# Patient Record
Sex: MALE | Race: BLACK OR AFRICAN AMERICAN | NOT HISPANIC OR LATINO | Employment: FULL TIME | URBAN - METROPOLITAN AREA
[De-identification: names, ages, dates, MRNs, and addresses within clinical notes are randomized per-mention and may not be internally consistent; named-entity substitution may affect disease eponyms.]

---

## 2018-03-29 ENCOUNTER — HOSPITAL ENCOUNTER (EMERGENCY)
Facility: HOSPITAL | Age: 38
Discharge: HOME | End: 2018-03-29
Attending: EMERGENCY MEDICINE
Payer: COMMERCIAL

## 2018-03-29 VITALS
WEIGHT: 203 LBS | HEART RATE: 100 BPM | SYSTOLIC BLOOD PRESSURE: 140 MMHG | TEMPERATURE: 100 F | RESPIRATION RATE: 20 BRPM | OXYGEN SATURATION: 97 % | DIASTOLIC BLOOD PRESSURE: 94 MMHG | BODY MASS INDEX: 34.84 KG/M2

## 2018-03-29 DIAGNOSIS — R03.0 ELEVATED BLOOD PRESSURE READING: ICD-10-CM

## 2018-03-29 DIAGNOSIS — R05.9 COUGH: ICD-10-CM

## 2018-03-29 DIAGNOSIS — J02.9 ACUTE PHARYNGITIS, UNSPECIFIED ETIOLOGY: Primary | ICD-10-CM

## 2018-03-29 LAB
FLUAV AG SPEC QL IA: NEGATIVE
FLUBV AG SPEC QL IA: NEGATIVE
SPECIMEN SOURCE: NORMAL

## 2018-03-29 PROCEDURE — 99284 EMERGENCY DEPT VISIT MOD MDM: CPT | Mod: 25

## 2018-03-29 PROCEDURE — 96372 THER/PROPH/DIAG INJ SC/IM: CPT

## 2018-03-29 PROCEDURE — 87400 INFLUENZA A/B EACH AG IA: CPT | Mod: 59

## 2018-03-29 PROCEDURE — 25000003 PHARM REV CODE 250: Performed by: EMERGENCY MEDICINE

## 2018-03-29 PROCEDURE — 63600175 PHARM REV CODE 636 W HCPCS: Performed by: EMERGENCY MEDICINE

## 2018-03-29 RX ORDER — BETAMETHASONE SODIUM PHOSPHATE AND BETAMETHASONE ACETATE 3; 3 MG/ML; MG/ML
12 INJECTION, SUSPENSION INTRA-ARTICULAR; INTRALESIONAL; INTRAMUSCULAR; SOFT TISSUE
Status: COMPLETED | OUTPATIENT
Start: 2018-03-29 | End: 2018-03-29

## 2018-03-29 RX ORDER — ACETAMINOPHEN 500 MG
1000 TABLET ORAL
Status: COMPLETED | OUTPATIENT
Start: 2018-03-29 | End: 2018-03-29

## 2018-03-29 RX ORDER — IBUPROFEN 200 MG
800 TABLET ORAL
Status: COMPLETED | OUTPATIENT
Start: 2018-03-29 | End: 2018-03-29

## 2018-03-29 RX ORDER — NAPROXEN 500 MG/1
500 TABLET ORAL 2 TIMES DAILY WITH MEALS
Qty: 20 TABLET | Refills: 0 | Status: SHIPPED | OUTPATIENT
Start: 2018-03-29 | End: 2019-08-25

## 2018-03-29 RX ORDER — TRAMADOL HYDROCHLORIDE 50 MG/1
50 TABLET ORAL EVERY 6 HOURS PRN
Qty: 12 TABLET | Refills: 0 | Status: SHIPPED | OUTPATIENT
Start: 2018-03-29 | End: 2019-08-25

## 2018-03-29 RX ORDER — CETIRIZINE HYDROCHLORIDE 10 MG/1
10 TABLET ORAL DAILY
Qty: 30 TABLET | Refills: 0 | Status: SHIPPED | OUTPATIENT
Start: 2018-03-29 | End: 2019-08-25

## 2018-03-29 RX ORDER — CLONIDINE HYDROCHLORIDE 0.1 MG/1
0.1 TABLET ORAL
Status: DISCONTINUED | OUTPATIENT
Start: 2018-03-29 | End: 2018-03-29

## 2018-03-29 RX ADMIN — BETAMETHASONE SODIUM PHOSPHATE AND BETAMETHASONE ACETATE 12 MG: 3; 3 INJECTION, SUSPENSION INTRA-ARTICULAR; INTRALESIONAL; INTRAMUSCULAR at 06:03

## 2018-03-29 RX ADMIN — PENICILLIN G BENZATHINE 1.2 MILLION UNITS: 1200000 INJECTION, SUSPENSION INTRAMUSCULAR at 06:03

## 2018-03-29 RX ADMIN — ACETAMINOPHEN 1000 MG: 500 TABLET, FILM COATED ORAL at 06:03

## 2018-03-29 RX ADMIN — IBUPROFEN 800 MG: 200 TABLET, FILM COATED ORAL at 06:03
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
## 2019-04-26 ENCOUNTER — HOSPITAL ENCOUNTER (EMERGENCY)
Facility: HOSPITAL | Age: 39
Discharge: HOME | End: 2019-04-26
Attending: EMERGENCY MEDICINE
Payer: MEDICAID

## 2019-04-26 VITALS
TEMPERATURE: 98 F | SYSTOLIC BLOOD PRESSURE: 169 MMHG | OXYGEN SATURATION: 99 % | HEART RATE: 95 BPM | DIASTOLIC BLOOD PRESSURE: 93 MMHG | BODY MASS INDEX: 33.74 KG/M2 | HEIGHT: 64 IN | WEIGHT: 197.63 LBS | RESPIRATION RATE: 18 BRPM

## 2019-04-26 DIAGNOSIS — S62.355A CLOSED NONDISPLACED FRACTURE OF SHAFT OF FOURTH METACARPAL BONE OF LEFT HAND, INITIAL ENCOUNTER: Primary | ICD-10-CM

## 2019-04-26 PROCEDURE — 99283 EMERGENCY DEPT VISIT LOW MDM: CPT | Mod: 25,ER

## 2019-04-26 PROCEDURE — 29125 APPL SHORT ARM SPLINT STATIC: CPT | Mod: LT,ER

## 2019-04-26 RX ORDER — HYDROCODONE BITARTRATE AND ACETAMINOPHEN 5; 325 MG/1; MG/1
1 TABLET ORAL EVERY 6 HOURS PRN
Qty: 15 TABLET | Refills: 0 | Status: SHIPPED | OUTPATIENT
Start: 2019-04-26 | End: 2019-08-25

## 2019-04-26 NOTE — ED NOTES
Pt states [employer] insurance and will f/u with [facility] orth[facility]. Pt stable, in NAD, and states no further needs at this time.MD aware of vitals.  Pt to be d/c'd home.

## 2019-04-26 NOTE — ED PROVIDER NOTES
Encounter Date: 4/26/2019       History     Chief Complaint   Patient presents with    Hand Injury     left hand, trip and fall 2 days ago      Patient is a 38-year-old male with no significant past medical history, who presents today for her emergency department today with complaints of left hand pain.  Patient states that he fell onto that hand 2 days ago and has had pain since.  He has had progressive swelling of the hand as well.  He has had no prior evaluation in no prior treatment.  Pain is worse with palpation and movement.  Pain is severe in severity and constant in duration        Review of patient's allergies indicates:  No Known Allergies  History reviewed. No pertinent past medical history.  History reviewed. No pertinent surgical history.  Family History   Problem Relation Age of Onset    Hypertension Mother     Diabetes Mother     Hypertension Father      Social History     Tobacco Use    Smoking status: Current Every Day Smoker     Packs/day: 1.00     Types: Cigarettes    Smokeless tobacco: Never Used   Substance Use Topics    Alcohol use: No    Drug use: No     Review of Systems     Constitutional: No fevers, no fatigue  HENT: No headache, no facial pain, no hearing loss  Eyes: No vision changes, no eye pain  Neck/Back: No neck pain, no back pain  Cardiovascular: No chest pain, no palpitations, no syncope  Respiratory: no SOB, no cough  Abdominal: no abdominal pain, no N/V  Genitourinary: no pelvic pain, no genital pain  Musculoskeletal:  Left hand pain/swelling   Neurological: No numbness, no paresthesias, no weakness, no LOC      Physical Exam     Initial Vitals [04/26/19 1050]   BP Pulse Resp Temp SpO2   (!) 169/93 95 18 97.5 °F (36.4 °C) 99 %      MAP       --         Physical Exam     Constitutional: Awake, alert, NAD  HENT: normocephalic, no facial bone tenderness, no evidence of basilar skull fx  Eyes: PERRL, EOM, normal conjunctiva  Neck: Trachea midline, nontender, full  ROM  Cardiovascular: RRR, 2+ palpable pulses in all 4 extremities  Pulmonary: Non-labored respirations, equal bilateral breath sounds, LCTAB  Chest Wall: No tenderness, no deformity  Abdominal: Soft, nontender, nondistended  Back: Nontender, no step-offs  Musculoskeletal:  Swelling to the entire left hand with tenderness most severe at the ring finger metacarpal neurovascularly intact; extension of left finger intact; patient reluctant to fully flex finger due to pain   Neurological: AAO x4, GCS 15, maintaining airway and answering questions appropriately, no focal deficits  Skin:  Skin intact with no lacerations      ED Course   Orthopedic Injury  Date/Time: 4/26/2019 5:00 PM  Performed by: Derrell Padilla MD  Authorized by: Derrell Padilla MD     Injury:     Injury location:  Hand    Location details:  Left hand    Injury type:  Fracture      Pre-procedure assessment:     Neurovascular status: Neurovascularly intact      Distal perfusion: normal      Neurological function: normal      Range of motion: reduced      Patient sedated?: No        Selections made in this section will also lock the Injury type section above.:     Manipulation performed?: No      Immobilization:  Splint    Splint type:  Ulnar gutter    Supplies used:  Ortho-Glass    Complications: No    Post-procedure assessment:     Neurovascular status: Neurovascularly intact      Distal perfusion: normal      Neurological function: normal      Range of motion: splinted      Patient tolerance:  Patient tolerated the procedure well with no immediate complications      Labs Reviewed - No data to display       Imaging Results          X-Ray Hand 3 View Left (Final result)  Result time 04/26/19 11:59:59    Final result by KARINE Barrientos Sr., MD (04/26/19 11:59:59)                 Impression:      1. There is an acute appearing oblique fracture in the diaphyseal portion of the 4th metacarpal.  2. There is mild prominence of the soft tissue thickness in the  dorsum of the left hand.      Electronically signed by: Praveen Barrientos MD  Date:    04/26/2019  Time:    11:59             Narrative:    EXAMINATION:  XR HAND COMPLETE 3 VIEW LEFT    CLINICAL HISTORY:  left hand pain;    COMPARISON:  None    FINDINGS:  There is an acute appearing oblique fracture in the diaphyseal portion of the 4th metacarpal.  There is no dislocation.  There is mild prominence of the soft tissue thickness in the dorsum of the left hand.                              Patient was offered pain medication here in the emergency department such as Tylenol or ibuprofen, but patient declined.  Patient was not given any narcotics because he is driving home.  Patient was given a prescription for hydrocodone and advised to follow up with Orthopedics.  Orthopedic information given to patient discharge                          Clinical Impression:   Final diagnoses:  [S62.355A] Closed nondisplaced fracture of shaft of fourth metacarpal bone of left hand, initial encounter (Primary)    Patient discharged home in stable condition in an ulnar gutter splint with instructions to follow up with Orthopedics and a prescription for hydrocodone; ER return precautions provided                             Derrell Padilla MD  04/26/19 [id number]

## 2019-06-27 ENCOUNTER — HOSPITAL ENCOUNTER (EMERGENCY)
Facility: HOSPITAL | Age: 39
Discharge: HOME | End: 2019-06-27
Attending: EMERGENCY MEDICINE
Payer: MEDICAID

## 2019-06-27 VITALS
OXYGEN SATURATION: 99 % | DIASTOLIC BLOOD PRESSURE: 99 MMHG | HEART RATE: 100 BPM | SYSTOLIC BLOOD PRESSURE: 169 MMHG | RESPIRATION RATE: 18 BRPM | BODY MASS INDEX: 33.76 KG/M2 | TEMPERATURE: 97 F | WEIGHT: 196.63 LBS

## 2019-06-27 DIAGNOSIS — L29.9 GENERALIZED PRURITUS: Primary | ICD-10-CM

## 2019-06-27 PROCEDURE — 25000003 PHARM REV CODE 250: Mod: ER | Performed by: EMERGENCY MEDICINE

## 2019-06-27 PROCEDURE — 99283 EMERGENCY DEPT VISIT LOW MDM: CPT | Mod: ER

## 2019-06-27 RX ORDER — HYDROXYZINE PAMOATE 25 MG/1
25 CAPSULE ORAL EVERY 4 HOURS PRN
Qty: 30 CAPSULE | Refills: 0 | Status: SHIPPED | OUTPATIENT
Start: 2019-06-27 | End: 2019-08-25

## 2019-06-27 RX ORDER — HYDROXYZINE PAMOATE 25 MG/1
50 CAPSULE ORAL
Status: COMPLETED | OUTPATIENT
Start: 2019-06-27 | End: 2019-06-27

## 2019-06-27 RX ADMIN — HYDROXYZINE PAMOATE 50 MG: 25 CAPSULE ORAL at 06:06

## 2019-06-27 NOTE — ED PROVIDER NOTES
Encounter Date: 6/27/2019       History     Chief Complaint   Patient presents with    Allergic Reaction     c/o possible allergic reaction. states swelling to left arm w/ bruises and bumps to neck and bilateral arms. unknown of what possibly allergic to.      Filemon La is a 38 y.o. male w/ no significant PMH p/w two days of constant, generalized itching that onset gradually after awakening yesterday AM at home. It originally started just on his left arm.  He has associated hives.  No prior episodes. He is not aware of any new exposures but he is exposed frequently to dust at work.         Review of patient's allergies indicates:  No Known Allergies  History reviewed. No pertinent past medical history.  History reviewed. No pertinent surgical history.  Family History   Problem Relation Age of Onset    Hypertension Mother     Diabetes Mother     Hypertension Father      Social History     Tobacco Use    Smoking status: Current Every Day Smoker     Packs/day: 1.00     Types: Cigarettes    Smokeless tobacco: Never Used   Substance Use Topics    Alcohol use: No    Drug use: No     Review of Systems   Constitutional: Negative.  Negative for fever.   HENT: Negative.    Eyes: Negative.    Respiratory: Negative.  Negative for cough and shortness of breath.    Cardiovascular: Negative.    Gastrointestinal: Negative.    Endocrine: Negative.    Genitourinary: Negative.    Musculoskeletal: Negative.    Skin: Negative.    Allergic/Immunologic: Negative.    Neurological: Negative.    Hematological: Negative.    Psychiatric/Behavioral: Negative.    All other systems reviewed and are negative.      Physical Exam     Initial Vitals [06/27/19 0555]   BP Pulse Resp Temp SpO2   (!) 191/91 99 20 97.4 °F (36.3 °C) 99 %      MAP       --         Physical Exam    Nursing note and vitals reviewed.  Constitutional: He appears well-developed and well-nourished. No distress.   HENT:   Head: Normocephalic and atraumatic.   Eyes:  Conjunctivae and EOM are normal. Pupils are equal, round, and reactive to light.   Neck: Neck supple.   Cardiovascular: Normal rate, regular rhythm, normal heart sounds and intact distal pulses.   Pulmonary/Chest: Breath sounds normal. No respiratory distress. He has no wheezes. He has no rhonchi. He has no rales.   Abdominal: Soft. He exhibits no distension. There is no tenderness.   Musculoskeletal: Normal range of motion. He exhibits no edema.   Neurological: He is alert and oriented to person, place, and time. He has normal strength.   Skin: Skin is warm and dry. Capillary refill takes less than 2 seconds. Rash (scattered erythematous rash over right anterolateral neck, both arms medially, and patches over the torso, with urticaria from 3 to 40 mm) noted.   No burrow marks or contaminants noted   Psychiatric: He has a normal mood and affect. His behavior is normal. Judgment and thought content normal.         ED Course   Procedures  Labs Reviewed - No data to display       Imaging Results    None        Presentation is most consistent with contact dermatitis to exposure at home or work, versus allergen exposure with reaction without anaphylaxis.  Rash is not classic for infestation but stated that if no improvement with supportive treatment and hygiene, will need to consider treatment such as permethrin.  Recommended Benadryl at night, looking for source at home, nonallergenic chemicals when possible, removing dust/chemicals from work as soon as feasible after exposure.                        Clinical Impression:       ICD-10-CM ICD-9-CM   1. Generalized pruritus L29.9 698.9         Disposition:   Disposition: Discharged  Condition: Stable                        Wesley Carcamo MD  06/27/19 [id number]

## 2019-08-25 ENCOUNTER — HOSPITAL ENCOUNTER (EMERGENCY)
Facility: HOSPITAL | Age: 39
Discharge: HOME | End: 2019-08-25
Attending: EMERGENCY MEDICINE
Payer: MEDICAID

## 2019-08-25 VITALS
HEART RATE: 107 BPM | BODY MASS INDEX: 34.16 KG/M2 | WEIGHT: 199 LBS | SYSTOLIC BLOOD PRESSURE: 128 MMHG | DIASTOLIC BLOOD PRESSURE: 87 MMHG | OXYGEN SATURATION: 97 % | RESPIRATION RATE: 20 BRPM | TEMPERATURE: 100 F

## 2019-08-25 DIAGNOSIS — J06.9 UPPER RESPIRATORY TRACT INFECTION, UNSPECIFIED TYPE: ICD-10-CM

## 2019-08-25 DIAGNOSIS — Z72.0 TOBACCO ABUSE: Primary | ICD-10-CM

## 2019-08-25 DIAGNOSIS — R05.9 COUGH: ICD-10-CM

## 2019-08-25 DIAGNOSIS — R19.7 DIARRHEA, UNSPECIFIED TYPE: ICD-10-CM

## 2019-08-25 LAB
DEPRECATED S PYO AG THROAT QL EIA: NEGATIVE
INFLUENZA A, MOLECULAR: NEGATIVE
INFLUENZA B, MOLECULAR: NEGATIVE
SPECIMEN SOURCE: NORMAL

## 2019-08-25 PROCEDURE — 99284 EMERGENCY DEPT VISIT MOD MDM: CPT | Mod: 25,ER

## 2019-08-25 PROCEDURE — 87081 CULTURE SCREEN ONLY: CPT

## 2019-08-25 PROCEDURE — 87880 STREP A ASSAY W/OPTIC: CPT | Mod: ER,59

## 2019-08-25 PROCEDURE — 25000003 PHARM REV CODE 250: Mod: ER | Performed by: EMERGENCY MEDICINE

## 2019-08-25 PROCEDURE — 87147 CULTURE TYPE IMMUNOLOGIC: CPT

## 2019-08-25 PROCEDURE — 87502 INFLUENZA DNA AMP PROBE: CPT | Mod: ER

## 2019-08-25 RX ORDER — IBUPROFEN 200 MG
600 TABLET ORAL
Status: COMPLETED | OUTPATIENT
Start: 2019-08-25 | End: 2019-08-25

## 2019-08-25 RX ORDER — ACETAMINOPHEN 500 MG
500 TABLET ORAL
Status: COMPLETED | OUTPATIENT
Start: 2019-08-25 | End: 2019-08-25

## 2019-08-25 RX ADMIN — IBUPROFEN 600 MG: 200 TABLET, FILM COATED ORAL at 09:08

## 2019-08-25 RX ADMIN — ACETAMINOPHEN 500 MG: 500 TABLET ORAL at 09:08

## 2019-08-25 NOTE — ED PROVIDER NOTES
History     Chief Complaint   Patient presents with    Generalized Body Aches     with chills       Review of patient's allergies indicates:  No Known Allergies    History of Present Illness   HPI    8/25/2019, 9:26 AM  The history is provided by the patient    Filemon La is a 38 y.o. male presenting to the ED for chills, body aches, cough, diarrhea and sore throat.  Patient's 3 year old daughter had similar episodes.    Prior treatment includes:  Motrin 400 mg last night.  Patient started with chills last night.  He had generalized body aches and pains.  He notes that his throat is sore.  It is painful for him to swallow.  Patient has a mild frontal headache not associated with neck pain or photophobia.  It is throbbing in nature.  It is not the worse headache of his life.  Patient is a smoker.  He does have a productive cough with green sputum.  Patient denies any rhinorrhea, nausea, vomiting, abdominal pain, numbness or weakness to 1 side.      Arrival mode:  Personal Vehicle    PCP: Primary Doctor No     Allergies:  Review of patient's allergies indicates:  No Known Allergies    Past Medical History:  History reviewed. No pertinent past medical history.    Past Surgical History:  History reviewed. No pertinent surgical history.      Family History:  Family History   Problem Relation Age of Onset    Hypertension Mother     Diabetes Mother     Hypertension Father        Social History:  Social History     Tobacco Use    Smoking status: Current Every Day Smoker     Packs/day: 1.00     Types: Cigarettes    Smokeless tobacco: Never Used   Substance and Sexual Activity    Alcohol use: No    Drug use: No    Sexual activity: Not on file        Review of Systems   Review of Systems   Constitutional: Positive for chills and fatigue. Negative for fever.   HENT: Positive for sinus pain and sore throat. Negative for congestion, sinus pressure, trouble swallowing and voice change.    Eyes: Negative for  photophobia.   Respiratory: Negative for shortness of breath.    Cardiovascular: Negative for chest pain.   Gastrointestinal: Positive for diarrhea (Times once). Negative for abdominal pain, nausea and vomiting.   Genitourinary: Negative for decreased urine volume, dysuria, frequency and urgency.   Musculoskeletal: Negative for back pain, neck pain and neck stiffness.   Skin: Negative for rash.   Neurological: Positive for headaches (Frontal, mild,  Not the most severe). Negative for weakness.   Hematological: Does not bruise/bleed easily.   Psychiatric/Behavioral: The patient is not nervous/anxious.           Physical Exam     Initial Vitals [08/25/19 0924]   BP Pulse Resp Temp SpO2   (!) 142/91 (!) 118 20 99.6 °F (37.6 °C) 96 %      MAP       --          Physical Exam    Nursing Notes and Vital Signs Reviewed.  Constitutional: Patient is in no acute distress.  Smells of smoke. Well-developed and well-nourished.  Head: Atraumatic. Normocephalic.  Eyes: PERRL. EOM intact. Conjunctivae are not pale. No scleral icterus.  ENT: Mucous membranes are moist. Oropharynx is clear and symmetric.  tympanic membranes pearly gray bilaterally. Oropharynx does have some erythema.  No exudate. Tonsils are symmetric. Slight nasal congestion noted.  Neck: Supple. Full ROM. No lymphadenopathy.  Cardiovascular: Tachycardic. Regular rate. Regular rhythm. No murmurs, rubs, or gallops. Distal pulses are 2+ and symmetric.  Pulmonary/Chest: No respiratory distress. Clear to auscultation bilaterally. No wheezing or rales.  Abdominal: Soft and non-distended.  There is no tenderness.  No rebound, guarding, or rigidity. Good bowel sounds.  Genitourinary: No CVA tenderness  Musculoskeletal: Moves all extremities. No obvious deformities. No edema. No calf tenderness.  Skin: Warm and dry.  Neurological:  Alert, awake, and appropriate.  Normal speech.  No acute focal neurological deficits are appreciated. Cranial nerves 2-12 intact.  Psychiatric:  Normal affect. Good eye contact. Appropriate in content.     ED Course     Procedures    ED Vital Signs:  Vitals:    08/25/19 0924 08/25/19 1027 08/25/19 1044   BP: (!) 142/91  128/87   Pulse: (!) 118 (!) 117 107   Resp: 20  20   Temp: 99.6 °F (37.6 °C)     TempSrc: Oral     SpO2: 96%  97%   Weight: 90.3 kg (198 lb 15.8 oz)         Abnormal Lab Results:  Labs Reviewed   THROAT SCREEN, RAPID   INFLUENZA A & B BY MOLECULAR   CULTURE, STREP A,  THROAT        All Lab Results:  Results for orders placed or performed during the hospital encounter of 08/25/19   Rapid strep screen   Result Value Ref Range    Rapid Strep A Screen Negative Negative   Influenza A & B by Molecular   Result Value Ref Range    Influenza A, Molecular Negative Negative    Influenza B, Molecular Negative Negative    Flu A & B Source NP              Imaging Results:  Imaging Results          X-Ray Chest PA And Lateral (Final result)  Result time 08/25/19 10:15:12    Final result by Johnnie Arias MD (08/25/19 10:15:12)                 Impression:      1.  Negative for acute process involving the chest.    2.  Stable findings as noted above.      Electronically signed by: Johnnie Arias MD  Date:    08/25/2019  Time:    10:15             Narrative:    EXAMINATION:  XR CHEST PA AND LATERAL    CLINICAL HISTORY:  Cough    COMPARISON:  March 28, 2018    FINDINGS:  The lungs are clear. The cardiac silhouette size is normal. The trachea is midline and the mediastinal width is normal. Negative for focal infiltrate, effusion or pneumothorax. Pulmonary vasculature is normal. Negative for osseous abnormalities. Tortuous aorta.  Marginal spondylosis.  Eventration of the hemidiaphragms.  Degenerative changes of both acromioclavicular joints.                                 The Emergency Provider reviewed the vital signs and test results, which are outlined above.     ED Discussion     11:06 AM  Reassessment: Dr. Chery reassessed the pt.  Patient states that  he feels much better.  No SIRS criteria.  Patient reports that he feels much better.  I discussed with patient the importance of oral rehydration.  Discussed fever control, indications to return to the emergency department.  Patient verbalized understanding.  The pt is resting comfortably and is NAD.  Pt states their sx have improved. Discussed test results, shared treatment plan, specific conditions for return, and the need for f/u.  Answered their questions at this time.  Pt understands and agrees to the plan.  The pt has remained hemodynamically stable through ED course and is stable for discharge.      I discussed with patient and/or family/caretaker that evaluation in the ED does not suggest any emergent or life threatening medical conditions requiring immediate intervention beyond what was provided in the ED, and I believe patient is safe for discharge.  Regardless, an unremarkable evaluation in the ED does not preclude the development or presence of a serious of life threatening condition. As such, patient was instructed to return immediately for any worsening or change in current symptoms.    Patient presents with upper respiratory and flulike symptoms. Based on my assessment in the ED, I do not suspect any respiratory, airway, pulmonary, cardiovascular (including myocarditis), metabolic, CNS, medical, or surgical emergency medical condition. I have discussed with the patient and/or caregiver signs and symptoms for secondary bacterial infections, such as pneumonia. I believe that the patient's symptoms are most consistent with a viral illness, possibly influenza. Patient is safe for discharge home with conservative therapy.        ED Medication(s):  Medications   ibuprofen tablet 600 mg (600 mg Oral Given 8/25/19 0946)   acetaminophen tablet 500 mg (500 mg Oral Given 8/25/19 0946)          Medication List      You have not been prescribed any medications.         Follow-up Information     [facility]  "Shilo In 2 days.    Why:  Return to emergency department for:  Stiff neck, severe headache, difficulty breathing, weakness, chest pain or other concerns.  Contact information:  [id number]  [place] [id number] [phone]                        MIPS Measures     Smoker? Yes     Hypertension: Pre-hypertension/Hypertension: The pt has been informed that they may have pre-hypertension or hypertension based on a blood pressure reading in the ED. I recommend that the pt call the PCP listed on their discharge instructions or a physician of their choice this week to arrange f/u for further evaluation of possible pre-hypertension or hypertension.        Medical Decision Making     Medical Decision Making:   Clinical Tests:   Lab Tests: Ordered and Reviewed  Radiological Study: Ordered and Reviewed       Additional MDM:   Smoking Cessation: The patient is a smoker. The patient was counseled on smoking cessation for: 3 minutes. The patient was counseled on tobacco related  health complications. Appropriate patient literature was given to the patient concerning tobacco cessation.        MDM  Reviewed: nursing note and vitals          Portions of this note may have been created with voice recognition software. Occasional "wrong-word" or "sound-a-like" substitutions may have occurred due to the inherent limitations of voice recognition software. Please, read the note carefully and recognize, using context, where substitutions have occurred.        Clinical Impression       ICD-10-CM ICD-9-CM   1. Tobacco abuse Z72.0 305.1   2. Cough R05 786.2   3. Upper respiratory tract infection, unspecified type J06.9 465.9   4. Diarrhea, unspecified type R19.7 787.91            Disposition: Discharge to home  Patient condition: Stable           Luci Chery, DO  08/25/19 1412    "

## 2019-08-26 LAB — BACTERIA THROAT CULT: ABNORMAL

## 2020-11-11 ENCOUNTER — HOSPITAL ENCOUNTER (EMERGENCY)
Facility: HOSPITAL | Age: 40
Discharge: HOME | End: 2020-11-11
Attending: EMERGENCY MEDICINE
Payer: MEDICAID

## 2020-11-11 VITALS
TEMPERATURE: 98 F | HEART RATE: 77 BPM | WEIGHT: 182.56 LBS | HEIGHT: 64 IN | RESPIRATION RATE: 20 BRPM | BODY MASS INDEX: 31.17 KG/M2 | DIASTOLIC BLOOD PRESSURE: 77 MMHG | SYSTOLIC BLOOD PRESSURE: 138 MMHG | OXYGEN SATURATION: 97 %

## 2020-11-11 DIAGNOSIS — Z20.822 EXPOSURE TO COVID-19 VIRUS: ICD-10-CM

## 2020-11-11 DIAGNOSIS — J06.9 VIRAL URI WITH COUGH: Primary | ICD-10-CM

## 2020-11-11 DIAGNOSIS — B34.9 VIRAL SYNDROME: ICD-10-CM

## 2020-11-11 LAB — SARS-COV-2 RDRP RESP QL NAA+PROBE: NEGATIVE

## 2020-11-11 PROCEDURE — 99283 EMERGENCY DEPT VISIT LOW MDM: CPT | Mod: ER

## 2020-11-11 PROCEDURE — U0002 COVID-19 LAB TEST NON-CDC: HCPCS | Mod: ER

## 2020-11-11 RX ORDER — PROMETHAZINE HYDROCHLORIDE AND DEXTROMETHORPHAN HYDROBROMIDE 6.25; 15 MG/5ML; MG/5ML
5 SYRUP ORAL EVERY 6 HOURS PRN
Qty: 120 ML | Refills: 0 | Status: SHIPPED | OUTPATIENT
Start: 2020-11-11 | End: 2020-11-21

## 2020-11-11 NOTE — Clinical Note
"Filemon Nicholson (Brian)ey was seen and treated in our emergency department on 11/11/2020.  He may return to work on 11/12/2020.       If you have any questions or concerns, please don't hesitate to call.      luis miguel brandt rn RN    "

## 2020-11-11 NOTE — ED PROVIDER NOTES
Encounter Date: 11/11/2020       History     Chief Complaint   Patient presents with    Nasal Congestion       URI  The primary symptoms include fever, sore throat, cough and myalgias. Primary symptoms do not include nausea or rash. The current episode started yesterday. The fever began yesterday.   The sore throat began yesterday.   The cough began yesterday.   The myalgias are not associated with weakness.   The onset of the illness is associated with exposure to sick contacts.     Review of patient's allergies indicates:  No Known Allergies  History reviewed. No pertinent past medical history.  History reviewed. No pertinent surgical history.  Family History   Problem Relation Age of Onset    Hypertension Mother     Diabetes Mother     Hypertension Father      Social History     Tobacco Use    Smoking status: Current Every Day Smoker     Packs/day: 1.00     Types: Cigarettes    Smokeless tobacco: Never Used   Substance Use Topics    Alcohol use: No    Drug use: No     Review of Systems   Constitutional: Positive for fever.   HENT: Positive for sore throat.    Respiratory: Positive for cough. Negative for shortness of breath.    Cardiovascular: Negative for chest pain.   Gastrointestinal: Negative for nausea.   Genitourinary: Negative for dysuria.   Musculoskeletal: Positive for myalgias. Negative for back pain.   Skin: Negative for rash.   Neurological: Negative for weakness.   Hematological: Does not bruise/bleed easily.       Physical Exam     Initial Vitals [11/11/20 1006]   BP Pulse Resp Temp SpO2   138/77 77 20 98.4 °F (36.9 °C) 97 %      MAP       --         Physical Exam    Nursing note and vitals reviewed.  Constitutional: He appears well-developed and well-nourished. No distress.   HENT:   Head: Normocephalic and atraumatic.   Mouth/Throat: Oropharynx is clear and moist.   Eyes: Conjunctivae and EOM are normal. Pupils are equal, round, and reactive to light.   Neck: Normal range of motion. Neck  supple.   Cardiovascular: Normal rate, regular rhythm and normal heart sounds. Exam reveals no gallop and no friction rub.    No murmur heard.  Pulmonary/Chest: Breath sounds normal. No respiratory distress. He has no wheezes. He has no rhonchi. He has no rales.   Abdominal: Soft. Bowel sounds are normal. He exhibits no distension and no mass. There is no abdominal tenderness. There is no rebound and no guarding.   Musculoskeletal: Normal range of motion. No edema.   Neurological: He is alert and oriented to person, place, and time. He has normal strength.   Skin: Skin is warm and dry. No rash noted.   Psychiatric: He has a normal mood and affect. Thought content normal.         ED Course   Procedures  Labs Reviewed   SARS-COV-2 RNA AMPLIFICATION, QUAL          Imaging Results    None                                      Clinical Impression:       ICD-10-CM ICD-9-CM   1. Viral URI with cough  J06.9 465.9   2. Viral syndrome  B34.9 079.99   3. Exposure to COVID-19 virus  Z20.828 V01.79                      Disposition:   Disposition: Discharged  Condition: Stable     ED Disposition Condition    Discharge Stable        ED Prescriptions     Medication Sig Dispense Start Date End Date Auth. Provider    promethazine-dextromethorphan (PROMETHAZINE-DM) 6.25-15 mg/5 mL Syrp Take 5 mLs by mouth every 6 (six) hours as needed. 120 mL 11/11/2020 11/21/2020 Pardeep Bright MD        Follow-up Information    None                                      Pardeep Bright MD  11/11/20 1111

## 2021-03-29 NOTE — DISCHARGE INSTRUCTIONS
Rapid Strep:  Negative    Influenza:  Negative    Chest Xray:  clear, no pneumonia    Use over the counter:  Afrin nasal spray OTC as directed.    Do not use more than 3 days in a row.    Use over the counter:   Nasal saline.  Use this as needed for dryness or congestion.     Get plenty of rest, keep well hydrated.    For throat pain, suck on lozenges or popsicles.    Return to the ED for:   Severe headache, confusion, difficulty breathing, rash, shortness of breath, fever or worsening condition.     Take frequent sips of Pedialyte, Powerade, Gatorade.  Popsicles.  Gaffney diet, bananas, breads, rice.  Avoid red sauces, fruit juices, and dairy products as can irritate your stomach.  If drinking water, be sure to have something salty such as crackers to help restore electrolytes.  Return to emergency department for: Weakness, passing out, blood in stool, blood in vomit, fever, worsening abdominal pain, or other concerns.       no edema, no murmurs, regular rate and rhythm

## 2021-10-26 ENCOUNTER — HOSPITAL ENCOUNTER (EMERGENCY)
Facility: HOSPITAL | Age: 41
Discharge: HOME | End: 2021-10-26
Attending: EMERGENCY MEDICINE
Payer: MEDICAID

## 2021-10-26 VITALS
OXYGEN SATURATION: 99 % | RESPIRATION RATE: 18 BRPM | SYSTOLIC BLOOD PRESSURE: 148 MMHG | BODY MASS INDEX: 31.37 KG/M2 | DIASTOLIC BLOOD PRESSURE: 93 MMHG | TEMPERATURE: 98 F | WEIGHT: 182.75 LBS | HEART RATE: 103 BPM

## 2021-10-26 DIAGNOSIS — N39.0 URINARY TRACT INFECTION WITHOUT HEMATURIA, SITE UNSPECIFIED: Primary | ICD-10-CM

## 2021-10-26 LAB
BACTERIA #/AREA URNS AUTO: ABNORMAL /HPF
BILIRUB UR QL STRIP: NEGATIVE
CLARITY UR REFRACT.AUTO: ABNORMAL
COLOR UR AUTO: YELLOW
GLUCOSE UR QL STRIP: NEGATIVE
HGB UR QL STRIP: ABNORMAL
KETONES UR QL STRIP: NEGATIVE
LEUKOCYTE ESTERASE UR QL STRIP: ABNORMAL
MICROSCOPIC COMMENT: ABNORMAL
NITRITE UR QL STRIP: NEGATIVE
PH UR STRIP: 7 [PH] (ref 5–8)
PROT UR QL STRIP: NEGATIVE
RBC #/AREA URNS AUTO: 4 /HPF (ref 0–4)
SP GR UR STRIP: 1.01 (ref 1–1.03)
URN SPEC COLLECT METH UR: ABNORMAL
UROBILINOGEN UR STRIP-ACNC: >=8 EU/DL
WBC #/AREA URNS AUTO: 20 /HPF (ref 0–5)
WBC CLUMPS UR QL AUTO: ABNORMAL

## 2021-10-26 PROCEDURE — 87086 URINE CULTURE/COLONY COUNT: CPT | Performed by: EMERGENCY MEDICINE

## 2021-10-26 PROCEDURE — 99283 EMERGENCY DEPT VISIT LOW MDM: CPT | Mod: ER

## 2021-10-26 PROCEDURE — 81000 URINALYSIS NONAUTO W/SCOPE: CPT | Mod: ER | Performed by: EMERGENCY MEDICINE

## 2021-10-26 RX ORDER — LEVOFLOXACIN 500 MG/1
500 TABLET, FILM COATED ORAL DAILY
Qty: 10 TABLET | Refills: 0 | Status: SHIPPED | OUTPATIENT
Start: 2021-10-26 | End: 2021-11-05

## 2021-10-28 LAB — BACTERIA UR CULT: NORMAL

## 2021-12-29 ENCOUNTER — HOSPITAL ENCOUNTER (EMERGENCY)
Facility: HOSPITAL | Age: 41
Discharge: HOME | End: 2021-12-30
Attending: EMERGENCY MEDICINE
Payer: MEDICAID

## 2021-12-29 VITALS
WEIGHT: 180.75 LBS | SYSTOLIC BLOOD PRESSURE: 120 MMHG | TEMPERATURE: 101 F | RESPIRATION RATE: 24 BRPM | DIASTOLIC BLOOD PRESSURE: 68 MMHG | OXYGEN SATURATION: 96 % | BODY MASS INDEX: 30.86 KG/M2 | HEART RATE: 129 BPM | HEIGHT: 64 IN

## 2021-12-29 DIAGNOSIS — U07.1 ACUTE COVID-19: Primary | ICD-10-CM

## 2021-12-29 LAB
CTP QC/QA: YES
SARS-COV-2 RDRP RESP QL NAA+PROBE: POSITIVE

## 2021-12-29 PROCEDURE — U0002 COVID-19 LAB TEST NON-CDC: HCPCS | Mod: ER | Performed by: EMERGENCY MEDICINE

## 2021-12-29 PROCEDURE — 99283 EMERGENCY DEPT VISIT LOW MDM: CPT | Mod: 25,ER

## 2021-12-29 RX ORDER — IBUPROFEN 200 MG
600 TABLET ORAL
Status: COMPLETED | OUTPATIENT
Start: 2021-12-30 | End: 2021-12-29

## 2021-12-29 RX ORDER — ACETAMINOPHEN 500 MG
1000 TABLET ORAL
Status: COMPLETED | OUTPATIENT
Start: 2021-12-30 | End: 2021-12-29

## 2021-12-29 RX ADMIN — IBUPROFEN 600 MG: 200 TABLET, FILM COATED ORAL at 11:12

## 2021-12-29 RX ADMIN — ACETAMINOPHEN 1000 MG: 500 TABLET ORAL at 11:12

## 2021-12-29 NOTE — Clinical Note
"Filemon Beaver" Abhinav was seen and treated in our emergency department on 12/29/2021.  He may return to work on 01/03/2022.       If you have any questions or concerns, please don't hesitate to call.      Jose Marks, RN RN    "

## 2021-12-29 NOTE — Clinical Note
"Filemon"Edouard La was seen and treated in our emergency department on 12/29/2021.     COVID-19 is present in our communities across the state. There is limited testing for COVID at this time, so not all patients can be tested. In this situation, your employee meets the following criteria:    Filemon La has met the criteria for COVID-19 testing and has a POSITIVE result. He can return to work once they are asymptomatic for 72 hours without the use of fever reducing medications AND at least ten days from the first positive result.     If you have any questions or concerns, or if I can be of further assistance, please do not hesitate to contact me.    Sincerely,             Santy Pop MD"

## 2021-12-30 PROCEDURE — 25000003 PHARM REV CODE 250: Mod: ER | Performed by: EMERGENCY MEDICINE

## 2021-12-30 NOTE — DISCHARGE INSTRUCTIONS
Quit smoking.  Follow routine isolation and self-care and monitoring instructions as printed including home pulse ox monitoring.  Return if less than 94%.  Once fully recovered, please get vaccinated.

## 2021-12-30 NOTE — ED PROVIDER NOTES
Encounter Date: 12/29/2021       History     Chief Complaint   Patient presents with    Fever     Relatively mild symptoms onset yesterday, cough, congestion, fever, aches.  Smoker, counseled to quit smoking.  Testing positive for coronavirus on arrival, never tested positive for, has received no doses of the vaccine.  Counseled in detail regarding all relevant concerns related to COVID, home care, home monitoring, home pulse oximetry, etc..  Stable for outpatient monitoring.  Strongly encouraged vaccination after recovered.    The history is provided by the patient. No  was used.     Review of patient's allergies indicates:  No Known Allergies  History reviewed. No pertinent past medical history.  History reviewed. No pertinent surgical history.  Family History   Problem Relation Age of Onset    Hypertension Mother     Diabetes Mother     Hypertension Father      Social History     Tobacco Use    Smoking status: Current Every Day Smoker     Packs/day: 1.00     Types: Cigarettes    Smokeless tobacco: Never Used   Substance Use Topics    Alcohol use: No    Drug use: No     Review of Systems   Constitutional: Positive for chills and fever.   HENT: Negative for congestion, facial swelling, nosebleeds and sinus pressure.    Eyes: Negative for pain and redness.   Respiratory: Positive for cough. Negative for chest tightness, shortness of breath and wheezing.    Cardiovascular: Negative for chest pain, palpitations and leg swelling.   Gastrointestinal: Negative for abdominal distention, abdominal pain, diarrhea, nausea and vomiting.   Endocrine: Negative for cold intolerance, polydipsia and polyphagia.   Genitourinary: Negative for difficulty urinating, dysuria, frequency and hematuria.   Musculoskeletal: Negative for arthralgias, back pain, myalgias and neck pain.   Skin: Negative for color change and rash.   Neurological: Positive for headaches. Negative for dizziness, weakness and numbness.    Hematological: Negative for adenopathy. Does not bruise/bleed easily.   Psychiatric/Behavioral: Negative for agitation and behavioral problems.   All other systems reviewed and are negative.      Physical Exam     Initial Vitals [12/29/21 2326]   BP Pulse Resp Temp SpO2   120/68 (!) 129 (!) 24 (!) 100.6 °F (38.1 °C) 96 %      MAP       --         Physical Exam    Nursing note and vitals reviewed.  Constitutional: He appears well-developed and well-nourished. He is not diaphoretic. No distress.   Mildly febrile   HENT:   Head: Normocephalic and atraumatic.   Mouth/Throat: Oropharynx is clear and moist. No oropharyngeal exudate.   Eyes: Conjunctivae and EOM are normal. Pupils are equal, round, and reactive to light. Right eye exhibits no discharge. Left eye exhibits no discharge. No scleral icterus.   Neck: Neck supple. No thyromegaly present. No tracheal deviation present. No JVD present.   Normal range of motion.  Cardiovascular: Regular rhythm and normal heart sounds. Exam reveals no gallop and no friction rub.    No murmur heard.  Mild tachycardia   Pulmonary/Chest: Breath sounds normal. No respiratory distress. He has no wheezes. He has no rhonchi. He has no rales. He exhibits no tenderness.   Abdominal: Abdomen is soft. Bowel sounds are normal. He exhibits no distension and no mass. There is no abdominal tenderness. There is no rebound and no guarding.   Musculoskeletal:         General: No tenderness or edema. Normal range of motion.      Cervical back: Normal range of motion and neck supple.     Lymphadenopathy:     He has no cervical adenopathy.   Neurological: He is alert and oriented to person, place, and time. He has normal strength. No cranial nerve deficit.   Skin: Skin is warm and dry. No rash noted. No erythema.   Psychiatric: He has a normal mood and affect. His behavior is normal. Judgment and thought content normal.         ED Course   Procedures  Labs Reviewed   SARS-COV-2 RDRP GENE - Abnormal;  Notable for the following components:       Result Value    POC Rapid COVID Positive (*)     All other components within normal limits          Imaging Results    None          Medications   acetaminophen tablet 1,000 mg (has no administration in time range)   ibuprofen tablet 600 mg (600 mg Oral Given 12/29/21 4518)                          Clinical Impression:   Final diagnoses:  [U07.1] Acute COVID-19 (Primary)          ED Disposition Condition    Discharge Stable        ED Prescriptions     None        Follow-up Information     Follow up With Specialties Details Why Contact Info    [facility] - Emergency Dept Emergency Medicine  As needed [id number] [address] [address] [phone]           Santy Pop MD  12/29/21 4051

## 2022-01-03 DIAGNOSIS — U07.1 COVID-19 VIRUS DETECTED: ICD-10-CM

## 2022-04-16 ENCOUNTER — HOSPITAL ENCOUNTER (EMERGENCY)
Facility: HOSPITAL | Age: 42
Discharge: HOME | End: 2022-04-16
Attending: EMERGENCY MEDICINE
Payer: MEDICAID

## 2022-04-16 VITALS
WEIGHT: 215.63 LBS | HEART RATE: 111 BPM | DIASTOLIC BLOOD PRESSURE: 81 MMHG | TEMPERATURE: 98 F | HEIGHT: 64 IN | BODY MASS INDEX: 36.81 KG/M2 | OXYGEN SATURATION: 97 % | RESPIRATION RATE: 17 BRPM | SYSTOLIC BLOOD PRESSURE: 139 MMHG

## 2022-04-16 DIAGNOSIS — R10.9 FLANK PAIN: Primary | ICD-10-CM

## 2022-04-16 DIAGNOSIS — K76.0 FATTY LIVER: ICD-10-CM

## 2022-04-16 DIAGNOSIS — R73.9 ELEVATED BLOOD SUGAR: ICD-10-CM

## 2022-04-16 LAB
ALBUMIN SERPL BCP-MCNC: 3.7 G/DL (ref 3.5–5.2)
ALP SERPL-CCNC: 70 U/L (ref 55–135)
ALT SERPL W/O P-5'-P-CCNC: 58 U/L (ref 10–44)
ANION GAP SERPL CALC-SCNC: 13 MMOL/L (ref 8–16)
AST SERPL-CCNC: 43 U/L (ref 10–40)
BASOPHILS # BLD AUTO: 0.06 K/UL (ref 0–0.2)
BASOPHILS NFR BLD: 0.8 % (ref 0–1.9)
BILIRUB SERPL-MCNC: 1.6 MG/DL (ref 0.1–1)
BILIRUB UR QL STRIP: NEGATIVE
BUN SERPL-MCNC: 9 MG/DL (ref 6–20)
CALCIUM SERPL-MCNC: 8.9 MG/DL (ref 8.7–10.5)
CHLORIDE SERPL-SCNC: 110 MMOL/L (ref 95–110)
CLARITY UR REFRACT.AUTO: CLEAR
CO2 SERPL-SCNC: 20 MMOL/L (ref 23–29)
COLOR UR AUTO: YELLOW
CREAT SERPL-MCNC: 1 MG/DL (ref 0.5–1.4)
DIFFERENTIAL METHOD: ABNORMAL
EOSINOPHIL # BLD AUTO: 0 K/UL (ref 0–0.5)
EOSINOPHIL NFR BLD: 0.6 % (ref 0–8)
ERYTHROCYTE [DISTWIDTH] IN BLOOD BY AUTOMATED COUNT: 12.4 % (ref 11.5–14.5)
EST. GFR  (AFRICAN AMERICAN): >60 ML/MIN/1.73 M^2
EST. GFR  (NON AFRICAN AMERICAN): >60 ML/MIN/1.73 M^2
GLUCOSE SERPL-MCNC: 164 MG/DL (ref 70–110)
GLUCOSE UR QL STRIP: NEGATIVE
HCT VFR BLD AUTO: 44 % (ref 40–54)
HGB BLD-MCNC: 15.4 G/DL (ref 14–18)
HGB UR QL STRIP: ABNORMAL
IMM GRANULOCYTES # BLD AUTO: 0.03 K/UL (ref 0–0.04)
IMM GRANULOCYTES NFR BLD AUTO: 0.4 % (ref 0–0.5)
KETONES UR QL STRIP: ABNORMAL
LEUKOCYTE ESTERASE UR QL STRIP: NEGATIVE
LYMPHOCYTES # BLD AUTO: 2.8 K/UL (ref 1–4.8)
LYMPHOCYTES NFR BLD: 38.9 % (ref 18–48)
MCH RBC QN AUTO: 35.7 PG (ref 27–31)
MCHC RBC AUTO-ENTMCNC: 35 G/DL (ref 32–36)
MCV RBC AUTO: 102 FL (ref 82–98)
MONOCYTES # BLD AUTO: 0.5 K/UL (ref 0.3–1)
MONOCYTES NFR BLD: 6.5 % (ref 4–15)
NEUTROPHILS # BLD AUTO: 3.8 K/UL (ref 1.8–7.7)
NEUTROPHILS NFR BLD: 52.8 % (ref 38–73)
NITRITE UR QL STRIP: NEGATIVE
NRBC BLD-RTO: 0 /100 WBC
PH UR STRIP: 7 [PH] (ref 5–8)
PLATELET # BLD AUTO: 172 K/UL (ref 150–450)
PMV BLD AUTO: 12.2 FL (ref 9.2–12.9)
POTASSIUM SERPL-SCNC: 3.9 MMOL/L (ref 3.5–5.1)
PROT SERPL-MCNC: 6.7 G/DL (ref 6–8.4)
PROT UR QL STRIP: ABNORMAL
RBC # BLD AUTO: 4.31 M/UL (ref 4.6–6.2)
SODIUM SERPL-SCNC: 143 MMOL/L (ref 136–145)
SP GR UR STRIP: 1.01 (ref 1–1.03)
URN SPEC COLLECT METH UR: ABNORMAL
UROBILINOGEN UR STRIP-ACNC: ABNORMAL EU/DL
WBC # BLD AUTO: 7.12 K/UL (ref 3.9–12.7)

## 2022-04-16 PROCEDURE — 99284 EMERGENCY DEPT VISIT MOD MDM: CPT | Mod: 25,ER

## 2022-04-16 PROCEDURE — 85025 COMPLETE CBC W/AUTO DIFF WBC: CPT | Mod: ER | Performed by: EMERGENCY MEDICINE

## 2022-04-16 PROCEDURE — 80053 COMPREHEN METABOLIC PANEL: CPT | Mod: ER | Performed by: EMERGENCY MEDICINE

## 2022-04-16 PROCEDURE — 81003 URINALYSIS AUTO W/O SCOPE: CPT | Mod: ER | Performed by: EMERGENCY MEDICINE

## 2022-04-16 NOTE — ED PROVIDER NOTES
Encounter Date: 4/16/2022       History     Chief Complaint   Patient presents with    Flank Pain     Bilateral X2 weeks     The history is provided by the patient.   Flank Pain  This is a new problem. The current episode started more than 1 week ago. The problem occurs daily. The problem has not changed since onset.Pertinent negatives include no chest pain, no abdominal pain, no headaches and no shortness of breath. Nothing aggravates the symptoms. Nothing relieves the symptoms.     Review of patient's allergies indicates:  No Known Allergies  History reviewed. No pertinent past medical history.  History reviewed. No pertinent surgical history.  Family History   Problem Relation Age of Onset    Hypertension Mother     Diabetes Mother     Hypertension Father      Social History     Tobacco Use    Smoking status: Current Every Day Smoker     Packs/day: 1.00     Types: Cigarettes    Smokeless tobacco: Never Used   Substance Use Topics    Alcohol use: No    Drug use: No     Review of Systems   Constitutional: Negative for fever.   HENT: Negative for sore throat.    Respiratory: Negative for shortness of breath.    Cardiovascular: Negative for chest pain.   Gastrointestinal: Negative for abdominal pain and nausea.   Genitourinary: Positive for flank pain. Negative for dysuria.   Musculoskeletal: Negative for back pain.   Skin: Negative for rash.   Neurological: Negative for weakness and headaches.   Hematological: Does not bruise/bleed easily.       Physical Exam     Initial Vitals [04/16/22 1038]   BP Pulse Resp Temp SpO2   127/86 (!) 115 18 97.8 °F (36.6 °C) 97 %      MAP       --         Physical Exam    Nursing note and vitals reviewed.  Constitutional: He appears well-developed and well-nourished. No distress.   HENT:   Head: Normocephalic and atraumatic.   Mouth/Throat: Oropharynx is clear and moist.   Eyes: Conjunctivae and EOM are normal. Pupils are equal, round, and reactive to light.   Neck: Neck  supple.   Normal range of motion.  Cardiovascular: Normal rate, regular rhythm and normal heart sounds. Exam reveals no gallop and no friction rub.    No murmur heard.  Pulmonary/Chest: Breath sounds normal. No respiratory distress. He has no wheezes. He has no rhonchi. He has no rales.   Abdominal: Abdomen is soft. Bowel sounds are normal. He exhibits no distension and no mass. There is no abdominal tenderness. There is no rebound and no guarding.   Musculoskeletal:         General: No edema. Normal range of motion.      Cervical back: Normal range of motion and neck supple.     Neurological: He is alert and oriented to person, place, and time. He has normal strength.   Skin: Skin is warm and dry. No rash noted.   Psychiatric: He has a normal mood and affect. Thought content normal.         ED Course   Procedures  Labs Reviewed   CBC W/ AUTO DIFFERENTIAL - Abnormal; Notable for the following components:       Result Value    RBC 4.31 (*)      (*)     MCH 35.7 (*)     All other components within normal limits   COMPREHENSIVE METABOLIC PANEL - Abnormal; Notable for the following components:    CO2 20 (*)     Glucose 164 (*)     Total Bilirubin 1.6 (*)     AST 43 (*)     ALT 58 (*)     All other components within normal limits   URINALYSIS, REFLEX TO URINE CULTURE - Abnormal; Notable for the following components:    Protein, UA Trace (*)     Ketones, UA Trace (*)     Occult Blood UA Trace (*)     Urobilinogen, UA 2.0-3.0 (*)     All other components within normal limits    Narrative:     Specimen Source->Urine          Imaging Results          CT Renal Stone Study ABD Pelvis WO (Final result)  Result time 04/16/22 11:18:06    Final result by Praveen Mejia MD (04/16/22 11:18:06)                 Impression:      1. Negative for acute inflammatory process of the abdomen or pelvis.  2. Severe fatty infiltration of the liver.  3. Negative for renal/ureteral calculus or obstruction.  All CT scans at this  "facility are performed  using dose modulation techniques as appropriate to performed exam including the following:  automated exposure control; adjustment of mA and/or kV according to the patients size (this includes techniques or standardized protocols for targeted exams where dose is matched to indication/reason for exam: i.e. extremities or head);  iterative reconstruction technique.      Electronically signed by: Praveen Mejia  Date:    04/16/2022  Time:    11:18             Narrative:    EXAMINATION:  CT RENAL STONE STUDY ABD PELVIS WO    CLINICAL HISTORY:  Flank pain, kidney stone suspected;.    TECHNIQUE:  Low dose axial images, sagittal and coronal reformations were obtained from the lung bases to the pubic symphysis.    COMPARISON:  None    FINDINGS:  Lung bases are clear.    Diffuse fatty infiltration of the liver.  Normal gallbladder.    Normal spleen.  Normal pancreas.  Normal adrenal glands.  The aorta and IVC are normal.  No retroperitoneal adenopathy.    The left kidney is normal.  Left ureter is normal.  Right kidney is normal.  Right ureter is normal.    Stomach is normal.  The small intestine is normal.  The appendix is normal.  The colon is normal.    The pelvis demonstrates no abnormalities.    Regional bones demonstrate no suspicious bony abnormality.  Minimal anterior wedging L1 with small Schmorl's node.  Finding likely represents normal variant rather than compression fracture.  Abdominal and pelvic wall soft tissues are normal.                              ED Vital Signs:  Vitals:    04/16/22 1038 04/16/22 1134   BP: 127/86 139/81   Pulse: (!) 115 (!) 113   Resp: 18 17   Temp: 97.8 °F (36.6 °C)    TempSrc: Oral    SpO2: 97% 96%   Weight: 97.8 kg (215 lb 9.8 oz)    Height: 5' 4" (1.626 m)          Abnormal Lab Results:  Labs Reviewed   CBC W/ AUTO DIFFERENTIAL - Abnormal; Notable for the following components:       Result Value    RBC 4.31 (*)      (*)     MCH 35.7 (*)     All " other components within normal limits   COMPREHENSIVE METABOLIC PANEL - Abnormal; Notable for the following components:    CO2 20 (*)     Glucose 164 (*)     Total Bilirubin 1.6 (*)     AST 43 (*)     ALT 58 (*)     All other components within normal limits   URINALYSIS, REFLEX TO URINE CULTURE - Abnormal; Notable for the following components:    Protein, UA Trace (*)     Ketones, UA Trace (*)     Occult Blood UA Trace (*)     Urobilinogen, UA 2.0-3.0 (*)     All other components within normal limits    Narrative:     Specimen Source->Urine          All Lab Results:  Results for orders placed or performed during the hospital encounter of 04/16/22   CBC Auto Differential   Result Value Ref Range    WBC 7.12 3.90 - 12.70 K/uL    RBC 4.31 (L) 4.60 - 6.20 M/uL    Hemoglobin 15.4 14.0 - 18.0 g/dL    Hematocrit 44.0 40.0 - 54.0 %     (H) 82 - 98 fL    MCH 35.7 (H) 27.0 - 31.0 pg    MCHC 35.0 32.0 - 36.0 g/dL    RDW 12.4 11.5 - 14.5 %    Platelets 172 150 - 450 K/uL    MPV 12.2 9.2 - 12.9 fL    Immature Granulocytes 0.4 0.0 - 0.5 %    Gran # (ANC) 3.8 1.8 - 7.7 K/uL    Immature Grans (Abs) 0.03 0.00 - 0.04 K/uL    Lymph # 2.8 1.0 - 4.8 K/uL    Mono # 0.5 0.3 - 1.0 K/uL    Eos # 0.0 0.0 - 0.5 K/uL    Baso # 0.06 0.00 - 0.20 K/uL    nRBC 0 0 /100 WBC    Gran % 52.8 38.0 - 73.0 %    Lymph % 38.9 18.0 - 48.0 %    Mono % 6.5 4.0 - 15.0 %    Eosinophil % 0.6 0.0 - 8.0 %    Basophil % 0.8 0.0 - 1.9 %    Differential Method Automated    Comprehensive Metabolic Panel   Result Value Ref Range    Sodium 143 136 - 145 mmol/L    Potassium 3.9 3.5 - 5.1 mmol/L    Chloride 110 95 - 110 mmol/L    CO2 20 (L) 23 - 29 mmol/L    Glucose 164 (H) 70 - 110 mg/dL    BUN 9 6 - 20 mg/dL    Creatinine 1.0 0.5 - 1.4 mg/dL    Calcium 8.9 8.7 - 10.5 mg/dL    Total Protein 6.7 6.0 - 8.4 g/dL    Albumin 3.7 3.5 - 5.2 g/dL    Total Bilirubin 1.6 (H) 0.1 - 1.0 mg/dL    Alkaline Phosphatase 70 55 - 135 U/L    AST 43 (H) 10 - 40 U/L    ALT 58 (H) 10 -  44 U/L    Anion Gap 13 8 - 16 mmol/L    eGFR if African American >60.0 >60 mL/min/1.73 m^2    eGFR if non African American >60.0 >60 mL/min/1.73 m^2   Urinalysis, Reflex to Urine Culture Urine, Clean Catch    Specimen: Urine   Result Value Ref Range    Specimen UA Urine, Clean Catch     Color, UA Yellow Yellow, Straw, Jessy    Appearance, UA Clear Clear    pH, UA 7.0 5.0 - 8.0    Specific Gravity, UA 1.015 1.005 - 1.030    Protein, UA Trace (A) Negative    Glucose, UA Negative Negative    Ketones, UA Trace (A) Negative    Bilirubin (UA) Negative Negative    Occult Blood UA Trace (A) Negative    Nitrite, UA Negative Negative    Urobilinogen, UA 2.0-3.0 (A) <2.0 EU/dL    Leukocytes, UA Negative Negative           Imaging Results:  Imaging Results          CT Renal Stone Study ABD Pelvis WO (Final result)  Result time 04/16/22 11:18:06    Final result by Praveen Mejia MD (04/16/22 11:18:06)                 Impression:      1. Negative for acute inflammatory process of the abdomen or pelvis.  2. Severe fatty infiltration of the liver.  3. Negative for renal/ureteral calculus or obstruction.  All CT scans at this facility are performed  using dose modulation techniques as appropriate to performed exam including the following:  automated exposure control; adjustment of mA and/or kV according to the patients size (this includes techniques or standardized protocols for targeted exams where dose is matched to indication/reason for exam: i.e. extremities or head);  iterative reconstruction technique.      Electronically signed by: Praveen Mejia  Date:    04/16/2022  Time:    11:18             Narrative:    EXAMINATION:  CT RENAL STONE STUDY ABD PELVIS WO    CLINICAL HISTORY:  Flank pain, kidney stone suspected;.    TECHNIQUE:  Low dose axial images, sagittal and coronal reformations were obtained from the lung bases to the pubic symphysis.    COMPARISON:  None    FINDINGS:  Lung bases are clear.    Diffuse fatty  infiltration of the liver.  Normal gallbladder.    Normal spleen.  Normal pancreas.  Normal adrenal glands.  The aorta and IVC are normal.  No retroperitoneal adenopathy.    The left kidney is normal.  Left ureter is normal.  Right kidney is normal.  Right ureter is normal.    Stomach is normal.  The small intestine is normal.  The appendix is normal.  The colon is normal.    The pelvis demonstrates no abnormalities.    Regional bones demonstrate no suspicious bony abnormality.  Minimal anterior wedging L1 with small Schmorl's node.  Finding likely represents normal variant rather than compression fracture.  Abdominal and pelvic wall soft tissues are normal.                                   The Emergency Provider reviewed the vital signs and test results, which are outlined above.    ED Discussions:  11:40 AM: Reassessed pt at this time.  Pt states his condition has improved at this time. Discussed with pt all pertinent ED information and results. Discussed pt dx of flank pain, fatty liver and plan of tx. Gave pt all f/u and return to the ED instructions. All questions and concerns were addressed at this time. Pt expresses understanding of information and instructions, and is comfortable with plan to discharge. Pt is stable for discharge.               Medications - No data to display                       Clinical Impression:   Final diagnoses:  [R10.9] Flank pain (Primary)  [K76.0] Fatty liver  [R73.9] Elevated blood sugar          ED Disposition Condition    Discharge Stable        ED Prescriptions     None        Follow-up Information     Follow up With Specialties Details Why Contact [facility]  Call in 1 day  [id number]  [place] [address]  [phone]             Pardeep Bright MD  04/16/22 0442

## 2022-05-21 ENCOUNTER — HOSPITAL ENCOUNTER (OUTPATIENT)
Facility: HOSPITAL | Age: 42
Discharge: HOME | End: 2022-05-22
Attending: EMERGENCY MEDICINE | Admitting: EMERGENCY MEDICINE
Payer: MEDICAID

## 2022-05-21 DIAGNOSIS — R79.89 ELEVATED TROPONIN: ICD-10-CM

## 2022-05-21 DIAGNOSIS — M25.569 KNEE PAIN: ICD-10-CM

## 2022-05-21 DIAGNOSIS — J18.9 PNEUMONIA OF RIGHT MIDDLE LOBE DUE TO INFECTIOUS ORGANISM: Primary | ICD-10-CM

## 2022-05-21 DIAGNOSIS — R00.0 TACHYCARDIA: ICD-10-CM

## 2022-05-21 DIAGNOSIS — R07.9 CHEST PAIN: ICD-10-CM

## 2022-05-21 DIAGNOSIS — M79.606 LEG PAIN: ICD-10-CM
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
## 2022-05-21 LAB
ALBUMIN SERPL BCP-MCNC: 3.9 G/DL (ref 3.5–5.2)
ALP SERPL-CCNC: 84 U/L (ref 55–135)
ALT SERPL W/O P-5'-P-CCNC: 30 U/L (ref 10–44)
ANION GAP SERPL CALC-SCNC: 13 MMOL/L (ref 8–16)
AST SERPL-CCNC: 34 U/L (ref 10–40)
BASOPHILS # BLD AUTO: 0.06 K/UL (ref 0–0.2)
BASOPHILS NFR BLD: 0.9 % (ref 0–1.9)
BILIRUB SERPL-MCNC: 1.5 MG/DL (ref 0.1–1)
BILIRUB UR QL STRIP: NEGATIVE
BNP SERPL-MCNC: 281 PG/ML (ref 0–99)
BUN SERPL-MCNC: 6 MG/DL (ref 6–20)
CALCIUM SERPL-MCNC: 8.8 MG/DL (ref 8.7–10.5)
CHLORIDE SERPL-SCNC: 112 MMOL/L (ref 95–110)
CK SERPL-CCNC: 359 U/L (ref 20–200)
CLARITY UR REFRACT.AUTO: ABNORMAL
CO2 SERPL-SCNC: 19 MMOL/L (ref 23–29)
COLOR UR AUTO: ABNORMAL
CREAT SERPL-MCNC: 0.9 MG/DL (ref 0.5–1.4)
CTP QC/QA: YES
DIFFERENTIAL METHOD: ABNORMAL
EOSINOPHIL # BLD AUTO: 0.1 K/UL (ref 0–0.5)
EOSINOPHIL NFR BLD: 0.7 % (ref 0–8)
ERYTHROCYTE [DISTWIDTH] IN BLOOD BY AUTOMATED COUNT: 13 % (ref 11.5–14.5)
EST. GFR  (AFRICAN AMERICAN): >60 ML/MIN/1.73 M^2
EST. GFR  (NON AFRICAN AMERICAN): >60 ML/MIN/1.73 M^2
GLUCOSE SERPL-MCNC: 92 MG/DL (ref 70–110)
GLUCOSE UR QL STRIP: NEGATIVE
HCT VFR BLD AUTO: 44.4 % (ref 40–54)
HGB BLD-MCNC: 15.8 G/DL (ref 14–18)
HGB UR QL STRIP: ABNORMAL
IMM GRANULOCYTES # BLD AUTO: 0.03 K/UL (ref 0–0.04)
IMM GRANULOCYTES NFR BLD AUTO: 0.4 % (ref 0–0.5)
KETONES UR QL STRIP: ABNORMAL
LEUKOCYTE ESTERASE UR QL STRIP: NEGATIVE
LYMPHOCYTES # BLD AUTO: 3 K/UL (ref 1–4.8)
LYMPHOCYTES NFR BLD: 43.1 % (ref 18–48)
MCH RBC QN AUTO: 36 PG (ref 27–31)
MCHC RBC AUTO-ENTMCNC: 35.6 G/DL (ref 32–36)
MCV RBC AUTO: 101 FL (ref 82–98)
MONOCYTES # BLD AUTO: 0.5 K/UL (ref 0.3–1)
MONOCYTES NFR BLD: 7.5 % (ref 4–15)
NEUTROPHILS # BLD AUTO: 3.3 K/UL (ref 1.8–7.7)
NEUTROPHILS NFR BLD: 47.4 % (ref 38–73)
NITRITE UR QL STRIP: NEGATIVE
NRBC BLD-RTO: 0 /100 WBC
PH UR STRIP: 6 [PH] (ref 5–8)
PLATELET # BLD AUTO: 207 K/UL (ref 150–450)
PMV BLD AUTO: 11.7 FL (ref 9.2–12.9)
POTASSIUM SERPL-SCNC: 3.9 MMOL/L (ref 3.5–5.1)
PROT SERPL-MCNC: 7.3 G/DL (ref 6–8.4)
PROT UR QL STRIP: ABNORMAL
RBC # BLD AUTO: 4.39 M/UL (ref 4.6–6.2)
SARS-COV-2 RDRP RESP QL NAA+PROBE: NEGATIVE
SODIUM SERPL-SCNC: 144 MMOL/L (ref 136–145)
SP GR UR STRIP: 1.02 (ref 1–1.03)
TROPONIN I SERPL DL<=0.01 NG/ML-MCNC: 0.04 NG/ML (ref 0–0.03)
URN SPEC COLLECT METH UR: ABNORMAL
UROBILINOGEN UR STRIP-ACNC: ABNORMAL EU/DL
WBC # BLD AUTO: 6.93 K/UL (ref 3.9–12.7)

## 2022-05-21 PROCEDURE — 96365 THER/PROPH/DIAG IV INF INIT: CPT | Mod: 59,ER

## 2022-05-21 PROCEDURE — 96367 TX/PROPH/DG ADDL SEQ IV INF: CPT

## 2022-05-21 PROCEDURE — 99285 EMERGENCY DEPT VISIT HI MDM: CPT | Mod: 25,ER

## 2022-05-21 PROCEDURE — 93005 ELECTROCARDIOGRAM TRACING: CPT | Mod: ER

## 2022-05-21 PROCEDURE — 84484 ASSAY OF TROPONIN QUANT: CPT | Mod: ER | Performed by: EMERGENCY MEDICINE

## 2022-05-21 PROCEDURE — 83880 ASSAY OF NATRIURETIC PEPTIDE: CPT | Mod: ER | Performed by: EMERGENCY MEDICINE

## 2022-05-21 PROCEDURE — 63600175 PHARM REV CODE 636 W HCPCS: Performed by: EMERGENCY MEDICINE

## 2022-05-21 PROCEDURE — 85025 COMPLETE CBC W/AUTO DIFF WBC: CPT | Mod: ER | Performed by: EMERGENCY MEDICINE

## 2022-05-21 PROCEDURE — 81003 URINALYSIS AUTO W/O SCOPE: CPT | Mod: ER | Performed by: EMERGENCY MEDICINE

## 2022-05-21 PROCEDURE — 87040 BLOOD CULTURE FOR BACTERIA: CPT | Mod: 59 | Performed by: EMERGENCY MEDICINE

## 2022-05-21 PROCEDURE — 82550 ASSAY OF CK (CPK): CPT | Mod: ER | Performed by: EMERGENCY MEDICINE

## 2022-05-21 PROCEDURE — G0378 HOSPITAL OBSERVATION PER HR: HCPCS

## 2022-05-21 PROCEDURE — 93010 EKG 12-LEAD: ICD-10-PCS | Mod: ,,, | Performed by: STUDENT IN AN ORGANIZED HEALTH CARE EDUCATION/TRAINING PROGRAM

## 2022-05-21 PROCEDURE — 93010 ELECTROCARDIOGRAM REPORT: CPT | Mod: ,,, | Performed by: STUDENT IN AN ORGANIZED HEALTH CARE EDUCATION/TRAINING PROGRAM

## 2022-05-21 PROCEDURE — 25000003 PHARM REV CODE 250: Performed by: EMERGENCY MEDICINE

## 2022-05-21 PROCEDURE — 25500020 PHARM REV CODE 255: Mod: ER | Performed by: EMERGENCY MEDICINE

## 2022-05-21 PROCEDURE — 80053 COMPREHEN METABOLIC PANEL: CPT | Mod: ER | Performed by: EMERGENCY MEDICINE

## 2022-05-21 PROCEDURE — A4216 STERILE WATER/SALINE, 10 ML: HCPCS | Performed by: EMERGENCY MEDICINE

## 2022-05-21 PROCEDURE — U0002 COVID-19 LAB TEST NON-CDC: HCPCS | Mod: ER | Performed by: EMERGENCY MEDICINE

## 2022-05-21 PROCEDURE — 63600175 PHARM REV CODE 636 W HCPCS: Mod: ER | Performed by: EMERGENCY MEDICINE

## 2022-05-21 PROCEDURE — S4991 NICOTINE PATCH NONLEGEND: HCPCS | Performed by: EMERGENCY MEDICINE

## 2022-05-21 RX ORDER — ONDANSETRON 8 MG/1
8 TABLET, ORALLY DISINTEGRATING ORAL EVERY 8 HOURS PRN
Status: DISCONTINUED | OUTPATIENT
Start: 2022-05-21 | End: 2022-05-22 | Stop reason: HOSPADM

## 2022-05-21 RX ORDER — SODIUM CHLORIDE 0.9 % (FLUSH) 0.9 %
10 SYRINGE (ML) INJECTION EVERY 8 HOURS
Status: DISCONTINUED | OUTPATIENT
Start: 2022-05-21 | End: 2022-05-22 | Stop reason: HOSPADM

## 2022-05-21 RX ORDER — LANOLIN ALCOHOL/MO/W.PET/CERES
800 CREAM (GRAM) TOPICAL
Status: DISCONTINUED | OUTPATIENT
Start: 2022-05-21 | End: 2022-05-22 | Stop reason: HOSPADM

## 2022-05-21 RX ORDER — ACETAMINOPHEN 325 MG/1
650 TABLET ORAL EVERY 4 HOURS PRN
Status: DISCONTINUED | OUTPATIENT
Start: 2022-05-21 | End: 2022-05-22 | Stop reason: HOSPADM

## 2022-05-21 RX ORDER — SODIUM,POTASSIUM PHOSPHATES 280-250MG
2 POWDER IN PACKET (EA) ORAL
Status: DISCONTINUED | OUTPATIENT
Start: 2022-05-21 | End: 2022-05-22 | Stop reason: HOSPADM

## 2022-05-21 RX ORDER — LEVOFLOXACIN 750 MG/1
750 TABLET ORAL DAILY
Status: DISCONTINUED | OUTPATIENT
Start: 2022-05-22 | End: 2022-05-22 | Stop reason: HOSPADM

## 2022-05-21 RX ORDER — NALOXONE HCL 0.4 MG/ML
0.02 VIAL (ML) INJECTION
Status: DISCONTINUED | OUTPATIENT
Start: 2022-05-21 | End: 2022-05-22 | Stop reason: HOSPADM

## 2022-05-21 RX ORDER — HYDROCODONE BITARTRATE AND ACETAMINOPHEN 5; 325 MG/1; MG/1
1 TABLET ORAL EVERY 6 HOURS PRN
Status: DISCONTINUED | OUTPATIENT
Start: 2022-05-21 | End: 2022-05-22 | Stop reason: HOSPADM

## 2022-05-21 RX ORDER — AMOXICILLIN 250 MG
1 CAPSULE ORAL 2 TIMES DAILY PRN
Status: DISCONTINUED | OUTPATIENT
Start: 2022-05-21 | End: 2022-05-22 | Stop reason: HOSPADM

## 2022-05-21 RX ORDER — TALC
6 POWDER (GRAM) TOPICAL NIGHTLY PRN
Status: DISCONTINUED | OUTPATIENT
Start: 2022-05-21 | End: 2022-05-22 | Stop reason: HOSPADM

## 2022-05-21 RX ORDER — POLYETHYLENE GLYCOL 3350 17 G/17G
17 POWDER, FOR SOLUTION ORAL DAILY PRN
Status: DISCONTINUED | OUTPATIENT
Start: 2022-05-21 | End: 2022-05-22 | Stop reason: HOSPADM

## 2022-05-21 RX ORDER — IBUPROFEN 200 MG
1 TABLET ORAL DAILY
Status: DISCONTINUED | OUTPATIENT
Start: 2022-05-21 | End: 2022-05-22 | Stop reason: HOSPADM

## 2022-05-21 RX ORDER — LEVOFLOXACIN 5 MG/ML
750 INJECTION, SOLUTION INTRAVENOUS
Status: COMPLETED | OUTPATIENT
Start: 2022-05-21 | End: 2022-05-21

## 2022-05-21 RX ADMIN — NICOTINE 1 PATCH: 14 PATCH, EXTENDED RELEASE TRANSDERMAL at 08:05

## 2022-05-21 RX ADMIN — IOHEXOL 100 ML: 350 INJECTION, SOLUTION INTRAVENOUS at 10:05

## 2022-05-21 RX ADMIN — Medication 10 ML: at 09:05

## 2022-05-21 RX ADMIN — FOLIC ACID: 5 INJECTION, SOLUTION INTRAMUSCULAR; INTRAVENOUS; SUBCUTANEOUS at 09:05

## 2022-05-21 RX ADMIN — LEVOFLOXACIN 750 MG: 750 INJECTION, SOLUTION INTRAVENOUS at 12:05

## 2022-05-21 NOTE — PLAN OF CARE
Pt arrived from [facility] at 1645, remains free from injury/falls this shift. Safety precautions maintained. VSS. No signs and symptoms of acute distress noted at this time. Chart reviewed, will continue to monitor.

## 2022-05-21 NOTE — ED PROVIDER NOTES
Encounter Date: 5/21/2022       History     Chief Complaint   Patient presents with    Leg Injury     Right leg pain from fall 7 days ago.     Patient states he is having worsening right leg pain after a fall a week ago.  He was carrying a crawfish pot, when he slipped on some wet steps.  Denies any chest pain or sob.     The history is provided by the patient.     Review of patient's allergies indicates:  No Known Allergies  History reviewed. No pertinent past medical history.  History reviewed. No pertinent surgical history.  Family History   Problem Relation Age of Onset    Hypertension Mother     Diabetes Mother     Hypertension Father      Social History     Tobacco Use    Smoking status: Current Every Day Smoker     Packs/day: 1.00     Types: Cigarettes    Smokeless tobacco: Never Used   Substance Use Topics    Alcohol use: No    Drug use: No     Review of Systems   Constitutional: Negative for fever.   HENT: Negative for sore throat.    Respiratory: Negative for shortness of breath.    Cardiovascular: Positive for leg swelling. Negative for chest pain.   Gastrointestinal: Negative for nausea.   Genitourinary: Negative for dysuria.   Musculoskeletal: Positive for myalgias. Negative for back pain.   Skin: Negative for rash.   Neurological: Negative for weakness.   Hematological: Does not bruise/bleed easily.       Physical Exam     Initial Vitals [05/21/22 0851]   BP Pulse Resp Temp SpO2   120/80 (!) 121 (!) 22 98.1 °F (36.7 °C) 98 %      MAP       --         Physical Exam    Nursing note and vitals reviewed.  Constitutional: He appears well-developed and well-nourished. No distress.   HENT:   Head: Normocephalic and atraumatic.   Mouth/Throat: Oropharynx is clear and moist.   Eyes: Conjunctivae and EOM are normal. Pupils are equal, round, and reactive to light.   Neck: Neck supple.   Normal range of motion.  Cardiovascular: Regular rhythm and normal heart sounds. Tachycardia present.  Exam reveals no  gallop and no friction rub.    No murmur heard.  Pulmonary/Chest: Breath sounds normal. Tachypnea noted. No respiratory distress. He has no wheezes. He has no rhonchi. He has no rales.   Abdominal: Abdomen is soft. Bowel sounds are normal. He exhibits no distension and no mass. There is no abdominal tenderness. There is no rebound and no guarding.   Musculoskeletal:         General: No edema. Normal range of motion.      Cervical back: Normal range of motion and neck supple.      Right lower leg: Swelling present.     Neurological: He is alert and oriented to person, place, and time. He has normal strength.   Skin: Skin is warm and dry. No rash noted.   Psychiatric: He has a normal mood and affect. Thought content normal.         ED Course   Procedures  Labs Reviewed   CBC W/ AUTO DIFFERENTIAL - Abnormal; Notable for the following components:       Result Value    RBC 4.39 (*)      (*)     MCH 36.0 (*)     All other components within normal limits   COMPREHENSIVE METABOLIC PANEL - Abnormal; Notable for the following components:    Chloride 112 (*)     CO2 19 (*)     Total Bilirubin 1.5 (*)     All other components within normal limits   URINALYSIS, REFLEX TO URINE CULTURE - Abnormal; Notable for the following components:    Appearance, UA Hazy (*)     Protein, UA Trace (*)     Ketones, UA Trace (*)     Occult Blood UA Trace (*)     Urobilinogen, UA 2.0-3.0 (*)     All other components within normal limits    Narrative:     Specimen Source->Urine   B-TYPE NATRIURETIC PEPTIDE - Abnormal; Notable for the following components:     (*)     All other components within normal limits   CK - Abnormal; Notable for the following components:     (*)     All other components within normal limits   TROPONIN I - Abnormal; Notable for the following components:    Troponin I 0.041 (*)     All other components within normal limits   CULTURE, BLOOD   CULTURE, BLOOD     EKG Readings: (Independently Interpreted)    Rhythm: Sinus Tachycardia. Heart Rate: 120. Ectopy: No Ectopy. Conduction: Normal. ST Segments: Normal ST Segments. T Waves: Normal. Clinical Impression: Normal Sinus Rhythm       Imaging Results          CTA Chest Non-Coronary(PE Studies) (Final result)  Result time 05/21/22 11:13:01    Final result by Leobardo Colin Jr., MD (05/21/22 11:13:01)                 Impression:      1. No evidence of pulmonary embolism.  2. Peripheral tree-in-bud opacities lower lobes right middle lobe with bronchial wall thickening within the lower lobes suggests endobronchial spread of infection and bronchitis.  3. Prominent lymphoid tissue within the hilar regions is likely reactive.      Electronically signed by: Leobardo Colin Jr., MD  Date:    05/21/2022  Time:    11:13             Narrative:    EXAMINATION:  CTA CHEST NON CORONARY    CLINICAL HISTORY:  PE suspected, intermediate prob, neg D-dimer;    TECHNIQUE:  CT angiogram of the chest protocol performed after the IV administration of 100 mL Omnipaque 350. 3D reconstructions/reformats/MIPs obtained. All CT scans at this facility use dose modulation, iterative reconstruction, and/or weight based dosing when appropriate to reduce radiation dose to as low as reasonably achievable.    COMPARISON:  None    FINDINGS:  There is no evidence of pulmonary embolism. There are tree-in-bud opacities within the peripheries of the right lower lobe and within the periphery of the left lower lobe.  There are similar findings within the right middle lobe.  Bronchial wall thickening within the lower lobes bilaterally.  No pleural fluid, pleural thickening, or pneumothorax. Prominent perihilar lymphoid tissue that is likely reactive in nature.  Station 10 R nolvia tissue measures up to 12 mm.  Normal caliber of the aorta.  Normal size of the heart. No acute osseous abnormalities.  Limited images through the upper abdomen demonstrate no acute findings.                               X-Ray Knee  Complete 4 Or More Views Right (Final result)  Result time 05/21/22 10:07:42    Final result by Santy Shultz III, MD (05/21/22 10:07:42)                 Impression:      No acute findings.      Electronically signed by: Santy Shultz MD  Date:    05/21/2022  Time:    10:07             Narrative:    EXAMINATION:  XR KNEE COMP 4 OR MORE VIEWS RIGHT    CLINICAL HISTORY:  Pain in unspecified knee    FINDINGS:  Bone alignment is satisfactory.  Chronic ossicle of the proximal MCL consistent with a Monik Stieda lesion related to remote sprain.  No fracture or dislocation.  No significant arthritic change.  No significant joint effusion.                               X-Ray Femur Ap/Lat Right (Final result)  Result time 05/21/22 10:06:15    Final result by Santy Shultz III, MD (05/21/22 10:06:15)                 Impression:      No acute findings.      Electronically signed by: Santy Shultz MD  Date:    05/21/2022  Time:    10:06             Narrative:    EXAMINATION:  XR FEMUR 2 VIEW RIGHT    CLINICAL HISTORY:  Pain in leg, unspecified    FINDINGS:  Bone alignment is satisfactory.  Small chronic ossicle along the proximal MCL of the knee consistent with a Monik Stieda lesion related to remote sprain.  No fracture or dislocation.  No advanced arthritic change.  No significant soft tissue findings.                               X-Ray Chest AP Portable (Final result)  Result time 05/21/22 09:46:45    Final result by Leobardo Colin Jr., MD (05/21/22 09:46:45)                 Impression:      Mild cardiomegaly.      Electronically signed by: Leobardo Colin Jr., MD  Date:    05/21/2022  Time:    09:46             Narrative:    EXAMINATION:  XR CHEST AP PORTABLE    CLINICAL HISTORY:  tachycardia;    COMPARISON:  Prior radiograph from 08/25/2019.    FINDINGS:  Lungs are clear.  No pleural fluid or pneumothorax.  The heart appears larger than on previous studies.  No significant bony findings.                        "       ED Vital Signs:  Vitals:    05/21/22 0851   BP: 120/80   Pulse: (!) 121   Resp: (!) 22   Temp: 98.1 °F (36.7 °C)   TempSrc: Oral   SpO2: 98%   Weight: 79 kg (174 lb 2.6 oz)   Height: 5' 4" (1.626 m)         Abnormal Lab Results:  Labs Reviewed   CBC W/ AUTO DIFFERENTIAL - Abnormal; Notable for the following components:       Result Value    RBC 4.39 (*)      (*)     MCH 36.0 (*)     All other components within normal limits   COMPREHENSIVE METABOLIC PANEL - Abnormal; Notable for the following components:    Chloride 112 (*)     CO2 19 (*)     Total Bilirubin 1.5 (*)     All other components within normal limits   URINALYSIS, REFLEX TO URINE CULTURE - Abnormal; Notable for the following components:    Appearance, UA Hazy (*)     Protein, UA Trace (*)     Ketones, UA Trace (*)     Occult Blood UA Trace (*)     Urobilinogen, UA 2.0-3.0 (*)     All other components within normal limits    Narrative:     Specimen Source->Urine   B-TYPE NATRIURETIC PEPTIDE - Abnormal; Notable for the following components:     (*)     All other components within normal limits   CK - Abnormal; Notable for the following components:     (*)     All other components within normal limits   TROPONIN I - Abnormal; Notable for the following components:    Troponin I 0.041 (*)     All other components within normal limits   CULTURE, BLOOD   CULTURE, BLOOD          All Lab Results:  Results for orders placed or performed during the hospital encounter of 05/21/22   CBC Auto Differential   Result Value Ref Range    WBC 6.93 3.90 - 12.70 K/uL    RBC 4.39 (L) 4.60 - 6.20 M/uL    Hemoglobin 15.8 14.0 - 18.0 g/dL    Hematocrit 44.4 40.0 - 54.0 %     (H) 82 - 98 fL    MCH 36.0 (H) 27.0 - 31.0 pg    MCHC 35.6 32.0 - 36.0 g/dL    RDW 13.0 11.5 - 14.5 %    Platelets 207 150 - 450 K/uL    MPV 11.7 9.2 - 12.9 fL    Immature Granulocytes 0.4 0.0 - 0.5 %    Gran # (ANC) 3.3 1.8 - 7.7 K/uL    Immature Grans (Abs) 0.03 0.00 - 0.04 " K/uL    Lymph # 3.0 1.0 - 4.8 K/uL    Mono # 0.5 0.3 - 1.0 K/uL    Eos # 0.1 0.0 - 0.5 K/uL    Baso # 0.06 0.00 - 0.20 K/uL    nRBC 0 0 /100 WBC    Gran % 47.4 38.0 - 73.0 %    Lymph % 43.1 18.0 - 48.0 %    Mono % 7.5 4.0 - 15.0 %    Eosinophil % 0.7 0.0 - 8.0 %    Basophil % 0.9 0.0 - 1.9 %    Differential Method Automated    Comprehensive Metabolic Panel   Result Value Ref Range    Sodium 144 136 - 145 mmol/L    Potassium 3.9 3.5 - 5.1 mmol/L    Chloride 112 (H) 95 - 110 mmol/L    CO2 19 (L) 23 - 29 mmol/L    Glucose 92 70 - 110 mg/dL    BUN 6 6 - 20 mg/dL    Creatinine 0.9 0.5 - 1.4 mg/dL    Calcium 8.8 8.7 - 10.5 mg/dL    Total Protein 7.3 6.0 - 8.4 g/dL    Albumin 3.9 3.5 - 5.2 g/dL    Total Bilirubin 1.5 (H) 0.1 - 1.0 mg/dL    Alkaline Phosphatase 84 55 - 135 U/L    AST 34 10 - 40 U/L    ALT 30 10 - 44 U/L    Anion Gap 13 8 - 16 mmol/L    eGFR if African American >60.0 >60 mL/min/1.73 m^2    eGFR if non African American >60.0 >60 mL/min/1.73 m^2   Urinalysis, Reflex to Urine Culture Urine, Clean Catch    Specimen: Urine   Result Value Ref Range    Specimen UA Urine, Clean Catch     Color, UA Jessy Yellow, Straw, Jessy    Appearance, UA Hazy (A) Clear    pH, UA 6.0 5.0 - 8.0    Specific Gravity, UA 1.020 1.005 - 1.030    Protein, UA Trace (A) Negative    Glucose, UA Negative Negative    Ketones, UA Trace (A) Negative    Bilirubin (UA) Negative Negative    Occult Blood UA Trace (A) Negative    Nitrite, UA Negative Negative    Urobilinogen, UA 2.0-3.0 (A) <2.0 EU/dL    Leukocytes, UA Negative Negative   BNP   Result Value Ref Range     (H) 0 - 99 pg/mL   CK   Result Value Ref Range     (H) 20 - 200 U/L   Troponin I   Result Value Ref Range    Troponin I 0.041 (H) 0.000 - 0.026 ng/mL           Imaging Results:  Imaging Results          CTA Chest Non-Coronary(PE Studies) (Final result)  Result time 05/21/22 11:13:01    Final result by Leobardo Colin Jr., MD (05/21/22 11:13:01)                  Impression:      1. No evidence of pulmonary embolism.  2. Peripheral tree-in-bud opacities lower lobes right middle lobe with bronchial wall thickening within the lower lobes suggests endobronchial spread of infection and bronchitis.  3. Prominent lymphoid tissue within the hilar regions is likely reactive.      Electronically signed by: Leobardo Colin Jr., MD  Date:    05/21/2022  Time:    11:13             Narrative:    EXAMINATION:  CTA CHEST NON CORONARY    CLINICAL HISTORY:  PE suspected, intermediate prob, neg D-dimer;    TECHNIQUE:  CT angiogram of the chest protocol performed after the IV administration of 100 mL Omnipaque 350. 3D reconstructions/reformats/MIPs obtained. All CT scans at this facility use dose modulation, iterative reconstruction, and/or weight based dosing when appropriate to reduce radiation dose to as low as reasonably achievable.    COMPARISON:  None    FINDINGS:  There is no evidence of pulmonary embolism. There are tree-in-bud opacities within the peripheries of the right lower lobe and within the periphery of the left lower lobe.  There are similar findings within the right middle lobe.  Bronchial wall thickening within the lower lobes bilaterally.  No pleural fluid, pleural thickening, or pneumothorax. Prominent perihilar lymphoid tissue that is likely reactive in nature.  Station 10 R nolvia tissue measures up to 12 mm.  Normal caliber of the aorta.  Normal size of the heart. No acute osseous abnormalities.  Limited images through the upper abdomen demonstrate no acute findings.                               X-Ray Knee Complete 4 Or More Views Right (Final result)  Result time 05/21/22 10:07:42    Final result by Santy Shultz III, MD (05/21/22 10:07:42)                 Impression:      No acute findings.      Electronically signed by: Santy Shultz MD  Date:    05/21/2022  Time:    10:07             Narrative:    EXAMINATION:  XR KNEE COMP 4 OR MORE VIEWS RIGHT    CLINICAL  HISTORY:  Pain in unspecified knee    FINDINGS:  Bone alignment is satisfactory.  Chronic ossicle of the proximal MCL consistent with a Monik Stieda lesion related to remote sprain.  No fracture or dislocation.  No significant arthritic change.  No significant joint effusion.                               X-Ray Femur Ap/Lat Right (Final result)  Result time 05/21/22 10:06:15    Final result by Santy Shultz III, MD (05/21/22 10:06:15)                 Impression:      No acute findings.      Electronically signed by: Santy Shultz MD  Date:    05/21/2022  Time:    10:06             Narrative:    EXAMINATION:  XR FEMUR 2 VIEW RIGHT    CLINICAL HISTORY:  Pain in leg, unspecified    FINDINGS:  Bone alignment is satisfactory.  Small chronic ossicle along the proximal MCL of the knee consistent with a Monik Stieda lesion related to remote sprain.  No fracture or dislocation.  No advanced arthritic change.  No significant soft tissue findings.                               X-Ray Chest AP Portable (Final result)  Result time 05/21/22 09:46:45    Final result by Leobardo Colin Jr., MD (05/21/22 09:46:45)                 Impression:      Mild cardiomegaly.      Electronically signed by: Leobardo Colin Jr., MD  Date:    05/21/2022  Time:    09:46             Narrative:    EXAMINATION:  XR CHEST AP PORTABLE    CLINICAL HISTORY:  tachycardia;    COMPARISON:  Prior radiograph from 08/25/2019.    FINDINGS:  Lungs are clear.  No pleural fluid or pneumothorax.  The heart appears larger than on previous studies.  No significant bony findings.                                   The Emergency Provider reviewed the vital signs and test results, which are outlined above.    ED Discussions:  11:23 AM: Re-evaluated pt. I have discussed test results, shared treatment plan, and the need for admission with patient and family at bedside. Pt and family express understanding at this time and agree with all information. All questions  answered. Pt and family have no further questions or concerns at this time. Pt is ready for admit.      All historical, clinical, radiographic, and laboratory findings were reviewed with the patient/family in detail along with the indications for transport to the facility in [place] in order to receive admission, IV abx.  All remaining questions and concerns were addressed at this time and the patient/family communicates understanding and agrees to proceed accordingly.  Similarly, all pertinent details of the encounter were discussed with Dr. Paiz via [facility] Sarmiento who agrees to receive the patient at [facility] for further care as outlined above.  The patient will be transferred by [facility] ambulance services secondary to a need for ongoing cardiac monitoring en route.  Pardeep Bright MD  11:47 AM           Medications   levoFLOXacin 750 mg/150 mL IVPB 750 mg (has no administration in time range)   iohexoL (OMNIPAQUE 350) injection 100 mL (100 mLs Intravenous Given 5/21/22 1039)                          Clinical Impression:   Final diagnoses:  [R00.0] Tachycardia  [M25.569] Knee pain  [M79.606] Leg pain  [J18.9] Pneumonia of right middle lobe due to infectious organism (Primary)  [R77.8] Elevated troponin          ED Disposition Condition    Observation               Pardeep Bright MD  05/21/22 6932

## 2022-05-22 VITALS
HEIGHT: 64 IN | BODY MASS INDEX: 29.74 KG/M2 | TEMPERATURE: 98 F | WEIGHT: 174.19 LBS | OXYGEN SATURATION: 96 % | SYSTOLIC BLOOD PRESSURE: 139 MMHG | DIASTOLIC BLOOD PRESSURE: 78 MMHG | HEART RATE: 51 BPM | RESPIRATION RATE: 17 BRPM

## 2022-05-22 LAB
ANION GAP SERPL CALC-SCNC: 15 MMOL/L (ref 8–16)
BUN SERPL-MCNC: 8 MG/DL (ref 6–20)
CALCIUM SERPL-MCNC: 8.5 MG/DL (ref 8.7–10.5)
CHLORIDE SERPL-SCNC: 105 MMOL/L (ref 95–110)
CK SERPL-CCNC: 126 U/L (ref 20–200)
CO2 SERPL-SCNC: 20 MMOL/L (ref 23–29)
CREAT SERPL-MCNC: 0.9 MG/DL (ref 0.5–1.4)
EST. GFR  (AFRICAN AMERICAN): >60 ML/MIN/1.73 M^2
EST. GFR  (NON AFRICAN AMERICAN): >60 ML/MIN/1.73 M^2
GLUCOSE SERPL-MCNC: 70 MG/DL (ref 70–110)
POTASSIUM SERPL-SCNC: 4 MMOL/L (ref 3.5–5.1)
SODIUM SERPL-SCNC: 140 MMOL/L (ref 136–145)
TROPONIN I SERPL DL<=0.01 NG/ML-MCNC: 0.04 NG/ML (ref 0–0.03)

## 2022-05-22 PROCEDURE — 82550 ASSAY OF CK (CPK): CPT | Performed by: EMERGENCY MEDICINE

## 2022-05-22 PROCEDURE — S4991 NICOTINE PATCH NONLEGEND: HCPCS | Performed by: EMERGENCY MEDICINE

## 2022-05-22 PROCEDURE — 80048 BASIC METABOLIC PNL TOTAL CA: CPT | Performed by: EMERGENCY MEDICINE

## 2022-05-22 PROCEDURE — 84484 ASSAY OF TROPONIN QUANT: CPT | Performed by: EMERGENCY MEDICINE

## 2022-05-22 PROCEDURE — 25000003 PHARM REV CODE 250: Performed by: EMERGENCY MEDICINE

## 2022-05-22 PROCEDURE — G0378 HOSPITAL OBSERVATION PER HR: HCPCS

## 2022-05-22 PROCEDURE — 36415 COLL VENOUS BLD VENIPUNCTURE: CPT | Performed by: EMERGENCY MEDICINE

## 2022-05-22 RX ORDER — LEVOFLOXACIN 750 MG/1
750 TABLET ORAL DAILY
Qty: 7 TABLET | Refills: 0 | Status: SHIPPED | OUTPATIENT
Start: 2022-05-22 | End: 2022-09-17

## 2022-05-22 RX ADMIN — LEVOFLOXACIN 750 MG: 750 TABLET, FILM COATED ORAL at 09:05

## 2022-05-22 RX ADMIN — NICOTINE 1 PATCH: 14 PATCH, EXTENDED RELEASE TRANSDERMAL at 09:05

## 2022-05-22 NOTE — DISCHARGE SUMMARY
[facility] Medicine  Discharge Summary      Patient Name: Filemon La  MRN: [id number]  Patient Class: OP- Observation  Admission Date: 5/21/2022  Hospital Length of Stay: 0 days  Discharge Date and Time: 5/22/2022 12:20 PM  Attending Physician: Paola Paiz MD   Discharging Provider: Anthony Michel NP  Primary Care Provider: Primary Doctor No      HPI:   41 year old AAM, ch smoker, no PMH who presented to [facility] ER with c/o worsening right leg pain after a fall a week ago. States that he was carrying a crawfish pot, when he slipped on some wet steps, and hurt his R thigh. There was some bruising but no obvious swelling. Since then pain has not improved significantly so he came to ER today. Denies any chest pain or sob but does have some cough, no sputum, no hemoptysis.    In the ER, VSS stable, afebrile, sats 98%. CBC normal, CMP normal except HCO3 19, , , Trop 0.031. Xray R thigh no fracture, CXR clear. CTA chest no PE but showed Peripheral tree-in-bud opacities lower lobes right middle lobe with bronchial wall thickening within the lower lobes suggests endobronchial spread of infection and bronchitis. Pt treated with IV Levaquin and placed in Observation at [facility] under Hospital Medicine.         * No surgery found *      Hospital Course:   Mr La is a 41 year old male who presented to [facility] ED for evaluation of right leg pain after a fall one week ago. CTA of chest showed no PE but did show peripheral tree-in-bud opacities to the lower lobes, right middle lobe with bronchial wall thickening. Clinically did not have any s/s of Pneumonia. Today, patient feeling well, denies chest pain or shortness of breath. O 2 sats 98 % on room air. Troponin mildly elevated and flat. Will continue Augmentin as outpatient for another week. He was seen, examined and deemed suitable for discharge.        Goals of Care Treatment Preferences:  Code Status: Full Code      Consults:      No new Assessment & Plan notes have been filed under this hospital service since the last note was generated.  Service: Hospital Medicine    Final Active Diagnoses:    Diagnosis Date Noted POA    PRINCIPAL PROBLEM:  Community acquired pneumonia of right lower lobe of lung [J18.9] 05/21/2022 Yes    Fall (on) (from) other stairs and steps, initial encounter [W10.8XXA] 05/21/2022 Yes      Problems Resolved During this Admission:       Discharged Condition: stable    Disposition: Home or Self Care    Follow Up:   Follow-up Information       [facility] Follow up in 2 day(s).    Why: Hospital Follow up for pneumonia  Contact information:  [address] [place] [address]  [phone]                           Patient Instructions:      Diet Cardiac     Notify your health care provider if you experience any of the following:  difficulty breathing or increased cough     Notify your health care provider if you experience any of the following:  severe uncontrolled pain     Activity as tolerated       Significant Diagnostic Studies: Labs:   CMP   Recent Labs   Lab 05/21/22  0915 05/22/22  0645    140   K 3.9 4.0   * 105   CO2 19* 20*   GLU 92 70   BUN 6 8   CREATININE 0.9 0.9   CALCIUM 8.8 8.5*   PROT 7.3  --    ALBUMIN 3.9  --    BILITOT 1.5*  --    ALKPHOS 84  --    AST 34  --    ALT 30  --    ANIONGAP 13 15   ESTGFRAFRICA >60.0 >60   EGFRNONAA >60.0 >60    and CBC   Recent Labs   Lab 05/21/22  0915   WBC 6.93   HGB 15.8   HCT 44.4          Pending Diagnostic Studies:       None           Medications:  Reconciled Home Medications:      Medication List        START taking these medications      levoFLOXacin 750 MG tablet  Commonly known as: LEVAQUIN  Take 1 tablet (750 mg total) by mouth once daily.              Indwelling Lines/Drains at time of discharge:   Lines/Drains/Airways       None                   Time spent on the discharge of patient: 40 minutes         Anthony Hawkins  JACINTO Michel  Department of Hospital Medicine  '[facility] Surg

## 2022-05-22 NOTE — PLAN OF CARE
[facility] - Med Surg  Discharge Final Note    Primary Care Provider: Primary Doctor No    Expected Discharge Date: 5/22/2022    Final Discharge Note (most recent)       Final Note - 05/22/22 1546          Final Note    Assessment Type Final Discharge Note (P)      Anticipated Discharge Disposition Home or Self Care (P)         Post-Acute Status    Discharge Delays None known at this time (P)                      Important Message from Medicare             Contact Info       [facility] [place] [address]   Phone: [phone]       Next Steps: Follow up in 2 day(s)    Instructions: Hospital Follow up for pneumonia          Chinyere Lennon LMSW 5/22/2022 3:46 PM

## 2022-05-22 NOTE — ASSESSMENT & PLAN NOTE
Seen on CT Chest- etiology unclear  Pt asymptomatic  Start IV levaquin  May need Pulmonary input to r/o Bronchiectasis

## 2022-05-22 NOTE — HPI
41 year old AAM, ch smoker, no PMH who presented to [facility] ER with c/o worsening right leg pain after a fall a week ago. States that he was carrying a crawfish pot, when he slipped on some wet steps, and hurt his R thigh. There was some bruising but no obvious swelling. Since then pain has not improved significantly so he came to ER today. Denies any chest pain or sob but does have some cough, no sputum, no hemoptysis.    In the ER, VSS stable, afebrile, sats 98%. CBC normal, CMP normal except HCO3 19, , , Trop 0.031. Xray R thigh no fracture, CXR clear. CTA chest no PE but showed Peripheral tree-in-bud opacities lower lobes right middle lobe with bronchial wall thickening within the lower lobes suggests endobronchial spread of infection and bronchitis. Pt treated with IV Levaquin and placed in Observation at [facility] under Hospital Medicine.

## 2022-05-22 NOTE — H&P
[facility] Medicine  History & Physical    Patient Name: Filemon La  MRN: [id number]  Patient Class: OP- Observation  Admission Date: 5/21/2022  Attending Physician: Paola Paiz MD   Primary Care Provider: Primary Doctor No         Patient information was obtained from patient and ER records.     Subjective:     Principal Problem:<principal problem not specified>    Chief Complaint:   Chief Complaint   Patient presents with    Leg Injury     Right leg pain from fall 7 days ago.        HPI: 41 year old AAM, ch smoker, no PMH who presented to [facility] ER with c/o worsening right leg pain after a fall a week ago. States that he was carrying a crawfish pot, when he slipped on some wet steps, and hurt his R thigh. There was some bruising but no obvious swelling. Since then pain has not improved significantly so he came to ER today. Denies any chest pain or sob but does have some cough, no sputum, no hemoptysis.    In the ER, VSS stable, afebrile, sats 98%. CBC normal, CMP normal except HCO3 19, , , Trop 0.031. Xray R thigh no fracture, CXR clear. CTA chest no PE but showed Peripheral tree-in-bud opacities lower lobes right middle lobe with bronchial wall thickening within the lower lobes suggests endobronchial spread of infection and bronchitis. Pt treated with IV Levaquin and placed in Observation at [facility] under Hospital Medicine.         History reviewed. No pertinent past medical history.    History reviewed. No pertinent surgical history.    Review of patient's allergies indicates:  No Known Allergies    No current facility-administered medications on file prior to encounter.     No current outpatient medications on file prior to encounter.     Family History       Problem Relation (Age of Onset)    Diabetes Mother    Hypertension Mother, Father          Tobacco Use    Smoking status: Current Every Day Smoker     Packs/day: 1.00     Types: Cigarettes    Smokeless tobacco: Never Used    Substance and Sexual Activity    Alcohol use: No    Drug use: No    Sexual activity: Not on file     Review of Systems   Constitutional:  Positive for activity change.   HENT: Negative.     Eyes: Negative.    Respiratory:  Positive for cough. Negative for shortness of breath and stridor.    Cardiovascular:  Negative for chest pain, palpitations and leg swelling.   Gastrointestinal: Negative.    Endocrine: Negative.    Genitourinary: Negative.    Musculoskeletal:  Positive for myalgias.   Skin: Negative.    Allergic/Immunologic: Negative.    Neurological: Negative.    Hematological: Negative.    Psychiatric/Behavioral: Negative.     Objective:     Vital Signs (Most Recent):  Temp: 97.6 °F (36.4 °C) (05/21/22 1948)  Pulse: (!) 53 (05/21/22 1948)  Resp: 18 (05/21/22 1948)  BP: (!) 149/92 (05/21/22 1948)  SpO2: 97 % (05/21/22 1948)   Vital Signs (24h Range):  Temp:  [97.6 °F (36.4 °C)-98.1 °F (36.7 °C)] 97.6 °F (36.4 °C)  Pulse:  [] 53  Resp:  [16-22] 18  SpO2:  [96 %-98 %] 97 %  BP: (120-149)/(80-94) 149/92     Weight: 79 kg (174 lb 2.6 oz)  Body mass index is 29.9 kg/m².    Physical Exam  Vitals and nursing note reviewed.   Constitutional:       General: He is not in acute distress.     Appearance: Normal appearance. He is not ill-appearing, toxic-appearing or diaphoretic.   HENT:      Head: Normocephalic and atraumatic.      Right Ear: External ear normal.      Left Ear: External ear normal.      Nose: Nose normal.      Mouth/Throat:      Mouth: Mucous membranes are moist.      Pharynx: Oropharynx is clear. No oropharyngeal exudate or posterior oropharyngeal erythema.   Eyes:      General: No scleral icterus.     Extraocular Movements: Extraocular movements intact.      Conjunctiva/sclera: Conjunctivae normal.      Pupils: Pupils are equal, round, and reactive to light.   Cardiovascular:      Rate and Rhythm: Regular rhythm. Tachycardia present.      Pulses: Normal pulses.      Heart sounds: Normal heart  sounds. No murmur heard.    No friction rub. No gallop.   Pulmonary:      Effort: Pulmonary effort is normal. No respiratory distress.      Breath sounds: Normal breath sounds. No wheezing or rales.   Abdominal:      General: Abdomen is flat. Bowel sounds are normal. There is no distension.      Palpations: Abdomen is soft.      Tenderness: There is no abdominal tenderness.   Genitourinary:     Comments: deferred  Musculoskeletal:         General: Tenderness present. No swelling or deformity.      Cervical back: Normal range of motion and neck supple. No rigidity or tenderness.   Skin:     General: Skin is dry.      Findings: Bruising present.      Comments: R Thigh Bruise   Neurological:      General: No focal deficit present.      Mental Status: He is alert and oriented to person, place, and time.   Psychiatric:         Mood and Affect: Mood normal.         Behavior: Behavior normal.         Thought Content: Thought content normal.         Judgment: Judgment normal.       CRANIAL NERVES     CN III, IV, VI   Pupils are equal, round, and reactive to light.  Results for orders placed or performed during the hospital encounter of 05/21/22   CBC Auto Differential   Result Value Ref Range    WBC 6.93 3.90 - 12.70 K/uL    RBC 4.39 (L) 4.60 - 6.20 M/uL    Hemoglobin 15.8 14.0 - 18.0 g/dL    Hematocrit 44.4 40.0 - 54.0 %     (H) 82 - 98 fL    MCH 36.0 (H) 27.0 - 31.0 pg    MCHC 35.6 32.0 - 36.0 g/dL    RDW 13.0 11.5 - 14.5 %    Platelets 207 150 - 450 K/uL    MPV 11.7 9.2 - 12.9 fL    Immature Granulocytes 0.4 0.0 - 0.5 %    Gran # (ANC) 3.3 1.8 - 7.7 K/uL    Immature Grans (Abs) 0.03 0.00 - 0.04 K/uL    Lymph # 3.0 1.0 - 4.8 K/uL    Mono # 0.5 0.3 - 1.0 K/uL    Eos # 0.1 0.0 - 0.5 K/uL    Baso # 0.06 0.00 - 0.20 K/uL    nRBC 0 0 /100 WBC    Gran % 47.4 38.0 - 73.0 %    Lymph % 43.1 18.0 - 48.0 %    Mono % 7.5 4.0 - 15.0 %    Eosinophil % 0.7 0.0 - 8.0 %    Basophil % 0.9 0.0 - 1.9 %    Differential Method Automated     Comprehensive Metabolic Panel   Result Value Ref Range    Sodium 144 136 - 145 mmol/L    Potassium 3.9 3.5 - 5.1 mmol/L    Chloride 112 (H) 95 - 110 mmol/L    CO2 19 (L) 23 - 29 mmol/L    Glucose 92 70 - 110 mg/dL    BUN 6 6 - 20 mg/dL    Creatinine 0.9 0.5 - 1.4 mg/dL    Calcium 8.8 8.7 - 10.5 mg/dL    Total Protein 7.3 6.0 - 8.4 g/dL    Albumin 3.9 3.5 - 5.2 g/dL    Total Bilirubin 1.5 (H) 0.1 - 1.0 mg/dL    Alkaline Phosphatase 84 55 - 135 U/L    AST 34 10 - 40 U/L    ALT 30 10 - 44 U/L    Anion Gap 13 8 - 16 mmol/L    eGFR if African American >60.0 >60 mL/min/1.73 m^2    eGFR if non African American >60.0 >60 mL/min/1.73 m^2   Urinalysis, Reflex to Urine Culture Urine, Clean Catch    Specimen: Urine   Result Value Ref Range    Specimen UA Urine, Clean Catch     Color, UA Jessy Yellow, Straw, Jessy    Appearance, UA Hazy (A) Clear    pH, UA 6.0 5.0 - 8.0    Specific Gravity, UA 1.020 1.005 - 1.030    Protein, UA Trace (A) Negative    Glucose, UA Negative Negative    Ketones, UA Trace (A) Negative    Bilirubin (UA) Negative Negative    Occult Blood UA Trace (A) Negative    Nitrite, UA Negative Negative    Urobilinogen, UA 2.0-3.0 (A) <2.0 EU/dL    Leukocytes, UA Negative Negative   BNP   Result Value Ref Range     (H) 0 - 99 pg/mL   CK   Result Value Ref Range     (H) 20 - 200 U/L   Troponin I   Result Value Ref Range    Troponin I 0.041 (H) 0.000 - 0.026 ng/mL   POCT COVID-19 Rapid Screening   Result Value Ref Range    POC Rapid COVID Negative Negative     Acceptable Yes       Significant Labs: All pertinent labs within the past 24 hours have been reviewed.  Imaging Results              CTA Chest Non-Coronary(PE Studies) (Final result)  Result time 05/21/22 11:13:01      Final result by Leobardo Colin Jr., MD (05/21/22 11:13:01)                   Impression:      1. No evidence of pulmonary embolism.  2. Peripheral tree-in-bud opacities lower lobes right middle lobe with bronchial  wall thickening within the lower lobes suggests endobronchial spread of infection and bronchitis.  3. Prominent lymphoid tissue within the hilar regions is likely reactive.      Electronically signed by: Leobardo Colin Jr., MD  Date:    05/21/2022  Time:    11:13               Narrative:    EXAMINATION:  CTA CHEST NON CORONARY    CLINICAL HISTORY:  PE suspected, intermediate prob, neg D-dimer;    TECHNIQUE:  CT angiogram of the chest protocol performed after the IV administration of 100 mL Omnipaque 350. 3D reconstructions/reformats/MIPs obtained. All CT scans at this facility use dose modulation, iterative reconstruction, and/or weight based dosing when appropriate to reduce radiation dose to as low as reasonably achievable.    COMPARISON:  None    FINDINGS:  There is no evidence of pulmonary embolism. There are tree-in-bud opacities within the peripheries of the right lower lobe and within the periphery of the left lower lobe.  There are similar findings within the right middle lobe.  Bronchial wall thickening within the lower lobes bilaterally.  No pleural fluid, pleural thickening, or pneumothorax. Prominent perihilar lymphoid tissue that is likely reactive in nature.  Station 10 R nolvia tissue measures up to 12 mm.  Normal caliber of the aorta.  Normal size of the heart. No acute osseous abnormalities.  Limited images through the upper abdomen demonstrate no acute findings.                                       X-Ray Knee Complete 4 Or More Views Right (Final result)  Result time 05/21/22 10:07:42      Final result by Santy Shultz III, MD (05/21/22 10:07:42)                   Impression:      No acute findings.      Electronically signed by: Santy Shultz MD  Date:    05/21/2022  Time:    10:07               Narrative:    EXAMINATION:  XR KNEE COMP 4 OR MORE VIEWS RIGHT    CLINICAL HISTORY:  Pain in unspecified knee    FINDINGS:  Bone alignment is satisfactory.  Chronic ossicle of the proximal MCL consistent  with a Monik Stieda lesion related to remote sprain.  No fracture or dislocation.  No significant arthritic change.  No significant joint effusion.                                       X-Ray Femur Ap/Lat Right (Final result)  Result time 05/21/22 10:06:15      Final result by Santy Shultz III, MD (05/21/22 10:06:15)                   Impression:      No acute findings.      Electronically signed by: Santy Shultz MD  Date:    05/21/2022  Time:    10:06               Narrative:    EXAMINATION:  XR FEMUR 2 VIEW RIGHT    CLINICAL HISTORY:  Pain in leg, unspecified    FINDINGS:  Bone alignment is satisfactory.  Small chronic ossicle along the proximal MCL of the knee consistent with a Monik Stieda lesion related to remote sprain.  No fracture or dislocation.  No advanced arthritic change.  No significant soft tissue findings.                                       X-Ray Chest AP Portable (Final result)  Result time 05/21/22 09:46:45      Final result by Leobardo Colin Jr., MD (05/21/22 09:46:45)                   Impression:      Mild cardiomegaly.      Electronically signed by: Leobardo Colin Jr., MD  Date:    05/21/2022  Time:    09:46               Narrative:    EXAMINATION:  XR CHEST AP PORTABLE    CLINICAL HISTORY:  tachycardia;    COMPARISON:  Prior radiograph from 08/25/2019.    FINDINGS:  Lungs are clear.  No pleural fluid or pneumothorax.  The heart appears larger than on previous studies.  No significant bony findings.                                     Significant Imaging: I have reviewed all pertinent imaging results/findings within the past 24 hours.    Assessment/Plan:     Fall (on) (from) other stairs and steps, initial encounter  Mild bruise R Thigh but no fracture, hematoma      Community acquired pneumonia of right lower lobe of lung  Seen on CT Chest- etiology unclear  Pt asymptomatic  Start IV levaquin  May need Pulmonary input to r/o Bronchiectasis        VTE Risk Mitigation (From  admission, onward)         Ordered     IP VTE LOW RISK PATIENT  Once         05/21/22 1848     Place sequential compression device  Until discontinued         05/21/22 1848                   Paola Paiz MD  Department of Hospital Medicine   [facility]

## 2022-05-22 NOTE — SUBJECTIVE & OBJECTIVE
History reviewed. No pertinent past medical history.    History reviewed. No pertinent surgical history.    Review of patient's allergies indicates:  No Known Allergies    No current facility-administered medications on file prior to encounter.     No current outpatient medications on file prior to encounter.     Family History       Problem Relation (Age of Onset)    Diabetes Mother    Hypertension Mother, Father          Tobacco Use    Smoking status: Current Every Day Smoker     Packs/day: 1.00     Types: Cigarettes    Smokeless tobacco: Never Used   Substance and Sexual Activity    Alcohol use: No    Drug use: No    Sexual activity: Not on file     Review of Systems   Constitutional:  Positive for activity change.   HENT: Negative.     Eyes: Negative.    Respiratory:  Positive for cough. Negative for shortness of breath and stridor.    Cardiovascular:  Negative for chest pain, palpitations and leg swelling.   Gastrointestinal: Negative.    Endocrine: Negative.    Genitourinary: Negative.    Musculoskeletal:  Positive for myalgias.   Skin: Negative.    Allergic/Immunologic: Negative.    Neurological: Negative.    Hematological: Negative.    Psychiatric/Behavioral: Negative.     Objective:     Vital Signs (Most Recent):  Temp: 97.6 °F (36.4 °C) (05/21/22 1948)  Pulse: (!) 53 (05/21/22 1948)  Resp: 18 (05/21/22 1948)  BP: (!) 149/92 (05/21/22 1948)  SpO2: 97 % (05/21/22 1948)   Vital Signs (24h Range):  Temp:  [97.6 °F (36.4 °C)-98.1 °F (36.7 °C)] 97.6 °F (36.4 °C)  Pulse:  [] 53  Resp:  [16-22] 18  SpO2:  [96 %-98 %] 97 %  BP: (120-149)/(80-94) 149/92     Weight: 79 kg (174 lb 2.6 oz)  Body mass index is 29.9 kg/m².    Physical Exam  Vitals and nursing note reviewed.   Constitutional:       General: He is not in acute distress.     Appearance: Normal appearance. He is not ill-appearing, toxic-appearing or diaphoretic.   HENT:      Head: Normocephalic and atraumatic.      Right Ear: External ear normal.       Left Ear: External ear normal.      Nose: Nose normal.      Mouth/Throat:      Mouth: Mucous membranes are moist.      Pharynx: Oropharynx is clear. No oropharyngeal exudate or posterior oropharyngeal erythema.   Eyes:      General: No scleral icterus.     Extraocular Movements: Extraocular movements intact.      Conjunctiva/sclera: Conjunctivae normal.      Pupils: Pupils are equal, round, and reactive to light.   Cardiovascular:      Rate and Rhythm: Regular rhythm. Tachycardia present.      Pulses: Normal pulses.      Heart sounds: Normal heart sounds. No murmur heard.    No friction rub. No gallop.   Pulmonary:      Effort: Pulmonary effort is normal. No respiratory distress.      Breath sounds: Normal breath sounds. No wheezing or rales.   Abdominal:      General: Abdomen is flat. Bowel sounds are normal. There is no distension.      Palpations: Abdomen is soft.      Tenderness: There is no abdominal tenderness.   Genitourinary:     Comments: deferred  Musculoskeletal:         General: Tenderness present. No swelling or deformity.      Cervical back: Normal range of motion and neck supple. No rigidity or tenderness.   Skin:     General: Skin is dry.      Findings: Bruising present.      Comments: R Thigh Bruise   Neurological:      General: No focal deficit present.      Mental Status: He is alert and oriented to person, place, and time.   Psychiatric:         Mood and Affect: Mood normal.         Behavior: Behavior normal.         Thought Content: Thought content normal.         Judgment: Judgment normal.       CRANIAL NERVES     CN III, IV, VI   Pupils are equal, round, and reactive to light.  Results for orders placed or performed during the hospital encounter of 05/21/22   CBC Auto Differential   Result Value Ref Range    WBC 6.93 3.90 - 12.70 K/uL    RBC 4.39 (L) 4.60 - 6.20 M/uL    Hemoglobin 15.8 14.0 - 18.0 g/dL    Hematocrit 44.4 40.0 - 54.0 %     (H) 82 - 98 fL    MCH 36.0 (H) 27.0 - 31.0 pg     MCHC 35.6 32.0 - 36.0 g/dL    RDW 13.0 11.5 - 14.5 %    Platelets 207 150 - 450 K/uL    MPV 11.7 9.2 - 12.9 fL    Immature Granulocytes 0.4 0.0 - 0.5 %    Gran # (ANC) 3.3 1.8 - 7.7 K/uL    Immature Grans (Abs) 0.03 0.00 - 0.04 K/uL    Lymph # 3.0 1.0 - 4.8 K/uL    Mono # 0.5 0.3 - 1.0 K/uL    Eos # 0.1 0.0 - 0.5 K/uL    Baso # 0.06 0.00 - 0.20 K/uL    nRBC 0 0 /100 WBC    Gran % 47.4 38.0 - 73.0 %    Lymph % 43.1 18.0 - 48.0 %    Mono % 7.5 4.0 - 15.0 %    Eosinophil % 0.7 0.0 - 8.0 %    Basophil % 0.9 0.0 - 1.9 %    Differential Method Automated    Comprehensive Metabolic Panel   Result Value Ref Range    Sodium 144 136 - 145 mmol/L    Potassium 3.9 3.5 - 5.1 mmol/L    Chloride 112 (H) 95 - 110 mmol/L    CO2 19 (L) 23 - 29 mmol/L    Glucose 92 70 - 110 mg/dL    BUN 6 6 - 20 mg/dL    Creatinine 0.9 0.5 - 1.4 mg/dL    Calcium 8.8 8.7 - 10.5 mg/dL    Total Protein 7.3 6.0 - 8.4 g/dL    Albumin 3.9 3.5 - 5.2 g/dL    Total Bilirubin 1.5 (H) 0.1 - 1.0 mg/dL    Alkaline Phosphatase 84 55 - 135 U/L    AST 34 10 - 40 U/L    ALT 30 10 - 44 U/L    Anion Gap 13 8 - 16 mmol/L    eGFR if African American >60.0 >60 mL/min/1.73 m^2    eGFR if non African American >60.0 >60 mL/min/1.73 m^2   Urinalysis, Reflex to Urine Culture Urine, Clean Catch    Specimen: Urine   Result Value Ref Range    Specimen UA Urine, Clean Catch     Color, UA Jessy Yellow, Straw, Jessy    Appearance, UA Hazy (A) Clear    pH, UA 6.0 5.0 - 8.0    Specific Gravity, UA 1.020 1.005 - 1.030    Protein, UA Trace (A) Negative    Glucose, UA Negative Negative    Ketones, UA Trace (A) Negative    Bilirubin (UA) Negative Negative    Occult Blood UA Trace (A) Negative    Nitrite, UA Negative Negative    Urobilinogen, UA 2.0-3.0 (A) <2.0 EU/dL    Leukocytes, UA Negative Negative   BNP   Result Value Ref Range     (H) 0 - 99 pg/mL   CK   Result Value Ref Range     (H) 20 - 200 U/L   Troponin I   Result Value Ref Range    Troponin I 0.041 (H) 0.000 -  0.026 ng/mL   POCT COVID-19 Rapid Screening   Result Value Ref Range    POC Rapid COVID Negative Negative     Acceptable Yes       Significant Labs: All pertinent labs within the past 24 hours have been reviewed.  Imaging Results              CTA Chest Non-Coronary(PE Studies) (Final result)  Result time 05/21/22 11:13:01      Final result by Leobardo Colin Jr., MD (05/21/22 11:13:01)                   Impression:      1. No evidence of pulmonary embolism.  2. Peripheral tree-in-bud opacities lower lobes right middle lobe with bronchial wall thickening within the lower lobes suggests endobronchial spread of infection and bronchitis.  3. Prominent lymphoid tissue within the hilar regions is likely reactive.      Electronically signed by: Leobardo Colin Jr., MD  Date:    05/21/2022  Time:    11:13               Narrative:    EXAMINATION:  CTA CHEST NON CORONARY    CLINICAL HISTORY:  PE suspected, intermediate prob, neg D-dimer;    TECHNIQUE:  CT angiogram of the chest protocol performed after the IV administration of 100 mL Omnipaque 350. 3D reconstructions/reformats/MIPs obtained. All CT scans at this facility use dose modulation, iterative reconstruction, and/or weight based dosing when appropriate to reduce radiation dose to as low as reasonably achievable.    COMPARISON:  None    FINDINGS:  There is no evidence of pulmonary embolism. There are tree-in-bud opacities within the peripheries of the right lower lobe and within the periphery of the left lower lobe.  There are similar findings within the right middle lobe.  Bronchial wall thickening within the lower lobes bilaterally.  No pleural fluid, pleural thickening, or pneumothorax. Prominent perihilar lymphoid tissue that is likely reactive in nature.  Station 10 R nolvia tissue measures up to 12 mm.  Normal caliber of the aorta.  Normal size of the heart. No acute osseous abnormalities.  Limited images through the upper abdomen demonstrate no acute  findings.                                       X-Ray Knee Complete 4 Or More Views Right (Final result)  Result time 05/21/22 10:07:42      Final result by Santy Shultz III, MD (05/21/22 10:07:42)                   Impression:      No acute findings.      Electronically signed by: Santy Shultz MD  Date:    05/21/2022  Time:    10:07               Narrative:    EXAMINATION:  XR KNEE COMP 4 OR MORE VIEWS RIGHT    CLINICAL HISTORY:  Pain in unspecified knee    FINDINGS:  Bone alignment is satisfactory.  Chronic ossicle of the proximal MCL consistent with a Monik Stieda lesion related to remote sprain.  No fracture or dislocation.  No significant arthritic change.  No significant joint effusion.                                       X-Ray Femur Ap/Lat Right (Final result)  Result time 05/21/22 10:06:15      Final result by Santy Shultz III, MD (05/21/22 10:06:15)                   Impression:      No acute findings.      Electronically signed by: Santy Shultz MD  Date:    05/21/2022  Time:    10:06               Narrative:    EXAMINATION:  XR FEMUR 2 VIEW RIGHT    CLINICAL HISTORY:  Pain in leg, unspecified    FINDINGS:  Bone alignment is satisfactory.  Small chronic ossicle along the proximal MCL of the knee consistent with a Monik Stieda lesion related to remote sprain.  No fracture or dislocation.  No advanced arthritic change.  No significant soft tissue findings.                                       X-Ray Chest AP Portable (Final result)  Result time 05/21/22 09:46:45      Final result by Leobardo Colin Jr., MD (05/21/22 09:46:45)                   Impression:      Mild cardiomegaly.      Electronically signed by: Leobardo Colin Jr., MD  Date:    05/21/2022  Time:    09:46               Narrative:    EXAMINATION:  XR CHEST AP PORTABLE    CLINICAL HISTORY:  tachycardia;    COMPARISON:  Prior radiograph from 08/25/2019.    FINDINGS:  Lungs are clear.  No pleural fluid or pneumothorax.  The heart  appears larger than on previous studies.  No significant bony findings.                                     Significant Imaging: I have reviewed all pertinent imaging results/findings within the past 24 hours.

## 2022-05-22 NOTE — PLAN OF CARE
Pt condition adequate for discharge at this time. Reviewed discharge paperwork with patient. All follow up appointments made. IV removed. Telemetry monitor removed. Rx sent to pt preferred pharmacy. Pt awaiting ride home.

## 2022-05-27 LAB
BACTERIA BLD CULT: NORMAL
BACTERIA BLD CULT: NORMAL

## 2022-05-28 ENCOUNTER — HOSPITAL ENCOUNTER (EMERGENCY)
Facility: HOSPITAL | Age: 42
Discharge: HOME | End: 2022-05-28
Attending: EMERGENCY MEDICINE
Payer: MEDICAID

## 2022-05-28 VITALS
BODY MASS INDEX: 30.6 KG/M2 | WEIGHT: 179.25 LBS | SYSTOLIC BLOOD PRESSURE: 127 MMHG | DIASTOLIC BLOOD PRESSURE: 74 MMHG | RESPIRATION RATE: 20 BRPM | HEART RATE: 115 BPM | HEIGHT: 64 IN | TEMPERATURE: 98 F | OXYGEN SATURATION: 98 %

## 2022-05-28 DIAGNOSIS — M79.604 RIGHT LEG PAIN: Primary | ICD-10-CM

## 2022-05-28 PROCEDURE — 99283 EMERGENCY DEPT VISIT LOW MDM: CPT | Mod: ER

## 2022-05-28 RX ORDER — HYDROCODONE BITARTRATE AND ACETAMINOPHEN 5; 325 MG/1; MG/1
1 TABLET ORAL EVERY 4 HOURS PRN
Qty: 18 TABLET | Refills: 0 | Status: SHIPPED | OUTPATIENT
Start: 2022-05-28 | End: 2022-05-31

## 2022-05-28 NOTE — ED PROVIDER NOTES
Encounter Date: 5/28/2022       History     Chief Complaint   Patient presents with    Thigh pain     Right thigh pain x1 month, pt states he went down the stairs the wrong way today and is having trouble walking.     Patient presents to ER for right upper leg pain, onset approximately 1 month ago after slip/fall and landing on concrete.  Patient states he was evaluated approximately 1 week ago for this same pain and had x-rays performed of right femur and right knee which resulted with no acute abnormality.  Patient states he received no further treatment for his right leg pain due to discovering that he had pneumonia and therefore was admitted to the hospital.  Patient states he is still experiencing right upper leg pain.  He denies any further injury to right leg since the initial injury 1 month ago.  He denies numbness, tingling, erythema, open wound, joint immobility.    The history is provided by the patient.     Review of patient's allergies indicates:  No Known Allergies  No past medical history on file.  No past surgical history on file.  Family History   Problem Relation Age of Onset    Hypertension Mother     Diabetes Mother     Hypertension Father      Social History     Tobacco Use    Smoking status: Current Every Day Smoker     Packs/day: 1.00     Types: Cigarettes    Smokeless tobacco: Never Used   Substance Use Topics    Alcohol use: No    Drug use: No     Review of Systems   Constitutional: Negative for chills, fatigue and fever.   HENT: Negative for congestion, ear pain and sore throat.    Eyes: Negative for pain.   Respiratory: Negative for cough and shortness of breath.    Cardiovascular: Negative for chest pain and palpitations.   Gastrointestinal: Negative for abdominal pain, nausea and vomiting.   Genitourinary: Negative for dysuria.   Musculoskeletal: Negative for back pain and myalgias.        + right leg pain   Skin: Negative for rash.   Neurological: Negative for weakness, numbness  and headaches.       Physical Exam     Initial Vitals   BP Pulse Resp Temp SpO2   05/28/22 1704 05/28/22 1704 05/28/22 1704 05/28/22 1705 05/28/22 1704   127/74 (!) 115 20 97.5 °F (36.4 °C) 98 %      MAP       --                Physical Exam    Nursing note and vitals reviewed.  Constitutional: He appears well-developed and well-nourished. He is not diaphoretic. No distress.   HENT:   Head: Normocephalic and atraumatic.   Eyes: Conjunctivae and EOM are normal. Pupils are equal, round, and reactive to light.   Neck: Neck supple.   Normal range of motion.  Cardiovascular: Normal rate, regular rhythm and intact distal pulses.   Pulmonary/Chest: Breath sounds normal. No respiratory distress.   Musculoskeletal:         General: Normal range of motion.      Cervical back: Normal range of motion and neck supple.      Right upper leg: Tenderness present. No swelling, edema or deformity.      Left upper leg: Normal.      Right knee: No swelling, deformity or erythema. Tenderness present.      Left knee: Normal.        Legs:      Neurological: He is alert and oriented to person, place, and time. He has normal strength. No sensory deficit. GCS score is 15. GCS eye subscore is 4. GCS verbal subscore is 5. GCS motor subscore is 6.   Skin: Skin is warm and dry. Capillary refill takes less than 2 seconds.         ED Course   Procedures  Labs Reviewed - No data to display       Imaging Results    None          Medications - No data to display    patient with negative right femur and right knee x-rays approximately 1 week ago and he denies additional injury since initial injury 1 month ago.  Will provide patient with pain medication and right knee knee immobilizer.  DiscussedR.I.C.E.  Instructed patient to follow-up with outpatient orthopedic services, contact information provided on discharge forms.  Instructed patient to follow-up with PCP.  Patient verbalized understanding, states no further questions or concerns, and he states  comfortable with discharge home.    I discussed with patient  that evaluation in the ED does not suggest any emergent or life threatening medical conditions requiring immediate intervention beyond what was provided in the ED, and I believe patient is safe for discharge. Regardless, an unremarkable evaluation in the ED does not preclude the development or presence of a serious of life threatening condition. As such, patient was instructed to return immediately for any worsening or change in current symptoms.                   Clinical Impression:   Final diagnoses:  [M79.604] Right leg pain (Primary)          ED Disposition Condition    Discharge Stable        ED Prescriptions     Medication Sig Dispense Start Date End Date Auth. Provider    HYDROcodone-acetaminophen (NORCO) 5-325 mg per tablet Take 1 tablet by mouth every 4 (four) hours as needed for Pain. 18 tablet 5/28/2022 5/31/2022 Filemon Rogers NP        Follow-up Information     Follow up With Specialties Details Why Contact Info    [facility] Orthopedics Schedule an appointment as soon as possible for a visit in 1 day  [address] [facility] [id number] [phone]    [facility] - Emergency Dept Emergency Medicine  As needed, If symptoms worsen [address]  [facility] [id number] [phone]    [facility]  Schedule an appointment as soon as possible for a visit in 1 day  [address]  [place] [id number] [phone]             Filemon Rogers NP  05/29/22 [id number]

## 2022-05-28 NOTE — Clinical Note
"Filemon La (Brian) was seen and treated in our emergency department on 5/28/2022.  He may return to work on 06/01/2022.       If you have any questions or concerns, please don't hesitate to call.       RN    "

## 2022-07-27 ENCOUNTER — HOSPITAL ENCOUNTER (EMERGENCY)
Facility: HOSPITAL | Age: 42
Discharge: HOME | End: 2022-07-27
Attending: EMERGENCY MEDICINE
Payer: MEDICAID

## 2022-07-27 VITALS
OXYGEN SATURATION: 96 % | SYSTOLIC BLOOD PRESSURE: 124 MMHG | HEART RATE: 108 BPM | RESPIRATION RATE: 17 BRPM | DIASTOLIC BLOOD PRESSURE: 83 MMHG | TEMPERATURE: 98 F | WEIGHT: 175.69 LBS | BODY MASS INDEX: 30.16 KG/M2

## 2022-07-27 DIAGNOSIS — R10.84 GENERALIZED ABDOMINAL PAIN: Primary | ICD-10-CM

## 2022-07-27 LAB
ALBUMIN SERPL BCP-MCNC: 3.6 G/DL (ref 3.5–5.2)
ALP SERPL-CCNC: 108 U/L (ref 55–135)
ALT SERPL W/O P-5'-P-CCNC: 37 U/L (ref 10–44)
ANION GAP SERPL CALC-SCNC: 12 MMOL/L (ref 8–16)
AST SERPL-CCNC: 28 U/L (ref 10–40)
BASOPHILS # BLD AUTO: 0.06 K/UL (ref 0–0.2)
BASOPHILS NFR BLD: 0.8 % (ref 0–1.9)
BILIRUB SERPL-MCNC: 3.2 MG/DL (ref 0.1–1)
BILIRUB UR QL STRIP: ABNORMAL
BUN SERPL-MCNC: 6 MG/DL (ref 6–20)
CALCIUM SERPL-MCNC: 9 MG/DL (ref 8.7–10.5)
CHLORIDE SERPL-SCNC: 107 MMOL/L (ref 95–110)
CLARITY UR REFRACT.AUTO: CLEAR
CO2 SERPL-SCNC: 20 MMOL/L (ref 23–29)
COLOR UR AUTO: ABNORMAL
CREAT SERPL-MCNC: 0.8 MG/DL (ref 0.5–1.4)
CTP QC/QA: YES
DIFFERENTIAL METHOD: ABNORMAL
EOSINOPHIL # BLD AUTO: 0 K/UL (ref 0–0.5)
EOSINOPHIL NFR BLD: 0.4 % (ref 0–8)
ERYTHROCYTE [DISTWIDTH] IN BLOOD BY AUTOMATED COUNT: 12.4 % (ref 11.5–14.5)
EST. GFR  (AFRICAN AMERICAN): >60 ML/MIN/1.73 M^2
EST. GFR  (NON AFRICAN AMERICAN): >60 ML/MIN/1.73 M^2
GLUCOSE SERPL-MCNC: 107 MG/DL (ref 70–110)
GLUCOSE UR QL STRIP: NEGATIVE
HCT VFR BLD AUTO: 47.1 % (ref 40–54)
HCV AB SERPL QL IA: NEGATIVE
HEP C VIRUS HOLD SPECIMEN: NORMAL
HGB BLD-MCNC: 16.4 G/DL (ref 14–18)
HGB UR QL STRIP: ABNORMAL
HIV 1+2 AB+HIV1 P24 AG SERPL QL IA: NEGATIVE
IMM GRANULOCYTES # BLD AUTO: 0.02 K/UL (ref 0–0.04)
IMM GRANULOCYTES NFR BLD AUTO: 0.3 % (ref 0–0.5)
KETONES UR QL STRIP: NEGATIVE
LEUKOCYTE ESTERASE UR QL STRIP: NEGATIVE
LIPASE SERPL-CCNC: 33 U/L (ref 4–60)
LYMPHOCYTES # BLD AUTO: 2.1 K/UL (ref 1–4.8)
LYMPHOCYTES NFR BLD: 27.2 % (ref 18–48)
MCH RBC QN AUTO: 34.2 PG (ref 27–31)
MCHC RBC AUTO-ENTMCNC: 34.8 G/DL (ref 32–36)
MCV RBC AUTO: 98 FL (ref 82–98)
MONOCYTES # BLD AUTO: 0.5 K/UL (ref 0.3–1)
MONOCYTES NFR BLD: 6.9 % (ref 4–15)
NEUTROPHILS # BLD AUTO: 5 K/UL (ref 1.8–7.7)
NEUTROPHILS NFR BLD: 64.4 % (ref 38–73)
NITRITE UR QL STRIP: NEGATIVE
NRBC BLD-RTO: 0 /100 WBC
PH UR STRIP: 6 [PH] (ref 5–8)
PLATELET # BLD AUTO: 191 K/UL (ref 150–450)
PMV BLD AUTO: 11.9 FL (ref 9.2–12.9)
POTASSIUM SERPL-SCNC: 3.9 MMOL/L (ref 3.5–5.1)
PROT SERPL-MCNC: 6.9 G/DL (ref 6–8.4)
PROT UR QL STRIP: NEGATIVE
RBC # BLD AUTO: 4.8 M/UL (ref 4.6–6.2)
SARS-COV-2 RDRP RESP QL NAA+PROBE: NEGATIVE
SODIUM SERPL-SCNC: 139 MMOL/L (ref 136–145)
SP GR UR STRIP: 1.01 (ref 1–1.03)
URN SPEC COLLECT METH UR: ABNORMAL
UROBILINOGEN UR STRIP-ACNC: >=8 EU/DL
WBC # BLD AUTO: 7.79 K/UL (ref 3.9–12.7)

## 2022-07-27 PROCEDURE — 63600175 PHARM REV CODE 636 W HCPCS: Mod: ER | Performed by: EMERGENCY MEDICINE

## 2022-07-27 PROCEDURE — 99285 EMERGENCY DEPT VISIT HI MDM: CPT | Mod: 25,ER

## 2022-07-27 PROCEDURE — 83690 ASSAY OF LIPASE: CPT | Mod: ER | Performed by: EMERGENCY MEDICINE

## 2022-07-27 PROCEDURE — 96374 THER/PROPH/DIAG INJ IV PUSH: CPT | Mod: ER

## 2022-07-27 PROCEDURE — 80053 COMPREHEN METABOLIC PANEL: CPT | Mod: ER | Performed by: EMERGENCY MEDICINE

## 2022-07-27 PROCEDURE — 86803 HEPATITIS C AB TEST: CPT | Performed by: EMERGENCY MEDICINE

## 2022-07-27 PROCEDURE — 87389 HIV-1 AG W/HIV-1&-2 AB AG IA: CPT | Performed by: EMERGENCY MEDICINE

## 2022-07-27 PROCEDURE — U0002 COVID-19 LAB TEST NON-CDC: HCPCS | Mod: ER | Performed by: EMERGENCY MEDICINE

## 2022-07-27 PROCEDURE — 85025 COMPLETE CBC W/AUTO DIFF WBC: CPT | Mod: ER | Performed by: EMERGENCY MEDICINE

## 2022-07-27 PROCEDURE — 25000003 PHARM REV CODE 250: Mod: ER | Performed by: EMERGENCY MEDICINE

## 2022-07-27 PROCEDURE — 81003 URINALYSIS AUTO W/O SCOPE: CPT | Mod: ER | Performed by: EMERGENCY MEDICINE

## 2022-07-27 PROCEDURE — 96361 HYDRATE IV INFUSION ADD-ON: CPT | Mod: ER

## 2022-07-27 RX ORDER — CEFUROXIME AXETIL 500 MG/1
500 TABLET ORAL 2 TIMES DAILY
Qty: 20 TABLET | Refills: 0 | Status: SHIPPED | OUTPATIENT
Start: 2022-07-27 | End: 2022-08-06

## 2022-07-27 RX ORDER — HYDROCODONE BITARTRATE AND ACETAMINOPHEN 5; 325 MG/1; MG/1
1 TABLET ORAL EVERY 4 HOURS PRN
Qty: 11 TABLET | Refills: 0 | Status: SHIPPED | OUTPATIENT
Start: 2022-07-27 | End: 2022-08-01

## 2022-07-27 RX ORDER — PROMETHAZINE HYDROCHLORIDE 25 MG/1
25 TABLET ORAL EVERY 6 HOURS PRN
Qty: 15 TABLET | Refills: 0 | Status: SHIPPED | OUTPATIENT
Start: 2022-07-27 | End: 2022-09-01 | Stop reason: ALTCHOICE

## 2022-07-27 RX ORDER — DOCUSATE SODIUM 100 MG/1
100 CAPSULE, LIQUID FILLED ORAL 3 TIMES DAILY PRN
Qty: 30 CAPSULE | Refills: 0 | Status: SHIPPED | OUTPATIENT
Start: 2022-07-27 | End: 2022-09-17

## 2022-07-27 RX ORDER — ONDANSETRON 2 MG/ML
4 INJECTION INTRAMUSCULAR; INTRAVENOUS
Status: COMPLETED | OUTPATIENT
Start: 2022-07-27 | End: 2022-07-27

## 2022-07-27 RX ADMIN — SODIUM CHLORIDE 1000 ML: 0.9 INJECTION, SOLUTION INTRAVENOUS at 07:07

## 2022-07-27 RX ADMIN — ONDANSETRON 4 MG: 2 INJECTION INTRAMUSCULAR; INTRAVENOUS at 07:07

## 2022-07-27 NOTE — ED PROVIDER NOTES
Encounter Date: 7/27/2022       History     Chief Complaint   Patient presents with    Abdominal Pain     Lower abd pain, reports pain with cough and walking, possible constipation     The history is provided by the patient.   Abdominal Pain  The current episode started two days ago. The onset of the illness was gradual. The abdominal pain is generalized. The abdominal pain does not radiate. The other symptoms of the illness do not include fever, shortness of breath, nausea or dysuria.   Additional symptoms associated with the illness include constipation. Symptoms associated with the illness do not include back pain.     Review of patient's allergies indicates:  No Known Allergies  No past medical history on file.  No past surgical history on file.  Family History   Problem Relation Age of Onset    Hypertension Mother     Diabetes Mother     Hypertension Father      Social History     Tobacco Use    Smoking status: Current Every Day Smoker     Packs/day: 1.00     Types: Cigarettes    Smokeless tobacco: Never Used   Substance Use Topics    Alcohol use: No    Drug use: No     Review of Systems   Constitutional: Negative for fever.   HENT: Negative for sore throat.    Respiratory: Positive for cough. Negative for shortness of breath.    Cardiovascular: Negative for chest pain.   Gastrointestinal: Positive for abdominal pain and constipation. Negative for nausea.   Genitourinary: Negative for dysuria.   Musculoskeletal: Negative for back pain.   Skin: Negative for rash.   Neurological: Negative for weakness.   Hematological: Does not bruise/bleed easily.       Physical Exam     Initial Vitals   BP Pulse Resp Temp SpO2   07/27/22 0734 07/27/22 0646 07/27/22 0646 07/27/22 0646 07/27/22 0646   128/81 (!) 117 16 98 °F (36.7 °C) 98 %      MAP       --                Physical Exam    Nursing note and vitals reviewed.  Constitutional: He appears well-developed and well-nourished. No distress.   HENT:   Head:  Normocephalic and atraumatic.   Mouth/Throat: Oropharynx is clear and moist.   Eyes: Conjunctivae and EOM are normal. Pupils are equal, round, and reactive to light.   Neck: Neck supple.   Normal range of motion.  Cardiovascular: Regular rhythm and normal heart sounds. Tachycardia present.  Exam reveals no gallop and no friction rub.    No murmur heard.  Pulmonary/Chest: Breath sounds normal. No respiratory distress. He has no wheezes. He has no rhonchi. He has no rales.   Abdominal: Abdomen is soft. Bowel sounds are normal. He exhibits no distension and no mass. There is no abdominal tenderness. There is no rebound and no guarding.   Musculoskeletal:         General: No edema. Normal range of motion.      Cervical back: Normal range of motion and neck supple.     Neurological: He is alert and oriented to person, place, and time. He has normal strength.   Skin: Skin is warm and dry. No rash noted.   Psychiatric: He has a normal mood and affect. Thought content normal.         ED Course   Procedures  Labs Reviewed   CBC W/ AUTO DIFFERENTIAL - Abnormal; Notable for the following components:       Result Value    MCH 34.2 (*)     All other components within normal limits    Narrative:     Release to patient->Immediate   COMPREHENSIVE METABOLIC PANEL - Abnormal; Notable for the following components:    CO2 20 (*)     Total Bilirubin 3.2 (*)     All other components within normal limits    Narrative:     Release to patient->Immediate   URINALYSIS, REFLEX TO URINE CULTURE - Abnormal; Notable for the following components:    Bilirubin (UA) 1+ (*)     Occult Blood UA Trace (*)     Urobilinogen, UA >=8.0 (*)     All other components within normal limits    Narrative:     Specimen Source->Urine   LIPASE    Narrative:     Release to patient->Immediate   HIV 1 / 2 ANTIBODY   HEPATITIS C ANTIBODY   HEP C VIRUS HOLD SPECIMEN   SARS-COV-2 RDRP GENE    Narrative:     This test utilizes isothermal nucleic acid amplification    technology to detect the SARS-CoV-2 RdRp nucleic acid segment.   The analytical sensitivity (limit of detection) is 125 genome   equivalents/mL.   A POSITIVE result implies infection with the SARS-CoV-2 virus;   the patient is presumed to be contagious.     A NEGATIVE result means that SARS-CoV-2 nucleic acids are not   present above the limit of detection. A NEGATIVE result should be   treated as presumptive. It does not rule out the possibility of   COVID-19 and should not be the sole basis for treatment decisions.   If COVID-19 is strongly suspected based on clinical and exposure   history, re-testing using an alternate molecular assay should be   considered.   This test is only for use under the Food and Drug   Administration s Emergency Use Authorization (EUA).   Commercial kits are provided by [employer].   Performance characteristics of the EUA have been independently   verified by [facility] Department of   Pathology and Laboratory Medicine.   _________________________________________________________________   The authorized Fact Sheet for Healthcare Providers and the authorized Fact   Sheet for Patients of the ID NOW COVID-19 are available on the FDA   website:     https://www.fda.gov/media/089578/download  https://www.fda.gov/media/252881/download                 Imaging Results          US Abdomen Limited (Final result)  Result time 07/27/22 08:56:08    Final result by Derrell Westbrook MD (07/27/22 08:56:08)                 Impression:      Mild hepatic steatosis      Electronically signed by: Derrell Westbrook MD  Date:    07/27/2022  Time:    08:56             Narrative:    EXAMINATION:  US ABDOMEN LIMITED    CLINICAL HISTORY:  elevated bilirubin;    COMPARISON:  None    FINDINGS:  Limited right upper quadrant ultrasound performed.  The liver measures 16.4 cm in length and demonstrates increased echogenicity consistent with hepatic steatosis.  Common bile duct is within normal limits  at 4 mm..  No gallstones, gallbladder wall thickening, or focal tenderness over the gallbladder.  The visualized portions of the pancreas and IVC are within normal limits.  The right kidney is normal in length measuring 11.5 cm. No right sided hydronephrosis or renal masses identified.                               CT Renal Stone Study ABD Pelvis WO (Final result)  Result time 07/27/22 07:53:54    Final result by Nelsy Mike MD (07/27/22 07:53:54)                 Impression:      No acute abnormality within the abdomen or pelvis identified.  No nephrolithiasis or obstructive uropathy.    Hepatomegaly with hepatic steatosis.    Cardiomegaly.      Electronically signed by: Nelsy Mike  Date:    07/27/2022  Time:    07:53             Narrative:    EXAMINATION:  CT RENAL STONE STUDY ABD PELVIS WO    CLINICAL HISTORY:  Flank pain, kidney stone suspected;    TECHNIQUE:  Low dose axial images, sagittal and coronal reformations were obtained from the lung bases to the pubic symphysis.  Contrast was not administered.  All CT scans at this facility are performed using dose optimization techniques including the following: automated exposure control; adjustment of the mA and/or kV; use of iterative reconstruction technique.    COMPARISON:  CT renal stone study 04/16/2022    FINDINGS:  Heart: Enlarged.  No significant pericardial fluid.    Lung Bases: There is bilateral interstitial prominence, which could reflect mild edema.    Liver: The liver is enlarged measuring 17 cm in craniocaudal dimension.  Hepatic attenuation is diffusely hypoattenuating, suggesting hepatic steatosis.  Nonspecific hypoattenuation along the ligamentum teres is typical in location for focal fatty infiltration, similar finding present adjacent to the gallbladder fossa, also typical for focal fatty infiltration.    Gallbladder: No calcified gallstones.    Bile Ducts: No evidence of dilated ducts.    Pancreas: No mass or peripancreatic fat  stranding.    Spleen: Unremarkable.    Adrenals: Unremarkable.    Kidneys/ Ureters: Unremarkable.    Bladder: Decompressed and thus not well evaluated.  No significant wall thickening.    Reproductive organs: Unremarkable.    GI Tract/Mesentery: No evidence of bowel obstruction or inflammation.  There are few colonic diverticula without inflammatory change of diverticulitis.  The appendix is normal.    Peritoneal Space: No ascites. No free air.    Retroperitoneum: No significant adenopathy.    Abdominal wall: Unremarkable.    Vasculature: There is mild aortic atherosclerosis.  No aneurysm.    Bones: No acute fracture.  There is mild degenerative change of the spine noting a prominent Schmorl's node at the superior endplate of T12.                                 Medications   sodium chloride 0.9% bolus 1,000 mL (1,000 mLs Intravenous New Bag 7/27/22 0733)   ondansetron injection 4 mg (4 mg Intravenous Given 7/27/22 0732)         ED Vital Signs:  Vitals:    07/27/22 0646 07/27/22 0734   BP:  128/81   Pulse: (!) 117    Resp: 16    Temp: 98 °F (36.7 °C)    TempSrc: Oral    SpO2: 98%    Weight: 79.7 kg (175 lb 11.3 oz)          Abnormal Lab Results:  Labs Reviewed   CBC W/ AUTO DIFFERENTIAL - Abnormal; Notable for the following components:       Result Value    MCH 34.2 (*)     All other components within normal limits    Narrative:     Release to patient->Immediate   COMPREHENSIVE METABOLIC PANEL - Abnormal; Notable for the following components:    CO2 20 (*)     Total Bilirubin 3.2 (*)     All other components within normal limits    Narrative:     Release to patient->Immediate   URINALYSIS, REFLEX TO URINE CULTURE - Abnormal; Notable for the following components:    Bilirubin (UA) 1+ (*)     Occult Blood UA Trace (*)     Urobilinogen, UA >=8.0 (*)     All other components within normal limits    Narrative:     Specimen Source->Urine   LIPASE    Narrative:     Release to patient->Immediate   HIV 1 / 2 ANTIBODY    HEPATITIS C ANTIBODY   HEP C VIRUS HOLD SPECIMEN   SARS-COV-2 RDRP GENE    Narrative:     This test utilizes isothermal nucleic acid amplification   technology to detect the SARS-CoV-2 RdRp nucleic acid segment.   The analytical sensitivity (limit of detection) is 125 genome   equivalents/mL.   A POSITIVE result implies infection with the SARS-CoV-2 virus;   the patient is presumed to be contagious.     A NEGATIVE result means that SARS-CoV-2 nucleic acids are not   present above the limit of detection. A NEGATIVE result should be   treated as presumptive. It does not rule out the possibility of   COVID-19 and should not be the sole basis for treatment decisions.   If COVID-19 is strongly suspected based on clinical and exposure   history, re-testing using an alternate molecular assay should be   considered.   This test is only for use under the Food and Drug   Administration s Emergency Use Authorization (EUA).   Commercial kits are provided by [employer].   Performance characteristics of the EUA have been independently   verified by [facility] Department of   Pathology and Laboratory Medicine.   _________________________________________________________________   The authorized Fact Sheet for Healthcare Providers and the authorized Fact   Sheet for Patients of the ID NOW COVID-19 are available on the FDA   website:     https://www.fda.gov/media/639283/download  https://www.fda.gov/media/281250/download                 All Lab Results:  Results for orders placed or performed during the hospital encounter of 07/27/22   CBC Auto Differential   Result Value Ref Range    WBC 7.79 3.90 - 12.70 K/uL    RBC 4.80 4.60 - 6.20 M/uL    Hemoglobin 16.4 14.0 - 18.0 g/dL    Hematocrit 47.1 40.0 - 54.0 %    MCV 98 82 - 98 fL    MCH 34.2 (H) 27.0 - 31.0 pg    MCHC 34.8 32.0 - 36.0 g/dL    RDW 12.4 11.5 - 14.5 %    Platelets 191 150 - 450 K/uL    MPV 11.9 9.2 - 12.9 fL    Immature Granulocytes 0.3 0.0 - 0.5 %     Gran # (ANC) 5.0 1.8 - 7.7 K/uL    Immature Grans (Abs) 0.02 0.00 - 0.04 K/uL    Lymph # 2.1 1.0 - 4.8 K/uL    Mono # 0.5 0.3 - 1.0 K/uL    Eos # 0.0 0.0 - 0.5 K/uL    Baso # 0.06 0.00 - 0.20 K/uL    nRBC 0 0 /100 WBC    Gran % 64.4 38.0 - 73.0 %    Lymph % 27.2 18.0 - 48.0 %    Mono % 6.9 4.0 - 15.0 %    Eosinophil % 0.4 0.0 - 8.0 %    Basophil % 0.8 0.0 - 1.9 %    Differential Method Automated    Comprehensive Metabolic Panel   Result Value Ref Range    Sodium 139 136 - 145 mmol/L    Potassium 3.9 3.5 - 5.1 mmol/L    Chloride 107 95 - 110 mmol/L    CO2 20 (L) 23 - 29 mmol/L    Glucose 107 70 - 110 mg/dL    BUN 6 6 - 20 mg/dL    Creatinine 0.8 0.5 - 1.4 mg/dL    Calcium 9.0 8.7 - 10.5 mg/dL    Total Protein 6.9 6.0 - 8.4 g/dL    Albumin 3.6 3.5 - 5.2 g/dL    Total Bilirubin 3.2 (H) 0.1 - 1.0 mg/dL    Alkaline Phosphatase 108 55 - 135 U/L    AST 28 10 - 40 U/L    ALT 37 10 - 44 U/L    Anion Gap 12 8 - 16 mmol/L    eGFR if African American >60.0 >60 mL/min/1.73 m^2    eGFR if non African American >60.0 >60 mL/min/1.73 m^2   Urinalysis, Reflex to Urine Culture Urine, Clean Catch    Specimen: Urine   Result Value Ref Range    Specimen UA Urine, Clean Catch     Color, UA Jessy Yellow, Straw, Jessy    Appearance, UA Clear Clear    pH, UA 6.0 5.0 - 8.0    Specific Gravity, UA 1.010 1.005 - 1.030    Protein, UA Negative Negative    Glucose, UA Negative Negative    Ketones, UA Negative Negative    Bilirubin (UA) 1+ (A) Negative    Occult Blood UA Trace (A) Negative    Nitrite, UA Negative Negative    Urobilinogen, UA >=8.0 (A) <2.0 EU/dL    Leukocytes, UA Negative Negative   Lipase   Result Value Ref Range    Lipase 33 4 - 60 U/L   POCT COVID-19 Rapid Screening   Result Value Ref Range    POC Rapid COVID Negative Negative     Acceptable Yes            Imaging Results:  Imaging Results          US Abdomen Limited (Final result)  Result time 07/27/22 08:56:08    Final result by Derrell Westbrook MD (07/27/22  08:56:08)                 Impression:      Mild hepatic steatosis      Electronically signed by: Derrell Westbrook MD  Date:    07/27/2022  Time:    08:56             Narrative:    EXAMINATION:  US ABDOMEN LIMITED    CLINICAL HISTORY:  elevated bilirubin;    COMPARISON:  None    FINDINGS:  Limited right upper quadrant ultrasound performed.  The liver measures 16.4 cm in length and demonstrates increased echogenicity consistent with hepatic steatosis.  Common bile duct is within normal limits at 4 mm..  No gallstones, gallbladder wall thickening, or focal tenderness over the gallbladder.  The visualized portions of the pancreas and IVC are within normal limits.  The right kidney is normal in length measuring 11.5 cm. No right sided hydronephrosis or renal masses identified.                               CT Renal Stone Study ABD Pelvis WO (Final result)  Result time 07/27/22 07:53:54    Final result by Nelsy Mike MD (07/27/22 07:53:54)                 Impression:      No acute abnormality within the abdomen or pelvis identified.  No nephrolithiasis or obstructive uropathy.    Hepatomegaly with hepatic steatosis.    Cardiomegaly.      Electronically signed by: Nelsy Mike  Date:    07/27/2022  Time:    07:53             Narrative:    EXAMINATION:  CT RENAL STONE STUDY ABD PELVIS WO    CLINICAL HISTORY:  Flank pain, kidney stone suspected;    TECHNIQUE:  Low dose axial images, sagittal and coronal reformations were obtained from the lung bases to the pubic symphysis.  Contrast was not administered.  All CT scans at this facility are performed using dose optimization techniques including the following: automated exposure control; adjustment of the mA and/or kV; use of iterative reconstruction technique.    COMPARISON:  CT renal stone study 04/16/2022    FINDINGS:  Heart: Enlarged.  No significant pericardial fluid.    Lung Bases: There is bilateral interstitial prominence, which could reflect mild  edema.    Liver: The liver is enlarged measuring 17 cm in craniocaudal dimension.  Hepatic attenuation is diffusely hypoattenuating, suggesting hepatic steatosis.  Nonspecific hypoattenuation along the ligamentum teres is typical in location for focal fatty infiltration, similar finding present adjacent to the gallbladder fossa, also typical for focal fatty infiltration.    Gallbladder: No calcified gallstones.    Bile Ducts: No evidence of dilated ducts.    Pancreas: No mass or peripancreatic fat stranding.    Spleen: Unremarkable.    Adrenals: Unremarkable.    Kidneys/ Ureters: Unremarkable.    Bladder: Decompressed and thus not well evaluated.  No significant wall thickening.    Reproductive organs: Unremarkable.    GI Tract/Mesentery: No evidence of bowel obstruction or inflammation.  There are few colonic diverticula without inflammatory change of diverticulitis.  The appendix is normal.    Peritoneal Space: No ascites. No free air.    Retroperitoneum: No significant adenopathy.    Abdominal wall: Unremarkable.    Vasculature: There is mild aortic atherosclerosis.  No aneurysm.    Bones: No acute fracture.  There is mild degenerative change of the spine noting a prominent Schmorl's node at the superior endplate of T12.                                   The Emergency Provider reviewed the vital signs and test results, which are outlined above.    ED Discussions:  9:17 AM: Reassessed pt at this time.  Pt states his condition has improved at this time. Discussed with pt all pertinent ED information and results. Discussed pt dx of abdominal pain and plan of tx. Gave pt all f/u and return to the ED instructions. All questions and concerns were addressed at this time. Pt expresses understanding of information and instructions, and is comfortable with plan to discharge. Pt is stable for discharge.                              Clinical Impression:   Final diagnoses:  [R10.84] Generalized abdominal pain (Primary)           ED Disposition Condition    Discharge Stable        ED Prescriptions     Medication Sig Dispense Start Date End Date Auth. Provider    HYDROcodone-acetaminophen (NORCO) 5-325 mg per tablet Take 1 tablet by mouth every 4 (four) hours as needed for Pain. 11 tablet 7/27/2022 8/1/2022 Pardeep Bright MD    promethazine (PHENERGAN) 25 MG tablet Take 1 tablet (25 mg total) by mouth every 6 (six) hours as needed for Nausea. 15 tablet 7/27/2022  Pardeep Bright MD    cefUROXime (CEFTIN) 500 MG tablet Take 1 tablet (500 mg total) by mouth 2 (two) times daily. for 10 days 20 tablet 7/27/2022 8/6/2022 Pardeep Bright MD    docusate sodium (COLACE) 100 MG capsule Take 1 capsule (100 mg total) by mouth 3 (three) times daily as needed for Constipation. 30 capsule 7/27/2022  Pardeep Bright MD        Follow-up Information     Follow up With Specialties Details Why Contact [facility] [place]  Call in 1 day  [id number]  [place] [address]  [phone]             Pardeep Bright MD  07/27/22 0917

## 2022-09-01 ENCOUNTER — HOSPITAL ENCOUNTER (EMERGENCY)
Facility: HOSPITAL | Age: 42
Discharge: HOME | End: 2022-09-01
Attending: EMERGENCY MEDICINE
Payer: MEDICAID

## 2022-09-01 VITALS
WEIGHT: 170.88 LBS | HEART RATE: 105 BPM | TEMPERATURE: 98 F | OXYGEN SATURATION: 98 % | BODY MASS INDEX: 29.33 KG/M2 | RESPIRATION RATE: 20 BRPM | SYSTOLIC BLOOD PRESSURE: 118 MMHG | DIASTOLIC BLOOD PRESSURE: 69 MMHG

## 2022-09-01 DIAGNOSIS — R06.02 SOB (SHORTNESS OF BREATH): ICD-10-CM

## 2022-09-01 DIAGNOSIS — R05.9 COUGH: Primary | ICD-10-CM

## 2022-09-01 LAB
CTP QC/QA: YES
SARS-COV-2 RDRP RESP QL NAA+PROBE: NEGATIVE

## 2022-09-01 PROCEDURE — 96372 THER/PROPH/DIAG INJ SC/IM: CPT | Performed by: EMERGENCY MEDICINE

## 2022-09-01 PROCEDURE — 63600175 PHARM REV CODE 636 W HCPCS: Mod: ER | Performed by: EMERGENCY MEDICINE

## 2022-09-01 PROCEDURE — 99284 EMERGENCY DEPT VISIT MOD MDM: CPT | Mod: 25,ER

## 2022-09-01 PROCEDURE — U0002 COVID-19 LAB TEST NON-CDC: HCPCS | Mod: ER | Performed by: EMERGENCY MEDICINE

## 2022-09-01 RX ORDER — DEXAMETHASONE SODIUM PHOSPHATE 4 MG/ML
8 INJECTION, SOLUTION INTRA-ARTICULAR; INTRALESIONAL; INTRAMUSCULAR; INTRAVENOUS; SOFT TISSUE
Status: COMPLETED | OUTPATIENT
Start: 2022-09-01 | End: 2022-09-01

## 2022-09-01 RX ORDER — PROMETHAZINE HYDROCHLORIDE AND DEXTROMETHORPHAN HYDROBROMIDE 6.25; 15 MG/5ML; MG/5ML
5 SYRUP ORAL NIGHTLY PRN
Qty: 120 ML | Refills: 1 | Status: ON HOLD | OUTPATIENT
Start: 2022-09-01 | End: 2022-09-30 | Stop reason: HOSPADM

## 2022-09-01 RX ADMIN — DEXAMETHASONE SODIUM PHOSPHATE 8 MG: 4 INJECTION INTRA-ARTICULAR; INTRALESIONAL; INTRAMUSCULAR; INTRAVENOUS; SOFT TISSUE at 10:09

## 2022-09-02 NOTE — ED PROVIDER NOTES
"Encounter Date: 9/1/2022       History     Chief Complaint   Patient presents with    Cough     Cough at night x 1 week     Chief complaint: Night time cough    History of present illness: 40 y/o male with c/o having a cough that occurs only at night. Nonproductive and not associated with fever, chills or congestion. Relates similar episode in past diagnosed as "walking pneumonia". + smoker. Relates he feels fine throughout the day with no cough or SOB. No relief with OTC meds. Denies extremity swelling or any exertional dyspnea, stating feels better during day at work with no symptoms frelated to exertion No aggravating or mitigating factors other than possible orthopnea though he minimizes the SOB.      Review of patient's allergies indicates:  No Known Allergies  No past medical history on file.  No past surgical history on file.  Family History   Problem Relation Age of Onset    Hypertension Mother     Diabetes Mother     Hypertension Father      Social History     Tobacco Use    Smoking status: Every Day     Packs/day: 1.00     Types: Cigarettes    Smokeless tobacco: Never   Substance Use Topics    Alcohol use: No    Drug use: No     Review of Systems   Constitutional: Negative.    HENT: Negative.     Respiratory:  Positive for cough and shortness of breath (very mild only at bedtime). Negative for choking and chest tightness.    Cardiovascular:  Negative for chest pain, palpitations and leg swelling.   Gastrointestinal: Negative.  Negative for nausea and vomiting.   Genitourinary: Negative.    Musculoskeletal: Negative.    Neurological:  Negative for dizziness and weakness.   Psychiatric/Behavioral: Negative.       Physical Exam     Initial Vitals [09/01/22 2032]   BP Pulse Resp Temp SpO2   110/69 110 18 97.9 °F (36.6 °C) 97 %      MAP       --         Physical Exam    Nursing note and vitals reviewed.  Constitutional: He appears well-developed and well-nourished. He is not diaphoretic. No distress.   HENT: "   Head: Normocephalic and atraumatic.   Mouth/Throat: Oropharynx is clear and moist. No oropharyngeal exudate.   Eyes: Conjunctivae and EOM are normal.   Neck: Neck supple.   Normal range of motion.  Cardiovascular:  Regular rhythm.           Mild tachycardia 105   Pulmonary/Chest: No respiratory distress. He has no wheezes. He has no rhonchi. He has no rales. He exhibits no tenderness.   Abdominal: He exhibits no distension. There is no abdominal tenderness.   Musculoskeletal:         General: No edema. Normal range of motion.      Cervical back: Normal range of motion and neck supple.     Neurological: He is oriented to person, place, and time. He has normal strength. GCS score is 15. GCS eye subscore is 4. GCS verbal subscore is 5. GCS motor subscore is 6.   Skin: Skin is dry. Capillary refill takes less than 2 seconds.   Psychiatric: He has a normal mood and affect. His behavior is normal.       ED Course   Procedures  Labs Reviewed   SARS-COV-2 RDRP GENE          Imaging Results              X-Ray Chest PA And Lateral (Final result)  Result time 09/01/22 21:20:39      Final result by Cindy Hargrove MD (09/01/22 21:20:39)                   Impression:      No acute process seen      Electronically signed by: Delvin White  Date:    09/01/2022  Time:    21:20               Narrative:    EXAMINATION:  XR CHEST PA AND LATERAL    CLINICAL HISTORY:  Shortness of breath    TECHNIQUE:  PA and lateral views of the chest were performed.    COMPARISON:  None    FINDINGS:  Lungs are clear.  No acute osseous injury.  Cardiac silhouette is minimally prominent.                                       Medications - No data to display  Medical Decision Making:   Clinical Tests:   Radiological Study: Ordered and Reviewed  ED Management:  Pt with no SOB or desaturation in ED. Appears robust and comfortable in no distress.  Possible supine-dependent congestion though doubt any component considerate of CHF.  Discussed plan and  patient comfortable with it and all questions addressed.                    Clinical Impression:       ICD-10-CM ICD-9-CM   1. Cough  R05.9 786.2   2. SOB (shortness of breath)  R06.02 786.05                  Cayden Lindsey MD  09/02/22 1016

## 2022-09-17 ENCOUNTER — HOSPITAL ENCOUNTER (INPATIENT)
Facility: HOSPITAL | Age: 42
LOS: 5 days | Discharge: HOME | DRG: 286 | End: 2022-09-22
Attending: EMERGENCY MEDICINE | Admitting: FAMILY MEDICINE
Payer: MEDICAID

## 2022-09-17 DIAGNOSIS — R79.89 ELEVATED TROPONIN: Primary | ICD-10-CM

## 2022-09-17 DIAGNOSIS — I50.41 ACUTE COMBINED SYSTOLIC AND DIASTOLIC CONGESTIVE HEART FAILURE: ICD-10-CM

## 2022-09-17 DIAGNOSIS — R74.8 ELEVATED LIVER ENZYMES: ICD-10-CM

## 2022-09-17 DIAGNOSIS — R79.89 ELEVATED BRAIN NATRIURETIC PEPTIDE (BNP) LEVEL: ICD-10-CM

## 2022-09-17 DIAGNOSIS — R06.02 SOB (SHORTNESS OF BREATH): ICD-10-CM

## 2022-09-17 DIAGNOSIS — Z72.0 TOBACCO ABUSE: ICD-10-CM

## 2022-09-17 DIAGNOSIS — R05.9 COUGH: ICD-10-CM

## 2022-09-17 DIAGNOSIS — I50.9 CHF (CONGESTIVE HEART FAILURE): ICD-10-CM

## 2022-09-17 DIAGNOSIS — I50.9 NEW ONSET OF CONGESTIVE HEART FAILURE: ICD-10-CM

## 2022-09-17 DIAGNOSIS — R00.0 TACHYCARDIA: ICD-10-CM

## 2022-09-17 DIAGNOSIS — Z78.9 ALCOHOL USE: ICD-10-CM

## 2022-09-17 PROBLEM — F10.20 ALCOHOLISM: Status: ACTIVE | Noted: 2022-09-17

## 2022-09-17 LAB
ALBUMIN SERPL BCP-MCNC: 3.5 G/DL (ref 3.5–5.2)
ALP SERPL-CCNC: 77 U/L (ref 55–135)
ALT SERPL W/O P-5'-P-CCNC: 78 U/L (ref 10–44)
AMPHET+METHAMPHET UR QL: NEGATIVE
ANION GAP SERPL CALC-SCNC: 11 MMOL/L (ref 8–16)
AST SERPL-CCNC: 55 U/L (ref 10–40)
BARBITURATES UR QL SCN>200 NG/ML: NEGATIVE
BASOPHILS # BLD AUTO: 0.06 K/UL (ref 0–0.2)
BASOPHILS NFR BLD: 0.9 % (ref 0–1.9)
BENZODIAZ UR QL SCN>200 NG/ML: NEGATIVE
BILIRUB SERPL-MCNC: 1.8 MG/DL (ref 0.1–1)
BNP SERPL-MCNC: 766 PG/ML (ref 0–99)
BUN SERPL-MCNC: 8 MG/DL (ref 6–20)
BZE UR QL SCN: NEGATIVE
CALCIUM SERPL-MCNC: 8.6 MG/DL (ref 8.7–10.5)
CANNABINOIDS UR QL SCN: NEGATIVE
CHLORIDE SERPL-SCNC: 111 MMOL/L (ref 95–110)
CO2 SERPL-SCNC: 18 MMOL/L (ref 23–29)
CREAT SERPL-MCNC: 0.8 MG/DL (ref 0.5–1.4)
CREAT UR-MCNC: 44.9 MG/DL (ref 23–375)
CTP QC/QA: YES
CTP QC/QA: YES
DIFFERENTIAL METHOD: ABNORMAL
EOSINOPHIL # BLD AUTO: 0 K/UL (ref 0–0.5)
EOSINOPHIL NFR BLD: 0.4 % (ref 0–8)
ERYTHROCYTE [DISTWIDTH] IN BLOOD BY AUTOMATED COUNT: 13.8 % (ref 11.5–14.5)
EST. GFR  (NO RACE VARIABLE): >60 ML/MIN/1.73 M^2
GLUCOSE SERPL-MCNC: 106 MG/DL (ref 70–110)
HCT VFR BLD AUTO: 45 % (ref 40–54)
HGB BLD-MCNC: 15.5 G/DL (ref 14–18)
IMM GRANULOCYTES # BLD AUTO: 0.02 K/UL (ref 0–0.04)
IMM GRANULOCYTES NFR BLD AUTO: 0.3 % (ref 0–0.5)
LACTATE SERPL-SCNC: 1.3 MMOL/L (ref 0.5–2.2)
LYMPHOCYTES # BLD AUTO: 2.8 K/UL (ref 1–4.8)
LYMPHOCYTES NFR BLD: 40.8 % (ref 18–48)
MCH RBC QN AUTO: 34.3 PG (ref 27–31)
MCHC RBC AUTO-ENTMCNC: 34.4 G/DL (ref 32–36)
MCV RBC AUTO: 100 FL (ref 82–98)
METHADONE UR QL SCN>300 NG/ML: NEGATIVE
MONOCYTES # BLD AUTO: 0.4 K/UL (ref 0.3–1)
MONOCYTES NFR BLD: 5.6 % (ref 4–15)
NEUTROPHILS # BLD AUTO: 3.6 K/UL (ref 1.8–7.7)
NEUTROPHILS NFR BLD: 52 % (ref 38–73)
NRBC BLD-RTO: 0 /100 WBC
OPIATES UR QL SCN: NEGATIVE
PCP UR QL SCN>25 NG/ML: NEGATIVE
PLATELET # BLD AUTO: 209 K/UL (ref 150–450)
PMV BLD AUTO: 12 FL (ref 9.2–12.9)
POC MOLECULAR INFLUENZA A AGN: NEGATIVE
POC MOLECULAR INFLUENZA B AGN: NEGATIVE
POTASSIUM SERPL-SCNC: 4 MMOL/L (ref 3.5–5.1)
PROCALCITONIN SERPL IA-MCNC: 0.05 NG/ML
PROT SERPL-MCNC: 6.5 G/DL (ref 6–8.4)
RBC # BLD AUTO: 4.52 M/UL (ref 4.6–6.2)
SARS-COV-2 RDRP RESP QL NAA+PROBE: NEGATIVE
SODIUM SERPL-SCNC: 140 MMOL/L (ref 136–145)
TOXICOLOGY INFORMATION: NORMAL
TROPONIN I SERPL DL<=0.01 NG/ML-MCNC: 0.04 NG/ML (ref 0–0.03)
TROPONIN I SERPL DL<=0.01 NG/ML-MCNC: 0.04 NG/ML (ref 0–0.03)
TROPONIN I SERPL DL<=0.01 NG/ML-MCNC: 0.06 NG/ML (ref 0–0.03)
WBC # BLD AUTO: 6.83 K/UL (ref 3.9–12.7)

## 2022-09-17 PROCEDURE — 63600175 PHARM REV CODE 636 W HCPCS: Mod: ER | Performed by: FAMILY MEDICINE

## 2022-09-17 PROCEDURE — 93010 ELECTROCARDIOGRAM REPORT: CPT | Mod: ,,, | Performed by: INTERNAL MEDICINE

## 2022-09-17 PROCEDURE — 96367 TX/PROPH/DG ADDL SEQ IV INF: CPT | Mod: ER

## 2022-09-17 PROCEDURE — 63600175 PHARM REV CODE 636 W HCPCS: Mod: ER | Performed by: EMERGENCY MEDICINE

## 2022-09-17 PROCEDURE — 93005 ELECTROCARDIOGRAM TRACING: CPT | Mod: ER

## 2022-09-17 PROCEDURE — G0378 HOSPITAL OBSERVATION PER HR: HCPCS

## 2022-09-17 PROCEDURE — 25000003 PHARM REV CODE 250: Mod: ER | Performed by: EMERGENCY MEDICINE

## 2022-09-17 PROCEDURE — 93010 ELECTROCARDIOGRAM REPORT: CPT | Mod: 76,,, | Performed by: INTERNAL MEDICINE

## 2022-09-17 PROCEDURE — G0378 HOSPITAL OBSERVATION PER HR: HCPCS | Mod: ER

## 2022-09-17 PROCEDURE — 87040 BLOOD CULTURE FOR BACTERIA: CPT | Performed by: EMERGENCY MEDICINE

## 2022-09-17 PROCEDURE — 80053 COMPREHEN METABOLIC PANEL: CPT | Mod: ER | Performed by: EMERGENCY MEDICINE

## 2022-09-17 PROCEDURE — 25000242 PHARM REV CODE 250 ALT 637 W/ HCPCS: Mod: ER | Performed by: EMERGENCY MEDICINE

## 2022-09-17 PROCEDURE — U0002 COVID-19 LAB TEST NON-CDC: HCPCS | Mod: ER | Performed by: EMERGENCY MEDICINE

## 2022-09-17 PROCEDURE — 93010 EKG 12-LEAD: ICD-10-PCS | Mod: ,,, | Performed by: INTERNAL MEDICINE

## 2022-09-17 PROCEDURE — S4991 NICOTINE PATCH NONLEGEND: HCPCS | Mod: ER | Performed by: EMERGENCY MEDICINE

## 2022-09-17 PROCEDURE — 85025 COMPLETE CBC W/AUTO DIFF WBC: CPT | Mod: ER | Performed by: EMERGENCY MEDICINE

## 2022-09-17 PROCEDURE — 94640 AIRWAY INHALATION TREATMENT: CPT | Mod: ER

## 2022-09-17 PROCEDURE — 84145 PROCALCITONIN (PCT): CPT | Mod: ER | Performed by: FAMILY MEDICINE

## 2022-09-17 PROCEDURE — 25500020 PHARM REV CODE 255: Mod: ER | Performed by: EMERGENCY MEDICINE

## 2022-09-17 PROCEDURE — 25000003 PHARM REV CODE 250: Performed by: FAMILY MEDICINE

## 2022-09-17 PROCEDURE — 83880 ASSAY OF NATRIURETIC PEPTIDE: CPT | Mod: ER | Performed by: EMERGENCY MEDICINE

## 2022-09-17 PROCEDURE — 25000003 PHARM REV CODE 250: Performed by: INTERNAL MEDICINE

## 2022-09-17 PROCEDURE — 96375 TX/PRO/DX INJ NEW DRUG ADDON: CPT | Mod: ER

## 2022-09-17 PROCEDURE — 84484 ASSAY OF TROPONIN QUANT: CPT | Mod: ER | Performed by: EMERGENCY MEDICINE

## 2022-09-17 PROCEDURE — 96365 THER/PROPH/DIAG IV INF INIT: CPT | Mod: ER

## 2022-09-17 PROCEDURE — 99291 CRITICAL CARE FIRST HOUR: CPT | Mod: 25,ER

## 2022-09-17 PROCEDURE — 84484 ASSAY OF TROPONIN QUANT: CPT | Mod: 91,ER | Performed by: EMERGENCY MEDICINE

## 2022-09-17 PROCEDURE — 80307 DRUG TEST PRSMV CHEM ANLYZR: CPT | Mod: ER | Performed by: EMERGENCY MEDICINE

## 2022-09-17 PROCEDURE — 83605 ASSAY OF LACTIC ACID: CPT | Mod: ER | Performed by: EMERGENCY MEDICINE

## 2022-09-17 PROCEDURE — 11000001 HC ACUTE MED/SURG PRIVATE ROOM

## 2022-09-17 RX ORDER — FUROSEMIDE 10 MG/ML
40 INJECTION INTRAMUSCULAR; INTRAVENOUS ONCE
Status: COMPLETED | OUTPATIENT
Start: 2022-09-17 | End: 2022-09-17

## 2022-09-17 RX ORDER — ONDANSETRON 4 MG/1
4 TABLET, FILM COATED ORAL EVERY 6 HOURS PRN
Status: DISCONTINUED | OUTPATIENT
Start: 2022-09-17 | End: 2022-09-20 | Stop reason: SDUPTHER

## 2022-09-17 RX ORDER — LORAZEPAM 0.5 MG/1
1 TABLET ORAL EVERY 6 HOURS PRN
Status: DISCONTINUED | OUTPATIENT
Start: 2022-09-17 | End: 2022-09-19

## 2022-09-17 RX ORDER — FUROSEMIDE 20 MG/1
20 TABLET ORAL DAILY
Status: DISCONTINUED | OUTPATIENT
Start: 2022-09-18 | End: 2022-09-20

## 2022-09-17 RX ORDER — CHLORDIAZEPOXIDE HYDROCHLORIDE 25 MG/1
25 CAPSULE, GELATIN COATED ORAL 3 TIMES DAILY
Status: DISCONTINUED | OUTPATIENT
Start: 2022-09-17 | End: 2022-09-22 | Stop reason: HOSPADM

## 2022-09-17 RX ORDER — IPRATROPIUM BROMIDE AND ALBUTEROL SULFATE 2.5; .5 MG/3ML; MG/3ML
3 SOLUTION RESPIRATORY (INHALATION)
Status: COMPLETED | OUTPATIENT
Start: 2022-09-17 | End: 2022-09-17

## 2022-09-17 RX ORDER — ACETAMINOPHEN 325 MG/1
650 TABLET ORAL EVERY 6 HOURS PRN
Status: DISCONTINUED | OUTPATIENT
Start: 2022-09-17 | End: 2022-09-20

## 2022-09-17 RX ORDER — ASPIRIN 325 MG
325 TABLET ORAL
Status: COMPLETED | OUTPATIENT
Start: 2022-09-17 | End: 2022-09-17

## 2022-09-17 RX ORDER — ENOXAPARIN SODIUM 100 MG/ML
40 INJECTION SUBCUTANEOUS EVERY 24 HOURS
Status: DISCONTINUED | OUTPATIENT
Start: 2022-09-17 | End: 2022-09-22 | Stop reason: HOSPADM

## 2022-09-17 RX ORDER — IBUPROFEN 200 MG
1 TABLET ORAL
Status: COMPLETED | OUTPATIENT
Start: 2022-09-17 | End: 2022-09-17

## 2022-09-17 RX ORDER — METHYLPREDNISOLONE SOD SUCC 125 MG
125 VIAL (EA) INJECTION
Status: COMPLETED | OUTPATIENT
Start: 2022-09-17 | End: 2022-09-17

## 2022-09-17 RX ADMIN — FUROSEMIDE 40 MG: 10 INJECTION, SOLUTION INTRAMUSCULAR; INTRAVENOUS at 06:09

## 2022-09-17 RX ADMIN — ASPIRIN 325 MG: 325 TABLET ORAL at 06:09

## 2022-09-17 RX ADMIN — IPRATROPIUM BROMIDE AND ALBUTEROL SULFATE 3 ML: 2.5; .5 SOLUTION RESPIRATORY (INHALATION) at 05:09

## 2022-09-17 RX ADMIN — NICOTINE 1 PATCH: 21 PATCH, EXTENDED RELEASE TRANSDERMAL at 11:09

## 2022-09-17 RX ADMIN — AZITHROMYCIN MONOHYDRATE 500 MG: 500 INJECTION, POWDER, LYOPHILIZED, FOR SOLUTION INTRAVENOUS at 05:09

## 2022-09-17 RX ADMIN — SODIUM CHLORIDE, SODIUM LACTATE, POTASSIUM CHLORIDE, AND CALCIUM CHLORIDE 1000 ML: .6; .31; .03; .02 INJECTION, SOLUTION INTRAVENOUS at 05:09

## 2022-09-17 RX ADMIN — IOHEXOL 100 ML: 350 INJECTION, SOLUTION INTRAVENOUS at 07:09

## 2022-09-17 RX ADMIN — SODIUM CHLORIDE 500 ML: 0.9 INJECTION, SOLUTION INTRAVENOUS at 05:09

## 2022-09-17 RX ADMIN — CEFTRIAXONE 1 G: 1 INJECTION, SOLUTION INTRAVENOUS at 07:09

## 2022-09-17 RX ADMIN — CHLORDIAZEPOXIDE HYDROCHLORIDE 25 MG: 25 CAPSULE ORAL at 09:09

## 2022-09-17 RX ADMIN — METHYLPREDNISOLONE SODIUM SUCCINATE 125 MG: 125 INJECTION, POWDER, FOR SOLUTION INTRAMUSCULAR; INTRAVENOUS at 05:09

## 2022-09-17 NOTE — Clinical Note
The groin and left radial was prepped. The site was clipped. The patient was draped. The patient was positioned supine.

## 2022-09-17 NOTE — Clinical Note
The PA catheter was inserted into the right pulmonary artery. Hemodynamics were performed. Cardiac output was obtained. 3.4

## 2022-09-17 NOTE — Clinical Note
The catheter insertion attempt was made into the ostium   left main. Hemodynamics were performed.  The catheter was unable to access the area..

## 2022-09-17 NOTE — Clinical Note
The catheter was removed from the left ventricle. Hemodynamics were performed.  and Pullback was recorded.

## 2022-09-17 NOTE — Clinical Note
ostium   left main. Hemodynamics were performed.  An angiography was performed of the left coronary arteries. Multiple views were taken.

## 2022-09-17 NOTE — ED PROVIDER NOTES
Encounter Date: 9/17/2022       History     Chief Complaint   Patient presents with    Shortness of Breath     C/o sob with cough     Filemon La is a 41 y.o. male who presents with gradual onset, moderate, worsening, constant SOB at home with associated wheezes and headache.  He's had multiple prior episodes diagnosed as pneumonia.  He has no other complaints.        Review of patient's allergies indicates:  No Known Allergies  History reviewed. No pertinent past medical history.  History reviewed. No pertinent surgical history.  Family History   Problem Relation Age of Onset    Hypertension Mother     Diabetes Mother     Hypertension Father      Social History     Tobacco Use    Smoking status: Every Day     Packs/day: 1.00     Types: Cigarettes    Smokeless tobacco: Never   Substance Use Topics    Alcohol use: Yes    Drug use: No     Review of Systems   Constitutional: Negative.  Negative for fever.   HENT: Negative.     Eyes: Negative.    Respiratory:  Positive for cough and shortness of breath.    Cardiovascular: Negative.  Negative for chest pain.   Gastrointestinal: Negative.  Negative for diarrhea and vomiting.   Endocrine: Negative.    Genitourinary: Negative.  Negative for dysuria.   Musculoskeletal: Negative.    Skin: Negative.    Allergic/Immunologic: Negative.    Neurological: Negative.    Hematological: Negative.    Psychiatric/Behavioral: Negative.     All other systems reviewed and are negative.      Initial Vitals [09/17/22 0505]   BP Pulse Resp Temp SpO2   (!) 133/92 (!) 117 20 98.6 °F (37 °C) (!) 94 %      MAP       --         Physical Exam    Nursing note and vitals reviewed.  Constitutional: He appears well-developed and well-nourished. No distress.   HENT:   Head: Normocephalic and atraumatic.   Eyes: Conjunctivae and EOM are normal. Pupils are equal, round, and reactive to light.   Neck: Neck supple.   Cardiovascular:  Regular rhythm, normal heart sounds and intact distal pulses.            tachycardic    Pulmonary/Chest: He is in respiratory distress. He has wheezes (mild right upper). He has rales (scattered).   tachypnea   Abdominal: Abdomen is soft. He exhibits no distension. There is no abdominal tenderness.   Musculoskeletal:         General: No edema. Normal range of motion.      Cervical back: Neck supple.     Neurological: He is alert and oriented to person, place, and time. He has normal strength.   Skin: Skin is warm and dry. Capillary refill takes less than 2 seconds.   Psychiatric: He has a normal mood and affect. His behavior is normal. Judgment and thought content normal.       ED Course   Critical Care    Date/Time: 9/17/2022 6:00 AM  Performed by: Luci Chery DO  Authorized by: Wesley Carcamo MD   Direct patient critical care time: 12 minutes  Additional history critical care time: 2 minutes  Ordering / reviewing critical care time: 2 minutes  Documentation critical care time: 10 minutes  Consulting other physicians critical care time: 5 minutes  Total critical care time (exclusive of procedural time) : 31 minutes  Critical care time was exclusive of separately billable procedures and treating other patients.  Critical care was necessary to treat or prevent imminent or life-threatening deterioration of the following conditions: cardiac failure.  Critical care was time spent personally by me on the following activities: blood draw for specimens, interpretation of cardiac output measurements, evaluation of patient's response to treatment, examination of patient, obtaining history from patient or surrogate, ordering and performing treatments and interventions, ordering and review of laboratory studies, ordering and review of radiographic studies, pulse oximetry, re-evaluation of patient's condition, review of old charts, discussions with consultants and development of treatment plan with patient or surrogate.      Labs Reviewed   CBC W/ AUTO DIFFERENTIAL - Abnormal; Notable  for the following components:       Result Value    RBC 4.52 (*)      (*)     MCH 34.3 (*)     All other components within normal limits   COMPREHENSIVE METABOLIC PANEL - Abnormal; Notable for the following components:    Chloride 111 (*)     CO2 18 (*)     Calcium 8.6 (*)     Total Bilirubin 1.8 (*)     AST 55 (*)     ALT 78 (*)     All other components within normal limits   TROPONIN I - Abnormal; Notable for the following components:    Troponin I 0.060 (*)     All other components within normal limits   B-TYPE NATRIURETIC PEPTIDE - Abnormal; Notable for the following components:     (*)     All other components within normal limits   TROPONIN I - Abnormal; Notable for the following components:    Troponin I 0.042 (*)     All other components within normal limits   CULTURE, BLOOD   CULTURE, BLOOD   LACTIC ACID, PLASMA   PROCALCITONIN   TROPONIN I   DRUG SCREEN PANEL, URINE EMERGENCY   SARS-COV-2 RDRP GENE   POCT INFLUENZA A/B MOLECULAR     EKG Readings: (Independently Interpreted)   EKG Interpretation by Emergency Physician: Wesley Carcamo MD  Rhythm: sinus tachycardia   Rate: 118 bpm  No ST-T changes concerning for acute ischemia  Normal axis.         ECG Results              EKG 12-lead (Preliminary result)  Result time 09/17/22 16:39:36      ED Interpretation by Luci Chery DO (09/17/22 16:39:36, [facility] - Emergency Dept, Emergency Medicine)    Rate is 122.  Sinus tachycardia.  Normal axis.  There is T-wave inversion in V5 V6.  This is unchanged from the previous EKG dated September 17th at 5:11 a.m.                                  Results for orders placed or performed during the hospital encounter of 09/17/22   Lactic acid, plasma   Result Value Ref Range    Lactate (Lactic Acid) 1.3 0.5 - 2.2 mmol/L   CBC auto differential   Result Value Ref Range    WBC 6.83 3.90 - 12.70 K/uL    RBC 4.52 (L) 4.60 - 6.20 M/uL    Hemoglobin 15.5 14.0 - 18.0 g/dL    Hematocrit 45.0 40.0 - 54.0 %      (H) 82 - 98 fL    MCH 34.3 (H) 27.0 - 31.0 pg    MCHC 34.4 32.0 - 36.0 g/dL    RDW 13.8 11.5 - 14.5 %    Platelets 209 150 - 450 K/uL    MPV 12.0 9.2 - 12.9 fL    Immature Granulocytes 0.3 0.0 - 0.5 %    Gran # (ANC) 3.6 1.8 - 7.7 K/uL    Immature Grans (Abs) 0.02 0.00 - 0.04 K/uL    Lymph # 2.8 1.0 - 4.8 K/uL    Mono # 0.4 0.3 - 1.0 K/uL    Eos # 0.0 0.0 - 0.5 K/uL    Baso # 0.06 0.00 - 0.20 K/uL    nRBC 0 0 /100 WBC    Gran % 52.0 38.0 - 73.0 %    Lymph % 40.8 18.0 - 48.0 %    Mono % 5.6 4.0 - 15.0 %    Eosinophil % 0.4 0.0 - 8.0 %    Basophil % 0.9 0.0 - 1.9 %    Differential Method Automated    Comprehensive metabolic panel   Result Value Ref Range    Sodium 140 136 - 145 mmol/L    Potassium 4.0 3.5 - 5.1 mmol/L    Chloride 111 (H) 95 - 110 mmol/L    CO2 18 (L) 23 - 29 mmol/L    Glucose 106 70 - 110 mg/dL    BUN 8 6 - 20 mg/dL    Creatinine 0.8 0.5 - 1.4 mg/dL    Calcium 8.6 (L) 8.7 - 10.5 mg/dL    Total Protein 6.5 6.0 - 8.4 g/dL    Albumin 3.5 3.5 - 5.2 g/dL    Total Bilirubin 1.8 (H) 0.1 - 1.0 mg/dL    Alkaline Phosphatase 77 55 - 135 U/L    AST 55 (H) 10 - 40 U/L    ALT 78 (H) 10 - 44 U/L    Anion Gap 11 8 - 16 mmol/L    eGFR >60.0 >60 mL/min/1.73 m^2   Troponin I   Result Value Ref Range    Troponin I 0.060 (H) 0.000 - 0.026 ng/mL   Brain natriuretic peptide   Result Value Ref Range     (H) 0 - 99 pg/mL   Procalcitonin   Result Value Ref Range    Procalcitonin 0.05 <0.25 ng/mL   Troponin I   Result Value Ref Range    Troponin I 0.042 (H) 0.000 - 0.026 ng/mL   POCT COVID-19 Rapid Screening   Result Value Ref Range    POC Rapid COVID Negative Negative     Acceptable Yes    POCT Influenza A/B Molecular   Result Value Ref Range    POC Molecular Influenza A Ag Negative Negative, Not Reported    POC Molecular Influenza B Ag Negative Negative, Not Reported     Acceptable Yes        Imaging Results              CTA Chest Non-Coronary (PE Studies) (Final result)  Result  time 09/17/22 13:16:08      Final result by Fox Everett MD (09/17/22 13:16:08)                   Impression:      Negative for pulmonary embolus.    Cardiomegaly with hazy central airspace edema and interstitial edema.  Trivial right pleural effusion.  Hepatic reflux with small amount perihepatic ascites.  Findings most consistent with CHF..    Mild mediastinal lymphadenopathy, perhaps reactive.  Attention on next follow-up.    All CT scans at this facility are performed  using dose modulation techniques as appropriate to performed exam including the following:  automated exposure control; adjustment of mA and/or kV according to the patients size (this includes techniques or standardized protocols for targeted exams where dose is matched to indication/reason for exam: i.e. extremities or head);  iterative reconstruction technique.      Electronically signed by: Fox Everett MD  Date:    09/17/2022  Time:    13:16               Narrative:    EXAMINATION:  CTA CHEST NON CORONARY (PE STUDIES)    CLINICAL HISTORY:  Pulmonary embolism (PE) suspected, unknown D-dimer;    TECHNIQUE:  CT angiogram through the chest following IV contrast administration.  Multiplanar MIP reformations performed.    COMPARISON:  Chest x-ray 09/17/2022    FINDINGS  Heart: Cardiomegaly.  No pericardial effusions.    Mediastinum: Several mildly enlarged mediastinal lymph nodes are noted scattered throughout.  A representative right hilar lymph node is 2.0 x 1.4 cm.    Vasculature: No evidence of pulmonary emboli.  No aneurysm.  No significant atherosclerosis.    Lungs: Diffuse interstitial thickening throughout the lungs.  Diffuse confluent areas of ground-glass opacification in the lungs with a central lung distribution.  Trivial right pleural effusion..    Esophagus: Unremarkable.    Upper Abdomen: Small amount of hepatic reflux.  Small amount of ascites    Bones: No acute fracture. No significant arthritic changes.    Subcutaneous soft  tissue anasarca.                                       X-Ray Chest AP Portable (Final result)  Result time 09/17/22 08:22:40      Final result by Fox Everett MD (09/17/22 08:22:40)                   Impression:      Question CHF.  Low-grade edema      Electronically signed by: Fox Everett MD  Date:    09/17/2022  Time:    08:22               Narrative:    EXAMINATION:  XR CHEST AP PORTABLE    CLINICAL HISTORY:  Cough, unspecified    TECHNIQUE:  Single frontal view of the chest was performed.    COMPARISON:  09/01/2022    FINDINGS:  Heart size accentuated by technique appearing mildly enlarged.    There is some haziness throughout the mid to lower lungs with some low-grade interstitial thickening.  No pleural effusion.    No acute osseous findings.  No advanced arthritic changes.                                    X-Rays:   Independently Interpreted Readings:   Chest X-Ray: The heart size is prominent.  There are increased vascular markings with cephalization.  Concern for congestive heart failure.  No free air under the diaphragm     Medications   cefTRIAXone (ROCEPHIN) 1 g/50 mL D5W IVPB (0 g Intravenous Stopped 9/17/22 0833)   sodium chloride 0.9% bolus 500 mL (has no administration in time range)   azithromycin 500 mg in dextrose 5 % 250 mL IVPB (ready to mix system) (0 mg Intravenous Stopped 9/17/22 0735)   albuterol-ipratropium 2.5 mg-0.5 mg/3 mL nebulizer solution 3 mL (3 mLs Nebulization Given 9/17/22 0540)   methylPREDNISolone sodium succinate injection 125 mg (125 mg Intravenous Given 9/17/22 0552)   furosemide injection 40 mg (40 mg Intravenous Given 9/17/22 0657)   aspirin tablet 325 mg (325 mg Oral Given 9/17/22 0644)   iohexoL (OMNIPAQUE 350) injection 100 mL (100 mLs Intravenous Given 9/17/22 0723)   nicotine 21 mg/24 hr 1 patch (1 patch Transdermal Patch Applied 9/17/22 1103)     Medical Decision Making:   Independently Interpreted Test(s):   I have ordered and independently interpreted X-rays -  see prior notes.  I have ordered and independently interpreted EKG Reading(s) - see prior notes  Clinical Tests:   Lab Tests: Ordered and Reviewed  Radiological Study: Ordered and Reviewed  Medical Tests: Ordered and Reviewed  ED Management:  Patient presents with 7 day history cough congestion.  Patient has usual sinus disease.  Patient reports orthopnea as well as paroxysmal nocturnal dyspnea.  No chest pain, chest pressure.  Patient does smoke.  As well as daily alcohol intake.  Patient does have history previous pneumonias as well.  No preceding history of fever, chills, body aches.  Patient noted to be tachypneic, tachycardic, and scattered crackles on exam.  Patient was treated with 125 mg of Solu-Medrol as well as 3 DuoNebs.  Re-evaluation shows patient still has conversational dyspnea, cough, crackles and wheezing.  Laboratory findings concerning for new onset congestive heart failure as patient has elevated BNP, normal white count, elevated troponin, and chest x-ray shows increased bilateral lung markings.  Patient was counseled on alcohol and tobacco abuse.  Patient requested transfer to our facility in [place].  Case was discussed with internal medicine    Recent Results (from the past 24 hour(s))   Lactic acid, plasma    Collection Time: 09/17/22  5:47 AM   Result Value Ref Range    Lactate (Lactic Acid) 1.3 0.5 - 2.2 mmol/L   CBC auto differential    Collection Time: 09/17/22  5:47 AM   Result Value Ref Range    WBC 6.83 3.90 - 12.70 K/uL    RBC 4.52 (L) 4.60 - 6.20 M/uL    Hemoglobin 15.5 14.0 - 18.0 g/dL    Hematocrit 45.0 40.0 - 54.0 %     (H) 82 - 98 fL    MCH 34.3 (H) 27.0 - 31.0 pg    MCHC 34.4 32.0 - 36.0 g/dL    RDW 13.8 11.5 - 14.5 %    Platelets 209 150 - 450 K/uL    MPV 12.0 9.2 - 12.9 fL    Immature Granulocytes 0.3 0.0 - 0.5 %    Gran # (ANC) 3.6 1.8 - 7.7 K/uL    Immature Grans (Abs) 0.02 0.00 - 0.04 K/uL    Lymph # 2.8 1.0 - 4.8 K/uL    Mono # 0.4 0.3 - 1.0 K/uL    Eos # 0.0  0.0 - 0.5 K/uL    Baso # 0.06 0.00 - 0.20 K/uL    nRBC 0 0 /100 WBC    Gran % 52.0 38.0 - 73.0 %    Lymph % 40.8 18.0 - 48.0 %    Mono % 5.6 4.0 - 15.0 %    Eosinophil % 0.4 0.0 - 8.0 %    Basophil % 0.9 0.0 - 1.9 %    Differential Method Automated    Comprehensive metabolic panel    Collection Time: 09/17/22  5:47 AM   Result Value Ref Range    Sodium 140 136 - 145 mmol/L    Potassium 4.0 3.5 - 5.1 mmol/L    Chloride 111 (H) 95 - 110 mmol/L    CO2 18 (L) 23 - 29 mmol/L    Glucose 106 70 - 110 mg/dL    BUN 8 6 - 20 mg/dL    Creatinine 0.8 0.5 - 1.4 mg/dL    Calcium 8.6 (L) 8.7 - 10.5 mg/dL    Total Protein 6.5 6.0 - 8.4 g/dL    Albumin 3.5 3.5 - 5.2 g/dL    Total Bilirubin 1.8 (H) 0.1 - 1.0 mg/dL    Alkaline Phosphatase 77 55 - 135 U/L    AST 55 (H) 10 - 40 U/L    ALT 78 (H) 10 - 44 U/L    Anion Gap 11 8 - 16 mmol/L    eGFR >60.0 >60 mL/min/1.73 m^2   Troponin I    Collection Time: 09/17/22  5:47 AM   Result Value Ref Range    Troponin I 0.060 (H) 0.000 - 0.026 ng/mL   Brain natriuretic peptide    Collection Time: 09/17/22  5:47 AM   Result Value Ref Range     (H) 0 - 99 pg/mL   POCT COVID-19 Rapid Screening    Collection Time: 09/17/22  5:55 AM   Result Value Ref Range    POC Rapid COVID Negative Negative     Acceptable Yes    POCT Influenza A/B Molecular    Collection Time: 09/17/22  5:55 AM   Result Value Ref Range    POC Molecular Influenza A Ag Negative Negative, Not Reported    POC Molecular Influenza B Ag Negative Negative, Not Reported     Acceptable Yes    Procalcitonin    Collection Time: 09/17/22  6:51 AM   Result Value Ref Range    Procalcitonin 0.05 <0.25 ng/mL   Troponin I    Collection Time: 09/17/22 10:52 AM   Result Value Ref Range    Troponin I 0.042 (H) 0.000 - 0.026 ng/mL       06:00 AM:  Care turned over from Dr. SUSHIL Carcamo.  Patient reports that he has had 7 days of shortness of breath and cough.  Patient reports that is worse when he lays down.  It is  associated with exertional dyspnea.  Patient notes that he does have some nasal drainage-but this does not appear to be different from his usual baseline.  The cough is productive.  He is getting green/yellow sputum.  Patient does drink alcohol daily.  He also continues to smoke.  No formal diagnosis of congestive heart failure or COPD.  Patient has not experienced any fever, chills, body aches, vomiting, dysuria, urgency, frequency, chest pain, chest pressure, indigestion, calf pain, calf swelling.  Patient has had multiple admissions for pneumonia.    General:  Patient sitting in bed.  He has been given 3 DuoNebs and 125 mg of Solu-Medrol.  Conversational dyspnea is present..    Neck:  No specific JVD.  Heart: Regular rate and rhythm.  It is tachycardic.  No murmur noted.  Lungs:  There is scattered crackles that are present.  There is conversational dyspnea.  There is slight accessory muscle use  Abdomen: Soft, no rebound, no guarding, no masses.  Extremities:  No pedal edema.    6:30 AM discussed with patient finding of prominent heart on chest x-ray as well as bilateral infiltrate.  I am concerned that this may represent congestive heart failure rather than pneumonia.  Patient was examined after receiving 3 DuoNebs and 125 of Solu-Medrol.  There is still wheezing present as well as crackles as well as conversational dyspnea Patient does drink daily.  I discussed recommendations for transfer to a facility with echocardiogram capability.  Patient is requesting transfer to our facility in [place].    6:44 AM      All historical, clinical, radiographic, and laboratory findings were reviewed with the patient/family in detail.  I discussed the indications and treatment need: (Internal Medicine and Cardiology) .    Patient/Family requests transfer to: [facility]    Patient/Family understands that [facility] does provide (Internal Medicine and Cardiology)  services.     Patient/family verbalized understanding.   All remaining questions and concerns were addressed at that time and the patient/family agrees to proceed accordingly.  Similarly all pertinent details of the encounter were discussed with Vasu Jain MD at [facility] . Vasu Jain MD agrees to accept the patient in transfer based on the needs/patient preferences outlined above.  [facility] , secondary to a need for ongoing Antibiotics and Cardiac Monitoring en route.  Risks: of transfer:    loss of vitals signs, permanent neurologic damage, MVC,  resulting in death, inclement weather, or loss of neurologic function.  Benefits of transfer: Internal Medicine.  Patient and family agree and verbalize understanding.     Luci Chery DO,  FACEP    9:26 AM re-examined patient.  Patient has diuresed at least over 1500 mL.  Patient is no longer tachypneic.  There is no conversational dyspnea.  Heart rate is improving.             ED Course as of 09/17/22 1640   Sat Sep 17, 2022   1621 Patient noted to be tachycardic.  Appears to be sinus tachycardia.  Patient had good diuresis.  He is afebrile.  Lungs are clear.  Will give small bolus of fluid continue to monitor. [LB]      ED Course User Index  [LB] Luci Chery DO                 Clinical Impression:   Final diagnoses:  [R06.02] SOB (shortness of breath)  [R05.9] Cough  [R77.8] Elevated troponin (Primary)  [R79.89] Elevated brain natriuretic peptide (BNP) level  [Z72.0] Tobacco abuse  [Z72.89] Alcohol use  [R74.8] Elevated liver enzymes  [R00.0] Tachycardia        ED Disposition Condition    Observation Stable                Luci Chery DO  09/17/22 0659       Luci Chery DO  09/17/22 0926       Luci Chery,   09/17/22 1640

## 2022-09-17 NOTE — Clinical Note
85 ml of contrast were injected throughout the case. 0 mL of contrast was the total wasted during the case. 85 mL was the total amount used during the case.

## 2022-09-18 LAB
ALBUMIN SERPL BCP-MCNC: 3.2 G/DL (ref 3.5–5.2)
ALP SERPL-CCNC: 74 U/L (ref 55–135)
ALT SERPL W/O P-5'-P-CCNC: 57 U/L (ref 10–44)
ANION GAP SERPL CALC-SCNC: 12 MMOL/L (ref 8–16)
ASCENDING AORTA: 1.71 CM
AST SERPL-CCNC: 24 U/L (ref 10–40)
AV INDEX (PROSTH): 0.65
AV MEAN GRADIENT: 2 MMHG
AV PEAK GRADIENT: 4 MMHG
AV VALVE AREA: 1.98 CM2
AV VELOCITY RATIO: 0.51
BASOPHILS # BLD AUTO: 0.03 K/UL (ref 0–0.2)
BASOPHILS NFR BLD: 0.2 % (ref 0–1.9)
BILIRUB SERPL-MCNC: 3.3 MG/DL (ref 0.1–1)
BSA FOR ECHO PROCEDURE: 1.9 M2
BUN SERPL-MCNC: 10 MG/DL (ref 6–20)
CALCIUM SERPL-MCNC: 9 MG/DL (ref 8.7–10.5)
CHLORIDE SERPL-SCNC: 107 MMOL/L (ref 95–110)
CO2 SERPL-SCNC: 20 MMOL/L (ref 23–29)
CREAT SERPL-MCNC: 0.8 MG/DL (ref 0.5–1.4)
CV ECHO LV RWT: 0.24 CM
DIFFERENTIAL METHOD: ABNORMAL
DOP CALC AO PEAK VEL: 1.04 M/S
DOP CALC AO VTI: 14.2 CM
DOP CALC LVOT AREA: 3 CM2
DOP CALC LVOT DIAMETER: 1.96 CM
DOP CALC LVOT PEAK VEL: 0.53 M/S
DOP CALC LVOT STROKE VOLUME: 28.05 CM3
DOP CALCLVOT PEAK VEL VTI: 9.3 CM
ECHO LV POSTERIOR WALL: 0.93 CM (ref 0.6–1.1)
EJECTION FRACTION: 15 %
EOSINOPHIL # BLD AUTO: 0 K/UL (ref 0–0.5)
EOSINOPHIL NFR BLD: 0.1 % (ref 0–8)
ERYTHROCYTE [DISTWIDTH] IN BLOOD BY AUTOMATED COUNT: 13.4 % (ref 11.5–14.5)
EST. GFR  (NO RACE VARIABLE): >60 ML/MIN/1.73 M^2
FRACTIONAL SHORTENING: 13 % (ref 28–44)
GLUCOSE SERPL-MCNC: 118 MG/DL (ref 70–110)
HCT VFR BLD AUTO: 46.7 % (ref 40–54)
HGB BLD-MCNC: 15.4 G/DL (ref 14–18)
IMM GRANULOCYTES # BLD AUTO: 0.09 K/UL (ref 0–0.04)
IMM GRANULOCYTES NFR BLD AUTO: 0.6 % (ref 0–0.5)
INTERVENTRICULAR SEPTUM: 0.9 CM (ref 0.6–1.1)
IVC DIAMETER: 1.27 CM
IVRT: 85.63 MSEC
LA MAJOR: 6.33 CM
LA MINOR: 6.34 CM
LEFT ATRIUM SIZE: 3.39 CM
LEFT INTERNAL DIMENSION IN SYSTOLE: 6.6 CM (ref 2.1–4)
LEFT VENTRICLE DIASTOLIC VOLUME INDEX: 166.36 ML/M2
LEFT VENTRICLE DIASTOLIC VOLUME: 307.76 ML
LEFT VENTRICLE MASS INDEX: 180 G/M2
LEFT VENTRICLE SYSTOLIC VOLUME INDEX: 120.8 ML/M2
LEFT VENTRICLE SYSTOLIC VOLUME: 223.41 ML
LEFT VENTRICULAR INTERNAL DIMENSION IN DIASTOLE: 7.61 CM (ref 3.5–6)
LEFT VENTRICULAR MASS: 333.83 G
LVOT MG: 0.6 MMHG
LVOT MV: 0.36 CM/S
LYMPHOCYTES # BLD AUTO: 2.9 K/UL (ref 1–4.8)
LYMPHOCYTES NFR BLD: 19 % (ref 18–48)
MCH RBC QN AUTO: 33.5 PG (ref 27–31)
MCHC RBC AUTO-ENTMCNC: 33 G/DL (ref 32–36)
MCV RBC AUTO: 102 FL (ref 82–98)
MONOCYTES # BLD AUTO: 1 K/UL (ref 0.3–1)
MONOCYTES NFR BLD: 6.4 % (ref 4–15)
MV PEAK E VEL: 1 M/S
MV STENOSIS PRESSURE HALF TIME: 49.11 MS
MV VALVE AREA P 1/2 METHOD: 4.48 CM2
NEUTROPHILS # BLD AUTO: 11.1 K/UL (ref 1.8–7.7)
NEUTROPHILS NFR BLD: 73.7 % (ref 38–73)
NRBC BLD-RTO: 0 /100 WBC
PISA MRMAX VEL: 4.44 M/S
PISA TR MAX VEL: 2.46 M/S
PLATELET # BLD AUTO: 205 K/UL (ref 150–450)
PMV BLD AUTO: 12.2 FL (ref 9.2–12.9)
POTASSIUM SERPL-SCNC: 3.8 MMOL/L (ref 3.5–5.1)
PROT SERPL-MCNC: 5.9 G/DL (ref 6–8.4)
PV MV: 0.53 M/S
PV PEAK VELOCITY: 0.72 CM/S
RA MAJOR: 5.4 CM
RA PRESSURE: 8 MMHG
RBC # BLD AUTO: 4.6 M/UL (ref 4.6–6.2)
SODIUM SERPL-SCNC: 139 MMOL/L (ref 136–145)
STJ: 2 CM
TR MAX PG: 24 MMHG
TV REST PULMONARY ARTERY PRESSURE: 32 MMHG
WBC # BLD AUTO: 15.08 K/UL (ref 3.9–12.7)

## 2022-09-18 PROCEDURE — 63600175 PHARM REV CODE 636 W HCPCS: Performed by: FAMILY MEDICINE

## 2022-09-18 PROCEDURE — 11000001 HC ACUTE MED/SURG PRIVATE ROOM

## 2022-09-18 PROCEDURE — 96372 THER/PROPH/DIAG INJ SC/IM: CPT | Performed by: FAMILY MEDICINE

## 2022-09-18 PROCEDURE — 25000003 PHARM REV CODE 250: Performed by: INTERNAL MEDICINE

## 2022-09-18 PROCEDURE — 25000003 PHARM REV CODE 250: Performed by: FAMILY MEDICINE

## 2022-09-18 PROCEDURE — 80053 COMPREHEN METABOLIC PANEL: CPT | Performed by: FAMILY MEDICINE

## 2022-09-18 PROCEDURE — 99223 1ST HOSP IP/OBS HIGH 75: CPT | Mod: 25,,, | Performed by: INTERNAL MEDICINE

## 2022-09-18 PROCEDURE — G0378 HOSPITAL OBSERVATION PER HR: HCPCS

## 2022-09-18 PROCEDURE — 99223 PR INITIAL HOSPITAL CARE,LEVL III: ICD-10-PCS | Mod: 25,,, | Performed by: INTERNAL MEDICINE

## 2022-09-18 PROCEDURE — 36415 COLL VENOUS BLD VENIPUNCTURE: CPT | Performed by: FAMILY MEDICINE

## 2022-09-18 PROCEDURE — 63600175 PHARM REV CODE 636 W HCPCS: Performed by: INTERNAL MEDICINE

## 2022-09-18 PROCEDURE — S4991 NICOTINE PATCH NONLEGEND: HCPCS | Performed by: INTERNAL MEDICINE

## 2022-09-18 PROCEDURE — 85025 COMPLETE CBC W/AUTO DIFF WBC: CPT | Performed by: FAMILY MEDICINE

## 2022-09-18 RX ORDER — IBUPROFEN 200 MG
1 TABLET ORAL DAILY
Status: DISCONTINUED | OUTPATIENT
Start: 2022-09-18 | End: 2022-09-22 | Stop reason: HOSPADM

## 2022-09-18 RX ORDER — IBUPROFEN 200 MG
1 TABLET ORAL DAILY
Status: DISCONTINUED | OUTPATIENT
Start: 2022-09-19 | End: 2022-09-18

## 2022-09-18 RX ORDER — AMLODIPINE BESYLATE 5 MG/1
5 TABLET ORAL DAILY
Status: DISCONTINUED | OUTPATIENT
Start: 2022-09-19 | End: 2022-09-19

## 2022-09-18 RX ADMIN — CHLORDIAZEPOXIDE HYDROCHLORIDE 25 MG: 25 CAPSULE ORAL at 09:09

## 2022-09-18 RX ADMIN — FUROSEMIDE 20 MG: 20 TABLET ORAL at 09:09

## 2022-09-18 RX ADMIN — CHLORDIAZEPOXIDE HYDROCHLORIDE 25 MG: 25 CAPSULE ORAL at 03:09

## 2022-09-18 RX ADMIN — CHLORDIAZEPOXIDE HYDROCHLORIDE 25 MG: 25 CAPSULE ORAL at 08:09

## 2022-09-18 RX ADMIN — NICOTINE 1 PATCH: 21 PATCH, EXTENDED RELEASE TRANSDERMAL at 04:09

## 2022-09-18 RX ADMIN — ENOXAPARIN SODIUM 40 MG: 100 INJECTION SUBCUTANEOUS at 04:09

## 2022-09-18 NOTE — HPI
Patient is a 41-year-old aa male with past medical history of alcoholism who presents to [facility] ED with complaints of shortness of breath.  Patient states the symptoms have been ongoing for several days and have progressively worsened.  Endorses nonproductive cough.  He reveals taking over-the-counter cold and cough medications without relief.  Associated symptoms include orthopnea, dyspnea on exertion.  He denies any lower extremity swelling.  Upon further questioning, patient states that he drinks half a pt of liquor every day for several years.  Does report withdrawal symptoms if he does not drink.    In the ED, patient was tachycardic with O2 saturations of 94% on room air.  Chest x-ray was concerning for edema.  BNP: 700. He was started on empiric antibiotics for pneumonia.  CTA performed was negative for PE however did show cardiomegaly and interstitial edema.  Lasix 40 mg IV was given.  Patient placed in observation for new onset CHF.

## 2022-09-18 NOTE — SUBJECTIVE & OBJECTIVE
History reviewed. No pertinent past medical history.    History reviewed. No pertinent surgical history.    Review of patient's allergies indicates:  No Known Allergies    No current facility-administered medications on file prior to encounter.     Current Outpatient Medications on File Prior to Encounter   Medication Sig    promethazine-dextromethorphan (PROMETHAZINE-DM) 6.25-15 mg/5 mL Syrp Take 5 mLs by mouth nightly as needed (cough).    [DISCONTINUED] docusate sodium (COLACE) 100 MG capsule Take 1 capsule (100 mg total) by mouth 3 (three) times daily as needed for Constipation.    [DISCONTINUED] levoFLOXacin (LEVAQUIN) 750 MG tablet Take 1 tablet (750 mg total) by mouth once daily.     Family History       Problem Relation (Age of Onset)    Diabetes Mother    Hypertension Mother, Father          Tobacco Use    Smoking status: Every Day     Packs/day: 1.00     Types: Cigarettes    Smokeless tobacco: Never   Substance and Sexual Activity    Alcohol use: Yes    Drug use: No    Sexual activity: Not on file     Review of Systems   Constitutional:  Negative for fatigue and fever.   HENT:  Negative for sinus pressure.    Eyes:  Negative for visual disturbance.   Respiratory:  Positive for cough and shortness of breath.    Cardiovascular:  Negative for chest pain and leg swelling.   Gastrointestinal:  Negative for nausea and vomiting.   Genitourinary:  Negative for difficulty urinating.   Musculoskeletal:  Negative for back pain.   Skin:  Negative for rash.   Neurological:  Negative for headaches.   Psychiatric/Behavioral:  Negative for confusion.    Objective:     Vital Signs (Most Recent):  Temp: 98.2 °F (36.8 °C) (09/17/22 1930)  Pulse: 103 (09/17/22 2100)  Resp: 17 (09/17/22 1930)  BP: (!) 140/80 (09/17/22 1930)  SpO2: 98 % (09/17/22 1930)   Vital Signs (24h Range):  Temp:  [98.2 °F (36.8 °C)-98.8 °F (37.1 °C)] 98.2 °F (36.8 °C)  Pulse:  [] 103  Resp:  [16-24] 17  SpO2:  [94 %-98 %] 98 %  BP:  (131-155)/(75-97) 140/80     Weight: 80 kg (176 lb 5.9 oz)  Body mass index is 30.27 kg/m².    Physical Exam  Constitutional:       General: He is not in acute distress.     Appearance: He is well-developed. He is not diaphoretic.   HENT:      Head: Normocephalic and atraumatic.   Eyes:      Pupils: Pupils are equal, round, and reactive to light.   Cardiovascular:      Rate and Rhythm: Normal rate and regular rhythm.      Heart sounds: Normal heart sounds. No murmur heard.    No friction rub. No gallop.   Pulmonary:      Effort: Pulmonary effort is normal. No respiratory distress.      Breath sounds: No stridor. Rales present. No wheezing.   Abdominal:      General: Bowel sounds are normal. There is no distension.      Palpations: Abdomen is soft. There is no mass.      Tenderness: There is no abdominal tenderness. There is no guarding.   Musculoskeletal:      Right lower leg: No edema.      Left lower leg: No edema.   Skin:     General: Skin is warm.      Findings: No erythema.   Neurological:      Mental Status: He is alert and oriented to person, place, and time.         CRANIAL NERVES     CN III, IV, VI   Pupils are equal, round, and reactive to light.     Significant Labs:   Results for orders placed or performed during the hospital encounter of 09/17/22   Lactic acid, plasma   Result Value Ref Range    Lactate (Lactic Acid) 1.3 0.5 - 2.2 mmol/L   CBC auto differential   Result Value Ref Range    WBC 6.83 3.90 - 12.70 K/uL    RBC 4.52 (L) 4.60 - 6.20 M/uL    Hemoglobin 15.5 14.0 - 18.0 g/dL    Hematocrit 45.0 40.0 - 54.0 %     (H) 82 - 98 fL    MCH 34.3 (H) 27.0 - 31.0 pg    MCHC 34.4 32.0 - 36.0 g/dL    RDW 13.8 11.5 - 14.5 %    Platelets 209 150 - 450 K/uL    MPV 12.0 9.2 - 12.9 fL    Immature Granulocytes 0.3 0.0 - 0.5 %    Gran # (ANC) 3.6 1.8 - 7.7 K/uL    Immature Grans (Abs) 0.02 0.00 - 0.04 K/uL    Lymph # 2.8 1.0 - 4.8 K/uL    Mono # 0.4 0.3 - 1.0 K/uL    Eos # 0.0 0.0 - 0.5 K/uL    Baso # 0.06  0.00 - 0.20 K/uL    nRBC 0 0 /100 WBC    Gran % 52.0 38.0 - 73.0 %    Lymph % 40.8 18.0 - 48.0 %    Mono % 5.6 4.0 - 15.0 %    Eosinophil % 0.4 0.0 - 8.0 %    Basophil % 0.9 0.0 - 1.9 %    Differential Method Automated    Comprehensive metabolic panel   Result Value Ref Range    Sodium 140 136 - 145 mmol/L    Potassium 4.0 3.5 - 5.1 mmol/L    Chloride 111 (H) 95 - 110 mmol/L    CO2 18 (L) 23 - 29 mmol/L    Glucose 106 70 - 110 mg/dL    BUN 8 6 - 20 mg/dL    Creatinine 0.8 0.5 - 1.4 mg/dL    Calcium 8.6 (L) 8.7 - 10.5 mg/dL    Total Protein 6.5 6.0 - 8.4 g/dL    Albumin 3.5 3.5 - 5.2 g/dL    Total Bilirubin 1.8 (H) 0.1 - 1.0 mg/dL    Alkaline Phosphatase 77 55 - 135 U/L    AST 55 (H) 10 - 40 U/L    ALT 78 (H) 10 - 44 U/L    Anion Gap 11 8 - 16 mmol/L    eGFR >60.0 >60 mL/min/1.73 m^2   Troponin I   Result Value Ref Range    Troponin I 0.060 (H) 0.000 - 0.026 ng/mL   Brain natriuretic peptide   Result Value Ref Range     (H) 0 - 99 pg/mL   Procalcitonin   Result Value Ref Range    Procalcitonin 0.05 <0.25 ng/mL   Troponin I   Result Value Ref Range    Troponin I 0.042 (H) 0.000 - 0.026 ng/mL   Troponin I   Result Value Ref Range    Troponin I 0.035 (H) 0.000 - 0.026 ng/mL   Drug screen panel, emergency   Result Value Ref Range    Benzodiazepines Negative Negative    Methadone metabolites Negative Negative    Cocaine (Metab.) Negative Negative    Opiate Scrn, Ur Negative Negative    Barbiturate Screen, Ur Negative Negative    Amphetamine Screen, Ur Negative Negative    THC Negative Negative    Phencyclidine Negative Negative    Creatinine, Urine 44.9 23.0 - 375.0 mg/dL    Toxicology Information SEE COMMENT    POCT COVID-19 Rapid Screening   Result Value Ref Range    POC Rapid COVID Negative Negative     Acceptable Yes    POCT Influenza A/B Molecular   Result Value Ref Range    POC Molecular Influenza A Ag Negative Negative, Not Reported    POC Molecular Influenza B Ag Negative Negative, Not  Reported     Acceptable Yes         Significant Imaging: X-Ray Chest 1 View  Narrative: EXAMINATION:  XR CHEST 1 VIEW    CLINICAL HISTORY:  sob;    TECHNIQUE:  Single frontal view of the chest was performed.    COMPARISON:  Prior    FINDINGS:  The lungs are clear, with normal appearance of pulmonary vasculature and no pleural effusion or pneumothorax.    Prominent cardiac silhouette.  Improvement in perihilar haze and pulmonary edema..  The hilar and mediastinal contours are unremarkable.    Bones are intact.  Impression: Interval improvement in perihilar haze suggestive of improving pulmonary edema.  Correlate clinically    Electronically signed by: Delvin White  Date:    09/17/2022  Time:    21:16  CTA Chest Non-Coronary (PE Studies)  Narrative: EXAMINATION:  CTA CHEST NON CORONARY (PE STUDIES)    CLINICAL HISTORY:  Pulmonary embolism (PE) suspected, unknown D-dimer;    TECHNIQUE:  CT angiogram through the chest following IV contrast administration.  Multiplanar MIP reformations performed.    COMPARISON:  Chest x-ray 09/17/2022    FINDINGS  Heart: Cardiomegaly.  No pericardial effusions.    Mediastinum: Several mildly enlarged mediastinal lymph nodes are noted scattered throughout.  A representative right hilar lymph node is 2.0 x 1.4 cm.    Vasculature: No evidence of pulmonary emboli.  No aneurysm.  No significant atherosclerosis.    Lungs: Diffuse interstitial thickening throughout the lungs.  Diffuse confluent areas of ground-glass opacification in the lungs with a central lung distribution.  Trivial right pleural effusion..    Esophagus: Unremarkable.    Upper Abdomen: Small amount of hepatic reflux.  Small amount of ascites    Bones: No acute fracture. No significant arthritic changes.    Subcutaneous soft tissue anasarca.  Impression: Negative for pulmonary embolus.    Cardiomegaly with hazy central airspace edema and interstitial edema.  Trivial right pleural effusion.  Hepatic reflux with  small amount perihepatic ascites.  Findings most consistent with CHF..    Mild mediastinal lymphadenopathy, perhaps reactive.  Attention on next follow-up.    All CT scans at this facility are performed  using dose modulation techniques as appropriate to performed exam including the following:  automated exposure control; adjustment of mA and/or kV according to the patients size (this includes techniques or standardized protocols for targeted exams where dose is matched to indication/reason for exam: i.e. extremities or head);  iterative reconstruction technique.    Electronically signed by: Fox Everett MD  Date:    09/17/2022  Time:    13:16  X-Ray Chest AP Portable  Narrative: EXAMINATION:  XR CHEST AP PORTABLE    CLINICAL HISTORY:  Cough, unspecified    TECHNIQUE:  Single frontal view of the chest was performed.    COMPARISON:  09/01/2022    FINDINGS:  Heart size accentuated by technique appearing mildly enlarged.    There is some haziness throughout the mid to lower lungs with some low-grade interstitial thickening.  No pleural effusion.    No acute osseous findings.  No advanced arthritic changes.  Impression: Question CHF.  Low-grade edema    Electronically signed by: Fox Everett MD  Date:    09/17/2022  Time:    08:22

## 2022-09-18 NOTE — H&P (VIEW-ONLY)
[facility]  Cardiology  Consult Note    Patient Name: Filemon La  MRN: [id number]  Admission Date: 9/17/2022  Hospital Length of Stay: 0 days  Code Status: Full Code   Attending Provider: Santy Newman MD   Consulting Provider: Praveen Ohara MD  Primary Care Physician: Primary Doctor No  Principal Problem:CHF (congestive heart failure)    Patient information was obtained from patient and ER records.     Inpatient consult to Cardiology  Consult performed by: Praveen Ohara MD  Consult ordered by: Vasu aJin MD        Subjective:     Chief Complaint:  Edema and SOB     HPI:   : Patient is a 41-year-old aa male with past medical history of alcoholism who presents to [facility] ED with complaints of shortness of breath.  Patient states the symptoms have been ongoing for several days and have progressively worsened.  Endorses nonproductive cough.  He reveals taking over-the-counter cold and cough medications without relief.  Associated symptoms include orthopnea, dyspnea on exertion.  He denies any lower extremity swelling.  Upon further questioning, patient states that he drinks half a pt of liquor every day for several years.  Does report withdrawal symptoms if he does not drink.     In the ED, patient was tachycardic with O2 saturations of 94% on room air.  Chest x-ray was concerning for edema.  BNP: 700. He was started on empiric antibiotics for pneumonia.  CTA performed was negative for PE however did show cardiomegaly and interstitial edema.  Lasix 40 mg IV was given.  Patient placed in observation for new onset CHF.   Patient seen and examined with good diuresis overnight. Plan continued diuresis, review echo and add afterload reduction.      History reviewed. No pertinent past medical history.    History reviewed. No pertinent surgical history.    Review of patient's allergies indicates:  No Known Allergies    No current facility-administered medications on file prior to encounter.     Current  Outpatient Medications on File Prior to Encounter   Medication Sig    promethazine-dextromethorphan (PROMETHAZINE-DM) 6.25-15 mg/5 mL Syrp Take 5 mLs by mouth nightly as needed (cough).     Family History       Problem Relation (Age of Onset)    Diabetes Mother    Hypertension Mother, Father          Tobacco Use    Smoking status: Every Day     Packs/day: 1.00     Types: Cigarettes    Smokeless tobacco: Never   Substance and Sexual Activity    Alcohol use: Yes    Drug use: No    Sexual activity: Not on file     Review of Systems   Constitutional: Negative for chills, diaphoresis, night sweats, weight gain and weight loss.   HENT:  Negative for congestion, hoarse voice, sore throat and stridor.    Eyes:  Negative for double vision and pain.   Cardiovascular:  Positive for dyspnea on exertion, leg swelling, palpitations and paroxysmal nocturnal dyspnea. Negative for chest pain, claudication, cyanosis, irregular heartbeat, near-syncope, orthopnea and syncope.   Respiratory:  Negative for cough, hemoptysis, shortness of breath, sleep disturbances due to breathing, snoring, sputum production and wheezing.    Endocrine: Negative for cold intolerance, heat intolerance and polydipsia.   Hematologic/Lymphatic: Negative for bleeding problem. Does not bruise/bleed easily.   Skin:  Negative for color change, dry skin and rash.   Musculoskeletal:  Negative for joint swelling and muscle cramps.   Gastrointestinal:  Negative for bloating, abdominal pain, constipation, diarrhea, dysphagia, melena, nausea and vomiting.   Genitourinary:  Negative for flank pain and urgency.   Neurological:  Negative for dizziness, focal weakness, headaches, light-headedness, loss of balance, seizures and weakness.   Psychiatric/Behavioral:  Negative for altered mental status and memory loss. The patient is not nervous/anxious.    Objective:     Vital Signs (Most Recent):  Temp: 98.7 °F (37.1 °C) (09/18/22 1133)  Pulse: (!) 58 (09/18/22  1133)  Resp: 19 (09/18/22 1133)  BP: (!) 180/99 (09/18/22 1133)  SpO2: 99 % (09/18/22 1133)   Vital Signs (24h Range):  Temp:  [96.6 °F (35.9 °C)-98.8 °F (37.1 °C)] 98.7 °F (37.1 °C)  Pulse:  [] 58  Resp:  [16-24] 19  SpO2:  [95 %-99 %] 99 %  BP: (128-180)/(70-99) 180/99     Weight: 80 kg (176 lb 5.9 oz)  Body mass index is 30.27 kg/m².    SpO2: 99 %  O2 Device (Oxygen Therapy): room air      Intake/Output Summary (Last 24 hours) at 9/18/2022 1135  Last data filed at 9/18/2022 0906  Gross per 24 hour   Intake 200 ml   Output 650 ml   Net -450 ml       Lines/Drains/Airways       Peripheral Intravenous Line  Duration                  Peripheral IV - Single Lumen 09/17/22 0741 20 G Anterior;Left Hand 1 day                    Physical Exam  Eyes:      Pupils: Pupils are equal, round, and reactive to light.   Neck:      Trachea: No tracheal deviation.   Cardiovascular:      Rate and Rhythm: Normal rate and regular rhythm.      Pulses: Intact distal pulses.           Carotid pulses are 2+ on the right side and 2+ on the left side.       Radial pulses are 2+ on the right side and 2+ on the left side.        Femoral pulses are 2+ on the right side and 2+ on the left side.       Popliteal pulses are 2+ on the right side and 2+ on the left side.        Dorsalis pedis pulses are 2+ on the right side and 2+ on the left side.        Posterior tibial pulses are 2+ on the right side and 2+ on the left side.      Heart sounds: No murmur heard.    No friction rub. Gallop present.   Pulmonary:      Effort: Pulmonary effort is normal. No respiratory distress.      Breath sounds: Normal breath sounds. No stridor. No wheezing or rales.   Chest:      Chest wall: No tenderness.   Abdominal:      General: There is no distension.      Tenderness: There is no abdominal tenderness. There is no rebound.   Musculoskeletal:         General: No tenderness.      Cervical back: Normal range of motion.      Right lower leg: Edema present.       Left lower leg: Edema present.   Skin:     General: Skin is warm and dry.   Neurological:      Mental Status: He is alert and oriented to person, place, and time.       Significant Labs: BMP:   Recent Labs   Lab 09/17/22  0547 09/18/22  0550    118*    139   K 4.0 3.8   * 107   CO2 18* 20*   BUN 8 10   CREATININE 0.8 0.8   CALCIUM 8.6* 9.0   , CBC   Recent Labs   Lab 09/17/22  0547 09/18/22  0550   WBC 6.83 15.08*   HGB 15.5 15.4   HCT 45.0 46.7    205   , and Troponin   Recent Labs   Lab 09/17/22  0547 09/17/22  1052 09/17/22  1757   TROPONINI 0.060* 0.042* 0.035*       Significant Imaging: Echocardiogram: Transthoracic echo (TTE) complete (Cupid Only): No results found for this or any previous visit.    Assessment and Plan:     * CHF (congestive heart failure)  Patient is identified as having Combined Systolic and Diastolic heart failure that is Acute on chronic. CHF is currently uncontrolled due to Continued edema of extremities and Weight gain of 20 pounds. Latest ECHO performed and demonstrates- No results found for this or any previous visit.  . Continue Beta Blocker, ACE/ARB and Furosemide and monitor clinical status closely. Monitor on telemetry. Patient is on CHF pathway.  Monitor strict Is&Os and daily weights.  Place on fluid restriction of 2 L. Continue to stress to patient importance of self efficacy and  on diet for CHF. Last BNP reviewed- and noted below   Recent Labs   Lab 09/17/22  0547   *   .      Alcoholism  cessation        VTE Risk Mitigation (From admission, onward)         Ordered     enoxaparin injection 40 mg  Daily         09/17/22 9865                Thank you for your consult. I will follow-up with patient. Please contact us if you have any additional questions.    Praveen Ohara MD  Cardiology   [facility]

## 2022-09-18 NOTE — CONSULTS
[facility]  Cardiology  Consult Note    Patient Name: Filemon La  MRN: [id number]  Admission Date: 9/17/2022  Hospital Length of Stay: 0 days  Code Status: Full Code   Attending Provider: Santy Newman MD   Consulting Provider: Praveen Ohara MD  Primary Care Physician: Primary Doctor No  Principal Problem:CHF (congestive heart failure)    Patient information was obtained from patient and ER records.     Inpatient consult to Cardiology  Consult performed by: Praveen Ohara MD  Consult ordered by: Vasu Jain MD        Subjective:     Chief Complaint:  Edema and SOB     HPI:   : Patient is a 41-year-old aa male with past medical history of alcoholism who presents to [facility] ED with complaints of shortness of breath.  Patient states the symptoms have been ongoing for several days and have progressively worsened.  Endorses nonproductive cough.  He reveals taking over-the-counter cold and cough medications without relief.  Associated symptoms include orthopnea, dyspnea on exertion.  He denies any lower extremity swelling.  Upon further questioning, patient states that he drinks half a pt of liquor every day for several years.  Does report withdrawal symptoms if he does not drink.     In the ED, patient was tachycardic with O2 saturations of 94% on room air.  Chest x-ray was concerning for edema.  BNP: 700. He was started on empiric antibiotics for pneumonia.  CTA performed was negative for PE however did show cardiomegaly and interstitial edema.  Lasix 40 mg IV was given.  Patient placed in observation for new onset CHF.   Patient seen and examined with good diuresis overnight. Plan continued diuresis, review echo and add afterload reduction.      History reviewed. No pertinent past medical history.    History reviewed. No pertinent surgical history.    Review of patient's allergies indicates:  No Known Allergies    No current facility-administered medications on file prior to encounter.     Current  Outpatient Medications on File Prior to Encounter   Medication Sig    promethazine-dextromethorphan (PROMETHAZINE-DM) 6.25-15 mg/5 mL Syrp Take 5 mLs by mouth nightly as needed (cough).     Family History       Problem Relation (Age of Onset)    Diabetes Mother    Hypertension Mother, Father          Tobacco Use    Smoking status: Every Day     Packs/day: 1.00     Types: Cigarettes    Smokeless tobacco: Never   Substance and Sexual Activity    Alcohol use: Yes    Drug use: No    Sexual activity: Not on file     Review of Systems   Constitutional: Negative for chills, diaphoresis, night sweats, weight gain and weight loss.   HENT:  Negative for congestion, hoarse voice, sore throat and stridor.    Eyes:  Negative for double vision and pain.   Cardiovascular:  Positive for dyspnea on exertion, leg swelling, palpitations and paroxysmal nocturnal dyspnea. Negative for chest pain, claudication, cyanosis, irregular heartbeat, near-syncope, orthopnea and syncope.   Respiratory:  Negative for cough, hemoptysis, shortness of breath, sleep disturbances due to breathing, snoring, sputum production and wheezing.    Endocrine: Negative for cold intolerance, heat intolerance and polydipsia.   Hematologic/Lymphatic: Negative for bleeding problem. Does not bruise/bleed easily.   Skin:  Negative for color change, dry skin and rash.   Musculoskeletal:  Negative for joint swelling and muscle cramps.   Gastrointestinal:  Negative for bloating, abdominal pain, constipation, diarrhea, dysphagia, melena, nausea and vomiting.   Genitourinary:  Negative for flank pain and urgency.   Neurological:  Negative for dizziness, focal weakness, headaches, light-headedness, loss of balance, seizures and weakness.   Psychiatric/Behavioral:  Negative for altered mental status and memory loss. The patient is not nervous/anxious.    Objective:     Vital Signs (Most Recent):  Temp: 98.7 °F (37.1 °C) (09/18/22 1133)  Pulse: (!) 58 (09/18/22  1133)  Resp: 19 (09/18/22 1133)  BP: (!) 180/99 (09/18/22 1133)  SpO2: 99 % (09/18/22 1133)   Vital Signs (24h Range):  Temp:  [96.6 °F (35.9 °C)-98.8 °F (37.1 °C)] 98.7 °F (37.1 °C)  Pulse:  [] 58  Resp:  [16-24] 19  SpO2:  [95 %-99 %] 99 %  BP: (128-180)/(70-99) 180/99     Weight: 80 kg (176 lb 5.9 oz)  Body mass index is 30.27 kg/m².    SpO2: 99 %  O2 Device (Oxygen Therapy): room air      Intake/Output Summary (Last 24 hours) at 9/18/2022 1135  Last data filed at 9/18/2022 0906  Gross per 24 hour   Intake 200 ml   Output 650 ml   Net -450 ml       Lines/Drains/Airways       Peripheral Intravenous Line  Duration                  Peripheral IV - Single Lumen 09/17/22 0741 20 G Anterior;Left Hand 1 day                    Physical Exam  Eyes:      Pupils: Pupils are equal, round, and reactive to light.   Neck:      Trachea: No tracheal deviation.   Cardiovascular:      Rate and Rhythm: Normal rate and regular rhythm.      Pulses: Intact distal pulses.           Carotid pulses are 2+ on the right side and 2+ on the left side.       Radial pulses are 2+ on the right side and 2+ on the left side.        Femoral pulses are 2+ on the right side and 2+ on the left side.       Popliteal pulses are 2+ on the right side and 2+ on the left side.        Dorsalis pedis pulses are 2+ on the right side and 2+ on the left side.        Posterior tibial pulses are 2+ on the right side and 2+ on the left side.      Heart sounds: No murmur heard.    No friction rub. Gallop present.   Pulmonary:      Effort: Pulmonary effort is normal. No respiratory distress.      Breath sounds: Normal breath sounds. No stridor. No wheezing or rales.   Chest:      Chest wall: No tenderness.   Abdominal:      General: There is no distension.      Tenderness: There is no abdominal tenderness. There is no rebound.   Musculoskeletal:         General: No tenderness.      Cervical back: Normal range of motion.      Right lower leg: Edema present.       Left lower leg: Edema present.   Skin:     General: Skin is warm and dry.   Neurological:      Mental Status: He is alert and oriented to person, place, and time.       Significant Labs: BMP:   Recent Labs   Lab 09/17/22  0547 09/18/22  0550    118*    139   K 4.0 3.8   * 107   CO2 18* 20*   BUN 8 10   CREATININE 0.8 0.8   CALCIUM 8.6* 9.0   , CBC   Recent Labs   Lab 09/17/22  0547 09/18/22  0550   WBC 6.83 15.08*   HGB 15.5 15.4   HCT 45.0 46.7    205   , and Troponin   Recent Labs   Lab 09/17/22  0547 09/17/22  1052 09/17/22  1757   TROPONINI 0.060* 0.042* 0.035*       Significant Imaging: Echocardiogram: Transthoracic echo (TTE) complete (Cupid Only): No results found for this or any previous visit.    Assessment and Plan:     * CHF (congestive heart failure)  Patient is identified as having Combined Systolic and Diastolic heart failure that is Acute on chronic. CHF is currently uncontrolled due to Continued edema of extremities and Weight gain of 20 pounds. Latest ECHO performed and demonstrates- No results found for this or any previous visit.  . Continue Beta Blocker, ACE/ARB and Furosemide and monitor clinical status closely. Monitor on telemetry. Patient is on CHF pathway.  Monitor strict Is&Os and daily weights.  Place on fluid restriction of 2 L. Continue to stress to patient importance of self efficacy and  on diet for CHF. Last BNP reviewed- and noted below   Recent Labs   Lab 09/17/22  0547   *   .      Alcoholism  cessation        VTE Risk Mitigation (From admission, onward)         Ordered     enoxaparin injection 40 mg  Daily         09/17/22 6935                Thank you for your consult. I will follow-up with patient. Please contact us if you have any additional questions.    Praveen Ohara MD  Cardiology   [facility]

## 2022-09-18 NOTE — PROGRESS NOTES
[facility]  [facility] Medicine  Progress Note    Patient Name: Filemon La  MRN: [id number]  Patient Class: IP- Inpatient   Admission Date: 9/17/2022  Length of Stay: 0 days  Attending Physician: Santy Newman MD  Primary Care Provider: Primary Doctor No    Subjective:     Principal Problem:CHF (congestive heart failure)        HPI:  Patient is a 41-year-old aa male with past medical history of alcoholism who presents to [facility] ED with complaints of shortness of breath.  Patient states the symptoms have been ongoing for several days and have progressively worsened.  Endorses nonproductive cough.  He reveals taking over-the-counter cold and cough medications without relief.  Associated symptoms include orthopnea, dyspnea on exertion.  He denies any lower extremity swelling.  Upon further questioning, patient states that he drinks half a pt of liquor every day for several years.  Does report withdrawal symptoms if he does not drink.    In the ED, patient was tachycardic with O2 saturations of 94% on room air.  Chest x-ray was concerning for edema.  BNP: 700. He was started on empiric antibiotics for pneumonia.  CTA performed was negative for PE however did show cardiomegaly and interstitial edema.  Lasix 40 mg IV was given.  Patient placed in observation for new onset CHF.       Overview/Hospital Course:  Filemon La is a 41 year old male who was admitted to [facility] for acute decompensated heart failure. He was diuresed and blood pressure controlled. He has never been on antihypertensives, started on amlodipine. Uncertain of cardiac function, Echocardiogram has been ordered. Counseled extensively on ETOH abuse. Liver enzymes are trending down.      Interval History: feeling better. But some some exertional dyspnea.    Review of Systems   Constitutional:  Negative for fatigue and fever.   HENT:  Negative for sinus pressure.    Eyes:  Negative for visual disturbance.   Respiratory:  Positive  for cough and shortness of breath.    Cardiovascular:  Negative for chest pain and leg swelling.   Gastrointestinal:  Negative for nausea and vomiting.   Genitourinary:  Negative for difficulty urinating.   Musculoskeletal:  Negative for back pain.   Skin:  Negative for rash.   Neurological:  Negative for headaches.   Psychiatric/Behavioral:  Negative for confusion.        Objective:     Vital Signs (Most Recent):  Temp: 96.6 °F (35.9 °C) (09/18/22 0749)  Pulse: 100 (09/18/22 1155)  Resp: 19 (09/18/22 1133)  BP: 120/86 (09/18/22 1219)  SpO2: 99 % (09/18/22 1133) Vital Signs (24h Range):  Temp:  [96.6 °F (35.9 °C)-98.8 °F (37.1 °C)] 96.6 °F (35.9 °C)  Pulse:  [] 100  Resp:  [16-21] 19  SpO2:  [95 %-99 %] 99 %  BP: (120-180)/(70-99) 120/86     Weight: 80 kg (176 lb 5.9 oz)  Body mass index is 30.27 kg/m².    Intake/Output Summary (Last 24 hours) at 9/18/2022 1515  Last data filed at 9/18/2022 1155  Gross per 24 hour   Intake 440 ml   Output 425 ml   Net 15 ml      Physical Exam  Constitutional:       General: He is not in acute distress.     Appearance: He is well-developed. He is not diaphoretic.   HENT:      Head: Normocephalic and atraumatic.   Eyes:      Pupils: Pupils are equal, round, and reactive to light.   Cardiovascular:      Rate and Rhythm: Normal rate and regular rhythm.      Heart sounds: Normal heart sounds. No murmur heard.    No friction rub. No gallop.   Pulmonary:      Effort: Pulmonary effort is normal. No respiratory distress.      Breath sounds: No stridor. Rales present. No wheezing.   Abdominal:      General: Bowel sounds are normal. There is no distension.      Palpations: Abdomen is soft. There is no mass.      Tenderness: There is no abdominal tenderness. There is no guarding.   Musculoskeletal:      Right lower leg: No edema.      Left lower leg: No edema.   Skin:     General: Skin is warm.      Findings: No erythema.   Neurological:      Mental Status: He is alert and oriented to  person, place, and time.     Significant Labs: All pertinent labs within the past 24 hours have been reviewed.  CBC:   Recent Labs   Lab 09/17/22  0547 09/18/22  0550   WBC 6.83 15.08*   HGB 15.5 15.4   HCT 45.0 46.7    205     CMP:   Recent Labs   Lab 09/17/22  0547 09/18/22  0550    139   K 4.0 3.8   * 107   CO2 18* 20*    118*   BUN 8 10   CREATININE 0.8 0.8   CALCIUM 8.6* 9.0   PROT 6.5 5.9*   ALBUMIN 3.5 3.2*   BILITOT 1.8* 3.3*   ALKPHOS 77 74   AST 55* 24   ALT 78* 57*   ANIONGAP 11 12       Significant Imaging: I have reviewed all pertinent imaging results/findings within the past 24 hours.      Assessment/Plan:      * CHF (congestive heart failure)  New onset CHF   CTA showed pulmonary edema  Elevated BNP   Symptoms improved post 40 mg of Lasix  Low-sodium diet   Fluid restriction   Echo pending  Cardiology consult  Currently euvolemic   Continue Lasix 20 mg daily    9/18/22  Cardiac function unknown; awaiting echocardiogram  Lasix changed to PO  Amlodipine started for blood pressure control  Continue I&D  Cardiology input appreciated.    Elevated LFTs  Likely secondary to alcoholism   Repeat labs in a.m.    9/18/22  Enzymes trending down. Total Bilirubin elevated  Ultrasound 7/2022 showed Mild hepatic steatosis  Repeat I AM      Alcoholism  Librium t.i.d.   Ativan p.r.n.        VTE Risk Mitigation (From admission, onward)         Ordered     enoxaparin injection 40 mg  Daily         09/17/22 2151                Discharge Planning   ABI:      Code Status: Full Code   Is the patient medically ready for discharge?:     Reason for patient still in hospital (select all that apply): Treatment           Gil Quarles NP  Department of Hospital Medicine   [facility]

## 2022-09-18 NOTE — ASSESSMENT & PLAN NOTE
New onset CHF   CTA showed pulmonary edema  Elevated BNP   Symptoms improved post 40 mg of Lasix  Low-sodium diet   Fluid restriction   Echo pending  Cardiology consult  Currently euvolemic   Continue Lasix 20 mg daily    9/18/22  Cardiac function unknown; awaiting echocardiogram  Lasix changed to PO  Amlodipine started for blood pressure control  Continue I&D  Cardiology input appreciated.

## 2022-09-18 NOTE — PLAN OF CARE
Problem: Adult Inpatient Plan of Care  Goal: Absence of Hospital-Acquired Illness or Injury  Intervention: Identify and Manage Fall Risk  Flowsheets (Taken 9/17/2022 2235)  Safety Promotion/Fall Prevention:   assistive device/personal item within reach   bed alarm set   Fall Risk reviewed with patient/family   medications reviewed   nonskid shoes/socks when out of bed     Problem: Fluid Imbalance (Pneumonia)  Goal: Fluid Balance  Intervention: Monitor and Manage Fluid Balance  Flowsheets (Taken 9/17/2022 2235)  Fluid/Electrolyte Management: fluids restricted     Problem: Infection (Pneumonia)  Goal: Resolution of Infection Signs and Symptoms  Intervention: Prevent Infection Progression  Flowsheets (Taken 9/17/2022 2235)  Infection Management: aseptic technique maintained     Problem: Respiratory Compromise (Pneumonia)  Goal: Effective Oxygenation and Ventilation  Intervention: Promote Airway Secretion Clearance  Flowsheets (Taken 9/17/2022 2235)  Breathing Techniques/Airway Clearance: deep/controlled cough encouraged  Cough And Deep Breathing: done independently per patient  Intervention: Optimize Oxygenation and Ventilation  Flowsheets (Taken 9/17/2022 2235)  Airway/Ventilation Management:   airway patency maintained   calming measures promoted  Head of Bed (HOB) Positioning: HOB elevated     Problem: Adjustment to Illness (Heart Failure)  Goal: Optimal Coping  Intervention: Support Psychosocial Response  Flowsheets (Taken 9/17/2022 2235)  Supportive Measures:   active listening utilized   relaxation techniques promoted   positive reinforcement provided     Problem: Fluid Imbalance (Heart Failure)  Goal: Fluid Balance  Intervention: Monitor and Manage Fluid Balance  Flowsheets (Taken 9/17/2022 2235)  Fluid/Electrolyte Management: fluids restricted

## 2022-09-18 NOTE — HPI
: Patient is a 41-year-old aa male with past medical history of alcoholism who presents to [facility] ED with complaints of shortness of breath.  Patient states the symptoms have been ongoing for several days and have progressively worsened.  Endorses nonproductive cough.  He reveals taking over-the-counter cold and cough medications without relief.  Associated symptoms include orthopnea, dyspnea on exertion.  He denies any lower extremity swelling.  Upon further questioning, patient states that he drinks half a pt of liquor every day for several years.  Does report withdrawal symptoms if he does not drink.     In the ED, patient was tachycardic with O2 saturations of 94% on room air.  Chest x-ray was concerning for edema.  BNP: 700. He was started on empiric antibiotics for pneumonia.  CTA performed was negative for PE however did show cardiomegaly and interstitial edema.  Lasix 40 mg IV was given.  Patient placed in observation for new onset CHF.   Patient seen and examined with good diuresis overnight. Plan continued diuresis, review echo and add afterload reduction.

## 2022-09-18 NOTE — PLAN OF CARE
Discussed Plan of Care with patient and verbalized understanding - Patient remains AAOx4 - remains free of falls, accidents and trauma during the day shift. Bed is in the low position and the call light is within reach. Echo completed.  Will continue to monitor

## 2022-09-18 NOTE — PROGRESS NOTES
When I went to manually check his BP I had to cut a piece coban off of his right AC - it was wrapped so tight that when I pulled up on it to get my bandage scissors under it his skin came up also and I did not notice until he stopped me and said I cut him also. I covered the cut with a Mepilex border, he only had a scant amount of bleeding, MD notified

## 2022-09-18 NOTE — H&P
[facility]  [facility] Medicine  History & Physical    Patient Name: Filemon La  MRN: [id number]  Patient Class: OP- Observation  Admission Date: 9/17/2022  Attending Physician: Santy Newman MD   Primary Care Provider: Primary Doctor No         Patient information was obtained from patient and ER records.     Subjective:     Principal Problem:CHF (congestive heart failure)    Chief Complaint:   Chief Complaint   Patient presents with    Shortness of Breath     C/o sob with cough        HPI: Patient is a 41-year-old aa male with past medical history of alcoholism who presents to [facility] ED with complaints of shortness of breath.  Patient states the symptoms have been ongoing for several days and have progressively worsened.  Endorses nonproductive cough.  He reveals taking over-the-counter cold and cough medications without relief.  Associated symptoms include orthopnea, dyspnea on exertion.  He denies any lower extremity swelling.  Upon further questioning, patient states that he drinks half a pt of liquor every day for several years.  Does report withdrawal symptoms if he does not drink.    In the ED, patient was tachycardic with O2 saturations of 94% on room air.  Chest x-ray was concerning for edema.  BNP: 700. He was started on empiric antibiotics for pneumonia.  CTA performed was negative for PE however did show cardiomegaly and interstitial edema.  Lasix 40 mg IV was given.  Patient placed in observation for new onset CHF.       History reviewed. No pertinent past medical history.    History reviewed. No pertinent surgical history.    Review of patient's allergies indicates:  No Known Allergies    No current facility-administered medications on file prior to encounter.     Current Outpatient Medications on File Prior to Encounter   Medication Sig    promethazine-dextromethorphan (PROMETHAZINE-DM) 6.25-15 mg/5 mL Syrp Take 5 mLs by mouth nightly as needed (cough).    [DISCONTINUED] docusate  sodium (COLACE) 100 MG capsule Take 1 capsule (100 mg total) by mouth 3 (three) times daily as needed for Constipation.    [DISCONTINUED] levoFLOXacin (LEVAQUIN) 750 MG tablet Take 1 tablet (750 mg total) by mouth once daily.     Family History       Problem Relation (Age of Onset)    Diabetes Mother    Hypertension Mother, Father          Tobacco Use    Smoking status: Every Day     Packs/day: 1.00     Types: Cigarettes    Smokeless tobacco: Never   Substance and Sexual Activity    Alcohol use: Yes    Drug use: No    Sexual activity: Not on file     Review of Systems   Constitutional:  Negative for fatigue and fever.   HENT:  Negative for sinus pressure.    Eyes:  Negative for visual disturbance.   Respiratory:  Positive for cough and shortness of breath.    Cardiovascular:  Negative for chest pain and leg swelling.   Gastrointestinal:  Negative for nausea and vomiting.   Genitourinary:  Negative for difficulty urinating.   Musculoskeletal:  Negative for back pain.   Skin:  Negative for rash.   Neurological:  Negative for headaches.   Psychiatric/Behavioral:  Negative for confusion.    Objective:     Vital Signs (Most Recent):  Temp: 98.2 °F (36.8 °C) (09/17/22 1930)  Pulse: 103 (09/17/22 2100)  Resp: 17 (09/17/22 1930)  BP: (!) 140/80 (09/17/22 1930)  SpO2: 98 % (09/17/22 1930)   Vital Signs (24h Range):  Temp:  [98.2 °F (36.8 °C)-98.8 °F (37.1 °C)] 98.2 °F (36.8 °C)  Pulse:  [] 103  Resp:  [16-24] 17  SpO2:  [94 %-98 %] 98 %  BP: (131-155)/(75-97) 140/80     Weight: 80 kg (176 lb 5.9 oz)  Body mass index is 30.27 kg/m².    Physical Exam  Constitutional:       General: He is not in acute distress.     Appearance: He is well-developed. He is not diaphoretic.   HENT:      Head: Normocephalic and atraumatic.   Eyes:      Pupils: Pupils are equal, round, and reactive to light.   Cardiovascular:      Rate and Rhythm: Normal rate and regular rhythm.      Heart sounds: Normal heart sounds. No murmur heard.     No friction rub. No gallop.   Pulmonary:      Effort: Pulmonary effort is normal. No respiratory distress.      Breath sounds: No stridor. Rales present. No wheezing.   Abdominal:      General: Bowel sounds are normal. There is no distension.      Palpations: Abdomen is soft. There is no mass.      Tenderness: There is no abdominal tenderness. There is no guarding.   Musculoskeletal:      Right lower leg: No edema.      Left lower leg: No edema.   Skin:     General: Skin is warm.      Findings: No erythema.   Neurological:      Mental Status: He is alert and oriented to person, place, and time.         CRANIAL NERVES     CN III, IV, VI   Pupils are equal, round, and reactive to light.     Significant Labs:   Results for orders placed or performed during the hospital encounter of 09/17/22   Lactic acid, plasma   Result Value Ref Range    Lactate (Lactic Acid) 1.3 0.5 - 2.2 mmol/L   CBC auto differential   Result Value Ref Range    WBC 6.83 3.90 - 12.70 K/uL    RBC 4.52 (L) 4.60 - 6.20 M/uL    Hemoglobin 15.5 14.0 - 18.0 g/dL    Hematocrit 45.0 40.0 - 54.0 %     (H) 82 - 98 fL    MCH 34.3 (H) 27.0 - 31.0 pg    MCHC 34.4 32.0 - 36.0 g/dL    RDW 13.8 11.5 - 14.5 %    Platelets 209 150 - 450 K/uL    MPV 12.0 9.2 - 12.9 fL    Immature Granulocytes 0.3 0.0 - 0.5 %    Gran # (ANC) 3.6 1.8 - 7.7 K/uL    Immature Grans (Abs) 0.02 0.00 - 0.04 K/uL    Lymph # 2.8 1.0 - 4.8 K/uL    Mono # 0.4 0.3 - 1.0 K/uL    Eos # 0.0 0.0 - 0.5 K/uL    Baso # 0.06 0.00 - 0.20 K/uL    nRBC 0 0 /100 WBC    Gran % 52.0 38.0 - 73.0 %    Lymph % 40.8 18.0 - 48.0 %    Mono % 5.6 4.0 - 15.0 %    Eosinophil % 0.4 0.0 - 8.0 %    Basophil % 0.9 0.0 - 1.9 %    Differential Method Automated    Comprehensive metabolic panel   Result Value Ref Range    Sodium 140 136 - 145 mmol/L    Potassium 4.0 3.5 - 5.1 mmol/L    Chloride 111 (H) 95 - 110 mmol/L    CO2 18 (L) 23 - 29 mmol/L    Glucose 106 70 - 110 mg/dL    BUN 8 6 - 20 mg/dL    Creatinine 0.8 0.5  - 1.4 mg/dL    Calcium 8.6 (L) 8.7 - 10.5 mg/dL    Total Protein 6.5 6.0 - 8.4 g/dL    Albumin 3.5 3.5 - 5.2 g/dL    Total Bilirubin 1.8 (H) 0.1 - 1.0 mg/dL    Alkaline Phosphatase 77 55 - 135 U/L    AST 55 (H) 10 - 40 U/L    ALT 78 (H) 10 - 44 U/L    Anion Gap 11 8 - 16 mmol/L    eGFR >60.0 >60 mL/min/1.73 m^2   Troponin I   Result Value Ref Range    Troponin I 0.060 (H) 0.000 - 0.026 ng/mL   Brain natriuretic peptide   Result Value Ref Range     (H) 0 - 99 pg/mL   Procalcitonin   Result Value Ref Range    Procalcitonin 0.05 <0.25 ng/mL   Troponin I   Result Value Ref Range    Troponin I 0.042 (H) 0.000 - 0.026 ng/mL   Troponin I   Result Value Ref Range    Troponin I 0.035 (H) 0.000 - 0.026 ng/mL   Drug screen panel, emergency   Result Value Ref Range    Benzodiazepines Negative Negative    Methadone metabolites Negative Negative    Cocaine (Metab.) Negative Negative    Opiate Scrn, Ur Negative Negative    Barbiturate Screen, Ur Negative Negative    Amphetamine Screen, Ur Negative Negative    THC Negative Negative    Phencyclidine Negative Negative    Creatinine, Urine 44.9 23.0 - 375.0 mg/dL    Toxicology Information SEE COMMENT    POCT COVID-19 Rapid Screening   Result Value Ref Range    POC Rapid COVID Negative Negative     Acceptable Yes    POCT Influenza A/B Molecular   Result Value Ref Range    POC Molecular Influenza A Ag Negative Negative, Not Reported    POC Molecular Influenza B Ag Negative Negative, Not Reported     Acceptable Yes         Significant Imaging: X-Ray Chest 1 View  Narrative: EXAMINATION:  XR CHEST 1 VIEW    CLINICAL HISTORY:  sob;    TECHNIQUE:  Single frontal view of the chest was performed.    COMPARISON:  Prior    FINDINGS:  The lungs are clear, with normal appearance of pulmonary vasculature and no pleural effusion or pneumothorax.    Prominent cardiac silhouette.  Improvement in perihilar haze and pulmonary edema..  The hilar and mediastinal  contours are unremarkable.    Bones are intact.  Impression: Interval improvement in perihilar haze suggestive of improving pulmonary edema.  Correlate clinically    Electronically signed by: Delvin White  Date:    09/17/2022  Time:    21:16  CTA Chest Non-Coronary (PE Studies)  Narrative: EXAMINATION:  CTA CHEST NON CORONARY (PE STUDIES)    CLINICAL HISTORY:  Pulmonary embolism (PE) suspected, unknown D-dimer;    TECHNIQUE:  CT angiogram through the chest following IV contrast administration.  Multiplanar MIP reformations performed.    COMPARISON:  Chest x-ray 09/17/2022    FINDINGS  Heart: Cardiomegaly.  No pericardial effusions.    Mediastinum: Several mildly enlarged mediastinal lymph nodes are noted scattered throughout.  A representative right hilar lymph node is 2.0 x 1.4 cm.    Vasculature: No evidence of pulmonary emboli.  No aneurysm.  No significant atherosclerosis.    Lungs: Diffuse interstitial thickening throughout the lungs.  Diffuse confluent areas of ground-glass opacification in the lungs with a central lung distribution.  Trivial right pleural effusion..    Esophagus: Unremarkable.    Upper Abdomen: Small amount of hepatic reflux.  Small amount of ascites    Bones: No acute fracture. No significant arthritic changes.    Subcutaneous soft tissue anasarca.  Impression: Negative for pulmonary embolus.    Cardiomegaly with hazy central airspace edema and interstitial edema.  Trivial right pleural effusion.  Hepatic reflux with small amount perihepatic ascites.  Findings most consistent with CHF..    Mild mediastinal lymphadenopathy, perhaps reactive.  Attention on next follow-up.    All CT scans at this facility are performed  using dose modulation techniques as appropriate to performed exam including the following:  automated exposure control; adjustment of mA and/or kV according to the patients size (this includes techniques or standardized protocols for targeted exams where dose is matched to  indication/reason for exam: i.e. extremities or head);  iterative reconstruction technique.    Electronically signed by: Fox Everett MD  Date:    09/17/2022  Time:    13:16  X-Ray Chest AP Portable  Narrative: EXAMINATION:  XR CHEST AP PORTABLE    CLINICAL HISTORY:  Cough, unspecified    TECHNIQUE:  Single frontal view of the chest was performed.    COMPARISON:  09/01/2022    FINDINGS:  Heart size accentuated by technique appearing mildly enlarged.    There is some haziness throughout the mid to lower lungs with some low-grade interstitial thickening.  No pleural effusion.    No acute osseous findings.  No advanced arthritic changes.  Impression: Question CHF.  Low-grade edema    Electronically signed by: Fox Everett MD  Date:    09/17/2022  Time:    08:22      Assessment/Plan:     * CHF (congestive heart failure)  New onset CHF   CTA showed pulmonary edema  Elevated BNP   Symptoms improved post 40 mg of Lasix  Low-sodium diet   Fluid restriction   Echo pending  Cardiology consult  Currently euvolemic   Continue Lasix 20 mg daily        Elevated LFTs  Likely secondary to alcoholism   Repeat labs in a.m.      Alcoholism  Librium t.i.d.   Ativan p.r.n.        VTE Risk Mitigation (From admission, onward)         Ordered     enoxaparin injection 40 mg  Daily         09/17/22 2310                   Vasu Jain MD  Department of Hospital Medicine   [facility]

## 2022-09-18 NOTE — ASSESSMENT & PLAN NOTE
Patient is identified as having Combined Systolic and Diastolic heart failure that is Acute on chronic. CHF is currently uncontrolled due to Continued edema of extremities and Weight gain of 20 pounds. Latest ECHO performed and demonstrates- No results found for this or any previous visit.  . Continue Beta Blocker, ACE/ARB and Furosemide and monitor clinical status closely. Monitor on telemetry. Patient is on CHF pathway.  Monitor strict Is&Os and daily weights.  Place on fluid restriction of 2 L. Continue to stress to patient importance of self efficacy and  on diet for CHF. Last BNP reviewed- and noted below   Recent Labs   Lab 09/17/22  0547   *   .

## 2022-09-18 NOTE — SUBJECTIVE & OBJECTIVE
History reviewed. No pertinent past medical history.    History reviewed. No pertinent surgical history.    Review of patient's allergies indicates:  No Known Allergies    No current facility-administered medications on file prior to encounter.     Current Outpatient Medications on File Prior to Encounter   Medication Sig    promethazine-dextromethorphan (PROMETHAZINE-DM) 6.25-15 mg/5 mL Syrp Take 5 mLs by mouth nightly as needed (cough).     Family History       Problem Relation (Age of Onset)    Diabetes Mother    Hypertension Mother, Father          Tobacco Use    Smoking status: Every Day     Packs/day: 1.00     Types: Cigarettes    Smokeless tobacco: Never   Substance and Sexual Activity    Alcohol use: Yes    Drug use: No    Sexual activity: Not on file     Review of Systems   Constitutional: Negative for chills, diaphoresis, night sweats, weight gain and weight loss.   HENT:  Negative for congestion, hoarse voice, sore throat and stridor.    Eyes:  Negative for double vision and pain.   Cardiovascular:  Positive for dyspnea on exertion, leg swelling, palpitations and paroxysmal nocturnal dyspnea. Negative for chest pain, claudication, cyanosis, irregular heartbeat, near-syncope, orthopnea and syncope.   Respiratory:  Negative for cough, hemoptysis, shortness of breath, sleep disturbances due to breathing, snoring, sputum production and wheezing.    Endocrine: Negative for cold intolerance, heat intolerance and polydipsia.   Hematologic/Lymphatic: Negative for bleeding problem. Does not bruise/bleed easily.   Skin:  Negative for color change, dry skin and rash.   Musculoskeletal:  Negative for joint swelling and muscle cramps.   Gastrointestinal:  Negative for bloating, abdominal pain, constipation, diarrhea, dysphagia, melena, nausea and vomiting.   Genitourinary:  Negative for flank pain and urgency.   Neurological:  Negative for dizziness, focal weakness, headaches, light-headedness, loss of balance,  seizures and weakness.   Psychiatric/Behavioral:  Negative for altered mental status and memory loss. The patient is not nervous/anxious.    Objective:     Vital Signs (Most Recent):  Temp: 98.7 °F (37.1 °C) (09/18/22 1133)  Pulse: (!) 58 (09/18/22 1133)  Resp: 19 (09/18/22 1133)  BP: (!) 180/99 (09/18/22 1133)  SpO2: 99 % (09/18/22 1133)   Vital Signs (24h Range):  Temp:  [96.6 °F (35.9 °C)-98.8 °F (37.1 °C)] 98.7 °F (37.1 °C)  Pulse:  [] 58  Resp:  [16-24] 19  SpO2:  [95 %-99 %] 99 %  BP: (128-180)/(70-99) 180/99     Weight: 80 kg (176 lb 5.9 oz)  Body mass index is 30.27 kg/m².    SpO2: 99 %  O2 Device (Oxygen Therapy): room air      Intake/Output Summary (Last 24 hours) at 9/18/2022 1135  Last data filed at 9/18/2022 0906  Gross per 24 hour   Intake 200 ml   Output 650 ml   Net -450 ml       Lines/Drains/Airways       Peripheral Intravenous Line  Duration                  Peripheral IV - Single Lumen 09/17/22 0741 20 G Anterior;Left Hand 1 day                    Physical Exam  Eyes:      Pupils: Pupils are equal, round, and reactive to light.   Neck:      Trachea: No tracheal deviation.   Cardiovascular:      Rate and Rhythm: Normal rate and regular rhythm.      Pulses: Intact distal pulses.           Carotid pulses are 2+ on the right side and 2+ on the left side.       Radial pulses are 2+ on the right side and 2+ on the left side.        Femoral pulses are 2+ on the right side and 2+ on the left side.       Popliteal pulses are 2+ on the right side and 2+ on the left side.        Dorsalis pedis pulses are 2+ on the right side and 2+ on the left side.        Posterior tibial pulses are 2+ on the right side and 2+ on the left side.      Heart sounds: No murmur heard.    No friction rub. Gallop present.   Pulmonary:      Effort: Pulmonary effort is normal. No respiratory distress.      Breath sounds: Normal breath sounds. No stridor. No wheezing or rales.   Chest:      Chest wall: No tenderness.    Abdominal:      General: There is no distension.      Tenderness: There is no abdominal tenderness. There is no rebound.   Musculoskeletal:         General: No tenderness.      Cervical back: Normal range of motion.      Right lower leg: Edema present.      Left lower leg: Edema present.   Skin:     General: Skin is warm and dry.   Neurological:      Mental Status: He is alert and oriented to person, place, and time.       Significant Labs: BMP:   Recent Labs   Lab 09/17/22  0547 09/18/22  0550    118*    139   K 4.0 3.8   * 107   CO2 18* 20*   BUN 8 10   CREATININE 0.8 0.8   CALCIUM 8.6* 9.0   , CBC   Recent Labs   Lab 09/17/22  0547 09/18/22  0550   WBC 6.83 15.08*   HGB 15.5 15.4   HCT 45.0 46.7    205   , and Troponin   Recent Labs   Lab 09/17/22  0547 09/17/22  1052 09/17/22  1757   TROPONINI 0.060* 0.042* 0.035*       Significant Imaging: Echocardiogram: Transthoracic echo (TTE) complete (Cupid Only): No results found for this or any previous visit.

## 2022-09-18 NOTE — ASSESSMENT & PLAN NOTE
New onset CHF   CTA showed pulmonary edema  Elevated BNP   Symptoms improved post 40 mg of Lasix  Low-sodium diet   Fluid restriction   Echo pending  Cardiology consult  Currently euvolemic   Continue Lasix 20 mg daily

## 2022-09-18 NOTE — HOSPITAL COURSE
Filemon La is a 41 year old male who was admitted to [facility] for acute decompensated heart failure. He was diuresed and blood pressure controlled. He has never been on antihypertensives, started on amlodipine. Uncertain of cardiac function, Echocardiogram has been ordered. Counseled extensively on ETOH abuse. Liver enzymes are trending down.    9/19/22: SOB improved. BNP trending down. Echo showed LVEF 15%. Troponin mildly elevated. Cardiology recommended LHC/RHC in am. No s/s alcohol w/ds. CM consulted for alcohol abuse community resources     9/20/22:  No chest pain/SOB. VSS. R/LHC today by Dr. Patterson. Cardiology added Toprol and will add Entresto   No s/s alcohol w/d     9/21/22  LHC showed EF of 10% with elevated filling pressures on the right and left still indicating volume overload. Lasix changed to IV . Will need lifevest before he discharges.     9/22 Stable and improved. Life vest on. Discharged to home.

## 2022-09-18 NOTE — SUBJECTIVE & OBJECTIVE
Interval History: feeling better. But some some exertional dyspnea.    Review of Systems   Constitutional:  Negative for fatigue and fever.   HENT:  Negative for sinus pressure.    Eyes:  Negative for visual disturbance.   Respiratory:  Positive for cough and shortness of breath.    Cardiovascular:  Negative for chest pain and leg swelling.   Gastrointestinal:  Negative for nausea and vomiting.   Genitourinary:  Negative for difficulty urinating.   Musculoskeletal:  Negative for back pain.   Skin:  Negative for rash.   Neurological:  Negative for headaches.   Psychiatric/Behavioral:  Negative for confusion.        Objective:     Vital Signs (Most Recent):  Temp: 96.6 °F (35.9 °C) (09/18/22 0749)  Pulse: 100 (09/18/22 1155)  Resp: 19 (09/18/22 1133)  BP: 120/86 (09/18/22 1219)  SpO2: 99 % (09/18/22 1133) Vital Signs (24h Range):  Temp:  [96.6 °F (35.9 °C)-98.8 °F (37.1 °C)] 96.6 °F (35.9 °C)  Pulse:  [] 100  Resp:  [16-21] 19  SpO2:  [95 %-99 %] 99 %  BP: (120-180)/(70-99) 120/86     Weight: 80 kg (176 lb 5.9 oz)  Body mass index is 30.27 kg/m².    Intake/Output Summary (Last 24 hours) at 9/18/2022 1515  Last data filed at 9/18/2022 1155  Gross per 24 hour   Intake 440 ml   Output 425 ml   Net 15 ml      Physical Exam  Constitutional:       General: He is not in acute distress.     Appearance: He is well-developed. He is not diaphoretic.   HENT:      Head: Normocephalic and atraumatic.   Eyes:      Pupils: Pupils are equal, round, and reactive to light.   Cardiovascular:      Rate and Rhythm: Normal rate and regular rhythm.      Heart sounds: Normal heart sounds. No murmur heard.    No friction rub. No gallop.   Pulmonary:      Effort: Pulmonary effort is normal. No respiratory distress.      Breath sounds: No stridor. Rales present. No wheezing.   Abdominal:      General: Bowel sounds are normal. There is no distension.      Palpations: Abdomen is soft. There is no mass.      Tenderness: There is no abdominal  tenderness. There is no guarding.   Musculoskeletal:      Right lower leg: No edema.      Left lower leg: No edema.   Skin:     General: Skin is warm.      Findings: No erythema.   Neurological:      Mental Status: He is alert and oriented to person, place, and time.     Significant Labs: All pertinent labs within the past 24 hours have been reviewed.  CBC:   Recent Labs   Lab 09/17/22  0547 09/18/22  0550   WBC 6.83 15.08*   HGB 15.5 15.4   HCT 45.0 46.7    205     CMP:   Recent Labs   Lab 09/17/22  0547 09/18/22  0550    139   K 4.0 3.8   * 107   CO2 18* 20*    118*   BUN 8 10   CREATININE 0.8 0.8   CALCIUM 8.6* 9.0   PROT 6.5 5.9*   ALBUMIN 3.5 3.2*   BILITOT 1.8* 3.3*   ALKPHOS 77 74   AST 55* 24   ALT 78* 57*   ANIONGAP 11 12       Significant Imaging: I have reviewed all pertinent imaging results/findings within the past 24 hours.

## 2022-09-18 NOTE — ASSESSMENT & PLAN NOTE
Likely secondary to alcoholism   Repeat labs in a.m.    9/18/22  Enzymes trending down. Total Bilirubin elevated  Ultrasound 7/2022 showed Mild hepatic steatosis  Repeat I AM

## 2022-09-19 ENCOUNTER — DOCUMENTATION ONLY (OUTPATIENT)
Dept: CARDIOLOGY | Facility: CLINIC | Age: 42
End: 2022-09-19
Payer: MEDICAID

## 2022-09-19 PROBLEM — I50.41 ACUTE COMBINED SYSTOLIC AND DIASTOLIC CONGESTIVE HEART FAILURE: Status: ACTIVE | Noted: 2022-09-17

## 2022-09-19 LAB
ALBUMIN SERPL BCP-MCNC: 3.2 G/DL (ref 3.5–5.2)
ALP SERPL-CCNC: 69 U/L (ref 55–135)
ALT SERPL W/O P-5'-P-CCNC: 49 U/L (ref 10–44)
ANION GAP SERPL CALC-SCNC: 13 MMOL/L (ref 8–16)
AST SERPL-CCNC: 18 U/L (ref 10–40)
BASOPHILS # BLD AUTO: 0.07 K/UL (ref 0–0.2)
BASOPHILS NFR BLD: 0.7 % (ref 0–1.9)
BILIRUB SERPL-MCNC: 2.5 MG/DL (ref 0.1–1)
BNP SERPL-MCNC: 522 PG/ML (ref 0–99)
BUN SERPL-MCNC: 10 MG/DL (ref 6–20)
CALCIUM SERPL-MCNC: 8.5 MG/DL (ref 8.7–10.5)
CHLORIDE SERPL-SCNC: 105 MMOL/L (ref 95–110)
CO2 SERPL-SCNC: 21 MMOL/L (ref 23–29)
CREAT SERPL-MCNC: 0.7 MG/DL (ref 0.5–1.4)
DIFFERENTIAL METHOD: ABNORMAL
EOSINOPHIL # BLD AUTO: 0.1 K/UL (ref 0–0.5)
EOSINOPHIL NFR BLD: 0.7 % (ref 0–8)
ERYTHROCYTE [DISTWIDTH] IN BLOOD BY AUTOMATED COUNT: 13.3 % (ref 11.5–14.5)
EST. GFR  (NO RACE VARIABLE): >60 ML/MIN/1.73 M^2
GLUCOSE SERPL-MCNC: 108 MG/DL (ref 70–110)
HCT VFR BLD AUTO: 45.5 % (ref 40–54)
HGB BLD-MCNC: 15.5 G/DL (ref 14–18)
IMM GRANULOCYTES # BLD AUTO: 0.04 K/UL (ref 0–0.04)
IMM GRANULOCYTES NFR BLD AUTO: 0.4 % (ref 0–0.5)
LYMPHOCYTES # BLD AUTO: 3.8 K/UL (ref 1–4.8)
LYMPHOCYTES NFR BLD: 39.1 % (ref 18–48)
MCH RBC QN AUTO: 34.1 PG (ref 27–31)
MCHC RBC AUTO-ENTMCNC: 34.1 G/DL (ref 32–36)
MCV RBC AUTO: 100 FL (ref 82–98)
MONOCYTES # BLD AUTO: 0.5 K/UL (ref 0.3–1)
MONOCYTES NFR BLD: 5.5 % (ref 4–15)
NEUTROPHILS # BLD AUTO: 5.2 K/UL (ref 1.8–7.7)
NEUTROPHILS NFR BLD: 53.6 % (ref 38–73)
NRBC BLD-RTO: 0 /100 WBC
PLATELET # BLD AUTO: 188 K/UL (ref 150–450)
PMV BLD AUTO: 12.4 FL (ref 9.2–12.9)
POTASSIUM SERPL-SCNC: 4.1 MMOL/L (ref 3.5–5.1)
PROT SERPL-MCNC: 5.8 G/DL (ref 6–8.4)
RBC # BLD AUTO: 4.54 M/UL (ref 4.6–6.2)
SODIUM SERPL-SCNC: 139 MMOL/L (ref 136–145)
WBC # BLD AUTO: 9.66 K/UL (ref 3.9–12.7)

## 2022-09-19 PROCEDURE — 11000001 HC ACUTE MED/SURG PRIVATE ROOM

## 2022-09-19 PROCEDURE — 25000003 PHARM REV CODE 250: Performed by: INTERNAL MEDICINE

## 2022-09-19 PROCEDURE — 80053 COMPREHEN METABOLIC PANEL: CPT | Performed by: NURSE PRACTITIONER

## 2022-09-19 PROCEDURE — 36415 COLL VENOUS BLD VENIPUNCTURE: CPT | Performed by: NURSE PRACTITIONER

## 2022-09-19 PROCEDURE — 25000003 PHARM REV CODE 250: Performed by: FAMILY MEDICINE

## 2022-09-19 PROCEDURE — 83880 ASSAY OF NATRIURETIC PEPTIDE: CPT | Performed by: NURSE PRACTITIONER

## 2022-09-19 PROCEDURE — 25000003 PHARM REV CODE 250: Performed by: NURSE PRACTITIONER

## 2022-09-19 PROCEDURE — 96372 THER/PROPH/DIAG INJ SC/IM: CPT | Performed by: FAMILY MEDICINE

## 2022-09-19 PROCEDURE — S4991 NICOTINE PATCH NONLEGEND: HCPCS | Performed by: INTERNAL MEDICINE

## 2022-09-19 PROCEDURE — G0378 HOSPITAL OBSERVATION PER HR: HCPCS

## 2022-09-19 PROCEDURE — 85025 COMPLETE CBC W/AUTO DIFF WBC: CPT | Performed by: NURSE PRACTITIONER

## 2022-09-19 PROCEDURE — 63600175 PHARM REV CODE 636 W HCPCS: Performed by: FAMILY MEDICINE

## 2022-09-19 PROCEDURE — 25000003 PHARM REV CODE 250: Performed by: PHYSICIAN ASSISTANT

## 2022-09-19 PROCEDURE — 99232 SBSQ HOSP IP/OBS MODERATE 35: CPT | Mod: ,,, | Performed by: PHYSICIAN ASSISTANT

## 2022-09-19 PROCEDURE — 99232 PR SUBSEQUENT HOSPITAL CARE,LEVL II: ICD-10-PCS | Mod: ,,, | Performed by: PHYSICIAN ASSISTANT

## 2022-09-19 PROCEDURE — 63600175 PHARM REV CODE 636 W HCPCS: Performed by: INTERNAL MEDICINE

## 2022-09-19 RX ORDER — METOPROLOL SUCCINATE 25 MG/1
25 TABLET, EXTENDED RELEASE ORAL NIGHTLY
Status: DISCONTINUED | OUTPATIENT
Start: 2022-09-19 | End: 2022-09-22

## 2022-09-19 RX ORDER — THIAMINE HCL 100 MG
100 TABLET ORAL DAILY
Status: DISCONTINUED | OUTPATIENT
Start: 2022-09-19 | End: 2022-09-22 | Stop reason: HOSPADM

## 2022-09-19 RX ORDER — LORAZEPAM 0.5 MG/1
0.5 TABLET ORAL EVERY 6 HOURS PRN
Status: DISCONTINUED | OUTPATIENT
Start: 2022-09-19 | End: 2022-09-22 | Stop reason: HOSPADM

## 2022-09-19 RX ADMIN — THIAMINE HCL TAB 100 MG 100 MG: 100 TAB at 01:09

## 2022-09-19 RX ADMIN — METOPROLOL SUCCINATE 25 MG: 25 TABLET, EXTENDED RELEASE ORAL at 08:09

## 2022-09-19 RX ADMIN — Medication 1 TABLET: at 01:09

## 2022-09-19 RX ADMIN — NICOTINE 1 PATCH: 21 PATCH, EXTENDED RELEASE TRANSDERMAL at 09:09

## 2022-09-19 RX ADMIN — CHLORDIAZEPOXIDE HYDROCHLORIDE 25 MG: 25 CAPSULE ORAL at 08:09

## 2022-09-19 RX ADMIN — AMLODIPINE BESYLATE 5 MG: 5 TABLET ORAL at 09:09

## 2022-09-19 RX ADMIN — FUROSEMIDE 20 MG: 20 TABLET ORAL at 09:09

## 2022-09-19 RX ADMIN — CHLORDIAZEPOXIDE HYDROCHLORIDE 25 MG: 25 CAPSULE ORAL at 09:09

## 2022-09-19 RX ADMIN — CHLORDIAZEPOXIDE HYDROCHLORIDE 25 MG: 25 CAPSULE ORAL at 04:09

## 2022-09-19 RX ADMIN — ENOXAPARIN SODIUM 40 MG: 100 INJECTION SUBCUTANEOUS at 04:09

## 2022-09-19 NOTE — SUBJECTIVE & OBJECTIVE
Review of Systems   Constitutional: Negative.   HENT: Negative.     Eyes: Negative.    Cardiovascular:  Positive for dyspnea on exertion.   Respiratory:  Positive for shortness of breath.    Endocrine: Negative.    Hematologic/Lymphatic: Negative.    Skin: Negative.    Musculoskeletal: Negative.    Gastrointestinal: Negative.    Genitourinary: Negative.    Neurological: Negative.    Psychiatric/Behavioral: Negative.     Allergic/Immunologic: Negative.    Objective:     Vital Signs (Most Recent):  Temp: 97.8 °F (36.6 °C) (09/19/22 1228)  Pulse: 85 (09/19/22 1228)  Resp: 18 (09/19/22 1228)  BP: 113/78 (09/19/22 0430)  SpO2: 99 % (09/19/22 1228) Vital Signs (24h Range):  Temp:  [97.3 °F (36.3 °C)-97.9 °F (36.6 °C)] 97.8 °F (36.6 °C)  Pulse:  [] 85  Resp:  [17-18] 18  SpO2:  [97 %-99 %] 99 %  BP: (113-125)/(77-79) 113/78     Weight: 80.3 kg (177 lb 0.5 oz)  Body mass index is 30.39 kg/m².     SpO2: 99 %  O2 Device (Oxygen Therapy): room air      Intake/Output Summary (Last 24 hours) at 9/19/2022 1345  Last data filed at 9/19/2022 1200  Gross per 24 hour   Intake 240 ml   Output 2350 ml   Net -2110 ml       Lines/Drains/Airways       Peripheral Intravenous Line  Duration                  Peripheral IV - Single Lumen 09/17/22 0741 20 G Anterior;Left Hand 2 days                    Physical Exam  Vitals and nursing note reviewed.   Constitutional:       General: He is not in acute distress.     Appearance: Normal appearance. He is well-developed. He is not diaphoretic.   HENT:      Head: Normocephalic and atraumatic.   Eyes:      General:         Right eye: No discharge.         Left eye: No discharge.      Pupils: Pupils are equal, round, and reactive to light.   Neck:      Thyroid: No thyromegaly.      Vascular: No JVD.      Trachea: No tracheal deviation.   Cardiovascular:      Rate and Rhythm: Normal rate and regular rhythm.      Heart sounds: Normal heart sounds, S1 normal and S2 normal. No murmur  heard.  Pulmonary:      Effort: Pulmonary effort is normal. No respiratory distress.      Breath sounds: Rhonchi present. No wheezing or rales.   Abdominal:      General: There is no distension.      Palpations: Abdomen is soft.      Tenderness: There is no rebound.   Musculoskeletal:      Cervical back: Neck supple.      Right lower leg: No edema.      Left lower leg: No edema.   Skin:     General: Skin is warm and dry.      Findings: No erythema.   Neurological:      General: No focal deficit present.      Mental Status: He is alert and oriented to person, place, and time.   Psychiatric:         Mood and Affect: Mood normal.         Behavior: Behavior normal.         Thought Content: Thought content normal.       Significant Labs: CMP   Recent Labs   Lab 09/18/22  0550 09/19/22  0514    139   K 3.8 4.1    105   CO2 20* 21*   * 108   BUN 10 10   CREATININE 0.8 0.7   CALCIUM 9.0 8.5*   PROT 5.9* 5.8*   ALBUMIN 3.2* 3.2*   BILITOT 3.3* 2.5*   ALKPHOS 74 69   AST 24 18   ALT 57* 49*   ANIONGAP 12 13   , CBC   Recent Labs   Lab 09/18/22  0550 09/19/22  0514   WBC 15.08* 9.66   HGB 15.4 15.5   HCT 46.7 45.5    188   , INR No results for input(s): INR, PROTIME in the last 48 hours., Troponin   Recent Labs   Lab 09/17/22  1757   TROPONINI 0.035*   , and All pertinent lab results from the last 24 hours have been reviewed.    Significant Imaging: Echocardiogram: Transthoracic echo (TTE) complete (Cupid Only):   Results for orders placed or performed during the hospital encounter of 09/17/22   Echo   Result Value Ref Range    BSA 1.9 m2    IVC diameter 1.27 cm    Left Ventricular Outflow Tract Mean Velocity 0.36 cm/s    Left Ventricular Outflow Tract Mean Gradient 0.60 mmHg    Pulmonary Valve Mean Velocity 0.53 m/s    PV PEAK VELOCITY 0.72 cm/s    LVIDd 7.61 (A) 3.5 - 6.0 cm    IVS 0.90 0.6 - 1.1 cm    Posterior Wall 0.93 0.6 - 1.1 cm    LVIDs 6.60 (A) 2.1 - 4.0 cm    FS 13 28 - 44 %    STJ 2.00 cm     Ascending aorta 1.71 cm    LV mass 333.83 g    LA size 3.39 cm    Left Ventricle Relative Wall Thickness 0.24 cm    AV mean gradient 2 mmHg    AV valve area 1.98 cm2    AV Velocity Ratio 0.51     AV index (prosthetic) 0.65     MV valve area p 1/2 method 4.48 cm2    IVRT 85.63 msec    LVOT diameter 1.96 cm    LVOT area 3.0 cm2    LVOT peak ghanshyam 0.53 m/s    LVOT peak VTI 9.30 cm    Ao peak ghanshyam 1.04 m/s    Ao VTI 14.2 cm    Mr max ghanshyam 4.44 m/s    LVOT stroke volume 28.05 cm3    AV peak gradient 4 mmHg    MV Peak E Ghanshyam 1.00 m/s    TR Max Ghanshyam 2.46 m/s    MV stenosis pressure 1/2 time 49.11 ms    LV Systolic Volume 223.41 mL    LV Systolic Volume Index 120.8 mL/m2    LV Diastolic Volume 307.76 mL    LV Diastolic Volume Index 166.36 mL/m2    LV Mass Index 180 g/m2    RA Major Axis 5.40 cm    Left Atrium Minor Axis 6.34 cm    Left Atrium Major Axis 6.33 cm    Triscuspid Valve Regurgitation Peak Gradient 24 mmHg    Right Atrial Pressure (from IVC) 8 mmHg    EF 15 %    TV rest pulmonary artery pressure 32 mmHg    Narrative    · The left ventricle is severely enlarged with severely decreased systolic   function.  · The estimated ejection fraction is 15%.  · Left ventricular diastolic dysfunction.  · There is severe left ventricular global hypokinesis.  · Normal right ventricular size with low normal right ventricular systolic   function.  · Moderate left atrial enlargement.  · Moderate right atrial enlargement.  · Mild mitral regurgitation.  · Mild tricuspid regurgitation.  · Intermediate central venous pressure (8 mmHg).  · The estimated PA systolic pressure is 32 mmHg.      , EKG: Reviewed, and X-Ray: CXR: X-Ray Chest 1 View (CXR):   Results for orders placed or performed during the hospital encounter of 09/17/22   X-Ray Chest 1 View    Narrative    EXAMINATION:  XR CHEST 1 VIEW    CLINICAL HISTORY:  sob;    TECHNIQUE:  Single frontal view of the chest was performed.    COMPARISON:  Prior    FINDINGS:  The lungs are clear,  with normal appearance of pulmonary vasculature and no pleural effusion or pneumothorax.    Prominent cardiac silhouette.  Improvement in perihilar haze and pulmonary edema..  The hilar and mediastinal contours are unremarkable.    Bones are intact.      Impression    Interval improvement in perihilar haze suggestive of improving pulmonary edema.  Correlate clinically      Electronically signed by: Delvin White  Date:    09/17/2022  Time:    21:16    and X-Ray Chest PA and Lateral (CXR): No results found for this visit on 09/17/22.

## 2022-09-19 NOTE — CONSULTS
Diet Education: CHF/HTN    Time Spent: 15 minutes.    Learners: Patient    Nutrition Education Provided with Handouts: Heart Failure Nutrition Therapy, Heart- Healthy Nutrition Therapy Cardiac- TLC Nutrition Therapy.    Comments: RD was consulted to educate pt on dietary patterns that will help combat CHF/HTN medical issues. Pt was engaged in conversation and ask a few questions concerning cooking methods for some of his favorites foods like chicken and fish. The RD explained to the pt that there are many possibilities for seasoning foods without using salt such as no salt seasonings and utilizing other fresh herbs and spices. Also explained that the pt should work on decreasing his intake of trans and saturated fats by indicating what are good food choices and what are not so good food choices. Pt says that he is committed to changing his diet. RD left the pt with office phone number for all future questions and concerns.     All questions and concerns answered. Dietitian`s contact information provided  Please re-consult as needed.    Thanks!  Amada Cyr, Dietitian

## 2022-09-19 NOTE — PROGRESS NOTES
[facility]  [facility]  Progress Note    Patient Name: Filemon La  MRN: [id number]  Patient Class: OP- Observation   Admission Date: 9/17/2022  Length of Stay: 1 days  Attending Physician: Santy Newman MD  Primary Care Provider: Primary Doctor No        Subjective:     Principal Problem:Acute combined systolic and diastolic congestive heart failure        HPI:  Patient is a 41-year-old aa male with past medical history of alcoholism who presents to [facility] ED with complaints of shortness of breath.  Patient states the symptoms have been ongoing for several days and have progressively worsened.  Endorses nonproductive cough.  He reveals taking over-the-counter cold and cough medications without relief.  Associated symptoms include orthopnea, dyspnea on exertion.  He denies any lower extremity swelling.  Upon further questioning, patient states that he drinks half a pt of liquor every day for several years.  Does report withdrawal symptoms if he does not drink.    In the ED, patient was tachycardic with O2 saturations of 94% on room air.  Chest x-ray was concerning for edema.  BNP: 700. He was started on empiric antibiotics for pneumonia.  CTA performed was negative for PE however did show cardiomegaly and interstitial edema.  Lasix 40 mg IV was given.  Patient placed in observation for new onset CHF.       Overview/Hospital Course:  Filemon La is a 41 year old male who was admitted to [facility] for acute decompensated heart failure. He was diuresed and blood pressure controlled. He has never been on antihypertensives, started on amlodipine. Uncertain of cardiac function, Echocardiogram has been ordered. Counseled extensively on ETOH abuse. Liver enzymes are trending down.    9/19/22: SOB improved. BNP trending down. Echo showed LVEF 15%. Troponin mildly elevated. Cardiology recommended LHC/RHC in am. No s/s alcohol w/ds. CM consulted for alcohol abuse community resources        Interval History: see hospital course    Review of Systems   Constitutional:  Negative for fatigue and fever.   HENT:  Negative for sinus pressure.    Eyes:  Negative for visual disturbance.   Respiratory:  Positive for cough and shortness of breath.    Cardiovascular:  Negative for chest pain and leg swelling.   Gastrointestinal:  Negative for nausea and vomiting.   Genitourinary:  Negative for difficulty urinating.   Musculoskeletal:  Negative for back pain.   Skin:  Negative for rash.   Neurological:  Negative for headaches.   Psychiatric/Behavioral:  Negative for confusion.        Objective:     Vital Signs (Most Recent):  Temp: 97.8 °F (36.6 °C) (09/19/22 1228)  Pulse: 85 (09/19/22 1228)  Resp: 18 (09/19/22 1228)  BP: 113/78 (09/19/22 0430)  SpO2: 99 % (09/19/22 1228) Vital Signs (24h Range):  Temp:  [97.3 °F (36.3 °C)-97.9 °F (36.6 °C)] 97.8 °F (36.6 °C)  Pulse:  [] 85  Resp:  [17-18] 18  SpO2:  [97 %-99 %] 99 %  BP: (113-125)/(77-79) 113/78     Weight: 80.3 kg (177 lb 0.5 oz)  Body mass index is 30.39 kg/m².    Intake/Output Summary (Last 24 hours) at 9/19/2022 1236  Last data filed at 9/19/2022 1200  Gross per 24 hour   Intake 240 ml   Output 2350 ml   Net -2110 ml        Physical Exam  Constitutional:       General: He is not in acute distress.     Appearance: He is well-developed. He is not diaphoretic.   HENT:      Head: Normocephalic and atraumatic.   Eyes:      Pupils: Pupils are equal, round, and reactive to light.   Cardiovascular:      Rate and Rhythm: Normal rate and regular rhythm.      Heart sounds: Normal heart sounds. No murmur heard.    No friction rub. No gallop.   Pulmonary:      Effort: Pulmonary effort is normal. No respiratory distress.      Breath sounds: No stridor. No wheezing.   Abdominal:      General: Bowel sounds are normal. There is no distension.      Palpations: Abdomen is soft. There is no mass.      Tenderness: There is no abdominal tenderness. There is no guarding.    Musculoskeletal:      Right lower leg: No edema.      Left lower leg: No edema.   Skin:     General: Skin is warm.      Findings: No erythema.   Neurological:      Mental Status: He is alert and oriented to person, place, and time.     Significant Labs: All pertinent labs within the past 24 hours have been reviewed.  CBC:   Recent Labs   Lab 09/18/22  0550 09/19/22  0514   WBC 15.08* 9.66   HGB 15.4 15.5   HCT 46.7 45.5    188       CMP:   Recent Labs   Lab 09/18/22  0550 09/19/22  0514    139   K 3.8 4.1    105   CO2 20* 21*   * 108   BUN 10 10   CREATININE 0.8 0.7   CALCIUM 9.0 8.5*   PROT 5.9* 5.8*   ALBUMIN 3.2* 3.2*   BILITOT 3.3* 2.5*   ALKPHOS 74 69   AST 24 18   ALT 57* 49*   ANIONGAP 12 13         Significant Imaging: I have reviewed all pertinent imaging results/findings within the past 24 hours.      Assessment/Plan:      * Acute combined systolic and diastolic congestive heart failure  New onset CHF   CTA showed pulmonary edema  Elevated BNP   Symptoms improved post 40 mg of Lasix  Low-sodium diet   Fluid restriction   Echo pending  Cardiology consult  Currently euvolemic   Continue Lasix 20 mg daily    9/18/22  Cardiac function unknown; awaiting echocardiogram  Lasix changed to PO  Amlodipine started for blood pressure control  Continue I&D  Cardiology input appreciated.    9/19/22: SOB improved. BNP trending down. Echo showed LVEF 15%. Troponin mildly elevated. Cardiology recommended LHC/RHC in am. No s/s alcohol w/ds. CM consulted for alcohol abuse community resources     Elevated LFTs  Likely secondary to alcoholism   Repeat labs in a.m.    9/18/22  Enzymes trending down. Total Bilirubin elevated  Ultrasound 7/2022 showed Mild hepatic steatosis  Repeat Ifts AM    9/19/22:improving   likely 2/2 fatty liver and CHF  Hep C negative       Alcoholism  Librium t.i.d.   Folic acid, Thiamine   Ativan p.r.n.  CIWA scale qshift      VTE Risk Mitigation (From admission, onward)          Ordered     enoxaparin injection 40 mg  Daily         09/17/22 2562                Discharge Planning   ABI:      Code Status: Full Code   Is the patient medically ready for discharge?:     Reason for patient still in hospital (select all that apply): Patient trending condition                     Eugenia Mcdowell NP  Department of Hospital Medicine   [facility]

## 2022-09-19 NOTE — PLAN OF CARE
Problem: Adult Inpatient Plan of Care  Goal: Plan of Care Review  Outcome: Ongoing, Progressing  Goal: Optimal Comfort and Wellbeing  Outcome: Ongoing, Progressing     Problem: Fluid Imbalance (Pneumonia)  Goal: Fluid Balance  Outcome: Ongoing, Progressing     Problem: Infection (Pneumonia)  Goal: Resolution of Infection Signs and Symptoms  Outcome: Ongoing, Progressing     Problem: Adjustment to Illness (Heart Failure)  Goal: Optimal Coping  Outcome: Ongoing, Progressing   Pt a little restless, ambulatory, gait steady.   Good appetite, po lasix, good output, fluid restriction.     Cardiology seen patient, planning heart cath tomorrow

## 2022-09-19 NOTE — PROGRESS NOTES
"[facility]) nurse navigator notified of HFTCC candidate in need of education and introduction to 4-6 week program.      PT aao x 3 while lying in bed. Introduced self to pt as HFTCC nurse navigator.     Patient given "Home Care Guide for Heart Failure Patients" , "Heart Failure [facility]" flyer and "Daily weight and symptom tracker".  Encouraged pt to review information.      Reviewed the following key points of HFTCC program with pt and family:   1.) Take your medications as directed.    2.) Weight yourself daily  Pt informed will get a scale and BP machine.   3.) Follow low salt and limited fluid diet.    4.) Stop smoking and start exercising  Recommended ETOH cessation.   5.) Go to your appointments and call your team.      Pt reminded to follow Symptom tracker and to call at the onset of symptoms according to tracker.     Reviewed plan for follow up once discharged to include phone calls, in person and virtual visits to assist pt optimizing their heart failure medication regimen and encouraging healthy lifestyle modifications.  Reminded pt that program will assist them over the next 4-6 weeks and then patient will be transferred to long term care provider .  Reminded pt how to contact HFTCC navigator via phone and or via [employer].     Pt instructed appointment will be printed on hospital discharge paperwork to follow up with LILO Trejo.     Pt also reminded HF nurse will call 48-72 hours after discharge to check on them.     PT verbalize read back of information given.  Encouraged pt and family to read over information often and contact team with any questions or concerns.      "

## 2022-09-19 NOTE — ASSESSMENT & PLAN NOTE
New onset CHF   CTA showed pulmonary edema  Elevated BNP   Symptoms improved post 40 mg of Lasix  Low-sodium diet   Fluid restriction   Echo pending  Cardiology consult  Currently euvolemic   Continue Lasix 20 mg daily    9/18/22  Cardiac function unknown; awaiting echocardiogram  Lasix changed to PO  Amlodipine started for blood pressure control  Continue I&D  Cardiology input appreciated.    9/19/22: SOB improved. BNP trending down. Echo showed LVEF 15%. Troponin mildly elevated. Cardiology recommended LHC/RHC in am. No s/s alcohol w/ds. CM consulted for alcohol abuse community resources

## 2022-09-19 NOTE — SUBJECTIVE & OBJECTIVE
Interval History: see hospital course    Review of Systems   Constitutional:  Negative for fatigue and fever.   HENT:  Negative for sinus pressure.    Eyes:  Negative for visual disturbance.   Respiratory:  Positive for cough and shortness of breath.    Cardiovascular:  Negative for chest pain and leg swelling.   Gastrointestinal:  Negative for nausea and vomiting.   Genitourinary:  Negative for difficulty urinating.   Musculoskeletal:  Negative for back pain.   Skin:  Negative for rash.   Neurological:  Negative for headaches.   Psychiatric/Behavioral:  Negative for confusion.        Objective:     Vital Signs (Most Recent):  Temp: 97.8 °F (36.6 °C) (09/19/22 1228)  Pulse: 85 (09/19/22 1228)  Resp: 18 (09/19/22 1228)  BP: 113/78 (09/19/22 0430)  SpO2: 99 % (09/19/22 1228) Vital Signs (24h Range):  Temp:  [97.3 °F (36.3 °C)-97.9 °F (36.6 °C)] 97.8 °F (36.6 °C)  Pulse:  [] 85  Resp:  [17-18] 18  SpO2:  [97 %-99 %] 99 %  BP: (113-125)/(77-79) 113/78     Weight: 80.3 kg (177 lb 0.5 oz)  Body mass index is 30.39 kg/m².    Intake/Output Summary (Last 24 hours) at 9/19/2022 1236  Last data filed at 9/19/2022 1200  Gross per 24 hour   Intake 240 ml   Output 2350 ml   Net -2110 ml        Physical Exam  Constitutional:       General: He is not in acute distress.     Appearance: He is well-developed. He is not diaphoretic.   HENT:      Head: Normocephalic and atraumatic.   Eyes:      Pupils: Pupils are equal, round, and reactive to light.   Cardiovascular:      Rate and Rhythm: Normal rate and regular rhythm.      Heart sounds: Normal heart sounds. No murmur heard.    No friction rub. No gallop.   Pulmonary:      Effort: Pulmonary effort is normal. No respiratory distress.      Breath sounds: No stridor. No wheezing.   Abdominal:      General: Bowel sounds are normal. There is no distension.      Palpations: Abdomen is soft. There is no mass.      Tenderness: There is no abdominal tenderness. There is no guarding.    Musculoskeletal:      Right lower leg: No edema.      Left lower leg: No edema.   Skin:     General: Skin is warm.      Findings: No erythema.   Neurological:      Mental Status: He is alert and oriented to person, place, and time.     Significant Labs: All pertinent labs within the past 24 hours have been reviewed.  CBC:   Recent Labs   Lab 09/18/22  0550 09/19/22  0514   WBC 15.08* 9.66   HGB 15.4 15.5   HCT 46.7 45.5    188       CMP:   Recent Labs   Lab 09/18/22  0550 09/19/22  0514    139   K 3.8 4.1    105   CO2 20* 21*   * 108   BUN 10 10   CREATININE 0.8 0.7   CALCIUM 9.0 8.5*   PROT 5.9* 5.8*   ALBUMIN 3.2* 3.2*   BILITOT 3.3* 2.5*   ALKPHOS 74 69   AST 24 18   ALT 57* 49*   ANIONGAP 12 13         Significant Imaging: I have reviewed all pertinent imaging results/findings within the past 24 hours.

## 2022-09-19 NOTE — ASSESSMENT & PLAN NOTE
Patient is identified as having Combined Systolic and Diastolic heart failure that is Acute on chronic. CHF is currently uncontrolled due to Continued edema of extremities and Weight gain of 20 pounds. Latest ECHO performed and demonstrates- No results found for this or any previous visit.  . Continue Beta Blocker, ACE/ARB and Furosemide and monitor clinical status closely. Monitor on telemetry. Patient is on CHF pathway.  Monitor strict Is&Os and daily weights.  Place on fluid restriction of 2 L. Continue to stress to patient importance of self efficacy and  on diet for CHF. Last BNP reviewed- and noted below   Recent Labs   Lab 09/19/22  0921   *   .  9/19/22  -Improved clinically, EF 15%  -Continue po Lasix  -Start Toprol XL 25 mg nightly  -Will plan to add Entresto tmw post cath  -R/LHC tomorrow by Dr. Patterson. All risks, benefits, and treatment alternatives explained to patient in detail. All questions answered. He has agreed to proceed.

## 2022-09-19 NOTE — PROGRESS NOTES
[facility]  Cardiology  Progress Note    Patient Name: Filemon La  MRN: [id number]  Admission Date: 9/17/2022  Hospital Length of Stay: 1 days  Code Status: Full Code   Attending Physician: Santy Newman MD   Primary Care Physician: Primary Doctor No  Expected Discharge Date:   Principal Problem:Acute combined systolic and diastolic congestive heart failure    Subjective:   HPI:  : Patient is a 41-year-old aa male with past medical history of alcoholism who presents to [facility] ED with complaints of shortness of breath.  Patient states the symptoms have been ongoing for several days and have progressively worsened.  Endorses nonproductive cough.  He reveals taking over-the-counter cold and cough medications without relief.  Associated symptoms include orthopnea, dyspnea on exertion.  He denies any lower extremity swelling.  Upon further questioning, patient states that he drinks half a pt of liquor every day for several years.  Does report withdrawal symptoms if he does not drink.     In the ED, patient was tachycardic with O2 saturations of 94% on room air.  Chest x-ray was concerning for edema.  BNP: 700. He was started on empiric antibiotics for pneumonia.  CTA performed was negative for PE however did show cardiomegaly and interstitial edema.  Lasix 40 mg IV was given.  Patient placed in observation for new onset CHF.   Patient seen and examined with good diuresis overnight. Plan continued diuresis, review echo and add afterload reduction.    Hospital Course:   9/19/22-Patient seen and examined today, sitting up in bed. Feeling much better. SOB greatly improved, diuresed well overnight. Chest pain free. Labs stable. BNP trending down. Echo showed EF of 15%. R/LHC tomorrow by Dr. Patterson.        Review of Systems   Constitutional: Negative.   HENT: Negative.     Eyes: Negative.    Cardiovascular:  Positive for dyspnea on exertion.   Respiratory:  Positive for shortness of breath.    Endocrine:  Negative.    Hematologic/Lymphatic: Negative.    Skin: Negative.    Musculoskeletal: Negative.    Gastrointestinal: Negative.    Genitourinary: Negative.    Neurological: Negative.    Psychiatric/Behavioral: Negative.     Allergic/Immunologic: Negative.    Objective:     Vital Signs (Most Recent):  Temp: 97.8 °F (36.6 °C) (09/19/22 1228)  Pulse: 85 (09/19/22 1228)  Resp: 18 (09/19/22 1228)  BP: 113/78 (09/19/22 0430)  SpO2: 99 % (09/19/22 1228) Vital Signs (24h Range):  Temp:  [97.3 °F (36.3 °C)-97.9 °F (36.6 °C)] 97.8 °F (36.6 °C)  Pulse:  [] 85  Resp:  [17-18] 18  SpO2:  [97 %-99 %] 99 %  BP: (113-125)/(77-79) 113/78     Weight: 80.3 kg (177 lb 0.5 oz)  Body mass index is 30.39 kg/m².     SpO2: 99 %  O2 Device (Oxygen Therapy): room air      Intake/Output Summary (Last 24 hours) at 9/19/2022 1345  Last data filed at 9/19/2022 1200  Gross per 24 hour   Intake 240 ml   Output 2350 ml   Net -2110 ml       Lines/Drains/Airways       Peripheral Intravenous Line  Duration                  Peripheral IV - Single Lumen 09/17/22 0741 20 G Anterior;Left Hand 2 days                    Physical Exam  Vitals and nursing note reviewed.   Constitutional:       General: He is not in acute distress.     Appearance: Normal appearance. He is well-developed. He is not diaphoretic.   HENT:      Head: Normocephalic and atraumatic.   Eyes:      General:         Right eye: No discharge.         Left eye: No discharge.      Pupils: Pupils are equal, round, and reactive to light.   Neck:      Thyroid: No thyromegaly.      Vascular: No JVD.      Trachea: No tracheal deviation.   Cardiovascular:      Rate and Rhythm: Normal rate and regular rhythm.      Heart sounds: Normal heart sounds, S1 normal and S2 normal. No murmur heard.  Pulmonary:      Effort: Pulmonary effort is normal. No respiratory distress.      Breath sounds: Rhonchi present. No wheezing or rales.   Abdominal:      General: There is no distension.      Palpations:  Abdomen is soft.      Tenderness: There is no rebound.   Musculoskeletal:      Cervical back: Neck supple.      Right lower leg: No edema.      Left lower leg: No edema.   Skin:     General: Skin is warm and dry.      Findings: No erythema.   Neurological:      General: No focal deficit present.      Mental Status: He is alert and oriented to person, place, and time.   Psychiatric:         Mood and Affect: Mood normal.         Behavior: Behavior normal.         Thought Content: Thought content normal.       Significant Labs: CMP   Recent Labs   Lab 09/18/22  0550 09/19/22  0514    139   K 3.8 4.1    105   CO2 20* 21*   * 108   BUN 10 10   CREATININE 0.8 0.7   CALCIUM 9.0 8.5*   PROT 5.9* 5.8*   ALBUMIN 3.2* 3.2*   BILITOT 3.3* 2.5*   ALKPHOS 74 69   AST 24 18   ALT 57* 49*   ANIONGAP 12 13   , CBC   Recent Labs   Lab 09/18/22  0550 09/19/22  0514   WBC 15.08* 9.66   HGB 15.4 15.5   HCT 46.7 45.5    188   , INR No results for input(s): INR, PROTIME in the last 48 hours., Troponin   Recent Labs   Lab 09/17/22  1757   TROPONINI 0.035*   , and All pertinent lab results from the last 24 hours have been reviewed.    Significant Imaging: Echocardiogram: Transthoracic echo (TTE) complete (Cupid Only):   Results for orders placed or performed during the hospital encounter of 09/17/22   Echo   Result Value Ref Range    BSA 1.9 m2    IVC diameter 1.27 cm    Left Ventricular Outflow Tract Mean Velocity 0.36 cm/s    Left Ventricular Outflow Tract Mean Gradient 0.60 mmHg    Pulmonary Valve Mean Velocity 0.53 m/s    PV PEAK VELOCITY 0.72 cm/s    LVIDd 7.61 (A) 3.5 - 6.0 cm    IVS 0.90 0.6 - 1.1 cm    Posterior Wall 0.93 0.6 - 1.1 cm    LVIDs 6.60 (A) 2.1 - 4.0 cm    FS 13 28 - 44 %    STJ 2.00 cm    Ascending aorta 1.71 cm    LV mass 333.83 g    LA size 3.39 cm    Left Ventricle Relative Wall Thickness 0.24 cm    AV mean gradient 2 mmHg    AV valve area 1.98 cm2    AV Velocity Ratio 0.51     AV index  (prosthetic) 0.65     MV valve area p 1/2 method 4.48 cm2    IVRT 85.63 msec    LVOT diameter 1.96 cm    LVOT area 3.0 cm2    LVOT peak ghanshyam 0.53 m/s    LVOT peak VTI 9.30 cm    Ao peak ghanshyam 1.04 m/s    Ao VTI 14.2 cm    Mr max ghanshyam 4.44 m/s    LVOT stroke volume 28.05 cm3    AV peak gradient 4 mmHg    MV Peak E Ghanshyam 1.00 m/s    TR Max Ghanshyam 2.46 m/s    MV stenosis pressure 1/2 time 49.11 ms    LV Systolic Volume 223.41 mL    LV Systolic Volume Index 120.8 mL/m2    LV Diastolic Volume 307.76 mL    LV Diastolic Volume Index 166.36 mL/m2    LV Mass Index 180 g/m2    RA Major Axis 5.40 cm    Left Atrium Minor Axis 6.34 cm    Left Atrium Major Axis 6.33 cm    Triscuspid Valve Regurgitation Peak Gradient 24 mmHg    Right Atrial Pressure (from IVC) 8 mmHg    EF 15 %    TV rest pulmonary artery pressure 32 mmHg    Narrative    · The left ventricle is severely enlarged with severely decreased systolic   function.  · The estimated ejection fraction is 15%.  · Left ventricular diastolic dysfunction.  · There is severe left ventricular global hypokinesis.  · Normal right ventricular size with low normal right ventricular systolic   function.  · Moderate left atrial enlargement.  · Moderate right atrial enlargement.  · Mild mitral regurgitation.  · Mild tricuspid regurgitation.  · Intermediate central venous pressure (8 mmHg).  · The estimated PA systolic pressure is 32 mmHg.      , EKG: Reviewed, and X-Ray: CXR: X-Ray Chest 1 View (CXR):   Results for orders placed or performed during the hospital encounter of 09/17/22   X-Ray Chest 1 View    Narrative    EXAMINATION:  XR CHEST 1 VIEW    CLINICAL HISTORY:  sob;    TECHNIQUE:  Single frontal view of the chest was performed.    COMPARISON:  Prior    FINDINGS:  The lungs are clear, with normal appearance of pulmonary vasculature and no pleural effusion or pneumothorax.    Prominent cardiac silhouette.  Improvement in perihilar haze and pulmonary edema..  The hilar and mediastinal  contours are unremarkable.    Bones are intact.      Impression    Interval improvement in perihilar haze suggestive of improving pulmonary edema.  Correlate clinically      Electronically signed by: Delvin Barraganimi  Date:    09/17/2022  Time:    21:16    and X-Ray Chest PA and Lateral (CXR): No results found for this visit on 09/17/22.    Assessment and Plan:   Patient who presents with acute CHF/cardiomyopathy, suspect ETOH-induced. Clinically improving, medications adjusted. R/LHC tomorrow by Dr. Patterson. Will need to discuss LifeVest prior to d/c.     * Acute combined systolic and diastolic congestive heart failure  Patient is identified as having Combined Systolic and Diastolic heart failure that is Acute on chronic. CHF is currently uncontrolled due to Continued edema of extremities and Weight gain of 20 pounds. Latest ECHO performed and demonstrates- No results found for this or any previous visit.  . Continue Beta Blocker, ACE/ARB and Furosemide and monitor clinical status closely. Monitor on telemetry. Patient is on CHF pathway.  Monitor strict Is&Os and daily weights.  Place on fluid restriction of 2 L. Continue to stress to patient importance of self efficacy and  on diet for CHF. Last BNP reviewed- and noted below   Recent Labs   Lab 09/19/22  0921   *   .  9/19/22  -Improved clinically, EF 15%  -Continue po Lasix  -Start Toprol XL 25 mg nightly  -Will plan to add Entresto tmw post cath  -R/LHC tomorrow by Dr. Patterson. All risks, benefits, and treatment alternatives explained to patient in detail. All questions answered. He has agreed to proceed.    Elevated LFTs  -In setting of decompensated CHF  -Improving with IV diuresis     Alcoholism  -Cessation advised        VTE Risk Mitigation (From admission, onward)         Ordered     enoxaparin injection 40 mg  Daily         09/17/22 8044                Skylar Ivey PA-C  Cardiology  [facility]

## 2022-09-19 NOTE — HOSPITAL COURSE
9/19/22-Patient seen and examined today, sitting up in bed. Feeling much better. SOB greatly improved, diuresed well overnight. Chest pain free. Labs stable. BNP trending down. R/LHC tomorrow by Dr. Patterson.    9/20/22-Patient seen and examined today, resting in bed. Feels well overall. No chest pain/SOB. Tolerating meds. R/LHC today by Dr. Patterson.    9/21/22- Patient seen and examined today, up sitting on bedside couch. Feels ok, denies CP, SOB. R/LHC yesterday revealed EF 10%, normal coronaries, elevated filling pressures. Cont IV diuresis.    9/22/22-Patient seen and examined today, resting in bed. No acute issues overnight. -11L since admission. Denies chest pain/SOB. Labs reviewed and are stable. LifeVest delivered. Patient counseled extensively on dietary restrictions.

## 2022-09-19 NOTE — ASSESSMENT & PLAN NOTE
Likely secondary to alcoholism   Repeat labs in a.m.    9/18/22  Enzymes trending down. Total Bilirubin elevated  Ultrasound 7/2022 showed Mild hepatic steatosis  Repeat Ifts AM    9/19/22:improving   likely 2/2 fatty liver and CHF  Hep C negative

## 2022-09-19 NOTE — PLAN OF CARE
Problem: Fluid Imbalance (Pneumonia)  Goal: Fluid Balance  Outcome: Ongoing, Progressing     Problem: Adult Inpatient Plan of Care  Goal: Absence of Hospital-Acquired Illness or Injury  Outcome: Ongoing, Progressing     Problem: Adult Inpatient Plan of Care  Goal: Absence of Hospital-Acquired Illness or Injury  Intervention: Identify and Manage Fall Risk  Flowsheets (Taken 9/19/2022 5731)  Safety Promotion/Fall Prevention:   instructed to call staff for mobility   side rails raised x 2   bed alarm set   bed alarm refused

## 2022-09-20 PROBLEM — R79.89 ELEVATED LFTS: Status: RESOLVED | Noted: 2022-09-17 | Resolved: 2022-09-20

## 2022-09-20 LAB
ALBUMIN SERPL BCP-MCNC: 3.2 G/DL (ref 3.5–5.2)
ALP SERPL-CCNC: 69 U/L (ref 55–135)
ALT SERPL W/O P-5'-P-CCNC: 42 U/L (ref 10–44)
ANION GAP SERPL CALC-SCNC: 10 MMOL/L (ref 8–16)
AST SERPL-CCNC: 15 U/L (ref 10–40)
BASOPHILS # BLD AUTO: 0.06 K/UL (ref 0–0.2)
BASOPHILS NFR BLD: 0.7 % (ref 0–1.9)
BILIRUB SERPL-MCNC: 1.9 MG/DL (ref 0.1–1)
BUN SERPL-MCNC: 11 MG/DL (ref 6–20)
CALCIUM SERPL-MCNC: 8.7 MG/DL (ref 8.7–10.5)
CATH EF QUANTITATIVE: 10 %
CHLORIDE SERPL-SCNC: 107 MMOL/L (ref 95–110)
CO2 SERPL-SCNC: 21 MMOL/L (ref 23–29)
CREAT SERPL-MCNC: 0.7 MG/DL (ref 0.5–1.4)
DIFFERENTIAL METHOD: ABNORMAL
EOSINOPHIL # BLD AUTO: 0.1 K/UL (ref 0–0.5)
EOSINOPHIL NFR BLD: 1.2 % (ref 0–8)
ERYTHROCYTE [DISTWIDTH] IN BLOOD BY AUTOMATED COUNT: 12.8 % (ref 11.5–14.5)
EST. GFR  (NO RACE VARIABLE): >60 ML/MIN/1.73 M^2
GLUCOSE SERPL-MCNC: 115 MG/DL (ref 70–110)
HCT VFR BLD AUTO: 45.5 % (ref 40–54)
HGB BLD-MCNC: 15.5 G/DL (ref 14–18)
IMM GRANULOCYTES # BLD AUTO: 0.05 K/UL (ref 0–0.04)
IMM GRANULOCYTES NFR BLD AUTO: 0.6 % (ref 0–0.5)
LYMPHOCYTES # BLD AUTO: 3.4 K/UL (ref 1–4.8)
LYMPHOCYTES NFR BLD: 41.7 % (ref 18–48)
MCH RBC QN AUTO: 33.3 PG (ref 27–31)
MCHC RBC AUTO-ENTMCNC: 34.1 G/DL (ref 32–36)
MCV RBC AUTO: 98 FL (ref 82–98)
MONOCYTES # BLD AUTO: 0.5 K/UL (ref 0.3–1)
MONOCYTES NFR BLD: 6 % (ref 4–15)
NEUTROPHILS # BLD AUTO: 4 K/UL (ref 1.8–7.7)
NEUTROPHILS NFR BLD: 49.8 % (ref 38–73)
NRBC BLD-RTO: 0 /100 WBC
PLATELET # BLD AUTO: 208 K/UL (ref 150–450)
PMV BLD AUTO: 12.4 FL (ref 9.2–12.9)
POTASSIUM SERPL-SCNC: 4.4 MMOL/L (ref 3.5–5.1)
PROT SERPL-MCNC: 5.9 G/DL (ref 6–8.4)
RBC # BLD AUTO: 4.65 M/UL (ref 4.6–6.2)
SODIUM SERPL-SCNC: 138 MMOL/L (ref 136–145)
WBC # BLD AUTO: 8.05 K/UL (ref 3.9–12.7)

## 2022-09-20 PROCEDURE — S4991 NICOTINE PATCH NONLEGEND: HCPCS | Performed by: INTERNAL MEDICINE

## 2022-09-20 PROCEDURE — 99900035 HC TECH TIME PER 15 MIN (STAT)

## 2022-09-20 PROCEDURE — 36415 COLL VENOUS BLD VENIPUNCTURE: CPT | Performed by: NURSE PRACTITIONER

## 2022-09-20 PROCEDURE — 25000003 PHARM REV CODE 250: Performed by: PHYSICIAN ASSISTANT

## 2022-09-20 PROCEDURE — 27000221 HC OXYGEN, UP TO 24 HOURS

## 2022-09-20 PROCEDURE — C1751 CATH, INF, PER/CENT/MIDLINE: HCPCS | Performed by: INTERNAL MEDICINE

## 2022-09-20 PROCEDURE — 25000003 PHARM REV CODE 250: Performed by: INTERNAL MEDICINE

## 2022-09-20 PROCEDURE — 93460 R&L HRT ART/VENTRICLE ANGIO: CPT | Mod: 26,,, | Performed by: INTERNAL MEDICINE

## 2022-09-20 PROCEDURE — 63600175 PHARM REV CODE 636 W HCPCS: Performed by: STUDENT IN AN ORGANIZED HEALTH CARE EDUCATION/TRAINING PROGRAM

## 2022-09-20 PROCEDURE — G0378 HOSPITAL OBSERVATION PER HR: HCPCS

## 2022-09-20 PROCEDURE — 93460 R&L HRT ART/VENTRICLE ANGIO: CPT | Performed by: INTERNAL MEDICINE

## 2022-09-20 PROCEDURE — 85025 COMPLETE CBC W/AUTO DIFF WBC: CPT | Performed by: NURSE PRACTITIONER

## 2022-09-20 PROCEDURE — 99152 MOD SED SAME PHYS/QHP 5/>YRS: CPT | Mod: ,,, | Performed by: INTERNAL MEDICINE

## 2022-09-20 PROCEDURE — 63600175 PHARM REV CODE 636 W HCPCS: Performed by: INTERNAL MEDICINE

## 2022-09-20 PROCEDURE — 25500020 PHARM REV CODE 255: Performed by: INTERNAL MEDICINE

## 2022-09-20 PROCEDURE — 11000001 HC ACUTE MED/SURG PRIVATE ROOM

## 2022-09-20 PROCEDURE — 25000003 PHARM REV CODE 250: Performed by: NURSE PRACTITIONER

## 2022-09-20 PROCEDURE — 25000003 PHARM REV CODE 250: Performed by: FAMILY MEDICINE

## 2022-09-20 PROCEDURE — 99153 MOD SED SAME PHYS/QHP EA: CPT | Performed by: INTERNAL MEDICINE

## 2022-09-20 PROCEDURE — 99152 MOD SED SAME PHYS/QHP 5/>YRS: CPT | Performed by: INTERNAL MEDICINE

## 2022-09-20 PROCEDURE — 93460 PR CATH PLACE/CORON ANGIO, IMG SUPER/INTERP,R&L HRT CATH, L HRT VENTRIC: ICD-10-PCS | Mod: 26,,, | Performed by: INTERNAL MEDICINE

## 2022-09-20 PROCEDURE — 99152 PR MOD CONSCIOUS SEDATION, SAME PHYS, 5+ YRS, FIRST 15 MIN: ICD-10-PCS | Mod: ,,, | Performed by: INTERNAL MEDICINE

## 2022-09-20 PROCEDURE — C1769 GUIDE WIRE: HCPCS | Performed by: INTERNAL MEDICINE

## 2022-09-20 PROCEDURE — C1894 INTRO/SHEATH, NON-LASER: HCPCS | Performed by: INTERNAL MEDICINE

## 2022-09-20 PROCEDURE — 99233 PR SUBSEQUENT HOSPITAL CARE,LEVL III: ICD-10-PCS | Mod: 25,,, | Performed by: STUDENT IN AN ORGANIZED HEALTH CARE EDUCATION/TRAINING PROGRAM

## 2022-09-20 PROCEDURE — 99233 SBSQ HOSP IP/OBS HIGH 50: CPT | Mod: 25,,, | Performed by: STUDENT IN AN ORGANIZED HEALTH CARE EDUCATION/TRAINING PROGRAM

## 2022-09-20 PROCEDURE — 80053 COMPREHEN METABOLIC PANEL: CPT | Performed by: NURSE PRACTITIONER

## 2022-09-20 PROCEDURE — 27201423 OPTIME MED/SURG SUP & DEVICES STERILE SUPPLY: Performed by: INTERNAL MEDICINE

## 2022-09-20 RX ORDER — LIDOCAINE HYDROCHLORIDE 20 MG/ML
INJECTION, SOLUTION EPIDURAL; INFILTRATION; INTRACAUDAL; PERINEURAL
Status: DISCONTINUED | OUTPATIENT
Start: 2022-09-20 | End: 2022-09-20

## 2022-09-20 RX ORDER — SODIUM CHLORIDE 9 MG/ML
INJECTION, SOLUTION INTRAVENOUS CONTINUOUS
Status: ACTIVE | OUTPATIENT
Start: 2022-09-20 | End: 2022-09-21

## 2022-09-20 RX ORDER — VERAPAMIL HYDROCHLORIDE 2.5 MG/ML
INJECTION, SOLUTION INTRAVENOUS
Status: DISCONTINUED | OUTPATIENT
Start: 2022-09-20 | End: 2022-09-20

## 2022-09-20 RX ORDER — DIPHENHYDRAMINE HYDROCHLORIDE 50 MG/ML
INJECTION INTRAMUSCULAR; INTRAVENOUS
Status: DISCONTINUED | OUTPATIENT
Start: 2022-09-20 | End: 2022-09-20

## 2022-09-20 RX ORDER — ACETAMINOPHEN 325 MG/1
650 TABLET ORAL EVERY 6 HOURS PRN
Status: DISCONTINUED | OUTPATIENT
Start: 2022-09-20 | End: 2022-09-22 | Stop reason: HOSPADM

## 2022-09-20 RX ORDER — IODIXANOL 320 MG/ML
INJECTION, SOLUTION INTRAVASCULAR
Status: DISCONTINUED | OUTPATIENT
Start: 2022-09-20 | End: 2022-09-20 | Stop reason: HOSPADM

## 2022-09-20 RX ORDER — ONDANSETRON 8 MG/1
8 TABLET, ORALLY DISINTEGRATING ORAL EVERY 8 HOURS PRN
Status: DISCONTINUED | OUTPATIENT
Start: 2022-09-20 | End: 2022-09-22 | Stop reason: HOSPADM

## 2022-09-20 RX ORDER — HEPARIN SODIUM 1000 [USP'U]/ML
INJECTION, SOLUTION INTRAVENOUS; SUBCUTANEOUS
Status: DISCONTINUED | OUTPATIENT
Start: 2022-09-20 | End: 2022-09-20

## 2022-09-20 RX ORDER — MIDAZOLAM HYDROCHLORIDE 1 MG/ML
INJECTION, SOLUTION INTRAMUSCULAR; INTRAVENOUS
Status: DISCONTINUED | OUTPATIENT
Start: 2022-09-20 | End: 2022-09-20

## 2022-09-20 RX ORDER — ACETAMINOPHEN 325 MG/1
650 TABLET ORAL EVERY 4 HOURS PRN
Status: DISCONTINUED | OUTPATIENT
Start: 2022-09-20 | End: 2022-09-22 | Stop reason: HOSPADM

## 2022-09-20 RX ORDER — NITROGLYCERIN 5 MG/ML
INJECTION, SOLUTION INTRAVENOUS
Status: DISCONTINUED | OUTPATIENT
Start: 2022-09-20 | End: 2022-09-20

## 2022-09-20 RX ORDER — SODIUM CHLORIDE 9 MG/ML
INJECTION, SOLUTION INTRAVENOUS CONTINUOUS
Status: DISCONTINUED | OUTPATIENT
Start: 2022-09-20 | End: 2022-09-20

## 2022-09-20 RX ORDER — FUROSEMIDE 10 MG/ML
60 INJECTION INTRAMUSCULAR; INTRAVENOUS 2 TIMES DAILY
Status: DISCONTINUED | OUTPATIENT
Start: 2022-09-20 | End: 2022-09-22 | Stop reason: HOSPADM

## 2022-09-20 RX ORDER — FENTANYL CITRATE 50 UG/ML
INJECTION, SOLUTION INTRAMUSCULAR; INTRAVENOUS
Status: DISCONTINUED | OUTPATIENT
Start: 2022-09-20 | End: 2022-09-20

## 2022-09-20 RX ADMIN — SODIUM CHLORIDE: 0.9 INJECTION, SOLUTION INTRAVENOUS at 12:09

## 2022-09-20 RX ADMIN — CHLORDIAZEPOXIDE HYDROCHLORIDE 25 MG: 25 CAPSULE ORAL at 09:09

## 2022-09-20 RX ADMIN — CHLORDIAZEPOXIDE HYDROCHLORIDE 25 MG: 25 CAPSULE ORAL at 02:09

## 2022-09-20 RX ADMIN — FUROSEMIDE 20 MG: 20 TABLET ORAL at 08:09

## 2022-09-20 RX ADMIN — ACETAMINOPHEN 650 MG: 325 TABLET ORAL at 04:09

## 2022-09-20 RX ADMIN — NICOTINE 1 PATCH: 21 PATCH, EXTENDED RELEASE TRANSDERMAL at 08:09

## 2022-09-20 RX ADMIN — CHLORDIAZEPOXIDE HYDROCHLORIDE 25 MG: 25 CAPSULE ORAL at 08:09

## 2022-09-20 RX ADMIN — SODIUM CHLORIDE: 0.9 INJECTION, SOLUTION INTRAVENOUS at 09:09

## 2022-09-20 RX ADMIN — THIAMINE HCL TAB 100 MG 100 MG: 100 TAB at 08:09

## 2022-09-20 RX ADMIN — FUROSEMIDE 60 MG: 10 INJECTION, SOLUTION INTRAMUSCULAR; INTRAVENOUS at 09:09

## 2022-09-20 RX ADMIN — METOPROLOL SUCCINATE 25 MG: 25 TABLET, EXTENDED RELEASE ORAL at 09:09

## 2022-09-20 RX ADMIN — Medication 1 TABLET: at 08:09

## 2022-09-20 NOTE — OP NOTE
INPATIENT Operative Note         SUMMARY     Surgery Date: 9/20/2022     Surgeon(s) and Role:     * Yady Patterson MD - Primary    ASSISTANT:none    Pre-op Diagnosis:  Acute combined systolic and diastolic congestive heart failure [I50.41]      Post-op Diagnosis:  Acute combined systolic and diastolic congestive heart failure [I50.41]    Procedure(s) (LRB):  CATHETERIZATION, HEART, BOTH LEFT AND RIGHT (N/A)  Venogram, Cath Lab (Left)    COMPLICATION:none    Anesthesia: RN IV Sedation    Findings/Key Components:  Elevated r and l filling pressures.  Moderate pulmonary htn  Normal coronaries  Ef 10%  Estimated Blood Loss: < 50 ML.         SPECIMEN: NONE    Devices/Prostetics: None    PLAN:   Maximize medical therapy

## 2022-09-20 NOTE — INTERVAL H&P NOTE
The patient has been examined and the H&P has been reviewed:    I concur with the findings and no changes have occurred since H&P was written.    Procedure risks, benefits and alternative options discussed and understood by patient/family.          Active Hospital Problems    Diagnosis  POA    *Acute combined systolic and diastolic congestive heart failure [I50.41]  Yes    Alcoholism [F10.20]  Yes      Resolved Hospital Problems    Diagnosis Date Resolved POA    Elevated LFTs [R79.89] 09/20/2022 Yes

## 2022-09-20 NOTE — ASSESSMENT & PLAN NOTE
New onset CHF   CTA showed pulmonary edema  Elevated BNP   Symptoms improved post 40 mg of Lasix  Low-sodium diet   Fluid restriction   Echo pending  Cardiology consult  Currently euvolemic   Continue Lasix 20 mg daily    9/18/22  Cardiac function unknown; awaiting echocardiogram  Lasix changed to PO  Amlodipine started for blood pressure control  Continue I&D  Cardiology input appreciated.    9/19/22: SOB improved. BNP trending down. Echo showed LVEF 15%. Troponin mildly elevated. Cardiology recommended LHC/RHC in am. No s/s alcohol w/ds. CM consulted for alcohol abuse community resources     9/20/22:  No chest pain/SOB. VSS. R/LHC today by Dr. Patterson.

## 2022-09-20 NOTE — PLAN OF CARE
[facility]  Initial Discharge Assessment       Primary Care Provider: Primary Doctor No    Admission Diagnosis: Cough [R05.9]  Tobacco abuse [Z72.0]  SOB (shortness of breath) [R06.02]  Tachycardia [R00.0]  Elevated liver enzymes [R74.8]  Alcohol use [Z72.89]  Elevated troponin [R77.8]  Elevated brain natriuretic peptide (BNP) level [R79.89]  New onset of congestive heart failure [I50.9]    Admission Date: 9/17/2022  Expected Discharge Date:     Discharge Barriers Identified: None    Payor: MEDICAID / Plan: [employer] [place] [place] / Product Type: Managed Medicaid /     Extended Emergency Contact Information  Primary Emergency Contact: filemon La jr  Mobile Phone: [phone]  Relation: Son  Preferred language: English   needed? No  Secondary Emergency Contact: Sister Ramirez  Mobile Phone: [phone]  Relation: Sister  Preferred language: English    Discharge Plan A: Home with family         [id number] - [place], [place] - [address]  [address]  [place] [address]  Phone: [phone] Fax: [phone]    [facility] - [place], [place] - [address]  [address]  [place] [address]  Phone: [phone] Fax: [phone]      Initial Assessment (most recent)       Adult Discharge Assessment - 09/20/22 1253          Discharge Assessment    Assessment Type Discharge Planning Assessment     Confirmed/corrected address, phone number and insurance Yes     Confirmed Demographics Correct on Facesheet     Source of Information patient     Communicated ABI with patient/caregiver Date not available/Unable to determine     Reason For Admission Acute combined systolic and diastolic congestive heart failure     Lives With alone     Do you expect to return to your current living situation? Yes     Do you have help at home or someone to help you manage your care at home? Yes     Who are your caregiver(s) and their phone number(s)? Filemon Agustin and   Jessica     Prior to hospitilization cognitive status: Alert/Oriented     Current cognitive status: Alert/Oriented     Walking or Climbing Stairs Difficulty none     Dressing/Bathing Difficulty none     Home Accessibility wheelchair accessible     Home Layout Able to live on 1st floor     Equipment Currently Used at Home none     Readmission within 30 days? No     Patient currently being followed by outpatient case management? No     Do you currently have service(s) that help you manage your care at home? No     Do you take prescription medications? Yes     Do you have prescription coverage? Yes     Coverage medicaid     Do you have any problems affording any of your prescribed medications? No     Is the patient taking medications as prescribed? yes     Who is going to help you get home at discharge? son or sister     How do you get to doctors appointments? car, drives self     Are you on dialysis? No     Do you take coumadin? No     Discharge Plan A Home with family     DME Needed Upon Discharge  none     Discharge Plan discussed with: Patient     Discharge Barriers Identified None                   Anticipated DC dispo: home with family   Prior Level of Function: independent, lives alone   PCP: none     Comments:  CM met with patient at bedside to introduce role and discuss discharge planning. Patient lives alone. Son and sister will be help at home and can provide transport at time of discharge. Patient has no PCP. Agreeable to [facility] in [place] for primary care. CM discharge needs depends on hospital progress. CM will continue following to assist with other needs.

## 2022-09-20 NOTE — SUBJECTIVE & OBJECTIVE
Review of Systems   Constitutional: Negative.   HENT: Negative.     Eyes: Negative.    Cardiovascular: Negative.    Respiratory: Negative.     Endocrine: Negative.    Hematologic/Lymphatic: Negative.    Musculoskeletal: Negative.    Gastrointestinal: Negative.    Genitourinary: Negative.    Neurological: Negative.    Psychiatric/Behavioral: Negative.     Allergic/Immunologic: Negative.    Objective:     Vital Signs (Most Recent):  Temp: 97.5 °F (36.4 °C) (09/20/22 1148)  Pulse: 96 (09/20/22 1148)  Resp: 17 (09/20/22 1148)  BP: 112/73 (09/20/22 1148)  SpO2: 98 % (09/20/22 1148) Vital Signs (24h Range):  Temp:  [96.7 °F (35.9 °C)-98.1 °F (36.7 °C)] 97.5 °F (36.4 °C)  Pulse:  [] 96  Resp:  [17-18] 17  SpO2:  [97 %-99 %] 98 %  BP: ()/(63-77) 112/73     Weight: 78.7 kg (173 lb 8 oz)  Body mass index is 29.78 kg/m².     SpO2: 98 %  O2 Device (Oxygen Therapy): room air      Intake/Output Summary (Last 24 hours) at 9/20/2022 1312  Last data filed at 9/20/2022 1222  Gross per 24 hour   Intake --   Output 3975 ml   Net -3975 ml       Lines/Drains/Airways       Peripheral Intravenous Line  Duration                  Peripheral IV - Single Lumen 09/20/22 0513 20 G Distal;Left;Posterior Forearm <1 day                    Physical Exam  Vitals and nursing note reviewed.   Constitutional:       General: He is not in acute distress.     Appearance: Normal appearance. He is well-developed. He is not diaphoretic.   HENT:      Head: Normocephalic and atraumatic.   Eyes:      General:         Right eye: No discharge.         Left eye: No discharge.      Pupils: Pupils are equal, round, and reactive to light.   Neck:      Thyroid: No thyromegaly.      Vascular: No JVD.      Trachea: No tracheal deviation.   Cardiovascular:      Rate and Rhythm: Normal rate and regular rhythm.      Heart sounds: Normal heart sounds, S1 normal and S2 normal. No murmur heard.  Pulmonary:      Effort: Pulmonary effort is normal. No respiratory  distress.      Breath sounds: Normal breath sounds. No wheezing or rales.   Abdominal:      General: There is no distension.      Tenderness: There is no rebound.   Musculoskeletal:      Cervical back: Neck supple.      Right lower leg: No edema.      Left lower leg: No edema.   Skin:     General: Skin is warm and dry.      Findings: No erythema.   Neurological:      General: No focal deficit present.      Mental Status: He is alert and oriented to person, place, and time.   Psychiatric:         Mood and Affect: Mood normal.         Behavior: Behavior normal.         Thought Content: Thought content normal.       Significant Labs: CMP   Recent Labs   Lab 09/19/22  0514 09/20/22  0543    138   K 4.1 4.4    107   CO2 21* 21*    115*   BUN 10 11   CREATININE 0.7 0.7   CALCIUM 8.5* 8.7   PROT 5.8* 5.9*   ALBUMIN 3.2* 3.2*   BILITOT 2.5* 1.9*   ALKPHOS 69 69   AST 18 15   ALT 49* 42   ANIONGAP 13 10   , CBC   Recent Labs   Lab 09/19/22  0514 09/20/22  0543   WBC 9.66 8.05   HGB 15.5 15.5   HCT 45.5 45.5    208   , Troponin No results for input(s): TROPONINI in the last 48 hours., and All pertinent lab results from the last 24 hours have been reviewed.    Significant Imaging: Echocardiogram: Transthoracic echo (TTE) complete (Cupid Only):   Results for orders placed or performed during the hospital encounter of 09/17/22   Echo   Result Value Ref Range    BSA 1.9 m2    IVC diameter 1.27 cm    Left Ventricular Outflow Tract Mean Velocity 0.36 cm/s    Left Ventricular Outflow Tract Mean Gradient 0.60 mmHg    Pulmonary Valve Mean Velocity 0.53 m/s    PV PEAK VELOCITY 0.72 cm/s    LVIDd 7.61 (A) 3.5 - 6.0 cm    IVS 0.90 0.6 - 1.1 cm    Posterior Wall 0.93 0.6 - 1.1 cm    LVIDs 6.60 (A) 2.1 - 4.0 cm    FS 13 28 - 44 %    STJ 2.00 cm    Ascending aorta 1.71 cm    LV mass 333.83 g    LA size 3.39 cm    Left Ventricle Relative Wall Thickness 0.24 cm    AV mean gradient 2 mmHg    AV valve area 1.98 cm2     AV Velocity Ratio 0.51     AV index (prosthetic) 0.65     MV valve area p 1/2 method 4.48 cm2    IVRT 85.63 msec    LVOT diameter 1.96 cm    LVOT area 3.0 cm2    LVOT peak ghanshyam 0.53 m/s    LVOT peak VTI 9.30 cm    Ao peak ghanshyam 1.04 m/s    Ao VTI 14.2 cm    Mr max ghanshyam 4.44 m/s    LVOT stroke volume 28.05 cm3    AV peak gradient 4 mmHg    MV Peak E Ghanshyam 1.00 m/s    TR Max Ghanshyam 2.46 m/s    MV stenosis pressure 1/2 time 49.11 ms    LV Systolic Volume 223.41 mL    LV Systolic Volume Index 120.8 mL/m2    LV Diastolic Volume 307.76 mL    LV Diastolic Volume Index 166.36 mL/m2    LV Mass Index 180 g/m2    RA Major Axis 5.40 cm    Left Atrium Minor Axis 6.34 cm    Left Atrium Major Axis 6.33 cm    Triscuspid Valve Regurgitation Peak Gradient 24 mmHg    Right Atrial Pressure (from IVC) 8 mmHg    EF 15 %    TV rest pulmonary artery pressure 32 mmHg    Narrative    · The left ventricle is severely enlarged with severely decreased systolic   function.  · The estimated ejection fraction is 15%.  · Left ventricular diastolic dysfunction.  · There is severe left ventricular global hypokinesis.  · Normal right ventricular size with low normal right ventricular systolic   function.  · Moderate left atrial enlargement.  · Moderate right atrial enlargement.  · Mild mitral regurgitation.  · Mild tricuspid regurgitation.  · Intermediate central venous pressure (8 mmHg).  · The estimated PA systolic pressure is 32 mmHg.      , EKG: Reviewed, and X-Ray: CXR: X-Ray Chest 1 View (CXR):   Results for orders placed or performed during the hospital encounter of 09/17/22   X-Ray Chest 1 View    Narrative    EXAMINATION:  XR CHEST 1 VIEW    CLINICAL HISTORY:  sob;    TECHNIQUE:  Single frontal view of the chest was performed.    COMPARISON:  Prior    FINDINGS:  The lungs are clear, with normal appearance of pulmonary vasculature and no pleural effusion or pneumothorax.    Prominent cardiac silhouette.  Improvement in perihilar haze and pulmonary  edema..  The hilar and mediastinal contours are unremarkable.    Bones are intact.      Impression    Interval improvement in perihilar haze suggestive of improving pulmonary edema.  Correlate clinically      Electronically signed by: Delvin White  Date:    09/17/2022  Time:    21:16    and X-Ray Chest PA and Lateral (CXR): No results found for this visit on 09/17/22.

## 2022-09-20 NOTE — ASSESSMENT & PLAN NOTE
Patient is identified as having Combined Systolic and Diastolic heart failure that is Acute on chronic. CHF is currently uncontrolled due to Continued edema of extremities and Weight gain of 20 pounds. Latest ECHO performed and demonstrates- No results found for this or any previous visit.  . Continue Beta Blocker, ACE/ARB and Furosemide and monitor clinical status closely. Monitor on telemetry. Patient is on CHF pathway.  Monitor strict Is&Os and daily weights.  Place on fluid restriction of 2 L. Continue to stress to patient importance of self efficacy and  on diet for CHF. Last BNP reviewed- and noted below   Recent Labs   Lab 09/19/22  0921   *   .  9/19/22  -Improved clinically, EF 15%  -Continue po Lasix  -Start Toprol XL 25 mg nightly  -Will plan to add Entresto tmw post cath  -R/LHC tomorrow by Dr. Patterson. All risks, benefits, and treatment alternatives explained to patient in detail. All questions answered. He has agreed to proceed.    9/20/22  -Stable overnight  -Continue po Lasix, Toprol XL  -R/LHC today by Dr. Patterson  -Will plan to add Entresto  -Will need LifeVest for SCD prevention

## 2022-09-20 NOTE — PROGRESS NOTES
[facility]  [facility]  Progress Note    Patient Name: Filemon La  MRN: [id number]  Patient Class: OP- Observation   Admission Date: 9/17/2022  Length of Stay: 1 days  Attending Physician: Santy Newman MD  Primary Care Provider: Primary Doctor No        Subjective:     Principal Problem:Acute combined systolic and diastolic congestive heart failure        HPI:  Patient is a 41-year-old aa male with past medical history of alcoholism who presents to [facility] ED with complaints of shortness of breath.  Patient states the symptoms have been ongoing for several days and have progressively worsened.  Endorses nonproductive cough.  He reveals taking over-the-counter cold and cough medications without relief.  Associated symptoms include orthopnea, dyspnea on exertion.  He denies any lower extremity swelling.  Upon further questioning, patient states that he drinks half a pt of liquor every day for several years.  Does report withdrawal symptoms if he does not drink.    In the ED, patient was tachycardic with O2 saturations of 94% on room air.  Chest x-ray was concerning for edema.  BNP: 700. He was started on empiric antibiotics for pneumonia.  CTA performed was negative for PE however did show cardiomegaly and interstitial edema.  Lasix 40 mg IV was given.  Patient placed in observation for new onset CHF.       Overview/Hospital Course:  Filemon La is a 41 year old male who was admitted to [facility] for acute decompensated heart failure. He was diuresed and blood pressure controlled. He has never been on antihypertensives, started on amlodipine. Uncertain of cardiac function, Echocardiogram has been ordered. Counseled extensively on ETOH abuse. Liver enzymes are trending down.    9/19/22: SOB improved. BNP trending down. Echo showed LVEF 15%. Troponin mildly elevated. Cardiology recommended LHC/RHC in am. No s/s alcohol w/ds. CM consulted for alcohol abuse community resources      9/20/22:  No chest pain/SOB. VSS. R/LHC today by Dr. Patterson. Cardiology added Toprol and will add Entresto   No s/s alcohol w/d       Interval History: see hospital course    Review of Systems   Constitutional:  Negative for fatigue and fever.   HENT:  Negative for sinus pressure.    Eyes:  Negative for visual disturbance.   Cardiovascular:  Negative for chest pain and leg swelling.   Gastrointestinal:  Negative for nausea and vomiting.   Genitourinary:  Negative for difficulty urinating.   Musculoskeletal:  Negative for back pain.   Skin:  Negative for rash.   Neurological:  Negative for headaches.   Psychiatric/Behavioral:  Negative for confusion.        Objective:     Vital Signs (Most Recent):  Temp: 97.5 °F (36.4 °C) (09/20/22 1148)  Pulse: 96 (09/20/22 1148)  Resp: 17 (09/20/22 1148)  BP: 112/73 (09/20/22 1148)  SpO2: 98 % (09/20/22 1148) Vital Signs (24h Range):  Temp:  [96.7 °F (35.9 °C)-98.1 °F (36.7 °C)] 97.5 °F (36.4 °C)  Pulse:  [] 96  Resp:  [17-18] 17  SpO2:  [97 %-99 %] 98 %  BP: ()/(63-77) 112/73     Weight: 78.7 kg (173 lb 8 oz)  Body mass index is 29.78 kg/m².    Intake/Output Summary (Last 24 hours) at 9/20/2022 1520  Last data filed at 9/20/2022 1415  Gross per 24 hour   Intake --   Output 4400 ml   Net -4400 ml        Physical Exam  Constitutional:       General: He is not in acute distress.     Appearance: He is well-developed. He is not diaphoretic.   HENT:      Head: Normocephalic and atraumatic.   Eyes:      Pupils: Pupils are equal, round, and reactive to light.   Cardiovascular:      Rate and Rhythm: Normal rate and regular rhythm.      Heart sounds: Normal heart sounds. No murmur heard.    No friction rub. No gallop.   Pulmonary:      Effort: Pulmonary effort is normal. No respiratory distress.      Breath sounds: No stridor. No wheezing.   Abdominal:      General: Bowel sounds are normal. There is no distension.      Palpations: Abdomen is soft. There is no mass.       Tenderness: There is no abdominal tenderness. There is no guarding.   Musculoskeletal:      Right lower leg: No edema.      Left lower leg: No edema.   Skin:     General: Skin is warm.      Findings: No erythema.   Neurological:      Mental Status: He is alert and oriented to person, place, and time.     Significant Labs: All pertinent labs within the past 24 hours have been reviewed.  CBC:   Recent Labs   Lab 09/19/22  0514 09/20/22  0543   WBC 9.66 8.05   HGB 15.5 15.5   HCT 45.5 45.5    208       CMP:   Recent Labs   Lab 09/19/22  0514 09/20/22  0543    138   K 4.1 4.4    107   CO2 21* 21*    115*   BUN 10 11   CREATININE 0.7 0.7   CALCIUM 8.5* 8.7   PROT 5.8* 5.9*   ALBUMIN 3.2* 3.2*   BILITOT 2.5* 1.9*   ALKPHOS 69 69   AST 18 15   ALT 49* 42   ANIONGAP 13 10         Significant Imaging: I have reviewed all pertinent imaging results/findings within the past 24 hours.      Assessment/Plan:      * Acute combined systolic and diastolic congestive heart failure  New onset CHF   CTA showed pulmonary edema  Elevated BNP   Symptoms improved post 40 mg of Lasix  Low-sodium diet   Fluid restriction   Echo pending  Cardiology consult  Currently euvolemic   Continue Lasix 20 mg daily    9/18/22  Cardiac function unknown; awaiting echocardiogram  Lasix changed to PO  Amlodipine started for blood pressure control  Continue I&D  Cardiology input appreciated.    9/19/22: SOB improved. BNP trending down. Echo showed LVEF 15%. Troponin mildly elevated. Cardiology recommended LHC/RHC in am. No s/s alcohol w/ds. CM consulted for alcohol abuse community resources     9/20/22:  No chest pain/SOB. VSS. R/LHC today by Dr. Patterson.    Alcoholism  Librium t.i.d.   Folic acid, Thiamine   Ativan p.r.n.  CIWA scale qshift      VTE Risk Mitigation (From admission, onward)         Ordered     enoxaparin injection 40 mg  Daily         09/17/22 2151                Discharge Planning   ABI:      Code Status: Full Code    Is the patient medically ready for discharge?:     Reason for patient still in hospital (select all that apply): Patient trending condition  Discharge Plan A: Home with family                  Eugenia Mcdowell NP  Department of Hospital Medicine   [facility]

## 2022-09-20 NOTE — PLAN OF CARE
Plan of care reviewed with pt, verbalized understanding. A&O x4. IV intact, dry, and clean. Medications given with no complications. On room air. Pt ambulates and turns in bed independently. No pain reported. NPO after midnight for possible heart cath in AM. NS to ST on monitor. Instructed to call for assistance. Hourly rounding completed.

## 2022-09-20 NOTE — SUBJECTIVE & OBJECTIVE
Interval History: see hospital course    Review of Systems   Constitutional:  Negative for fatigue and fever.   HENT:  Negative for sinus pressure.    Eyes:  Negative for visual disturbance.   Cardiovascular:  Negative for chest pain and leg swelling.   Gastrointestinal:  Negative for nausea and vomiting.   Genitourinary:  Negative for difficulty urinating.   Musculoskeletal:  Negative for back pain.   Skin:  Negative for rash.   Neurological:  Negative for headaches.   Psychiatric/Behavioral:  Negative for confusion.        Objective:     Vital Signs (Most Recent):  Temp: 97.5 °F (36.4 °C) (09/20/22 1148)  Pulse: 96 (09/20/22 1148)  Resp: 17 (09/20/22 1148)  BP: 112/73 (09/20/22 1148)  SpO2: 98 % (09/20/22 1148) Vital Signs (24h Range):  Temp:  [96.7 °F (35.9 °C)-98.1 °F (36.7 °C)] 97.5 °F (36.4 °C)  Pulse:  [] 96  Resp:  [17-18] 17  SpO2:  [97 %-99 %] 98 %  BP: ()/(63-77) 112/73     Weight: 78.7 kg (173 lb 8 oz)  Body mass index is 29.78 kg/m².    Intake/Output Summary (Last 24 hours) at 9/20/2022 1520  Last data filed at 9/20/2022 1415  Gross per 24 hour   Intake --   Output 4400 ml   Net -4400 ml        Physical Exam  Constitutional:       General: He is not in acute distress.     Appearance: He is well-developed. He is not diaphoretic.   HENT:      Head: Normocephalic and atraumatic.   Eyes:      Pupils: Pupils are equal, round, and reactive to light.   Cardiovascular:      Rate and Rhythm: Normal rate and regular rhythm.      Heart sounds: Normal heart sounds. No murmur heard.    No friction rub. No gallop.   Pulmonary:      Effort: Pulmonary effort is normal. No respiratory distress.      Breath sounds: No stridor. No wheezing.   Abdominal:      General: Bowel sounds are normal. There is no distension.      Palpations: Abdomen is soft. There is no mass.      Tenderness: There is no abdominal tenderness. There is no guarding.   Musculoskeletal:      Right lower leg: No edema.      Left lower leg:  No edema.   Skin:     General: Skin is warm.      Findings: No erythema.   Neurological:      Mental Status: He is alert and oriented to person, place, and time.     Significant Labs: All pertinent labs within the past 24 hours have been reviewed.  CBC:   Recent Labs   Lab 09/19/22  0514 09/20/22  0543   WBC 9.66 8.05   HGB 15.5 15.5   HCT 45.5 45.5    208       CMP:   Recent Labs   Lab 09/19/22  0514 09/20/22  0543    138   K 4.1 4.4    107   CO2 21* 21*    115*   BUN 10 11   CREATININE 0.7 0.7   CALCIUM 8.5* 8.7   PROT 5.8* 5.9*   ALBUMIN 3.2* 3.2*   BILITOT 2.5* 1.9*   ALKPHOS 69 69   AST 18 15   ALT 49* 42   ANIONGAP 13 10         Significant Imaging: I have reviewed all pertinent imaging results/findings within the past 24 hours.

## 2022-09-20 NOTE — PROGRESS NOTES
[facility]  Cardiology  Progress Note    Patient Name: Filemon La  MRN: [id number]  Admission Date: 9/17/2022  Hospital Length of Stay: 1 days  Code Status: Full Code   Attending Physician: Santy Newman MD   Primary Care Physician: Primary Doctor No  Expected Discharge Date:   Principal Problem:Acute combined systolic and diastolic congestive heart failure    Subjective:   HPI:  Patient is a 41-year-old aa male with past medical history of alcoholism who presents to [facility] ED with complaints of shortness of breath.  Patient states the symptoms have been ongoing for several days and have progressively worsened.  Endorses nonproductive cough.  He reveals taking over-the-counter cold and cough medications without relief.  Associated symptoms include orthopnea, dyspnea on exertion.  He denies any lower extremity swelling.  Upon further questioning, patient states that he drinks half a pt of liquor every day for several years.  Does report withdrawal symptoms if he does not drink.     In the ED, patient was tachycardic with O2 saturations of 94% on room air.  Chest x-ray was concerning for edema.  BNP: 700. He was started on empiric antibiotics for pneumonia.  CTA performed was negative for PE however did show cardiomegaly and interstitial edema.  Lasix 40 mg IV was given.  Patient placed in observation for new onset CHF.   Patient seen and examined with good diuresis overnight. Plan continued diuresis, review echo and add afterload reduction.    Hospital Course:   9/19/22-Patient seen and examined today, sitting up in bed. Feeling much better. SOB greatly improved, diuresed well overnight. Chest pain free. Labs stable. BNP trending down. R/LHC tomorrow by Dr. Patterson.    9/20/22-Patient seen and examined today, resting in bed. Feels well overall. No chest pain/SOB. Tolerating meds. R/LHC today by Dr. Patterson.          Review of Systems   Constitutional: Negative.   HENT: Negative.     Eyes: Negative.     Cardiovascular: Negative.    Respiratory: Negative.     Endocrine: Negative.    Hematologic/Lymphatic: Negative.    Musculoskeletal: Negative.    Gastrointestinal: Negative.    Genitourinary: Negative.    Neurological: Negative.    Psychiatric/Behavioral: Negative.     Allergic/Immunologic: Negative.    Objective:     Vital Signs (Most Recent):  Temp: 97.5 °F (36.4 °C) (09/20/22 1148)  Pulse: 96 (09/20/22 1148)  Resp: 17 (09/20/22 1148)  BP: 112/73 (09/20/22 1148)  SpO2: 98 % (09/20/22 1148) Vital Signs (24h Range):  Temp:  [96.7 °F (35.9 °C)-98.1 °F (36.7 °C)] 97.5 °F (36.4 °C)  Pulse:  [] 96  Resp:  [17-18] 17  SpO2:  [97 %-99 %] 98 %  BP: ()/(63-77) 112/73     Weight: 78.7 kg (173 lb 8 oz)  Body mass index is 29.78 kg/m².     SpO2: 98 %  O2 Device (Oxygen Therapy): room air      Intake/Output Summary (Last 24 hours) at 9/20/2022 1312  Last data filed at 9/20/2022 1222  Gross per 24 hour   Intake --   Output 3975 ml   Net -3975 ml       Lines/Drains/Airways       Peripheral Intravenous Line  Duration                  Peripheral IV - Single Lumen 09/20/22 0513 20 G Distal;Left;Posterior Forearm <1 day                    Physical Exam  Vitals and nursing note reviewed.   Constitutional:       General: He is not in acute distress.     Appearance: Normal appearance. He is well-developed. He is not diaphoretic.   HENT:      Head: Normocephalic and atraumatic.   Eyes:      General:         Right eye: No discharge.         Left eye: No discharge.      Pupils: Pupils are equal, round, and reactive to light.   Neck:      Thyroid: No thyromegaly.      Vascular: No JVD.      Trachea: No tracheal deviation.   Cardiovascular:      Rate and Rhythm: Normal rate and regular rhythm.      Heart sounds: Normal heart sounds, S1 normal and S2 normal. No murmur heard.  Pulmonary:      Effort: Pulmonary effort is normal. No respiratory distress.      Breath sounds: Normal breath sounds. No wheezing or rales.   Abdominal:       General: There is no distension.      Tenderness: There is no rebound.   Musculoskeletal:      Cervical back: Neck supple.      Right lower leg: No edema.      Left lower leg: No edema.   Skin:     General: Skin is warm and dry.      Findings: No erythema.   Neurological:      General: No focal deficit present.      Mental Status: He is alert and oriented to person, place, and time.   Psychiatric:         Mood and Affect: Mood normal.         Behavior: Behavior normal.         Thought Content: Thought content normal.       Significant Labs: CMP   Recent Labs   Lab 09/19/22  0514 09/20/22  0543    138   K 4.1 4.4    107   CO2 21* 21*    115*   BUN 10 11   CREATININE 0.7 0.7   CALCIUM 8.5* 8.7   PROT 5.8* 5.9*   ALBUMIN 3.2* 3.2*   BILITOT 2.5* 1.9*   ALKPHOS 69 69   AST 18 15   ALT 49* 42   ANIONGAP 13 10   , CBC   Recent Labs   Lab 09/19/22  0514 09/20/22  0543   WBC 9.66 8.05   HGB 15.5 15.5   HCT 45.5 45.5    208   , Troponin No results for input(s): TROPONINI in the last 48 hours., and All pertinent lab results from the last 24 hours have been reviewed.    Significant Imaging: Echocardiogram: Transthoracic echo (TTE) complete (Cupid Only):   Results for orders placed or performed during the hospital encounter of 09/17/22   Echo   Result Value Ref Range    BSA 1.9 m2    IVC diameter 1.27 cm    Left Ventricular Outflow Tract Mean Velocity 0.36 cm/s    Left Ventricular Outflow Tract Mean Gradient 0.60 mmHg    Pulmonary Valve Mean Velocity 0.53 m/s    PV PEAK VELOCITY 0.72 cm/s    LVIDd 7.61 (A) 3.5 - 6.0 cm    IVS 0.90 0.6 - 1.1 cm    Posterior Wall 0.93 0.6 - 1.1 cm    LVIDs 6.60 (A) 2.1 - 4.0 cm    FS 13 28 - 44 %    STJ 2.00 cm    Ascending aorta 1.71 cm    LV mass 333.83 g    LA size 3.39 cm    Left Ventricle Relative Wall Thickness 0.24 cm    AV mean gradient 2 mmHg    AV valve area 1.98 cm2    AV Velocity Ratio 0.51     AV index (prosthetic) 0.65     MV valve area p 1/2 method  4.48 cm2    IVRT 85.63 msec    LVOT diameter 1.96 cm    LVOT area 3.0 cm2    LVOT peak ghanshyam 0.53 m/s    LVOT peak VTI 9.30 cm    Ao peak ghanshyam 1.04 m/s    Ao VTI 14.2 cm    Mr max ghanshyam 4.44 m/s    LVOT stroke volume 28.05 cm3    AV peak gradient 4 mmHg    MV Peak E Ghanshyam 1.00 m/s    TR Max Ghanshyam 2.46 m/s    MV stenosis pressure 1/2 time 49.11 ms    LV Systolic Volume 223.41 mL    LV Systolic Volume Index 120.8 mL/m2    LV Diastolic Volume 307.76 mL    LV Diastolic Volume Index 166.36 mL/m2    LV Mass Index 180 g/m2    RA Major Axis 5.40 cm    Left Atrium Minor Axis 6.34 cm    Left Atrium Major Axis 6.33 cm    Triscuspid Valve Regurgitation Peak Gradient 24 mmHg    Right Atrial Pressure (from IVC) 8 mmHg    EF 15 %    TV rest pulmonary artery pressure 32 mmHg    Narrative    · The left ventricle is severely enlarged with severely decreased systolic   function.  · The estimated ejection fraction is 15%.  · Left ventricular diastolic dysfunction.  · There is severe left ventricular global hypokinesis.  · Normal right ventricular size with low normal right ventricular systolic   function.  · Moderate left atrial enlargement.  · Moderate right atrial enlargement.  · Mild mitral regurgitation.  · Mild tricuspid regurgitation.  · Intermediate central venous pressure (8 mmHg).  · The estimated PA systolic pressure is 32 mmHg.      , EKG: Reviewed, and X-Ray: CXR: X-Ray Chest 1 View (CXR):   Results for orders placed or performed during the hospital encounter of 09/17/22   X-Ray Chest 1 View    Narrative    EXAMINATION:  XR CHEST 1 VIEW    CLINICAL HISTORY:  sob;    TECHNIQUE:  Single frontal view of the chest was performed.    COMPARISON:  Prior    FINDINGS:  The lungs are clear, with normal appearance of pulmonary vasculature and no pleural effusion or pneumothorax.    Prominent cardiac silhouette.  Improvement in perihilar haze and pulmonary edema..  The hilar and mediastinal contours are unremarkable.    Bones are intact.       Impression    Interval improvement in perihilar haze suggestive of improving pulmonary edema.  Correlate clinically      Electronically signed by: Delvin Cindy  Date:    09/17/2022  Time:    21:16    and X-Ray Chest PA and Lateral (CXR): No results found for this visit on 09/17/22.    Assessment and Plan:   Patient who presents with decompensated combined CHF. Clinically improved. R/LHC today.    * Acute combined systolic and diastolic congestive heart failure  Patient is identified as having Combined Systolic and Diastolic heart failure that is Acute on chronic. CHF is currently uncontrolled due to Continued edema of extremities and Weight gain of 20 pounds. Latest ECHO performed and demonstrates- No results found for this or any previous visit.  . Continue Beta Blocker, ACE/ARB and Furosemide and monitor clinical status closely. Monitor on telemetry. Patient is on CHF pathway.  Monitor strict Is&Os and daily weights.  Place on fluid restriction of 2 L. Continue to stress to patient importance of self efficacy and  on diet for CHF. Last BNP reviewed- and noted below   Recent Labs   Lab 09/19/22  0921   *   .  9/19/22  -Improved clinically, EF 15%  -Continue po Lasix  -Start Toprol XL 25 mg nightly  -Will plan to add Entresto tmw post cath  -R/LHC tomorrow by Dr. Patterson. All risks, benefits, and treatment alternatives explained to patient in detail. All questions answered. He has agreed to proceed.    9/20/22  -Stable overnight  -Continue po Lasix, Toprol XL  -R/LHC today by Dr. Patterson  -Will plan to add Entresto  -Will need LifeVest for SCD prevention    Elevated LFTs  -In setting of decompensated CHF  -Improving with IV diuresis     Alcoholism  -Cessation advised        VTE Risk Mitigation (From admission, onward)         Ordered     enoxaparin injection 40 mg  Daily         09/17/22 1634                Skylar Ivey PA-C  Cardiology  [facility]

## 2022-09-21 LAB
ALBUMIN SERPL BCP-MCNC: 3.3 G/DL (ref 3.5–5.2)
ALP SERPL-CCNC: 72 U/L (ref 55–135)
ALT SERPL W/O P-5'-P-CCNC: 36 U/L (ref 10–44)
ANION GAP SERPL CALC-SCNC: 12 MMOL/L (ref 8–16)
AST SERPL-CCNC: 15 U/L (ref 10–40)
BASOPHILS # BLD AUTO: 0.06 K/UL (ref 0–0.2)
BASOPHILS NFR BLD: 0.7 % (ref 0–1.9)
BILIRUB SERPL-MCNC: 2.1 MG/DL (ref 0.1–1)
BUN SERPL-MCNC: 13 MG/DL (ref 6–20)
CALCIUM SERPL-MCNC: 8.9 MG/DL (ref 8.7–10.5)
CHLORIDE SERPL-SCNC: 106 MMOL/L (ref 95–110)
CO2 SERPL-SCNC: 21 MMOL/L (ref 23–29)
CREAT SERPL-MCNC: 0.8 MG/DL (ref 0.5–1.4)
DIFFERENTIAL METHOD: ABNORMAL
EOSINOPHIL # BLD AUTO: 0.1 K/UL (ref 0–0.5)
EOSINOPHIL NFR BLD: 1.3 % (ref 0–8)
ERYTHROCYTE [DISTWIDTH] IN BLOOD BY AUTOMATED COUNT: 12.6 % (ref 11.5–14.5)
EST. GFR  (NO RACE VARIABLE): >60 ML/MIN/1.73 M^2
GLUCOSE SERPL-MCNC: 127 MG/DL (ref 70–110)
HCT VFR BLD AUTO: 47.3 % (ref 40–54)
HGB BLD-MCNC: 16.2 G/DL (ref 14–18)
IMM GRANULOCYTES # BLD AUTO: 0.05 K/UL (ref 0–0.04)
IMM GRANULOCYTES NFR BLD AUTO: 0.6 % (ref 0–0.5)
LYMPHOCYTES # BLD AUTO: 3.2 K/UL (ref 1–4.8)
LYMPHOCYTES NFR BLD: 38.5 % (ref 18–48)
MCH RBC QN AUTO: 33.7 PG (ref 27–31)
MCHC RBC AUTO-ENTMCNC: 34.2 G/DL (ref 32–36)
MCV RBC AUTO: 98 FL (ref 82–98)
MONOCYTES # BLD AUTO: 0.6 K/UL (ref 0.3–1)
MONOCYTES NFR BLD: 6.8 % (ref 4–15)
NEUTROPHILS # BLD AUTO: 4.3 K/UL (ref 1.8–7.7)
NEUTROPHILS NFR BLD: 52.1 % (ref 38–73)
NRBC BLD-RTO: 0 /100 WBC
PLATELET # BLD AUTO: 211 K/UL (ref 150–450)
PMV BLD AUTO: 12.1 FL (ref 9.2–12.9)
POCT GLUCOSE: 94 MG/DL (ref 70–110)
POTASSIUM SERPL-SCNC: 4.3 MMOL/L (ref 3.5–5.1)
PROT SERPL-MCNC: 6.2 G/DL (ref 6–8.4)
RBC # BLD AUTO: 4.81 M/UL (ref 4.6–6.2)
SODIUM SERPL-SCNC: 139 MMOL/L (ref 136–145)
WBC # BLD AUTO: 8.2 K/UL (ref 3.9–12.7)

## 2022-09-21 PROCEDURE — 63600175 PHARM REV CODE 636 W HCPCS: Performed by: STUDENT IN AN ORGANIZED HEALTH CARE EDUCATION/TRAINING PROGRAM

## 2022-09-21 PROCEDURE — 25000003 PHARM REV CODE 250: Performed by: INTERNAL MEDICINE

## 2022-09-21 PROCEDURE — S4991 NICOTINE PATCH NONLEGEND: HCPCS | Performed by: INTERNAL MEDICINE

## 2022-09-21 PROCEDURE — 36415 COLL VENOUS BLD VENIPUNCTURE: CPT | Performed by: INTERNAL MEDICINE

## 2022-09-21 PROCEDURE — 85025 COMPLETE CBC W/AUTO DIFF WBC: CPT | Performed by: INTERNAL MEDICINE

## 2022-09-21 PROCEDURE — 80053 COMPREHEN METABOLIC PANEL: CPT | Performed by: INTERNAL MEDICINE

## 2022-09-21 PROCEDURE — 11000001 HC ACUTE MED/SURG PRIVATE ROOM

## 2022-09-21 PROCEDURE — 63600175 PHARM REV CODE 636 W HCPCS: Performed by: INTERNAL MEDICINE

## 2022-09-21 RX ADMIN — NICOTINE 1 PATCH: 21 PATCH, EXTENDED RELEASE TRANSDERMAL at 08:09

## 2022-09-21 RX ADMIN — FUROSEMIDE 60 MG: 10 INJECTION, SOLUTION INTRAMUSCULAR; INTRAVENOUS at 09:09

## 2022-09-21 RX ADMIN — METOPROLOL SUCCINATE 25 MG: 25 TABLET, EXTENDED RELEASE ORAL at 09:09

## 2022-09-21 RX ADMIN — CHLORDIAZEPOXIDE HYDROCHLORIDE 25 MG: 25 CAPSULE ORAL at 09:09

## 2022-09-21 RX ADMIN — CHLORDIAZEPOXIDE HYDROCHLORIDE 25 MG: 25 CAPSULE ORAL at 08:09

## 2022-09-21 RX ADMIN — Medication 1 TABLET: at 08:09

## 2022-09-21 RX ADMIN — ENOXAPARIN SODIUM 40 MG: 100 INJECTION SUBCUTANEOUS at 05:09

## 2022-09-21 RX ADMIN — THIAMINE HCL TAB 100 MG 100 MG: 100 TAB at 08:09

## 2022-09-21 RX ADMIN — FUROSEMIDE 60 MG: 10 INJECTION, SOLUTION INTRAMUSCULAR; INTRAVENOUS at 08:09

## 2022-09-21 RX ADMIN — CHLORDIAZEPOXIDE HYDROCHLORIDE 25 MG: 25 CAPSULE ORAL at 02:09

## 2022-09-21 NOTE — SUBJECTIVE & OBJECTIVE
Interval History:    Review of Systems   Constitutional: Negative.   HENT: Negative.     Eyes: Negative.    Cardiovascular: Negative.    Respiratory: Negative.     Skin: Negative.    Musculoskeletal: Negative.    Gastrointestinal: Negative.    Genitourinary: Negative.    Neurological: Negative.    Psychiatric/Behavioral: Negative.     Objective:     Vital Signs (Most Recent):  Temp: 96.8 °F (36 °C) (09/21/22 0743)  Pulse: 101 (09/21/22 0743)  Resp: 16 (09/21/22 0743)  BP: 101/63 (09/21/22 0743)  SpO2: 99 % (09/21/22 0743) Vital Signs (24h Range):  Temp:  [96.8 °F (36 °C)-98.9 °F (37.2 °C)] 96.8 °F (36 °C)  Pulse:  [] 101  Resp:  [16-23] 16  SpO2:  [95 %-100 %] 99 %  BP: ()/(52-73) 101/63     Weight: 78.7 kg (173 lb 8 oz)  Body mass index is 29.78 kg/m².     SpO2: 99 %  O2 Device (Oxygen Therapy): room air      Intake/Output Summary (Last 24 hours) at 9/21/2022 1125  Last data filed at 9/21/2022 0800  Gross per 24 hour   Intake 433.38 ml   Output 2225 ml   Net -1791.62 ml       Lines/Drains/Airways       Peripheral Intravenous Line  Duration                  Peripheral IV - Single Lumen 09/20/22 0513 20 G Distal;Left;Posterior Forearm 1 day                    Physical Exam  Vitals and nursing note reviewed.   Constitutional:       Appearance: Normal appearance.   HENT:      Head: Normocephalic and atraumatic.   Eyes:      General:         Right eye: No discharge.         Left eye: No discharge.      Pupils: Pupils are equal, round, and reactive to light.   Cardiovascular:      Rate and Rhythm: Normal rate and regular rhythm.      Heart sounds: S1 normal and S2 normal. No murmur heard.    No friction rub.   Pulmonary:      Effort: Pulmonary effort is normal. No respiratory distress.      Breath sounds: Normal breath sounds. No rales.   Abdominal:      Palpations: Abdomen is soft.      Tenderness: There is no abdominal tenderness.   Musculoskeletal:      Cervical back: Neck supple.      Right lower leg: No  edema.      Left lower leg: No edema.   Skin:     General: Skin is warm and dry.   Neurological:      General: No focal deficit present.      Mental Status: He is alert and oriented to person, place, and time.   Psychiatric:         Mood and Affect: Mood normal.         Behavior: Behavior normal.         Thought Content: Thought content normal.       Significant Labs: BMP:   Recent Labs   Lab 09/20/22  0543 09/21/22  0438   * 127*    139   K 4.4 4.3    106   CO2 21* 21*   BUN 11 13   CREATININE 0.7 0.8   CALCIUM 8.7 8.9   , CMP   Recent Labs   Lab 09/20/22  0543 09/21/22  0438    139   K 4.4 4.3    106   CO2 21* 21*   * 127*   BUN 11 13   CREATININE 0.7 0.8   CALCIUM 8.7 8.9   PROT 5.9* 6.2   ALBUMIN 3.2* 3.3*   BILITOT 1.9* 2.1*   ALKPHOS 69 72   AST 15 15   ALT 42 36   ANIONGAP 10 12   , CBC   Recent Labs   Lab 09/20/22  0543 09/21/22  0437   WBC 8.05 8.20   HGB 15.5 16.2   HCT 45.5 47.3    211   , INR No results for input(s): INR, PROTIME in the last 48 hours., Lipid Panel No results for input(s): CHOL, HDL, LDLCALC, TRIG, CHOLHDL in the last 48 hours., Troponin No results for input(s): TROPONINI in the last 48 hours., and All pertinent lab results from the last 24 hours have been reviewed.    Significant Imaging: Cardiac Cath: Reviewed, Echocardiogram: Transthoracic echo (TTE) complete (Cupid Only):   Results for orders placed or performed during the hospital encounter of 09/17/22   Echo   Result Value Ref Range    BSA 1.9 m2    IVC diameter 1.27 cm    Left Ventricular Outflow Tract Mean Velocity 0.36 cm/s    Left Ventricular Outflow Tract Mean Gradient 0.60 mmHg    Pulmonary Valve Mean Velocity 0.53 m/s    PV PEAK VELOCITY 0.72 cm/s    LVIDd 7.61 (A) 3.5 - 6.0 cm    IVS 0.90 0.6 - 1.1 cm    Posterior Wall 0.93 0.6 - 1.1 cm    LVIDs 6.60 (A) 2.1 - 4.0 cm    FS 13 28 - 44 %    STJ 2.00 cm    Ascending aorta 1.71 cm    LV mass 333.83 g    LA size 3.39 cm    Left  Ventricle Relative Wall Thickness 0.24 cm    AV mean gradient 2 mmHg    AV valve area 1.98 cm2    AV Velocity Ratio 0.51     AV index (prosthetic) 0.65     MV valve area p 1/2 method 4.48 cm2    IVRT 85.63 msec    LVOT diameter 1.96 cm    LVOT area 3.0 cm2    LVOT peak ghanshyam 0.53 m/s    LVOT peak VTI 9.30 cm    Ao peak ghanshyam 1.04 m/s    Ao VTI 14.2 cm    Mr max ghanshyam 4.44 m/s    LVOT stroke volume 28.05 cm3    AV peak gradient 4 mmHg    MV Peak E Ghanshyam 1.00 m/s    TR Max Ghanshyam 2.46 m/s    MV stenosis pressure 1/2 time 49.11 ms    LV Systolic Volume 223.41 mL    LV Systolic Volume Index 120.8 mL/m2    LV Diastolic Volume 307.76 mL    LV Diastolic Volume Index 166.36 mL/m2    LV Mass Index 180 g/m2    RA Major Axis 5.40 cm    Left Atrium Minor Axis 6.34 cm    Left Atrium Major Axis 6.33 cm    Triscuspid Valve Regurgitation Peak Gradient 24 mmHg    Right Atrial Pressure (from IVC) 8 mmHg    EF 15 %    TV rest pulmonary artery pressure 32 mmHg    Narrative    · The left ventricle is severely enlarged with severely decreased systolic   function.  · The estimated ejection fraction is 15%.  · Left ventricular diastolic dysfunction.  · There is severe left ventricular global hypokinesis.  · Normal right ventricular size with low normal right ventricular systolic   function.  · Moderate left atrial enlargement.  · Moderate right atrial enlargement.  · Mild mitral regurgitation.  · Mild tricuspid regurgitation.  · Intermediate central venous pressure (8 mmHg).  · The estimated PA systolic pressure is 32 mmHg.      , and EKG: Reviewed

## 2022-09-21 NOTE — ASSESSMENT & PLAN NOTE
New onset CHF   CTA showed pulmonary edema  Elevated BNP   Symptoms improved post 40 mg of Lasix  Low-sodium diet   Fluid restriction   Echo pending  Cardiology consult  Currently euvolemic   Continue Lasix 20 mg daily    9/18/22  Cardiac function unknown; awaiting echocardiogram  Lasix changed to PO  Amlodipine started for blood pressure control  Continue I&D  Cardiology input appreciated.    9/19/22: SOB improved. BNP trending down. Echo showed LVEF 15%. Troponin mildly elevated. Cardiology recommended LHC/RHC in am. No s/s alcohol w/ds. CM consulted for alcohol abuse community resources     9/20/22:  No chest pain/SOB. VSS. R/LHC today by Dr. Patterson.    9/21/22 LHC negative for CAD, but showed EF and elevated filling pressures. Lasix changed to IV.

## 2022-09-21 NOTE — NURSING
Report called to nurse caring for patient. Patient AAOX4 with not complaints of pain at this time. Tele monitor placed on patient. Patient transported to room 313 at this time.  Dressing to left radial site C/D/I.

## 2022-09-21 NOTE — PROGRESS NOTES
[facility]  [facility] Medicine  Progress Note    Patient Name: Filemon La  MRN: [id number]  Patient Class: IP- Inpatient   Admission Date: 9/17/2022  Length of Stay: 1 days  Attending Physician: Santy Newman MD  Primary Care Provider: Primary Doctor No    Subjective:     Principal Problem:Acute combined systolic and diastolic congestive heart failure        HPI:  Patient is a 41-year-old aa male with past medical history of alcoholism who presents to [facility] ED with complaints of shortness of breath.  Patient states the symptoms have been ongoing for several days and have progressively worsened.  Endorses nonproductive cough.  He reveals taking over-the-counter cold and cough medications without relief.  Associated symptoms include orthopnea, dyspnea on exertion.  He denies any lower extremity swelling.  Upon further questioning, patient states that he drinks half a pt of liquor every day for several years.  Does report withdrawal symptoms if he does not drink.    In the ED, patient was tachycardic with O2 saturations of 94% on room air.  Chest x-ray was concerning for edema.  BNP: 700. He was started on empiric antibiotics for pneumonia.  CTA performed was negative for PE however did show cardiomegaly and interstitial edema.  Lasix 40 mg IV was given.  Patient placed in observation for new onset CHF.       Overview/Hospital Course:  Filemon La is a 41 year old male who was admitted to [facility] for acute decompensated heart failure. He was diuresed and blood pressure controlled. He has never been on antihypertensives, started on amlodipine. Uncertain of cardiac function, Echocardiogram has been ordered. Counseled extensively on ETOH abuse. Liver enzymes are trending down.    9/19/22: SOB improved. BNP trending down. Echo showed LVEF 15%. Troponin mildly elevated. Cardiology recommended LHC/RHC in am. No s/s alcohol w/ds. CM consulted for alcohol abuse community resources     9/20/22:  No  chest pain/SOB. VSS. R/LHC today by Dr. Patterson. Cardiology added Toprol and will add Entresto   No s/s alcohol w/d     9/21/22  LHC showed EF of 10% with elevated filling pressures on the right and left still indicating volume overload. Lasix changed to IV . Will need lifevest before he discharges.       Interval History: doing well. Discussed reasons for changing Lasix to IV.    Review of Systems   Constitutional:  Negative for fatigue and fever.   HENT:  Negative for sinus pressure.    Eyes:  Negative for visual disturbance.   Cardiovascular:  Negative for chest pain and leg swelling.   Gastrointestinal:  Negative for nausea and vomiting.   Genitourinary:  Negative for difficulty urinating.   Musculoskeletal:  Negative for back pain.   Skin:  Negative for rash.   Neurological:  Negative for headaches.   Psychiatric/Behavioral:  Negative for confusion.    Objective:     Vital Signs (Most Recent):  Temp: 97.5 °F (36.4 °C) (09/21/22 1146)  Pulse: 104 (09/21/22 1146)  Resp: 16 (09/21/22 1146)  BP: 112/67 (09/21/22 1146)  SpO2: 97 % (09/21/22 1146) Vital Signs (24h Range):  Temp:  [96.8 °F (36 °C)-98.9 °F (37.2 °C)] 97.5 °F (36.4 °C)  Pulse:  [] 104  Resp:  [16-23] 16  SpO2:  [95 %-100 %] 97 %  BP: ()/(52-69) 112/67     Weight: 78.7 kg (173 lb 8 oz)  Body mass index is 29.78 kg/m².    Intake/Output Summary (Last 24 hours) at 9/21/2022 1352  Last data filed at 9/21/2022 1300  Gross per 24 hour   Intake 913.38 ml   Output 2750 ml   Net -1836.62 ml      Physical Exam  Constitutional:       General: He is not in acute distress.     Appearance: He is well-developed. He is not diaphoretic.   HENT:      Head: Normocephalic and atraumatic.   Eyes:      General: Scleral icterus present.      Pupils: Pupils are equal, round, and reactive to light.   Cardiovascular:      Rate and Rhythm: Normal rate and regular rhythm.      Heart sounds: Normal heart sounds. No murmur heard.    No friction rub. No gallop.   Pulmonary:       Effort: Pulmonary effort is normal. No respiratory distress.      Breath sounds: No stridor. No wheezing.   Abdominal:      General: Bowel sounds are normal. There is no distension.      Palpations: Abdomen is soft. There is no mass.      Tenderness: There is no abdominal tenderness. There is no guarding.   Musculoskeletal:      Right lower leg: No edema.      Left lower leg: No edema.   Skin:     General: Skin is warm.      Findings: No erythema.   Neurological:      Mental Status: He is alert and oriented to person, place, and time.     Significant Labs: All pertinent labs within the past 24 hours have been reviewed.  CBC:   Recent Labs   Lab 09/20/22  0543 09/21/22  0437   WBC 8.05 8.20   HGB 15.5 16.2   HCT 45.5 47.3    211     CMP:   Recent Labs   Lab 09/20/22  0543 09/21/22  0438    139   K 4.4 4.3    106   CO2 21* 21*   * 127*   BUN 11 13   CREATININE 0.7 0.8   CALCIUM 8.7 8.9   PROT 5.9* 6.2   ALBUMIN 3.2* 3.3*   BILITOT 1.9* 2.1*   ALKPHOS 69 72   AST 15 15   ALT 42 36   ANIONGAP 10 12       Significant Imaging: I have reviewed all pertinent imaging results/findings within the past 24 hours.      Assessment/Plan:      * Acute combined systolic and diastolic congestive heart failure  New onset CHF   CTA showed pulmonary edema  Elevated BNP   Symptoms improved post 40 mg of Lasix  Low-sodium diet   Fluid restriction   Echo pending  Cardiology consult  Currently euvolemic   Continue Lasix 20 mg daily    9/18/22  Cardiac function unknown; awaiting echocardiogram  Lasix changed to PO  Amlodipine started for blood pressure control  Continue I&D  Cardiology input appreciated.    9/19/22: SOB improved. BNP trending down. Echo showed LVEF 15%. Troponin mildly elevated. Cardiology recommended LHC/RHC in am. No s/s alcohol w/ds. CM consulted for alcohol abuse community resources     9/20/22:  No chest pain/SOB. VSS. R/LHC today by Dr. Patterson.    9/21/22 LHC negative for CAD, but showed EF  and elevated filling pressures. Lasix changed to IV.       Alcoholism  Librium t.i.d.   Folic acid, Thiamine   Ativan p.r.n.  CIWA scale qshift    9/21/22  Counseled extensively on cessation.      VTE Risk Mitigation (From admission, onward)         Ordered     enoxaparin injection 40 mg  Daily         09/17/22 6298                Discharge Planning   ABI:      Code Status: Full Code   Is the patient medically ready for discharge?:     Reason for patient still in hospital (select all that apply): Treatment  Discharge Plan A: Home with family        Gil Quarles, NP  Department of Hospital Medicine   [facility]

## 2022-09-21 NOTE — SUBJECTIVE & OBJECTIVE
Interval History: doing well. Discussed reasons for changing Lasix to IV.    Review of Systems   Constitutional:  Negative for fatigue and fever.   HENT:  Negative for sinus pressure.    Eyes:  Negative for visual disturbance.   Cardiovascular:  Negative for chest pain and leg swelling.   Gastrointestinal:  Negative for nausea and vomiting.   Genitourinary:  Negative for difficulty urinating.   Musculoskeletal:  Negative for back pain.   Skin:  Negative for rash.   Neurological:  Negative for headaches.   Psychiatric/Behavioral:  Negative for confusion.    Objective:     Vital Signs (Most Recent):  Temp: 97.5 °F (36.4 °C) (09/21/22 1146)  Pulse: 104 (09/21/22 1146)  Resp: 16 (09/21/22 1146)  BP: 112/67 (09/21/22 1146)  SpO2: 97 % (09/21/22 1146) Vital Signs (24h Range):  Temp:  [96.8 °F (36 °C)-98.9 °F (37.2 °C)] 97.5 °F (36.4 °C)  Pulse:  [] 104  Resp:  [16-23] 16  SpO2:  [95 %-100 %] 97 %  BP: ()/(52-69) 112/67     Weight: 78.7 kg (173 lb 8 oz)  Body mass index is 29.78 kg/m².    Intake/Output Summary (Last 24 hours) at 9/21/2022 1352  Last data filed at 9/21/2022 1300  Gross per 24 hour   Intake 913.38 ml   Output 2750 ml   Net -1836.62 ml      Physical Exam  Constitutional:       General: He is not in acute distress.     Appearance: He is well-developed. He is not diaphoretic.   HENT:      Head: Normocephalic and atraumatic.   Eyes:      General: Scleral icterus present.      Pupils: Pupils are equal, round, and reactive to light.   Cardiovascular:      Rate and Rhythm: Normal rate and regular rhythm.      Heart sounds: Normal heart sounds. No murmur heard.    No friction rub. No gallop.   Pulmonary:      Effort: Pulmonary effort is normal. No respiratory distress.      Breath sounds: No stridor. No wheezing.   Abdominal:      General: Bowel sounds are normal. There is no distension.      Palpations: Abdomen is soft. There is no mass.      Tenderness: There is no abdominal tenderness. There is no  guarding.   Musculoskeletal:      Right lower leg: No edema.      Left lower leg: No edema.   Skin:     General: Skin is warm.      Findings: No erythema.   Neurological:      Mental Status: He is alert and oriented to person, place, and time.     Significant Labs: All pertinent labs within the past 24 hours have been reviewed.  CBC:   Recent Labs   Lab 09/20/22  0543 09/21/22  0437   WBC 8.05 8.20   HGB 15.5 16.2   HCT 45.5 47.3    211     CMP:   Recent Labs   Lab 09/20/22  0543 09/21/22  0438    139   K 4.4 4.3    106   CO2 21* 21*   * 127*   BUN 11 13   CREATININE 0.7 0.8   CALCIUM 8.7 8.9   PROT 5.9* 6.2   ALBUMIN 3.2* 3.3*   BILITOT 1.9* 2.1*   ALKPHOS 69 72   AST 15 15   ALT 42 36   ANIONGAP 10 12       Significant Imaging: I have reviewed all pertinent imaging results/findings within the past 24 hours.

## 2022-09-21 NOTE — CONSULTS
Consulted for skin tear. RFA with small cut from bandage scissors. Foam dressing in place. Recommend continued care per order.

## 2022-09-21 NOTE — PROGRESS NOTES
[facility]  Cardiology  Progress Note    Patient Name: Filemon La  MRN: [id number]  Admission Date: 9/17/2022  Hospital Length of Stay: 1 days  Code Status: Full Code   Attending Physician: aSnty Newman MD   Primary Care Physician: Primary Doctor No  Expected Discharge Date:   Principal Problem:Acute combined systolic and diastolic congestive heart failure    Subjective:     Hospital Course:   9/19/22-Patient seen and examined today, sitting up in bed. Feeling much better. SOB greatly improved, diuresed well overnight. Chest pain free. Labs stable. BNP trending down. R/LHC tomorrow by Dr. Patterson.    9/20/22-Patient seen and examined today, resting in bed. Feels well overall. No chest pain/SOB. Tolerating meds. R/LHC today by Dr. Patterson.    9/21/22- Patient seen and examined today, up sitting on bedside couch. Feels ok, denies CP, SOB. R/LHC yesterday revealed EF 10%, normal coronaries, elevated filling pressures. Cont IV diuresis      Interval History:    Review of Systems   Constitutional: Negative.   HENT: Negative.     Eyes: Negative.    Cardiovascular: Negative.    Respiratory: Negative.     Skin: Negative.    Musculoskeletal: Negative.    Gastrointestinal: Negative.    Genitourinary: Negative.    Neurological: Negative.    Psychiatric/Behavioral: Negative.     Objective:     Vital Signs (Most Recent):  Temp: 96.8 °F (36 °C) (09/21/22 0743)  Pulse: 101 (09/21/22 0743)  Resp: 16 (09/21/22 0743)  BP: 101/63 (09/21/22 0743)  SpO2: 99 % (09/21/22 0743) Vital Signs (24h Range):  Temp:  [96.8 °F (36 °C)-98.9 °F (37.2 °C)] 96.8 °F (36 °C)  Pulse:  [] 101  Resp:  [16-23] 16  SpO2:  [95 %-100 %] 99 %  BP: ()/(52-73) 101/63     Weight: 78.7 kg (173 lb 8 oz)  Body mass index is 29.78 kg/m².     SpO2: 99 %  O2 Device (Oxygen Therapy): room air      Intake/Output Summary (Last 24 hours) at 9/21/2022 1125  Last data filed at 9/21/2022 0800  Gross per 24 hour   Intake 433.38 ml   Output 2225 ml   Net  -1791.62 ml       Lines/Drains/Airways       Peripheral Intravenous Line  Duration                  Peripheral IV - Single Lumen 09/20/22 0513 20 G Distal;Left;Posterior Forearm 1 day                    Physical Exam  Vitals and nursing note reviewed.   Constitutional:       Appearance: Normal appearance.   HENT:      Head: Normocephalic and atraumatic.   Eyes:      General:         Right eye: No discharge.         Left eye: No discharge.      Pupils: Pupils are equal, round, and reactive to light.   Cardiovascular:      Rate and Rhythm: Normal rate and regular rhythm.      Heart sounds: S1 normal and S2 normal. No murmur heard.    No friction rub.   Pulmonary:      Effort: Pulmonary effort is normal. No respiratory distress.      Breath sounds: Normal breath sounds. No rales.   Abdominal:      Palpations: Abdomen is soft.      Tenderness: There is no abdominal tenderness.   Musculoskeletal:      Cervical back: Neck supple.      Right lower leg: No edema.      Left lower leg: No edema.   Skin:     General: Skin is warm and dry.   Neurological:      General: No focal deficit present.      Mental Status: He is alert and oriented to person, place, and time.   Psychiatric:         Mood and Affect: Mood normal.         Behavior: Behavior normal.         Thought Content: Thought content normal.       Significant Labs: BMP:   Recent Labs   Lab 09/20/22  0543 09/21/22  0438   * 127*    139   K 4.4 4.3    106   CO2 21* 21*   BUN 11 13   CREATININE 0.7 0.8   CALCIUM 8.7 8.9   , CMP   Recent Labs   Lab 09/20/22  0543 09/21/22  0438    139   K 4.4 4.3    106   CO2 21* 21*   * 127*   BUN 11 13   CREATININE 0.7 0.8   CALCIUM 8.7 8.9   PROT 5.9* 6.2   ALBUMIN 3.2* 3.3*   BILITOT 1.9* 2.1*   ALKPHOS 69 72   AST 15 15   ALT 42 36   ANIONGAP 10 12   , CBC   Recent Labs   Lab 09/20/22  0543 09/21/22  0437   WBC 8.05 8.20   HGB 15.5 16.2   HCT 45.5 47.3    211   , INR No results for  input(s): INR, PROTIME in the last 48 hours., Lipid Panel No results for input(s): CHOL, HDL, LDLCALC, TRIG, CHOLHDL in the last 48 hours., Troponin No results for input(s): TROPONINI in the last 48 hours., and All pertinent lab results from the last 24 hours have been reviewed.    Significant Imaging: Cardiac Cath: Reviewed, Echocardiogram: Transthoracic echo (TTE) complete (Cupid Only):   Results for orders placed or performed during the hospital encounter of 09/17/22   Echo   Result Value Ref Range    BSA 1.9 m2    IVC diameter 1.27 cm    Left Ventricular Outflow Tract Mean Velocity 0.36 cm/s    Left Ventricular Outflow Tract Mean Gradient 0.60 mmHg    Pulmonary Valve Mean Velocity 0.53 m/s    PV PEAK VELOCITY 0.72 cm/s    LVIDd 7.61 (A) 3.5 - 6.0 cm    IVS 0.90 0.6 - 1.1 cm    Posterior Wall 0.93 0.6 - 1.1 cm    LVIDs 6.60 (A) 2.1 - 4.0 cm    FS 13 28 - 44 %    STJ 2.00 cm    Ascending aorta 1.71 cm    LV mass 333.83 g    LA size 3.39 cm    Left Ventricle Relative Wall Thickness 0.24 cm    AV mean gradient 2 mmHg    AV valve area 1.98 cm2    AV Velocity Ratio 0.51     AV index (prosthetic) 0.65     MV valve area p 1/2 method 4.48 cm2    IVRT 85.63 msec    LVOT diameter 1.96 cm    LVOT area 3.0 cm2    LVOT peak ghanshyam 0.53 m/s    LVOT peak VTI 9.30 cm    Ao peak ghanshyam 1.04 m/s    Ao VTI 14.2 cm    Mr max ghanshyam 4.44 m/s    LVOT stroke volume 28.05 cm3    AV peak gradient 4 mmHg    MV Peak E Ghanshyam 1.00 m/s    TR Max Ghanshyam 2.46 m/s    MV stenosis pressure 1/2 time 49.11 ms    LV Systolic Volume 223.41 mL    LV Systolic Volume Index 120.8 mL/m2    LV Diastolic Volume 307.76 mL    LV Diastolic Volume Index 166.36 mL/m2    LV Mass Index 180 g/m2    RA Major Axis 5.40 cm    Left Atrium Minor Axis 6.34 cm    Left Atrium Major Axis 6.33 cm    Triscuspid Valve Regurgitation Peak Gradient 24 mmHg    Right Atrial Pressure (from IVC) 8 mmHg    EF 15 %    TV rest pulmonary artery pressure 32 mmHg    Narrative    · The left ventricle is  severely enlarged with severely decreased systolic   function.  · The estimated ejection fraction is 15%.  · Left ventricular diastolic dysfunction.  · There is severe left ventricular global hypokinesis.  · Normal right ventricular size with low normal right ventricular systolic   function.  · Moderate left atrial enlargement.  · Moderate right atrial enlargement.  · Mild mitral regurgitation.  · Mild tricuspid regurgitation.  · Intermediate central venous pressure (8 mmHg).  · The estimated PA systolic pressure is 32 mmHg.      , and EKG: Reviewed    Assessment and Plan:     Brief HPI:     * Acute combined systolic and diastolic congestive heart failure  Patient is identified as having Combined Systolic and Diastolic heart failure that is Acute. CHF is currently .  9/19/22  -Improved clinically, EF 15%  -Continue po Lasix  -Start Toprol XL 25 mg nightly  -Will plan to add Entresto tmw post cath  -R/LHC tomorrow by Dr. Patterson. All risks, benefits, and treatment alternatives explained to patient in detail. All questions answered. He has agreed to proceed.    9/20/22  -Stable overnight  -Continue po Lasix, Toprol XL  -R/LHC today by Dr. Patterson  -Will plan to add Entresto  -Will need LifeVest for SCD prevention    9/21/22  -Stable overnight  -R/LHC yesterday by Dr. Patterson, EF was 10%, the filling pressures on the right and left were moderately elevated. Pulmonary hypertension was moderate  -Will need LifeVest for SCD prevention upon discharge  -Needs further IV diuresis  -Recommend Entresto daily      Alcoholism  -Cessation advised        VTE Risk Mitigation (From admission, onward)         Ordered     enoxaparin injection 40 mg  Daily         09/17/22 6630                Sabina Romero NP  Cardiology  [facility]

## 2022-09-21 NOTE — ASSESSMENT & PLAN NOTE
Patient is identified as having Combined Systolic and Diastolic heart failure that is Acute. CHF is currently .  9/19/22  -Improved clinically, EF 15%  -Continue po Lasix  -Start Toprol XL 25 mg nightly  -Will plan to add Entresto tmw post cath  -R/LHC tomorrow by Dr. Patterson. All risks, benefits, and treatment alternatives explained to patient in detail. All questions answered. He has agreed to proceed.    9/20/22  -Stable overnight  -Continue po Lasix, Toprol XL  -R/LHC today by Dr. Patterson  -Will plan to add Entresto  -Will need LifeVest for SCD prevention    9/21/22  -Stable overnight  -R/LHC yesterday by Dr. Patterson, EF was 10%, the filling pressures on the right and left were moderately elevated. Pulmonary hypertension was moderate  -Will need LifeVest for SCD prevention upon discharge  -Needs further IV diuresis  -Recommend Entresto daily

## 2022-09-21 NOTE — ASSESSMENT & PLAN NOTE
Librium t.i.d.   Folic acid, Thiamine   Ativan p.r.n.  CIWA scale qshift    9/21/22  Counseled extensively on cessation.

## 2022-09-21 NOTE — PLAN OF CARE
Plan of care reviewed with pt, verbalized understanding. A&O x4. IV intact, dry, and clean. Medications given with no complications. On room air. [facility] wrist site clean, dry, and clean. Pt ambulates and turns in bed independently. No pain reported. NS on monitor. Instructed to call for assistance. Hourly rounding completed.

## 2022-09-22 VITALS
RESPIRATION RATE: 18 BRPM | HEART RATE: 101 BPM | SYSTOLIC BLOOD PRESSURE: 94 MMHG | OXYGEN SATURATION: 96 % | HEIGHT: 64 IN | TEMPERATURE: 98 F | WEIGHT: 177.5 LBS | DIASTOLIC BLOOD PRESSURE: 50 MMHG | BODY MASS INDEX: 30.3 KG/M2

## 2022-09-22 PROBLEM — I27.20 PULMONARY HYPERTENSION: Status: ACTIVE | Noted: 2022-09-22

## 2022-09-22 LAB
ANION GAP SERPL CALC-SCNC: 11 MMOL/L (ref 8–16)
BACTERIA BLD CULT: NORMAL
BACTERIA BLD CULT: NORMAL
BASOPHILS # BLD AUTO: 0.06 K/UL (ref 0–0.2)
BASOPHILS NFR BLD: 0.8 % (ref 0–1.9)
BUN SERPL-MCNC: 18 MG/DL (ref 6–20)
CALCIUM SERPL-MCNC: 9.1 MG/DL (ref 8.7–10.5)
CHLORIDE SERPL-SCNC: 106 MMOL/L (ref 95–110)
CO2 SERPL-SCNC: 20 MMOL/L (ref 23–29)
CREAT SERPL-MCNC: 0.9 MG/DL (ref 0.5–1.4)
DIFFERENTIAL METHOD: ABNORMAL
EOSINOPHIL # BLD AUTO: 0.1 K/UL (ref 0–0.5)
EOSINOPHIL NFR BLD: 1.3 % (ref 0–8)
ERYTHROCYTE [DISTWIDTH] IN BLOOD BY AUTOMATED COUNT: 12.4 % (ref 11.5–14.5)
EST. GFR  (NO RACE VARIABLE): >60 ML/MIN/1.73 M^2
GLUCOSE SERPL-MCNC: 121 MG/DL (ref 70–110)
HCT VFR BLD AUTO: 46.9 % (ref 40–54)
HGB BLD-MCNC: 16.1 G/DL (ref 14–18)
IMM GRANULOCYTES # BLD AUTO: 0.04 K/UL (ref 0–0.04)
IMM GRANULOCYTES NFR BLD AUTO: 0.5 % (ref 0–0.5)
LYMPHOCYTES # BLD AUTO: 3.2 K/UL (ref 1–4.8)
LYMPHOCYTES NFR BLD: 40.1 % (ref 18–48)
MCH RBC QN AUTO: 33.8 PG (ref 27–31)
MCHC RBC AUTO-ENTMCNC: 34.3 G/DL (ref 32–36)
MCV RBC AUTO: 99 FL (ref 82–98)
MONOCYTES # BLD AUTO: 0.5 K/UL (ref 0.3–1)
MONOCYTES NFR BLD: 6.7 % (ref 4–15)
NEUTROPHILS # BLD AUTO: 4 K/UL (ref 1.8–7.7)
NEUTROPHILS NFR BLD: 50.6 % (ref 38–73)
NRBC BLD-RTO: 0 /100 WBC
PLATELET # BLD AUTO: 204 K/UL (ref 150–450)
PMV BLD AUTO: 12.3 FL (ref 9.2–12.9)
POTASSIUM SERPL-SCNC: 4.4 MMOL/L (ref 3.5–5.1)
RBC # BLD AUTO: 4.76 M/UL (ref 4.6–6.2)
SODIUM SERPL-SCNC: 137 MMOL/L (ref 136–145)
WBC # BLD AUTO: 7.86 K/UL (ref 3.9–12.7)

## 2022-09-22 PROCEDURE — 80048 BASIC METABOLIC PNL TOTAL CA: CPT | Performed by: NURSE PRACTITIONER

## 2022-09-22 PROCEDURE — S4991 NICOTINE PATCH NONLEGEND: HCPCS | Performed by: INTERNAL MEDICINE

## 2022-09-22 PROCEDURE — 25000003 PHARM REV CODE 250: Performed by: STUDENT IN AN ORGANIZED HEALTH CARE EDUCATION/TRAINING PROGRAM

## 2022-09-22 PROCEDURE — 99233 SBSQ HOSP IP/OBS HIGH 50: CPT | Mod: ,,, | Performed by: STUDENT IN AN ORGANIZED HEALTH CARE EDUCATION/TRAINING PROGRAM

## 2022-09-22 PROCEDURE — 25000003 PHARM REV CODE 250: Performed by: INTERNAL MEDICINE

## 2022-09-22 PROCEDURE — 99233 PR SUBSEQUENT HOSPITAL CARE,LEVL III: ICD-10-PCS | Mod: ,,, | Performed by: STUDENT IN AN ORGANIZED HEALTH CARE EDUCATION/TRAINING PROGRAM

## 2022-09-22 PROCEDURE — 36415 COLL VENOUS BLD VENIPUNCTURE: CPT | Performed by: NURSE PRACTITIONER

## 2022-09-22 PROCEDURE — 85025 COMPLETE CBC W/AUTO DIFF WBC: CPT | Performed by: NURSE PRACTITIONER

## 2022-09-22 PROCEDURE — 63600175 PHARM REV CODE 636 W HCPCS: Performed by: STUDENT IN AN ORGANIZED HEALTH CARE EDUCATION/TRAINING PROGRAM

## 2022-09-22 RX ORDER — ACETAMINOPHEN 325 MG/1
650 TABLET ORAL EVERY 6 HOURS PRN
Refills: 0
Start: 2022-09-22 | End: 2023-02-23 | Stop reason: CLARIF

## 2022-09-22 RX ORDER — CHLORDIAZEPOXIDE HYDROCHLORIDE 10 MG/1
CAPSULE, GELATIN COATED ORAL
Qty: 18 CAPSULE | Refills: 0 | Status: SHIPPED | OUTPATIENT
Start: 2022-09-25 | End: 2022-11-10

## 2022-09-22 RX ORDER — METOPROLOL SUCCINATE 25 MG/1
12.5 TABLET, EXTENDED RELEASE ORAL NIGHTLY
Qty: 15 TABLET | Refills: 0 | Status: ON HOLD | OUTPATIENT
Start: 2022-09-22 | End: 2022-09-30 | Stop reason: HOSPADM

## 2022-09-22 RX ORDER — IBUPROFEN 200 MG
1 TABLET ORAL DAILY
Qty: 28 PATCH | Refills: 0 | Status: SHIPPED | OUTPATIENT
Start: 2022-09-23 | End: 2022-10-21

## 2022-09-22 RX ORDER — CHLORDIAZEPOXIDE HYDROCHLORIDE 25 MG/1
25 CAPSULE, GELATIN COATED ORAL 3 TIMES DAILY
Qty: 6 CAPSULE | Refills: 0 | Status: SHIPPED | OUTPATIENT
Start: 2022-09-22 | End: 2022-09-24

## 2022-09-22 RX ORDER — LANOLIN ALCOHOL/MO/W.PET/CERES
100 CREAM (GRAM) TOPICAL DAILY
Qty: 30 TABLET | Refills: 0 | Status: SHIPPED | OUTPATIENT
Start: 2022-09-23 | End: 2022-10-23

## 2022-09-22 RX ORDER — TORSEMIDE 20 MG/1
40 TABLET ORAL 2 TIMES DAILY
Qty: 120 TABLET | Refills: 0 | Status: ON HOLD | OUTPATIENT
Start: 2022-09-22 | End: 2022-09-29 | Stop reason: SDUPTHER

## 2022-09-22 RX ADMIN — CHLORDIAZEPOXIDE HYDROCHLORIDE 25 MG: 25 CAPSULE ORAL at 08:09

## 2022-09-22 RX ADMIN — NICOTINE 1 PATCH: 21 PATCH, EXTENDED RELEASE TRANSDERMAL at 08:09

## 2022-09-22 RX ADMIN — Medication 1 TABLET: at 08:09

## 2022-09-22 RX ADMIN — SACUBITRIL AND VALSARTAN 1 TABLET: 24; 26 TABLET, FILM COATED ORAL at 08:09

## 2022-09-22 RX ADMIN — THIAMINE HCL TAB 100 MG 100 MG: 100 TAB at 08:09

## 2022-09-22 RX ADMIN — FUROSEMIDE 60 MG: 10 INJECTION, SOLUTION INTRAMUSCULAR; INTRAVENOUS at 08:09

## 2022-09-22 NOTE — NURSING
Pt IV and tele removed. Life vest remains in place. Pt counseled on all medications and need for compliance. Medications gone over with patient in detail including when to take. Also went over follow-up appointment scheduled for next Friday with patient. Pt verbalized understanding.

## 2022-09-22 NOTE — PROGRESS NOTES
[facility]  Cardiology  Progress Note    Patient Name: Filemon La  MRN: [id number]  Admission Date: 9/17/2022  Hospital Length of Stay: 2 days  Code Status: Full Code   Attending Physician: Santy Newman MD   Primary Care Physician: Primary Doctor No  Expected Discharge Date:   Principal Problem:Acute combined systolic and diastolic congestive heart failure    Subjective:   HPI:  Patient is a 41-year-old aa male with past medical history of alcoholism who presents to [facility] ED with complaints of shortness of breath.  Patient states the symptoms have been ongoing for several days and have progressively worsened.  Endorses nonproductive cough.  He reveals taking over-the-counter cold and cough medications without relief.  Associated symptoms include orthopnea, dyspnea on exertion.  He denies any lower extremity swelling.  Upon further questioning, patient states that he drinks half a pt of liquor every day for several years.  Does report withdrawal symptoms if he does not drink.     In the ED, patient was tachycardic with O2 saturations of 94% on room air.  Chest x-ray was concerning for edema.  BNP: 700. He was started on empiric antibiotics for pneumonia.  CTA performed was negative for PE however did show cardiomegaly and interstitial edema.  Lasix 40 mg IV was given.  Patient placed in observation for new onset CHF.   Patient seen and examined with good diuresis overnight. Plan continued diuresis, review echo and add afterload reduction.       Hospital Course:   9/19/22-Patient seen and examined today, sitting up in bed. Feeling much better. SOB greatly improved, diuresed well overnight. Chest pain free. Labs stable. BNP trending down. R/LHC tomorrow by Dr. Patterson.    9/20/22-Patient seen and examined today, resting in bed. Feels well overall. No chest pain/SOB. Tolerating meds. R/LHC today by Dr. Patterson.    9/21/22- Patient seen and examined today, up sitting on bedside couch. Feels ok, denies CP,  SOB. R/LHC yesterday revealed EF 10%, normal coronaries, elevated filling pressures. Cont IV diuresis.    9/22/22-Patient seen and examined today, resting in bed. No acute issues overnight. -11L since admission. Denies chest pain/SOB. Labs reviewed and are stable. LifeVest delivered. Patient counseled extensively on dietary restrictions.         Review of Systems   Constitutional: Negative.   HENT: Negative.     Eyes: Negative.    Cardiovascular: Negative.    Respiratory: Negative.     Endocrine: Negative.    Hematologic/Lymphatic: Negative.    Skin: Negative.    Musculoskeletal: Negative.    Gastrointestinal: Negative.    Genitourinary: Negative.    Neurological: Negative.    Psychiatric/Behavioral: Negative.     Allergic/Immunologic: Negative.    Objective:     Vital Signs (Most Recent):  Temp: 97.6 °F (36.4 °C) (09/22/22 0721)  Pulse: 101 (09/22/22 0721)  Resp: 16 (09/22/22 0721)  BP: 96/67 (09/22/22 0721)  SpO2: 96 % (09/22/22 0721) Vital Signs (24h Range):  Temp:  [97.5 °F (36.4 °C)-98 °F (36.7 °C)] 97.6 °F (36.4 °C)  Pulse:  [] 101  Resp:  [16-18] 16  SpO2:  [96 %-98 %] 96 %  BP: ()/(59-71) 96/67     Weight: 80.5 kg (177 lb 7.5 oz)  Body mass index is 30.46 kg/m².     SpO2: 96 %  O2 Device (Oxygen Therapy): room air      Intake/Output Summary (Last 24 hours) at 9/22/2022 1053  Last data filed at 9/22/2022 0513  Gross per 24 hour   Intake 980 ml   Output 3325 ml   Net -2345 ml       Lines/Drains/Airways       Peripheral Intravenous Line  Duration                  Peripheral IV - Single Lumen 09/20/22 0513 20 G Distal;Posterior;Right Forearm 2 days                    Physical Exam  Vitals and nursing note reviewed.   Constitutional:       General: He is not in acute distress.     Appearance: Normal appearance. He is well-developed. He is not diaphoretic.   HENT:      Head: Normocephalic and atraumatic.   Eyes:      General:         Right eye: No discharge.         Left eye: No discharge.      Pupils:  Pupils are equal, round, and reactive to light.   Neck:      Thyroid: No thyromegaly.      Vascular: No JVD.      Trachea: No tracheal deviation.   Cardiovascular:      Rate and Rhythm: Normal rate and regular rhythm.      Heart sounds: Normal heart sounds, S1 normal and S2 normal. No murmur heard.     Comments: LifeVest in place  Pulmonary:      Effort: Pulmonary effort is normal. No respiratory distress.      Breath sounds: Normal breath sounds. No wheezing or rales.   Abdominal:      General: There is no distension.      Tenderness: There is no rebound.   Musculoskeletal:      Cervical back: Neck supple.      Right lower leg: No edema.      Left lower leg: No edema.   Skin:     General: Skin is warm and dry.      Findings: No erythema. Radial access site C/D/I; no bleeding erythema or drainage, brachial access site with mild swelling, no drainage, erythema, intact pulses    Neurological:      General: No focal deficit present.      Mental Status: He is alert and oriented to person, place, and time.   Psychiatric:         Mood and Affect: Mood normal.         Behavior: Behavior normal.         Thought Content: Thought content normal.       Significant Labs: CMP   Recent Labs   Lab 09/21/22  0438 09/22/22  0446    137   K 4.3 4.4    106   CO2 21* 20*   * 121*   BUN 13 18   CREATININE 0.8 0.9   CALCIUM 8.9 9.1   PROT 6.2  --    ALBUMIN 3.3*  --    BILITOT 2.1*  --    ALKPHOS 72  --    AST 15  --    ALT 36  --    ANIONGAP 12 11   , CBC   Recent Labs   Lab 09/21/22  0437 09/22/22  0446   WBC 8.20 7.86   HGB 16.2 16.1   HCT 47.3 46.9    204   , Troponin No results for input(s): TROPONINI in the last 48 hours., and All pertinent lab results from the last 24 hours have been reviewed.    Significant Imaging: Echocardiogram: Transthoracic echo (TTE) complete (Cupid Only):   Results for orders placed or performed during the hospital encounter of 09/17/22   Echo   Result Value Ref Range    BSA 1.9  m2    IVC diameter 1.27 cm    Left Ventricular Outflow Tract Mean Velocity 0.36 cm/s    Left Ventricular Outflow Tract Mean Gradient 0.60 mmHg    Pulmonary Valve Mean Velocity 0.53 m/s    PV PEAK VELOCITY 0.72 cm/s    LVIDd 7.61 (A) 3.5 - 6.0 cm    IVS 0.90 0.6 - 1.1 cm    Posterior Wall 0.93 0.6 - 1.1 cm    LVIDs 6.60 (A) 2.1 - 4.0 cm    FS 13 28 - 44 %    STJ 2.00 cm    Ascending aorta 1.71 cm    LV mass 333.83 g    LA size 3.39 cm    Left Ventricle Relative Wall Thickness 0.24 cm    AV mean gradient 2 mmHg    AV valve area 1.98 cm2    AV Velocity Ratio 0.51     AV index (prosthetic) 0.65     MV valve area p 1/2 method 4.48 cm2    IVRT 85.63 msec    LVOT diameter 1.96 cm    LVOT area 3.0 cm2    LVOT peak ghanshyam 0.53 m/s    LVOT peak VTI 9.30 cm    Ao peak ghanshyam 1.04 m/s    Ao VTI 14.2 cm    Mr max ghanshyam 4.44 m/s    LVOT stroke volume 28.05 cm3    AV peak gradient 4 mmHg    MV Peak E Ghanshyam 1.00 m/s    TR Max Ghanshyam 2.46 m/s    MV stenosis pressure 1/2 time 49.11 ms    LV Systolic Volume 223.41 mL    LV Systolic Volume Index 120.8 mL/m2    LV Diastolic Volume 307.76 mL    LV Diastolic Volume Index 166.36 mL/m2    LV Mass Index 180 g/m2    RA Major Axis 5.40 cm    Left Atrium Minor Axis 6.34 cm    Left Atrium Major Axis 6.33 cm    Triscuspid Valve Regurgitation Peak Gradient 24 mmHg    Right Atrial Pressure (from IVC) 8 mmHg    EF 15 %    TV rest pulmonary artery pressure 32 mmHg    Narrative    · The left ventricle is severely enlarged with severely decreased systolic   function.  · The estimated ejection fraction is 15%.  · Left ventricular diastolic dysfunction.  · There is severe left ventricular global hypokinesis.  · Normal right ventricular size with low normal right ventricular systolic   function.  · Moderate left atrial enlargement.  · Moderate right atrial enlargement.  · Mild mitral regurgitation.  · Mild tricuspid regurgitation.  · Intermediate central venous pressure (8 mmHg).  · The estimated PA systolic pressure  is 32 mmHg.      , EKG: Reviewed, and X-Ray: CXR: X-Ray Chest 1 View (CXR):   Results for orders placed or performed during the hospital encounter of 09/17/22   X-Ray Chest 1 View    Narrative    EXAMINATION:  XR CHEST 1 VIEW    CLINICAL HISTORY:  sob;    TECHNIQUE:  Single frontal view of the chest was performed.    COMPARISON:  Prior    FINDINGS:  The lungs are clear, with normal appearance of pulmonary vasculature and no pleural effusion or pneumothorax.    Prominent cardiac silhouette.  Improvement in perihilar haze and pulmonary edema..  The hilar and mediastinal contours are unremarkable.    Bones are intact.      Impression    Interval improvement in perihilar haze suggestive of improving pulmonary edema.  Correlate clinically      Electronically signed by: Delvin White  Date:    09/17/2022  Time:    21:16    and X-Ray Chest PA and Lateral (CXR): No results found for this visit on 09/17/22.    Assessment and Plan:   Patient who presents with acute CHF. Much improved since admission. Meds optimized. Follow-up in clinic.    * Acute combined systolic and diastolic congestive heart failure  Patient is identified as having Combined Systolic and Diastolic heart failure that is Acute. CHF is currently .  9/19/22  -Improved clinically, EF 15%  -Continue po Lasix  -Start Toprol XL 25 mg nightly  -Will plan to add Entresto tmw post cath  -R/LHC tomorrow by Dr. Patterson. All risks, benefits, and treatment alternatives explained to patient in detail. All questions answered. He has agreed to proceed.    9/20/22  -Stable overnight  -Continue po Lasix, Toprol XL  -R/LHC today by Dr. Patterson  -Will plan to add Entresto  -Will need LifeVest for SCD prevention    9/21/22  -Stable overnight  -R/LHC yesterday by Dr. Patterson, EF was 10%, the filling pressures on the right and left were moderately elevated. Pulmonary hypertension was moderate  -Will need LifeVest for SCD prevention upon discharge  -Needs further IV diuresis  -Recommend  Entresto daily    9/22/22  -11 L since admission  -Feels well, wants to go home  -Diuretics switched to po-Torsemide 40 mg BID  -Continue Toprol XL  -Entresto once daily  -LifeVest for SCD prevention  -Close f/u in CHF clinic    Alcoholism  -Cessation advised        VTE Risk Mitigation (From admission, onward)         Ordered     enoxaparin injection 40 mg  Daily         09/17/22 4993                Skylar Ivey PA-C  Cardiology  [facility]

## 2022-09-22 NOTE — SUBJECTIVE & OBJECTIVE
Review of Systems   Constitutional: Negative.   HENT: Negative.     Eyes: Negative.    Cardiovascular: Negative.    Respiratory: Negative.     Endocrine: Negative.    Hematologic/Lymphatic: Negative.    Skin: Negative.    Musculoskeletal: Negative.    Gastrointestinal: Negative.    Genitourinary: Negative.    Neurological: Negative.    Psychiatric/Behavioral: Negative.     Allergic/Immunologic: Negative.    Objective:     Vital Signs (Most Recent):  Temp: 97.6 °F (36.4 °C) (09/22/22 0721)  Pulse: 101 (09/22/22 0721)  Resp: 16 (09/22/22 0721)  BP: 96/67 (09/22/22 0721)  SpO2: 96 % (09/22/22 0721) Vital Signs (24h Range):  Temp:  [97.5 °F (36.4 °C)-98 °F (36.7 °C)] 97.6 °F (36.4 °C)  Pulse:  [] 101  Resp:  [16-18] 16  SpO2:  [96 %-98 %] 96 %  BP: ()/(59-71) 96/67     Weight: 80.5 kg (177 lb 7.5 oz)  Body mass index is 30.46 kg/m².     SpO2: 96 %  O2 Device (Oxygen Therapy): room air      Intake/Output Summary (Last 24 hours) at 9/22/2022 1053  Last data filed at 9/22/2022 0513  Gross per 24 hour   Intake 980 ml   Output 3325 ml   Net -2345 ml       Lines/Drains/Airways       Peripheral Intravenous Line  Duration                  Peripheral IV - Single Lumen 09/20/22 0513 20 G Distal;Posterior;Right Forearm 2 days                    Physical Exam  Vitals and nursing note reviewed.   Constitutional:       General: He is not in acute distress.     Appearance: Normal appearance. He is well-developed. He is not diaphoretic.   HENT:      Head: Normocephalic and atraumatic.   Eyes:      General:         Right eye: No discharge.         Left eye: No discharge.      Pupils: Pupils are equal, round, and reactive to light.   Neck:      Thyroid: No thyromegaly.      Vascular: No JVD.      Trachea: No tracheal deviation.   Cardiovascular:      Rate and Rhythm: Normal rate and regular rhythm.      Heart sounds: Normal heart sounds, S1 normal and S2 normal. No murmur heard.     Comments: LifeVest in place  Pulmonary:       Effort: Pulmonary effort is normal. No respiratory distress.      Breath sounds: Normal breath sounds. No wheezing or rales.   Abdominal:      General: There is no distension.      Tenderness: There is no rebound.   Musculoskeletal:      Cervical back: Neck supple.      Right lower leg: No edema.      Left lower leg: No edema.   Skin:     General: Skin is warm and dry.      Findings: No erythema.   Neurological:      General: No focal deficit present.      Mental Status: He is alert and oriented to person, place, and time.   Psychiatric:         Mood and Affect: Mood normal.         Behavior: Behavior normal.         Thought Content: Thought content normal.       Significant Labs: CMP   Recent Labs   Lab 09/21/22  0438 09/22/22  0446    137   K 4.3 4.4    106   CO2 21* 20*   * 121*   BUN 13 18   CREATININE 0.8 0.9   CALCIUM 8.9 9.1   PROT 6.2  --    ALBUMIN 3.3*  --    BILITOT 2.1*  --    ALKPHOS 72  --    AST 15  --    ALT 36  --    ANIONGAP 12 11   , CBC   Recent Labs   Lab 09/21/22 0437 09/22/22 0446   WBC 8.20 7.86   HGB 16.2 16.1   HCT 47.3 46.9    204   , Troponin No results for input(s): TROPONINI in the last 48 hours., and All pertinent lab results from the last 24 hours have been reviewed.    Significant Imaging: Echocardiogram: Transthoracic echo (TTE) complete (Cupid Only):   Results for orders placed or performed during the hospital encounter of 09/17/22   Echo   Result Value Ref Range    BSA 1.9 m2    IVC diameter 1.27 cm    Left Ventricular Outflow Tract Mean Velocity 0.36 cm/s    Left Ventricular Outflow Tract Mean Gradient 0.60 mmHg    Pulmonary Valve Mean Velocity 0.53 m/s    PV PEAK VELOCITY 0.72 cm/s    LVIDd 7.61 (A) 3.5 - 6.0 cm    IVS 0.90 0.6 - 1.1 cm    Posterior Wall 0.93 0.6 - 1.1 cm    LVIDs 6.60 (A) 2.1 - 4.0 cm    FS 13 28 - 44 %    STJ 2.00 cm    Ascending aorta 1.71 cm    LV mass 333.83 g    LA size 3.39 cm    Left Ventricle Relative Wall Thickness 0.24  cm    AV mean gradient 2 mmHg    AV valve area 1.98 cm2    AV Velocity Ratio 0.51     AV index (prosthetic) 0.65     MV valve area p 1/2 method 4.48 cm2    IVRT 85.63 msec    LVOT diameter 1.96 cm    LVOT area 3.0 cm2    LVOT peak ghanshyam 0.53 m/s    LVOT peak VTI 9.30 cm    Ao peak ghanshyam 1.04 m/s    Ao VTI 14.2 cm    Mr max ghanshyam 4.44 m/s    LVOT stroke volume 28.05 cm3    AV peak gradient 4 mmHg    MV Peak E Ghanshyam 1.00 m/s    TR Max Ghanshyam 2.46 m/s    MV stenosis pressure 1/2 time 49.11 ms    LV Systolic Volume 223.41 mL    LV Systolic Volume Index 120.8 mL/m2    LV Diastolic Volume 307.76 mL    LV Diastolic Volume Index 166.36 mL/m2    LV Mass Index 180 g/m2    RA Major Axis 5.40 cm    Left Atrium Minor Axis 6.34 cm    Left Atrium Major Axis 6.33 cm    Triscuspid Valve Regurgitation Peak Gradient 24 mmHg    Right Atrial Pressure (from IVC) 8 mmHg    EF 15 %    TV rest pulmonary artery pressure 32 mmHg    Narrative    · The left ventricle is severely enlarged with severely decreased systolic   function.  · The estimated ejection fraction is 15%.  · Left ventricular diastolic dysfunction.  · There is severe left ventricular global hypokinesis.  · Normal right ventricular size with low normal right ventricular systolic   function.  · Moderate left atrial enlargement.  · Moderate right atrial enlargement.  · Mild mitral regurgitation.  · Mild tricuspid regurgitation.  · Intermediate central venous pressure (8 mmHg).  · The estimated PA systolic pressure is 32 mmHg.      , EKG: Reviewed, and X-Ray: CXR: X-Ray Chest 1 View (CXR):   Results for orders placed or performed during the hospital encounter of 09/17/22   X-Ray Chest 1 View    Narrative    EXAMINATION:  XR CHEST 1 VIEW    CLINICAL HISTORY:  sob;    TECHNIQUE:  Single frontal view of the chest was performed.    COMPARISON:  Prior    FINDINGS:  The lungs are clear, with normal appearance of pulmonary vasculature and no pleural effusion or pneumothorax.    Prominent cardiac  silhouette.  Improvement in perihilar haze and pulmonary edema..  The hilar and mediastinal contours are unremarkable.    Bones are intact.      Impression    Interval improvement in perihilar haze suggestive of improving pulmonary edema.  Correlate clinically      Electronically signed by: Delvin White  Date:    09/17/2022  Time:    21:16    and X-Ray Chest PA and Lateral (CXR): No results found for this visit on 09/17/22.

## 2022-09-22 NOTE — ASSESSMENT & PLAN NOTE
Patient is identified as having Combined Systolic and Diastolic heart failure that is Acute. CHF is currently .  9/19/22  -Improved clinically, EF 15%  -Continue po Lasix  -Start Toprol XL 25 mg nightly  -Will plan to add Entresto tmw post cath  -R/LHC tomorrow by Dr. Patterson. All risks, benefits, and treatment alternatives explained to patient in detail. All questions answered. He has agreed to proceed.    9/20/22  -Stable overnight  -Continue po Lasix, Toprol XL  -R/LHC today by Dr. Patterson  -Will plan to add Entresto  -Will need LifeVest for SCD prevention    9/21/22  -Stable overnight  -R/LHC yesterday by Dr. Patterson, EF was 10%, the filling pressures on the right and left were moderately elevated. Pulmonary hypertension was moderate  -Will need LifeVest for SCD prevention upon discharge  -Needs further IV diuresis  -Recommend Entresto daily    9/22/22  -11 L since admission  -Feels well, wants to go home  -Diuretics switched to po-Torsemide 40 mg BID  -Continue Toprol XL, Entresto  -LifeVest for SCD prevention  -Close f/u in CHF clinic

## 2022-09-22 NOTE — PLAN OF CARE
Pt remains fall free this shift.  Pt AAOx4, verbal, clear speech.  Skin warm and dry. No new skin issues.  Telemonitoring in progress  Patient on Room air  Continent of bowel and bladder  Independent in room   Bed low, side rails up x 2, wheels locked, call light in reach.  Bed alarm maintained for safety.  Patient instructed to call for assistance.  Hourly rounding completed.  24 hour chart check completed  POC updated and reviewed with pt. Will continue POC.

## 2022-09-22 NOTE — DISCHARGE SUMMARY
[facility]  Hospital Medicine  Discharge Summary      Patient Name: Filemon La  MRN: [id number]  Patient Class: IP- Inpatient  Admission Date: 9/17/2022  Hospital Length of Stay: 2 days  Discharge Date and Time:  09/22/2022 2:35 PM  Attending Physician: Santy Newman MD   Discharging Provider: PRUDENCE Leija  Primary Care Provider: Primary Doctor No      HPI:   Patient is a 41-year-old aa male with past medical history of alcoholism who presents to [facility] ED with complaints of shortness of breath.  Patient states the symptoms have been ongoing for several days and have progressively worsened.  Endorses nonproductive cough.  He reveals taking over-the-counter cold and cough medications without relief.  Associated symptoms include orthopnea, dyspnea on exertion.  He denies any lower extremity swelling.  Upon further questioning, patient states that he drinks half a pt of liquor every day for several years.  Does report withdrawal symptoms if he does not drink.    In the ED, patient was tachycardic with O2 saturations of 94% on room air.  Chest x-ray was concerning for edema.  BNP: 700. He was started on empiric antibiotics for pneumonia.  CTA performed was negative for PE however did show cardiomegaly and interstitial edema.  Lasix 40 mg IV was given.  Patient placed in observation for new onset CHF.       Procedure(s) (LRB):  CATHETERIZATION, HEART, BOTH LEFT AND RIGHT (N/A)  Venogram, Cath Lab (Left)      Hospital Course:   Filemon La is a 41 year old male who was admitted to [facility] for acute decompensated heart failure. He was diuresed and blood pressure controlled. He has never been on antihypertensives, started on amlodipine. Uncertain of cardiac function, Echocardiogram has been ordered. Counseled extensively on ETOH abuse. Liver enzymes are trending down.    9/19/22: SOB improved. BNP trending down. Echo showed LVEF 15%. Troponin mildly elevated. Cardiology recommended  LHC/RHC in am. No s/s alcohol w/ds. CM consulted for alcohol abuse community resources     9/20/22:  No chest pain/SOB. VSS. R/LHC today by Dr. Patterson. Cardiology added Toprol and will add Entresto   No s/s alcohol w/d     9/21/22  LHC showed EF of 10% with elevated filling pressures on the right and left still indicating volume overload. Lasix changed to IV . Will need lifevest before he discharges.     9/22 Stable and improved. Life vest on. Discharged to home.      Goals of Care Treatment Preferences:  Code Status: Full Code      Consults:   Consults (From admission, onward)          Status Ordering Provider     Inpatient consult to Social Work/Case Management            Completed SIMONE JOHNSON     Inpatient consult to Registered Dietitian/Nutritionist            Completed SUMANTH CARSON     Inpatient consult to Cardiology  Once        Provider:  Praveen Ohara MD    Completed NEAL SHELTON BAHIJ N.     Service: Hospital Medicine    Final Active Diagnoses:    Diagnosis Date Noted POA    PRINCIPAL PROBLEM:  Acute combined systolic and diastolic congestive heart failure [I50.41] 09/17/2022 Yes    Pulmonary hypertension [I27.20] 09/22/2022 Yes    Alcoholism [F10.20] 09/17/2022 Yes      Problems Resolved During this Admission:    Diagnosis Date Noted Date Resolved POA    Elevated LFTs [R79.89] 09/17/2022 09/20/2022 Yes       Discharged Condition: stable    Disposition: Home or Self Care    Follow Up:   Follow-up Information       Mariano Patterson MD Follow up in 1 week(s).    Specialties: Interventional Cardiology, Cardiology  Why: Take blood pressure and pulse 2 x day and keep log for follow up  Contact information:  [address]  [place] [address]  [phone]               CHF clinic Follow up in 1 week(s).                           Patient Instructions:      Diet Cardiac   Order Comments: 1500 ml fluid restriction    NO ALCOHOL_ of any kind . DO NOT DRINK ALCOHOL>  ALCOHOL IS TOXIC TO YOUR HEART AND WILL MAKE YOUR CONDITION WORSE     Notify your health care provider if you experience any of the following:  difficulty breathing or increased cough     Notify your health care provider if you experience any of the following:  persistent dizziness, light-headedness, or visual disturbances     Notify your health care provider if you experience any of the following:  increased confusion or weakness     Notify your health care provider if you experience any of the following:   Order Comments: Any decline in condition     Activity as tolerated   Order Comments: No heavy lifting, no strenuous activity    LIFE VEST       Significant Diagnostic Studies:   CMP   Recent Labs   Lab 09/21/22  0438 09/22/22  0446    137   K 4.3 4.4    106   CO2 21* 20*   * 121*   BUN 13 18   CREATININE 0.8 0.9   CALCIUM 8.9 9.1   PROT 6.2  --    ALBUMIN 3.3*  --    BILITOT 2.1*  --    ALKPHOS 72  --    AST 15  --    ALT 36  --    ANIONGAP 12 11   , CBC   Recent Labs   Lab 09/21/22  0437 09/22/22  0446   WBC 8.20 7.86   HGB 16.2 16.1   HCT 47.3 46.9    204   Troponin   Recent Labs   Lab 09/17/22  0547 09/17/22  1052 09/17/22  1757   TROPONINI 0.060* 0.042* 0.035*         Transthoracic echo (TTE) complete  Order# [id number]  Reading physician: Praveen Ohara MD Ordering physician: Gil Quarles NP Study date: 9/18/22     Reason for Exam  Priority: Routine  Dx: CHF (congestive heart failure) [I50.9 (ICD-10-CM)]     View Images Vital Vitrea     Show images for Echo  Summary    The left ventricle is severely enlarged with severely decreased systolic function.  The estimated ejection fraction is 15%.  Left ventricular diastolic dysfunction.  There is severe left ventricular global hypokinesis.  Normal right ventricular size with low normal right ventricular systolic function.  Moderate left atrial enlargement.  Moderate right atrial enlargement.  Mild mitral regurgitation.  Mild  tricuspid regurgitation.  Intermediate central venous pressure (8 mmHg).  The estimated PA systolic pressure is 32 mmHg.     Vitals    Height Weight BMI (Calculated) BSA (Calculated - sq m) BP Pulse       120/86 97     Study Details    A complete echo was performed using complete 2D, color flow Doppler and spectral Doppler. During the study, the apical, parasternal and subcostal views were captured.  Overall the study quality was good. This was a portable study performed at the patient's bedside.     Echocardiography Findings    Left Ventricle    The left ventricle is severely enlarged with severely decreased systolic function. The estimated ejection fraction is 15%. The wall thickness is normal. There is diastolic dysfunction. There is severe global hypokinesis.     Right Ventricle    Normal cavity size. Systolic function is borderline low.     Left Atrium    There is moderate left atrial enlargement.     Right Atrium    The right atrium is moderately enlarged.     Aortic Valve    The aortic valve appears structurally normal. The valve is trileaflet. There is normal leaflet mobility. Trace regurgitation. There is no stenosis.     Mitral Valve    The mitral valve appears structurally normal. There is normal leaflet mobility.  There is mild mitral valve regurgitation. The estimated mitral valve area by pressure half time is 4.48 cm2.     Tricuspid Valve    The tricuspid valve appears structurally normal. There is normal leaflet mobility. There is mild regurgitation. The estimated PA systolic pressure is greater than 24 mmHg.     Pulmonic Valve    Pulmonic valve is not well visualized due to poor sonic window.     IVC/SVC    Intermediate central venous pressure (8 mm Hg).     Ascending Aorta    The aortic root and ascending aorta are normal in size.     Pericardium and Other Findings    Pericardium is normal. There is no pericardial effusion.     Exam Details    Performed Procedure Technologist     Transthoracic echo  (TTE) complete Kevyn Anand, Patient Care Assistant           Begin Exam End Exam     9/18/2022  2:05 PM 9/18/2022  2:05 PM              2D Measurements    Dimensions   LVIDd 7.61 cm Important          LVIDs 6.6 cm Important          IVS 0.9 cm         Posterior Wall 0.93 cm         FS 13 %         LA size 3.39 cm         LV mass 333.83 g          Dimensions   RA Major Axis 5.4 cm          Aortic Root - End Diastolic   STJ 2 cm         Ascending aorta 1.71 cm          Inferior Vena Cava   IVC diameter 1.27 cm               Doppler Measurements - Diastolic Filling    Diastolic Filling   IVRT 85.63 msec               Doppler Measurements - Aortic Valve    Stenosis   LVOT diameter 1.96 cm         LVOT area 3 cm2         LVOT peak ghanshyam 0.53 m/s         LVOT peak VTI 9.3 cm         Ao peak ghanshyam 1.04 m/s         Ao VTI 14.2 cm         AV valve area 1.98 cm2         AV mean gradient 2 mmHg         AV peak gradient 4 mmHg         AV Velocity Ratio 0.51          AV index (prosthetic) 0.65                  Doppler Measurements - Mitral Valve    Stenosis   MV valve area p 1/2 method 4.48 cm2          PISA-MS   MV Peak E Ghanshyam 1 m/s          PISA-MR   Mr max ghanshyam 4.44 m/s                Doppler Measurements - Shunt Ratio    Shunt Ratio   LVOT stroke volume 28.05 cm3              Doppler Measurements - Tricuspid Valve    Stress Evaluation   TV rest pulmonary artery pressure 32 mmHg                 Wall Scoring    Resting Score Index: 2.00              The left ventricular wall motion is globally hypokinetic.                 Medications  (Filter: Administrations occurring from 0000 to 1405 on 09/18/22)  None     Signed    Electronically signed by Praveen Ohara MD on 9/18/22 at 1637 CDT           Procedure Component Value Units Date/Time   X-Ray Chest 1 View [[id number]] Resulted: 09/17/22 2116   Order Status: Completed Updated: 09/17/22 2119   Narrative:     EXAMINATION:   XR CHEST 1 VIEW     CLINICAL HISTORY:   sob;      TECHNIQUE:   Single frontal view of the chest was performed.     COMPARISON:   Prior     FINDINGS:   The lungs are clear, with normal appearance of pulmonary vasculature and no pleural effusion or pneumothorax.     Prominent cardiac silhouette.  Improvement in perihilar haze and pulmonary edema..  The hilar and mediastinal contours are unremarkable.     Bones are intact.    Impression:       Interval improvement in perihilar haze suggestive of improving pulmonary edema.  Correlate clinically       Electronically signed by: Delvin White   Date: 09/17/2022   Time: 21:16   CTA Chest Non-Coronary (PE Studies) [[id number]] Resulted: 09/17/22 1316   Order Status: Completed Updated: 09/17/22 1318   Narrative:     EXAMINATION:   CTA CHEST NON CORONARY (PE STUDIES)     CLINICAL HISTORY:   Pulmonary embolism (PE) suspected, unknown D-dimer;     TECHNIQUE:   CT angiogram through the chest following IV contrast administration.  Multiplanar MIP reformations performed.     COMPARISON:   Chest x-ray 09/17/2022     FINDINGS   Heart: Cardiomegaly.  No pericardial effusions.     Mediastinum: Several mildly enlarged mediastinal lymph nodes are noted scattered throughout.  A representative right hilar lymph node is 2.0 x 1.4 cm.     Vasculature: No evidence of pulmonary emboli.  No aneurysm.  No significant atherosclerosis.     Lungs: Diffuse interstitial thickening throughout the lungs.  Diffuse confluent areas of ground-glass opacification in the lungs with a central lung distribution.  Trivial right pleural effusion..     Esophagus: Unremarkable.     Upper Abdomen: Small amount of hepatic reflux.  Small amount of ascites     Bones: No acute fracture. No significant arthritic changes.     Subcutaneous soft tissue anasarca.    Impression:       Negative for pulmonary embolus.     Cardiomegaly with hazy central airspace edema and interstitial edema.  Trivial right pleural effusion.  Hepatic reflux with small amount perihepatic  ascites.  Findings most consistent with CHF..     Mild mediastinal lymphadenopathy, perhaps reactive.  Attention on next follow-up.     All CT scans at this facility are performed  using dose modulation techniques as appropriate to performed exam including the following:  automated exposure control; adjustment of mA and/or kV according to the patients size (this includes techniques or standardized protocols for targeted exams where dose is matched to indication/reason for exam: i.e. extremities or head);  iterative reconstruction technique.       Electronically signed by: Fox Everett MD   Date: 09/17/2022   Time: 13:16   X-Ray Chest AP Portable [[id number]] Resulted: 09/17/22 0822   Order Status: Completed Updated: 09/17/22 0825   Narrative:     EXAMINATION:   XR CHEST AP PORTABLE     CLINICAL HISTORY:   Cough, unspecified     TECHNIQUE:   Single frontal view of the chest was performed.     COMPARISON:   09/01/2022     FINDINGS:   Heart size accentuated by technique appearing mildly enlarged.     There is some haziness throughout the mid to lower lungs with some low-grade interstitial thickening.  No pleural effusion.     No acute osseous findings.  No advanced arthritic changes.    Impression:       Question CHF.  Low-grade edema       Electronically signed by: Fox Everett MD   Date: 09/17/2022   Time: 08:22           Filemon La  Cardiac catheterization  Order# [id number]  Reading physician: Yady Patterson MD Ordering physician: Yady Patterson MD Study date: 9/20/22     Patient Information    Name MRN Description   Filemon La [id number] 41 y.o. male     Physicians    Panel Physicians Referring Physician Case Authorizing Physician   Yady Patterson MD (Primary)       Indications    Acute combined systolic and diastolic congestive heart failure [I50.41 (ICD-10-CM)]     Summary         The ejection fraction was calculated to be 10%.    The pre-procedure left ventricular end diastolic pressure was 28.    The  post-procedure left ventricular end diastolic pressure was 30.    The estimated blood loss was none.    The coronary arteries were normal..    There was diastolic dysfunction.    The filling pressures on the right and left were moderately elevated. Pulmonary hypertension was moderate.     The procedure log was documented by Documenter: Jessica Noel RN and verified by Mariano Patterson MD.     Date: 9/20/2022  Time: 6:31 PM     Procedures    CATHETERIZATION, HEART, BOTH LEFT AND RIGHT   Venogram, Cath Lab     View Images Vital Vitrea     Show images for Cardiac catheterization  Pre Procedure Diagnosis    Acute combined systolic and diastolic congestive heart failure [I50.41]    Post Procedure Diagnosis    Acute combined systolic and diastolic congestive heart failure [I50.41]      Encounter-Level Documents:    Scan on 9/20/2022 6:35 PM         Reviewed By    Yady Patterson MD on 9/21/2022 07:53     Procedural Details    Filemon La was counseled regarding the potential benefits, risks, and alternatives to the procedure(s). The patient gave informed consent and consent forms were signed. The patient was brought to the cath lab in a fasting state, prepped, and draped in the usual sterile manner.            Coronary Findings      Diagnostic  Dominance: Right      Left Anterior Descending   The vessel is large and is angiographically normal.      First Diagonal Branch   The vessel is large and is angiographically normal.      Left Circumflex   The vessel is large and is angiographically normal.      First Obtuse Marginal Branch   The vessel is large and is angiographically normal.      Right Coronary Artery   The vessel is large and is angiographically normal.      Right Posterior Descending Artery   The vessel is large and is angiographically normal.        Intervention       No interventions have been documented.             Left Heart Findings      Left Ventricle    The ejection fraction is calculated to be 10%.    LVEDP (Pre): 28 mmHG. LVEDP (Post):  30 mmHG.         Right Heart Findings      Right Heart Pressures    RA: 19/ 15/ 15 RV: 56/ 13/ 18 PA: 59/ 32/ 44 PWP: 31/ 31/ 30 .   Cardiac output was 3.4. Cardiac index is 1.9 L/min/m2.                                         Recommendations     Routine post-cath care.   Maximize medical management.       Complications      Complications documented before study signed (9/20/2022  6:36 PM)       No complications were associated with this study.   Documented by Yady Patterson MD - 9/20/2022  6:35 PM          Signed    Procedure Log and Final Result signed by Yady Patterson MD on 9/20/22 at 1835 CDT           Procedure Component Value Units Date/Time   Blood culture #1 **CANNOT BE ORDERED STAT** [[id number]] Collected: 09/17/22 0549   Order Status: Completed Specimen: Blood from Peripheral, Forearm, Right Updated: 09/21/22 1812    Blood Culture, Routine No Growth to date     No Growth to date     No Growth to date     No Growth to date     No Growth to date   Blood culture #2 **CANNOT BE ORDERED STAT** [[id number]] Collected: 09/17/22 0547   Order Status: Completed Specimen: Blood from Peripheral, Antecubital, Left Updated: 09/21/22 1812    Blood Culture, Routine No Growth to date     No Growth to date     No Growth to date     No Growth to date     No Growth to date         Medications:  Reconciled Home Medications:      Medication List        START taking these medications      acetaminophen 325 MG tablet  Commonly known as: TYLENOL  Take 2 tablets (650 mg total) by mouth every 6 (six) hours as needed for Pain.     * chlordiazepoxide 25 MG Cap  Commonly known as: LIBRIUM  Take 1 capsule (25 mg total) by mouth 3 (three) times daily. for 2 days     * chlordiazepoxide 10 MG capsule  Commonly known as: LIBRIUM  Librium 10 mg po tid x 3 days then 10 mg po bid x 3 days then 10 mg po daily x 3 days then dose is completed  Start taking on: September 25, 2022     ENTRESTO 24-26 mg per  tablet  Generic drug: sacubitriL-valsartan  Take 1 tablet by mouth once daily.  Start taking on: September 23, 2022     metoprolol succinate 25 MG 24 hr tablet  Commonly known as: TOPROL-XL  Take 0.5 tablets (12.5 mg total) by mouth every evening.     nicotine 21 mg/24 hr  Commonly known as: NICODERM CQ  Place 1 patch onto the skin once daily.  Start taking on: September 23, 2022     NIVA-FOL 2.5-25-2 mg Tab  Generic drug: folic acid-vit B6-vit B12 2.5-25-2 mg  Take 1 tablet by mouth once daily.  Start taking on: September 23, 2022     thiamine 100 MG tablet  Take 1 tablet (100 mg total) by mouth once daily.  Start taking on: September 23, 2022     torsemide 20 MG Tab  Commonly known as: DEMADEX  Take 2 tablets (40 mg total) by mouth 2 (two) times a day.           * This list has 2 medication(s) that are the same as other medications prescribed for you. Read the directions carefully, and ask your doctor or other care provider to review them with you.                CONTINUE taking these medications      promethazine-dextromethorphan 6.25-15 mg/5 mL Syrp  Commonly known as: PROMETHAZINE-DM  Take 5 mLs by mouth nightly as needed (cough).              Indwelling Lines/Drains at time of discharge:   Lines/Drains/Airways       None                   Time spent on the discharge of patient: 68 minutes         PRUDENCE Leija  Department of Hospital Medicine  [facility]

## 2022-09-23 ENCOUNTER — PATIENT OUTREACH (OUTPATIENT)
Dept: ADMINISTRATIVE | Facility: CLINIC | Age: 42
End: 2022-09-23
Payer: MEDICAID

## 2022-09-23 PROBLEM — R74.8 ELEVATED LIVER ENZYMES: Status: ACTIVE | Noted: 2022-09-23

## 2022-09-23 PROBLEM — Z72.0 TOBACCO ABUSE: Status: ACTIVE | Noted: 2022-09-23

## 2022-09-23 PROBLEM — R00.0 TACHYCARDIA: Status: ACTIVE | Noted: 2022-09-23

## 2022-09-23 PROBLEM — Z78.9 ALCOHOL USE: Status: ACTIVE | Noted: 2022-09-23

## 2022-09-23 PROBLEM — F10.90 ALCOHOL USE: Status: ACTIVE | Noted: 2022-09-23

## 2022-09-23 PROBLEM — R79.89 ELEVATED TROPONIN: Status: ACTIVE | Noted: 2022-09-23

## 2022-09-23 PROBLEM — R06.02 SOB (SHORTNESS OF BREATH): Status: ACTIVE | Noted: 2022-09-23

## 2022-09-23 NOTE — PROGRESS NOTES
C3 nurse spoke with Filemon Espinoza for a TCC post hospital discharge follow up call. The patient does not have a scheduled HOSFU appointment with Primary Doctor No  within 5-7 days post hospital discharge date 09/22/2022. C3 nurse was unable to schedule HOSFU appointment in [facility].  Patient indtructed to please contact pcp and schedule follow up appointment using HOSFU visit type on or before 09/29/2022.    Out od NP area.

## 2022-09-26 ENCOUNTER — TELEPHONE (OUTPATIENT)
Dept: CARDIOLOGY | Facility: CLINIC | Age: 42
End: 2022-09-26
Payer: MEDICAID

## 2022-09-26 DIAGNOSIS — I50.41 ACUTE COMBINED SYSTOLIC AND DIASTOLIC CONGESTIVE HEART FAILURE: Primary | ICD-10-CM

## 2022-09-26 NOTE — TELEPHONE ENCOUNTER
"[facility]) hospital discharge 48-72 hour phone follow up completed.     Most Recent Hospital Discharge Date: 9/22/22  Last admission Diagnosis/chief complaint:Acute combined systolic and diastolic congestive heart failure    TCC nurse Navigator spoke with pt and his PAURL Taina Mendoza who is also a nurse practitioner    Current Patient reported weight: no scale at this time. She says they are working on getting one    Current Patient reported blood pressure and heart rate: no bp cuff at home.     Pt is requesting order for home health services. f    Pt reports the following:  [x]  Shortness of Breath with Activity- slowly improving, also says he gets a little out of breath tieing his shoes  []  Shortness of Breath at rest   [x]  Fatigue  []  Edema   [] Chest pain or tightness  [] Weight Increase since discharge  [] None of the above    Lifevest In place    Medications:   Discharge medication reviewed with pt.  Pt reports having medication list available and has all medications at home for use per list. Meds reviewed with Taina    Education:   Confirmed pt received "Home Care Guide for Heart Failure Patients" while admitted.  Reviewed key points as listed below.     Recommend 2 -3 gram sodium restriction and 1500 cc-2000 cc fluid restriction.  Encourage physical activity with graded exercise program.  Requested patient to weigh themselves daily, and to notify us if their weight increases by more than 3 lbs in 1 day or 5 lbs in 3 days.   Reminded patient to use "Daily weight and symptom tracker" to record and guide patient on when and how to call H[facility]. PT may also use symptom tracker if no scale available  Pt reports being in the green (color) Zone. If in yellow/red, reminded that they should be calling HFT[facility] today or now.     Watch for these Signs and Symptoms: If any of these occur, contact HFT[facility] immediately:   Increase in shortness of breath with movement   Increase in swelling " in your legs and ankles   Weight gain of more than 3 pounds in a day or 5 pounds in 3 days.   Difficulty breathing when you are lying down   Worsening fatigue or tiredness   Stomach bloating, a full feeling or a loss of appetite   Increased coughing--especially when you are lying down      Pt was able to verbalize back to nurse in their own words correct diet/fluid restrictions, necessity for exercise, warning signs and symptoms, when and how to contact their TCC team.      Pt educated on follow-up plan while in H[facility] program to include:   Week 1 -  F/u appt with Provider and HF nurse 9/30 with Berenice NAGY    Week 2-5 - In person/ Virtual/ phone call check ins    Week 5-7 - Pt will discharge from [facility] and transition to longterm care provider (Cardiology/PCP/ Advanced Heart Failure).      Patient active on myChart? yes      Pt given the following contact information for ease of communication: [phone] (Mon-Fri, 8a-5p) & for urgent issues on the weekend call Nati on-call [phone] to page the Cardiology MD on call.  Pt also encouraged utilize [facility] messaging as well.      Will follow up with pt at first clinic visit and HF nurse navigator available for pt questions, issues or concerns.

## 2022-09-26 NOTE — TELEPHONE ENCOUNTER
[facility] - Cardiology  Medical Specialty       Patient Name: Filemon La  MRN: [id number]  Primary Care Physician: Primary Doctor No  Reason for Referral: Home Health    Contacted [facility], [facility], [facility], and [facility] as all are listed on [employer]'s provider tatum. [facility] and [facility] declined, but [facility] and [facility] offered to review referral. Referral faxed to both for their review. Will f/u tomorrow to see if either are able to accept.      Chago Cannon, MSW, [facility]  [phone]

## 2022-09-27 ENCOUNTER — TELEPHONE (OUTPATIENT)
Dept: CARDIOLOGY | Facility: CLINIC | Age: 42
End: 2022-09-27
Payer: MEDICAID

## 2022-09-27 NOTE — PHYSICIAN QUERY
PT Name: Filemon La  MR #: [id number]     DOCUMENTATION CLARIFICATION      CDS/: Purvi La RN CDIS            Contact information: [email or web address]  This form is a permanent document in the medical record.     Query Date: September 27, 2022    Dear Provider,  By submitting this query, we are merely seeking further clarification of documentation.  Please utilize your independent clinical judgment when addressing the question(s) below.     The Medical Record contains the following:    Supporting Clinical Findings Location in Medical Record   He was started on empiric antibiotics for pneumonia.  CTA performed was negative for PE however did show cardiomegaly and interstitial edema.  Lasix 40 mg IV was given.  Discharge summary    azithromycin 500 mg in dextrose 5 % 250 mL IVPB (ready to mix system)  Dose: 500 mg  Freq: ED 1 Time Route: IV  Indications of Use: lower respiratory infection  Start: 09/17/22 0530 End: 09/17/22 0735 MAR              Please clarify if the __Pneumonia __ diagnosis has been:    [ x ] Ruled In   [  ] Ruled Out   [  ] Other/Clarification of findings (please specify): _______________    [   ] Clinically undetermined       Please document in your progress notes daily for the duration of treatment, until resolved, and include in your discharge summary.    Form No. [id number]

## 2022-09-27 NOTE — TELEPHONE ENCOUNTER
"----- Message from Chago Cannon LCSW sent at 9/27/2022  8:23 AM CDT -----  Regarding: FW: home health request  [place], all 4 of the home healths that accept his insurance declined at this time due to being at their "max amount of medicaid pts" already  ----- Message -----  From: Chago Cannon LCSW  Sent: 9/26/2022   3:13 PM CDT  To: Mallory Rodriguez LPN  Subject: RE: home health request                          Hey, I've reached out to a few that may accept his Medicaid. I'm waiting to hear back, but will let you know if I can find an accepting one!  ----- Message -----  From: Mallory Rodriguez LPN  Sent: 9/26/2022   2:50 PM CDT  To: Chago Cannon LCSW  Subject: home health request                              Hi . We will be following this pt in heart failure clinic. He is requesting  services. I put in an order to [facility] but was told they do not take his insurance.   Would you be able to assist in finding out what agency maybe accepts his insurance?        "

## 2022-09-28 ENCOUNTER — HOSPITAL ENCOUNTER (OUTPATIENT)
Facility: HOSPITAL | Age: 42
Discharge: HOME | End: 2022-09-29
Attending: EMERGENCY MEDICINE | Admitting: EMERGENCY MEDICINE
Payer: MEDICAID

## 2022-09-28 DIAGNOSIS — I50.9 ACUTE ON CHRONIC CONGESTIVE HEART FAILURE, UNSPECIFIED HEART FAILURE TYPE: ICD-10-CM

## 2022-09-28 DIAGNOSIS — I95.9 HYPOTENSION, UNSPECIFIED HYPOTENSION TYPE: Primary | ICD-10-CM

## 2022-09-28 DIAGNOSIS — R53.1 WEAKNESS: ICD-10-CM

## 2022-09-28 PROBLEM — I50.43 ACUTE ON CHRONIC COMBINED SYSTOLIC AND DIASTOLIC CONGESTIVE HEART FAILURE: Status: ACTIVE | Noted: 2022-09-17

## 2022-09-28 PROBLEM — I42.6 ALCOHOLIC CARDIOMYOPATHY: Status: ACTIVE | Noted: 2022-09-28

## 2022-09-28 LAB
ALBUMIN SERPL BCP-MCNC: 4 G/DL (ref 3.5–5.2)
ALP SERPL-CCNC: 82 U/L (ref 55–135)
ALT SERPL W/O P-5'-P-CCNC: 21 U/L (ref 10–44)
ANION GAP SERPL CALC-SCNC: 12 MMOL/L (ref 8–16)
AST SERPL-CCNC: 17 U/L (ref 10–40)
BASOPHILS # BLD AUTO: 0.05 K/UL (ref 0–0.2)
BASOPHILS NFR BLD: 0.7 % (ref 0–1.9)
BILIRUB SERPL-MCNC: 1.6 MG/DL (ref 0.1–1)
BILIRUB UR QL STRIP: NEGATIVE
BNP SERPL-MCNC: 612 PG/ML (ref 0–99)
BUN SERPL-MCNC: 35 MG/DL (ref 6–20)
CALCIUM SERPL-MCNC: 9.7 MG/DL (ref 8.7–10.5)
CHLORIDE SERPL-SCNC: 101 MMOL/L (ref 95–110)
CK SERPL-CCNC: 92 U/L (ref 20–200)
CLARITY UR REFRACT.AUTO: CLEAR
CO2 SERPL-SCNC: 24 MMOL/L (ref 23–29)
COLOR UR AUTO: YELLOW
CREAT SERPL-MCNC: 1.2 MG/DL (ref 0.5–1.4)
CTP QC/QA: YES
CTP QC/QA: YES
DIFFERENTIAL METHOD: ABNORMAL
EOSINOPHIL # BLD AUTO: 0.1 K/UL (ref 0–0.5)
EOSINOPHIL NFR BLD: 0.9 % (ref 0–8)
ERYTHROCYTE [DISTWIDTH] IN BLOOD BY AUTOMATED COUNT: 12.1 % (ref 11.5–14.5)
EST. GFR  (NO RACE VARIABLE): >60 ML/MIN/1.73 M^2
GLUCOSE SERPL-MCNC: 91 MG/DL (ref 70–110)
GLUCOSE UR QL STRIP: NEGATIVE
HCT VFR BLD AUTO: 50.3 % (ref 40–54)
HGB BLD-MCNC: 17.6 G/DL (ref 14–18)
HGB UR QL STRIP: NEGATIVE
IMM GRANULOCYTES # BLD AUTO: 0.01 K/UL (ref 0–0.04)
IMM GRANULOCYTES NFR BLD AUTO: 0.1 % (ref 0–0.5)
KETONES UR QL STRIP: NEGATIVE
LEUKOCYTE ESTERASE UR QL STRIP: NEGATIVE
LYMPHOCYTES # BLD AUTO: 2.6 K/UL (ref 1–4.8)
LYMPHOCYTES NFR BLD: 38.9 % (ref 18–48)
MCH RBC QN AUTO: 34 PG (ref 27–31)
MCHC RBC AUTO-ENTMCNC: 35 G/DL (ref 32–36)
MCV RBC AUTO: 97 FL (ref 82–98)
MONOCYTES # BLD AUTO: 0.8 K/UL (ref 0.3–1)
MONOCYTES NFR BLD: 11.6 % (ref 4–15)
NEUTROPHILS # BLD AUTO: 3.2 K/UL (ref 1.8–7.7)
NEUTROPHILS NFR BLD: 47.8 % (ref 38–73)
NITRITE UR QL STRIP: NEGATIVE
NRBC BLD-RTO: 0 /100 WBC
PH UR STRIP: 6 [PH] (ref 5–8)
PLATELET # BLD AUTO: 203 K/UL (ref 150–450)
PMV BLD AUTO: 13 FL (ref 9.2–12.9)
POC MOLECULAR INFLUENZA A AGN: NEGATIVE
POC MOLECULAR INFLUENZA B AGN: NEGATIVE
POTASSIUM SERPL-SCNC: 3.9 MMOL/L (ref 3.5–5.1)
PROT SERPL-MCNC: 8.2 G/DL (ref 6–8.4)
PROT UR QL STRIP: NEGATIVE
RBC # BLD AUTO: 5.17 M/UL (ref 4.6–6.2)
SARS-COV-2 RDRP RESP QL NAA+PROBE: NEGATIVE
SODIUM SERPL-SCNC: 137 MMOL/L (ref 136–145)
SP GR UR STRIP: 1.01 (ref 1–1.03)
TROPONIN I SERPL DL<=0.01 NG/ML-MCNC: 0.04 NG/ML (ref 0–0.03)
URN SPEC COLLECT METH UR: NORMAL
UROBILINOGEN UR STRIP-ACNC: NEGATIVE EU/DL
WBC # BLD AUTO: 6.79 K/UL (ref 3.9–12.7)

## 2022-09-28 PROCEDURE — G0378 HOSPITAL OBSERVATION PER HR: HCPCS

## 2022-09-28 PROCEDURE — 84484 ASSAY OF TROPONIN QUANT: CPT | Mod: ER | Performed by: EMERGENCY MEDICINE

## 2022-09-28 PROCEDURE — 83880 ASSAY OF NATRIURETIC PEPTIDE: CPT | Mod: ER | Performed by: EMERGENCY MEDICINE

## 2022-09-28 PROCEDURE — 63600175 PHARM REV CODE 636 W HCPCS: Mod: ER | Performed by: EMERGENCY MEDICINE

## 2022-09-28 PROCEDURE — 85025 COMPLETE CBC W/AUTO DIFF WBC: CPT | Mod: ER | Performed by: EMERGENCY MEDICINE

## 2022-09-28 PROCEDURE — U0002 COVID-19 LAB TEST NON-CDC: HCPCS | Mod: ER | Performed by: EMERGENCY MEDICINE

## 2022-09-28 PROCEDURE — 93005 ELECTROCARDIOGRAM TRACING: CPT | Mod: ER

## 2022-09-28 PROCEDURE — 93010 EKG 12-LEAD: ICD-10-PCS | Mod: ,,, | Performed by: INTERNAL MEDICINE

## 2022-09-28 PROCEDURE — 96374 THER/PROPH/DIAG INJ IV PUSH: CPT | Mod: ER

## 2022-09-28 PROCEDURE — 96376 TX/PRO/DX INJ SAME DRUG ADON: CPT

## 2022-09-28 PROCEDURE — 63600175 PHARM REV CODE 636 W HCPCS: Performed by: INTERNAL MEDICINE

## 2022-09-28 PROCEDURE — 87502 INFLUENZA DNA AMP PROBE: CPT | Mod: ER

## 2022-09-28 PROCEDURE — 25000003 PHARM REV CODE 250: Mod: ER | Performed by: EMERGENCY MEDICINE

## 2022-09-28 PROCEDURE — 99285 EMERGENCY DEPT VISIT HI MDM: CPT | Mod: 25,ER

## 2022-09-28 PROCEDURE — 81003 URINALYSIS AUTO W/O SCOPE: CPT | Mod: ER | Performed by: EMERGENCY MEDICINE

## 2022-09-28 PROCEDURE — 80053 COMPREHEN METABOLIC PANEL: CPT | Mod: ER | Performed by: EMERGENCY MEDICINE

## 2022-09-28 PROCEDURE — 93010 ELECTROCARDIOGRAM REPORT: CPT | Mod: ,,, | Performed by: INTERNAL MEDICINE

## 2022-09-28 PROCEDURE — 82550 ASSAY OF CK (CPK): CPT | Mod: ER | Performed by: EMERGENCY MEDICINE

## 2022-09-28 RX ORDER — SODIUM CHLORIDE 9 MG/ML
1000 INJECTION, SOLUTION INTRAVENOUS
Status: COMPLETED | OUTPATIENT
Start: 2022-09-28 | End: 2022-09-28

## 2022-09-28 RX ORDER — ONDANSETRON 2 MG/ML
4 INJECTION INTRAMUSCULAR; INTRAVENOUS EVERY 8 HOURS PRN
Status: DISCONTINUED | OUTPATIENT
Start: 2022-09-28 | End: 2022-09-29 | Stop reason: HOSPADM

## 2022-09-28 RX ORDER — FUROSEMIDE 10 MG/ML
20 INJECTION INTRAMUSCULAR; INTRAVENOUS ONCE
Status: COMPLETED | OUTPATIENT
Start: 2022-09-28 | End: 2022-09-28

## 2022-09-28 RX ORDER — IPRATROPIUM BROMIDE AND ALBUTEROL SULFATE 2.5; .5 MG/3ML; MG/3ML
3 SOLUTION RESPIRATORY (INHALATION) EVERY 4 HOURS PRN
Status: DISCONTINUED | OUTPATIENT
Start: 2022-09-28 | End: 2022-09-29 | Stop reason: HOSPADM

## 2022-09-28 RX ORDER — GUAIFENESIN 100 MG/5ML
200 SOLUTION ORAL EVERY 4 HOURS PRN
Status: DISCONTINUED | OUTPATIENT
Start: 2022-09-28 | End: 2022-09-29 | Stop reason: HOSPADM

## 2022-09-28 RX ORDER — FUROSEMIDE 10 MG/ML
60 INJECTION INTRAMUSCULAR; INTRAVENOUS
Status: COMPLETED | OUTPATIENT
Start: 2022-09-28 | End: 2022-09-28

## 2022-09-28 RX ORDER — FUROSEMIDE 10 MG/ML
40 INJECTION INTRAMUSCULAR; INTRAVENOUS
Status: DISCONTINUED | OUTPATIENT
Start: 2022-09-28 | End: 2022-09-28

## 2022-09-28 RX ORDER — ACETAMINOPHEN 325 MG/1
650 TABLET ORAL EVERY 6 HOURS PRN
Status: DISCONTINUED | OUTPATIENT
Start: 2022-09-28 | End: 2022-09-29 | Stop reason: HOSPADM

## 2022-09-28 RX ORDER — MAG HYDROX/ALUMINUM HYD/SIMETH 200-200-20
30 SUSPENSION, ORAL (FINAL DOSE FORM) ORAL EVERY 6 HOURS PRN
Status: DISCONTINUED | OUTPATIENT
Start: 2022-09-28 | End: 2022-09-29 | Stop reason: HOSPADM

## 2022-09-28 RX ADMIN — FUROSEMIDE 60 MG: 10 INJECTION, SOLUTION INTRAMUSCULAR; INTRAVENOUS at 01:09

## 2022-09-28 RX ADMIN — FUROSEMIDE 20 MG: 10 INJECTION, SOLUTION INTRAMUSCULAR; INTRAVENOUS at 09:09

## 2022-09-28 RX ADMIN — SODIUM CHLORIDE 1000 ML: 0.9 INJECTION, SOLUTION INTRAVENOUS at 01:09

## 2022-09-28 RX ADMIN — FUROSEMIDE 40 MG: 10 INJECTION, SOLUTION INTRAMUSCULAR; INTRAVENOUS at 09:09

## 2022-09-28 NOTE — ED PROVIDER NOTES
"Encounter Date: 9/28/2022       History     Chief Complaint   Patient presents with    Hypotension     Pt came into er after having low BP at dr caba office. Pt admits to feeling "summer weak". Was recently admitted with CHF and pulmonary HTN.      Patient with life vest presents to PCPs office for feeling weak for the last several days.  There his BP was low, so he was sent here for management.  Patient denies cp or sob.  States he just feels weak.      The history is provided by the patient.   Review of patient's allergies indicates:  No Known Allergies  Past Medical History:   Diagnosis Date    CHF (congestive heart failure)     Pulmonary hypertension     pt reported     Past Surgical History:   Procedure Laterality Date    CATHETERIZATION OF BOTH LEFT AND RIGHT HEART N/A 9/20/2022    Procedure: CATHETERIZATION, HEART, BOTH LEFT AND RIGHT;  Surgeon: Yady Patterson MD;  Location[facility] CATH LAB;  Service: Cardiology;  Laterality: N/A;     Family History   Problem Relation Age of Onset    Hypertension Mother     Diabetes Mother     Hypertension Father      Social History     Tobacco Use    Smoking status: Former     Packs/day: 1.00     Types: Cigarettes    Smokeless tobacco: Never   Substance Use Topics    Alcohol use: Not Currently     Comment: for about 2 weeks    Drug use: No     Review of Systems   Constitutional:  Negative for fever.   HENT:  Negative for sore throat.    Respiratory:  Negative for shortness of breath.    Cardiovascular:  Negative for chest pain.   Gastrointestinal:  Negative for nausea.   Genitourinary:  Negative for dysuria.   Musculoskeletal:  Negative for back pain.   Skin:  Negative for rash.   Neurological:  Negative for weakness.   Hematological:  Does not bruise/bleed easily.     Physical Exam     Initial Vitals [09/28/22 1210]   BP Pulse Resp Temp SpO2   (!) 96/56 94 16 97.7 °F (36.5 °C) 99 %      MAP       --         Physical Exam    Nursing note and vitals reviewed.  Constitutional: " He appears well-developed and well-nourished. No distress.   HENT:   Head: Normocephalic and atraumatic.   Mouth/Throat: Oropharynx is clear and moist.   Eyes: Conjunctivae and EOM are normal. Pupils are equal, round, and reactive to light.   Neck: Neck supple.   Normal range of motion.  Cardiovascular:  Normal rate, regular rhythm and normal heart sounds.     Exam reveals no gallop and no friction rub.       No murmur heard.  Pulmonary/Chest: Breath sounds normal. No respiratory distress. He has no wheezes. He has no rhonchi. He has no rales.   Abdominal: Abdomen is soft. Bowel sounds are normal. He exhibits no distension and no mass. There is no abdominal tenderness. There is no rebound and no guarding.   Musculoskeletal:         General: No edema. Normal range of motion.      Cervical back: Normal range of motion and neck supple.     Neurological: He is alert and oriented to person, place, and time. He has normal strength.   Skin: Skin is warm and dry. No rash noted.   Psychiatric: He has a normal mood and affect. Thought content normal.       ED Course   Critical Care    Date/Time: 9/28/2022 1:43 PM  Performed by: Pardeep Bright MD  Authorized by: Pardeep Bright MD   Direct patient critical care time: 35 minutes  Additional history critical care time: 15 minutes  Ordering / reviewing critical care time: 12 minutes  Documentation critical care time: 10 minutes  Consulting other physicians critical care time: 15 minutes  Total critical care time (exclusive of procedural time) : 87 minutes  Critical care was necessary to treat or prevent imminent or life-threatening deterioration of the following conditions: cardiac failure and circulatory failure.      Labs Reviewed   CBC W/ AUTO DIFFERENTIAL - Abnormal; Notable for the following components:       Result Value    MCH 34.0 (*)     MPV 13.0 (*)     All other components within normal limits   COMPREHENSIVE METABOLIC PANEL - Abnormal; Notable for the following  components:    BUN 35 (*)     Total Bilirubin 1.6 (*)     All other components within normal limits   B-TYPE NATRIURETIC PEPTIDE - Abnormal; Notable for the following components:     (*)     All other components within normal limits   TROPONIN I - Abnormal; Notable for the following components:    Troponin I 0.035 (*)     All other components within normal limits   URINALYSIS, REFLEX TO URINE CULTURE    Narrative:     Specimen Source->Urine   CK   SARS-COV-2 RDRP GENE   POCT INFLUENZA A/B MOLECULAR     EKG Readings: (Independently Interpreted)   Rhythm: Normal Sinus Rhythm. Heart Rate: 94. Ectopy: No Ectopy. Conduction: Normal. ST Segments: Normal ST Segments. T Waves: Normal. Clinical Impression: Normal Sinus Rhythm   Other EKG Interpretations: Repeat sinus rhythm with 1st degree av block.  Rate 89   ECG Results              EKG 12-lead (In process)  Result time 09/28/22 12:26:01      In process by Interface, Lab In [facility] (09/28/22 12:26:01)                   Narrative:    Test Reason : HYPOTENSION    Vent. Rate : 094 BPM     Atrial Rate : 094 BPM     P-R Int : 206 ms          QRS Dur : 100 ms      QT Int : 368 ms       P-R-T Axes : 064 054 225 degrees     QTc Int : 460 ms    Normal sinus rhythm  Biatrial enlargement  T wave abnormality, consider inferolateral ischemia  Prolonged QT  Abnormal ECG  When compared with ECG of 17-SEP-2022 16:29,  Non-specific change in ST segment in Anterior leads  T wave inversion more evident in Lateral leads    Referred By: [email or web address]   SELF           Confirmed By:                                   Imaging Results              X-Ray Chest AP Portable (Final result)  Result time 09/28/22 12:55:55      Final result by Derrell Westbrook MD (09/28/22 12:55:55)                   Impression:      No acute process seen.      Electronically signed by: Derrell Westbrook MD  Date:    09/28/2022  Time:    12:55               Narrative:    EXAMINATION:  XR CHEST AP PORTABLE    CLINICAL  HISTORY:  weakness;    FINDINGS:  Single view of the chest.  Comparison 09/17/2022    Cardiac silhouette is normal.  The lungs demonstrate no evidence of active disease.  No evidence of pleural effusion or pneumothorax.  Bones appear intact.  Life vest noted.                                       Medications   furosemide injection 60 mg (has no administration in time range)   0.9%  NaCl infusion (0 mLs Intravenous Stopped 9/28/22 1340)      ED Vital Signs:  Vitals:    09/28/22 1210 09/28/22 1225 09/28/22 1253 09/28/22 1254   BP: (!) 96/56  (!) 90/55 (!) 95/58   Pulse: 94 92 93 96   Resp: 16  16 20   Temp: 97.7 °F (36.5 °C)      TempSrc: Oral      SpO2: 99%  98% 97%   Weight: 80.2 kg (176 lb 12.9 oz)       09/28/22 1255 09/28/22 1334   BP: (!) 90/54 (!) 84/60   Pulse: 96 89   Resp: 20 20   Temp:     TempSrc:     SpO2: 98% 97%   Weight:           Abnormal Lab Results:  Labs Reviewed   CBC W/ AUTO DIFFERENTIAL - Abnormal; Notable for the following components:       Result Value    MCH 34.0 (*)     MPV 13.0 (*)     All other components within normal limits   COMPREHENSIVE METABOLIC PANEL - Abnormal; Notable for the following components:    BUN 35 (*)     Total Bilirubin 1.6 (*)     All other components within normal limits   B-TYPE NATRIURETIC PEPTIDE - Abnormal; Notable for the following components:     (*)     All other components within normal limits   TROPONIN I - Abnormal; Notable for the following components:    Troponin I 0.035 (*)     All other components within normal limits   URINALYSIS, REFLEX TO URINE CULTURE    Narrative:     Specimen Source->Urine   CK   SARS-COV-2 RDRP GENE   POCT INFLUENZA A/B MOLECULAR          All Lab Results:  Results for orders placed or performed during the hospital encounter of 09/28/22   CBC Auto Differential   Result Value Ref Range    WBC 6.79 3.90 - 12.70 K/uL    RBC 5.17 4.60 - 6.20 M/uL    Hemoglobin 17.6 14.0 - 18.0 g/dL    Hematocrit 50.3 40.0 - 54.0 %    MCV 97 82 -  98 fL    MCH 34.0 (H) 27.0 - 31.0 pg    MCHC 35.0 32.0 - 36.0 g/dL    RDW 12.1 11.5 - 14.5 %    Platelets 203 150 - 450 K/uL    MPV 13.0 (H) 9.2 - 12.9 fL    Immature Granulocytes 0.1 0.0 - 0.5 %    Gran # (ANC) 3.2 1.8 - 7.7 K/uL    Immature Grans (Abs) 0.01 0.00 - 0.04 K/uL    Lymph # 2.6 1.0 - 4.8 K/uL    Mono # 0.8 0.3 - 1.0 K/uL    Eos # 0.1 0.0 - 0.5 K/uL    Baso # 0.05 0.00 - 0.20 K/uL    nRBC 0 0 /100 WBC    Gran % 47.8 38.0 - 73.0 %    Lymph % 38.9 18.0 - 48.0 %    Mono % 11.6 4.0 - 15.0 %    Eosinophil % 0.9 0.0 - 8.0 %    Basophil % 0.7 0.0 - 1.9 %    Differential Method Automated    Comprehensive Metabolic Panel   Result Value Ref Range    Sodium 137 136 - 145 mmol/L    Potassium 3.9 3.5 - 5.1 mmol/L    Chloride 101 95 - 110 mmol/L    CO2 24 23 - 29 mmol/L    Glucose 91 70 - 110 mg/dL    BUN 35 (H) 6 - 20 mg/dL    Creatinine 1.2 0.5 - 1.4 mg/dL    Calcium 9.7 8.7 - 10.5 mg/dL    Total Protein 8.2 6.0 - 8.4 g/dL    Albumin 4.0 3.5 - 5.2 g/dL    Total Bilirubin 1.6 (H) 0.1 - 1.0 mg/dL    Alkaline Phosphatase 82 55 - 135 U/L    AST 17 10 - 40 U/L    ALT 21 10 - 44 U/L    Anion Gap 12 8 - 16 mmol/L    eGFR >60.0 >60 mL/min/1.73 m^2   Urinalysis, Reflex to Urine Culture Urine, Clean Catch    Specimen: Urine   Result Value Ref Range    Specimen UA Urine, Clean Catch     Color, UA Yellow Yellow, Straw, Jessy    Appearance, UA Clear Clear    pH, UA 6.0 5.0 - 8.0    Specific Gravity, UA 1.010 1.005 - 1.030    Protein, UA Negative Negative    Glucose, UA Negative Negative    Ketones, UA Negative Negative    Bilirubin (UA) Negative Negative    Occult Blood UA Negative Negative    Nitrite, UA Negative Negative    Urobilinogen, UA Negative <2.0 EU/dL    Leukocytes, UA Negative Negative   BNP   Result Value Ref Range     (H) 0 - 99 pg/mL   CK   Result Value Ref Range    CPK 92 20 - 200 U/L   Troponin I   Result Value Ref Range    Troponin I 0.035 (H) 0.000 - 0.026 ng/mL   POCT COVID-19 Rapid Screening   Result  Value Ref Range    POC Rapid COVID Negative Negative     Acceptable Yes    POCT Influenza A/B Molecular   Result Value Ref Range    POC Molecular Influenza A Ag Negative Negative, Not Reported    POC Molecular Influenza B Ag Negative Negative, Not Reported     Acceptable Yes            Imaging Results:  Imaging Results              X-Ray Chest AP Portable (Final result)  Result time 09/28/22 12:55:55      Final result by Derrell Westbrook MD (09/28/22 12:55:55)                   Impression:      No acute process seen.      Electronically signed by: Derrell Westbrook MD  Date:    09/28/2022  Time:    12:55               Narrative:    EXAMINATION:  XR CHEST AP PORTABLE    CLINICAL HISTORY:  weakness;    FINDINGS:  Single view of the chest.  Comparison 09/17/2022    Cardiac silhouette is normal.  The lungs demonstrate no evidence of active disease.  No evidence of pleural effusion or pneumothorax.  Bones appear intact.  Life vest noted.                                         The Emergency Provider reviewed the vital signs and test results, which are outlined above.    ED Discussions:  1:20 PM: Re-evaluated pt. I have discussed test results, shared treatment plan, and the need for admission with patient and family at bedside. Pt and family express understanding at this time and agree with all information. All questions answered. Pt and family have no further questions or concerns at this time. Pt is ready for admit.      All historical, clinical, radiographic, and laboratory findings were reviewed with the patient/family in detail along with the indications for transport to the facility in [place] in order to receive admission, IV diuresis and cardiology consult.  All remaining questions and concerns were addressed at this time and the patient/family communicates understanding and agrees to proceed accordingly.  Similarly, all pertinent details of the encounter were discussed with Dr. Paiz  via Bonnie Booth who agrees to receive the patient at [facility] for further care as outlined above.  The patient will be transferred by [facility] ambulance services secondary to a need for ongoing cardiac monitoring en route.  Pardeep Bright MD  1:40 PM                                Clinical Impression:   Final diagnoses:  [R53.1] Weakness  [I95.9] Hypotension, unspecified hypotension type (Primary)  [I50.9] Acute on chronic congestive heart failure, unspecified heart failure type        ED Disposition Condition    Observation Stable                Pardeep Bright MD  09/28/22 4280

## 2022-09-29 VITALS
DIASTOLIC BLOOD PRESSURE: 59 MMHG | OXYGEN SATURATION: 99 % | SYSTOLIC BLOOD PRESSURE: 100 MMHG | BODY MASS INDEX: 32.54 KG/M2 | HEART RATE: 100 BPM | HEIGHT: 62 IN | WEIGHT: 176.81 LBS | TEMPERATURE: 98 F | RESPIRATION RATE: 16 BRPM

## 2022-09-29 LAB
ALBUMIN SERPL BCP-MCNC: 3.5 G/DL (ref 3.5–5.2)
ALP SERPL-CCNC: 78 U/L (ref 55–135)
ALT SERPL W/O P-5'-P-CCNC: 17 U/L (ref 10–44)
ANION GAP SERPL CALC-SCNC: 12 MMOL/L (ref 8–16)
AST SERPL-CCNC: 16 U/L (ref 10–40)
BASOPHILS # BLD AUTO: 0.05 K/UL (ref 0–0.2)
BASOPHILS NFR BLD: 0.8 % (ref 0–1.9)
BILIRUB SERPL-MCNC: 1.7 MG/DL (ref 0.1–1)
BUN SERPL-MCNC: 29 MG/DL (ref 6–20)
CALCIUM SERPL-MCNC: 9.2 MG/DL (ref 8.7–10.5)
CHLORIDE SERPL-SCNC: 101 MMOL/L (ref 95–110)
CO2 SERPL-SCNC: 24 MMOL/L (ref 23–29)
CREAT SERPL-MCNC: 1.1 MG/DL (ref 0.5–1.4)
DIFFERENTIAL METHOD: ABNORMAL
EOSINOPHIL # BLD AUTO: 0.1 K/UL (ref 0–0.5)
EOSINOPHIL NFR BLD: 1.5 % (ref 0–8)
ERYTHROCYTE [DISTWIDTH] IN BLOOD BY AUTOMATED COUNT: 11.9 % (ref 11.5–14.5)
EST. GFR  (NO RACE VARIABLE): >60 ML/MIN/1.73 M^2
GLUCOSE SERPL-MCNC: 105 MG/DL (ref 70–110)
HCT VFR BLD AUTO: 47.8 % (ref 40–54)
HGB BLD-MCNC: 16.8 G/DL (ref 14–18)
IMM GRANULOCYTES # BLD AUTO: 0.01 K/UL (ref 0–0.04)
IMM GRANULOCYTES NFR BLD AUTO: 0.2 % (ref 0–0.5)
LYMPHOCYTES # BLD AUTO: 2.5 K/UL (ref 1–4.8)
LYMPHOCYTES NFR BLD: 40.8 % (ref 18–48)
MAGNESIUM SERPL-MCNC: 2 MG/DL (ref 1.6–2.6)
MCH RBC QN AUTO: 34.1 PG (ref 27–31)
MCHC RBC AUTO-ENTMCNC: 35.1 G/DL (ref 32–36)
MCV RBC AUTO: 97 FL (ref 82–98)
MONOCYTES # BLD AUTO: 0.7 K/UL (ref 0.3–1)
MONOCYTES NFR BLD: 10.9 % (ref 4–15)
NEUTROPHILS # BLD AUTO: 2.8 K/UL (ref 1.8–7.7)
NEUTROPHILS NFR BLD: 45.8 % (ref 38–73)
NRBC BLD-RTO: 0 /100 WBC
PLATELET # BLD AUTO: 192 K/UL (ref 150–450)
PMV BLD AUTO: 12.7 FL (ref 9.2–12.9)
POTASSIUM SERPL-SCNC: 3.8 MMOL/L (ref 3.5–5.1)
PROT SERPL-MCNC: 7 G/DL (ref 6–8.4)
RBC # BLD AUTO: 4.92 M/UL (ref 4.6–6.2)
SODIUM SERPL-SCNC: 137 MMOL/L (ref 136–145)
TROPONIN I SERPL DL<=0.01 NG/ML-MCNC: 0.02 NG/ML (ref 0–0.03)
TROPONIN I SERPL DL<=0.01 NG/ML-MCNC: 0.03 NG/ML (ref 0–0.03)
WBC # BLD AUTO: 6.12 K/UL (ref 3.9–12.7)

## 2022-09-29 PROCEDURE — 99220 PR INITIAL OBSERVATION CARE,LEVL III: CPT | Mod: ,,, | Performed by: INTERNAL MEDICINE

## 2022-09-29 PROCEDURE — 84484 ASSAY OF TROPONIN QUANT: CPT | Mod: 91 | Performed by: INTERNAL MEDICINE

## 2022-09-29 PROCEDURE — 84484 ASSAY OF TROPONIN QUANT: CPT | Performed by: INTERNAL MEDICINE

## 2022-09-29 PROCEDURE — 85025 COMPLETE CBC W/AUTO DIFF WBC: CPT | Performed by: INTERNAL MEDICINE

## 2022-09-29 PROCEDURE — 83735 ASSAY OF MAGNESIUM: CPT | Performed by: INTERNAL MEDICINE

## 2022-09-29 PROCEDURE — G0378 HOSPITAL OBSERVATION PER HR: HCPCS

## 2022-09-29 PROCEDURE — 80053 COMPREHEN METABOLIC PANEL: CPT | Performed by: INTERNAL MEDICINE

## 2022-09-29 PROCEDURE — 36415 COLL VENOUS BLD VENIPUNCTURE: CPT | Performed by: INTERNAL MEDICINE

## 2022-09-29 PROCEDURE — 99220 PR INITIAL OBSERVATION CARE,LEVL III: ICD-10-PCS | Mod: ,,, | Performed by: INTERNAL MEDICINE

## 2022-09-29 RX ORDER — TORSEMIDE 20 MG/1
20 TABLET ORAL DAILY
Qty: 30 TABLET | Refills: 0 | Status: ON HOLD
Start: 2022-09-29 | End: 2022-10-13 | Stop reason: SDUPTHER

## 2022-09-29 NOTE — DISCHARGE SUMMARY
[facility] - Tele[facility] ([facility])  [facility] Medicine  Discharge Summary      Patient Name: Filemon La  MRN: [id number]  Patient Class: OP- Observation  Admission Date: 9/28/2022  Hospital Length of Stay: 0 days  Discharge Date and Time: 9/29/2022 12:10 PM  Attending Physician: No att. providers found   Discharging Provider: DEBORA Gutierrez  Primary Care Provider: [facility]      HPI:   Mr. La is a 41-year-old  female with PMH significant for chronic alcoholism, was hospitalized last week for a new onset CHF.  Echocardiogram revealed EF 15% with diastolic dysfunction, suggestive of alcoholic cardiomyopathy.  LHC did not reveal evidence of obstructive coronary disease.  Patient was started on beta-blocker, Entresto, diuretics and discharged home on 09/22 with a LifeVest..  He presented to [facility] ED today complaining of generalized weakness.  He was seen by his PCP earlier today, was told to proceed to the nearest ED due to hypotension, systolics in the 80s.  Patient has been complaining of lightheadedness, dizziness.  Patient reports compliance with diuretics at home, on torsemide 20 mg p.o. b.i.d..  ED physician discussed case with Cardiology on-call, recommended placing observation.            * No surgery found *      Hospital Course:   Patient was kept on OBS for acute on chronic systolic and diastolic CHF under the care of [facility] medicine. Patient with hypotension - EF 10% on 9/20/22. Wearing lifevest but waiting for family member to bring . Discussed with cardiology who feels like pt is overmedicated. Dr. Barragan recommends: stop entresto, stop metorprolol, decrease torsemide to 20mg daily and f/u as OP with HF clinic tomorrow. Discussed all with pt who agrees and verbalizes understanding. Patient was seen and examined today and deemed stable for discharge home.       Goals of Care Treatment Preferences:  Code Status: Full Code      Consults:   Consults  (From admission, onward)        Status Ordering Provider     Inpatient consult to Cardiology  Once        Provider:  Aftab Barragan MD    Completed LUIS MIGUEL LASSITER          No new Assessment & Plan notes have been filed under this hospital service since the last note was generated.  Service: Hospital Medicine    Final Active Diagnoses:    Diagnosis Date Noted POA    PRINCIPAL PROBLEM:  Acute on chronic combined systolic and diastolic congestive heart failure [I50.43] 09/17/2022 Yes    Alcoholic cardiomyopathy [I42.6] 09/28/2022 Yes    Hypotension [I95.9] 09/28/2022 Yes    Generalized weakness [R53.1] 09/28/2022 Yes    Alcoholism [F10.20] 09/17/2022 Yes      Problems Resolved During this Admission:       Discharged Condition: stable    Disposition: Home or Self Care    Follow Up:   Follow-up Information     [facility] Follow up in 1 day(s).    Specialty: Cardiology  Contact information:  [address] [place] [address] [phone]  Additional information:  [facility] [facility] Cardiology                     Patient Instructions:      Diet Cardiac     Notify your health care provider if you experience any of the following:  increased confusion or weakness     Notify your health care provider if you experience any of the following:  persistent dizziness, light-headedness, or visual disturbances     Notify your health care provider if you experience any of the following:  worsening rash     Notify your health care provider if you experience any of the following:  severe persistent headache     Notify your health care provider if you experience any of the following:  difficulty breathing or increased cough     Notify your health care provider if you experience any of the following:  persistent nausea and vomiting or diarrhea     Notify your health care provider if you experience any of the following:  severe uncontrolled pain     Notify your health care provider if you experience any  of the following:  temperature >100.4     Notify your health care provider if you experience any of the following:  redness, tenderness, or signs of infection (pain, swelling, redness, odor or green/yellow discharge around incision site)     Activity as tolerated       Significant Diagnostic Studies: Labs:   BMP:   Recent Labs   Lab 09/28/22  1223 09/29/22  0442   GLU 91 105    137   K 3.9 3.8    101   CO2 24 24   BUN 35* 29*   CREATININE 1.2 1.1   CALCIUM 9.7 9.2   MG  --  2.0   , CMP   Recent Labs   Lab 09/28/22  1223 09/29/22  0442    137   K 3.9 3.8    101   CO2 24 24   GLU 91 105   BUN 35* 29*   CREATININE 1.2 1.1   CALCIUM 9.7 9.2   PROT 8.2 7.0   ALBUMIN 4.0 3.5   BILITOT 1.6* 1.7*   ALKPHOS 82 78   AST 17 16   ALT 21 17   ANIONGAP 12 12   , CBC   Recent Labs   Lab 09/28/22  1223 09/29/22  0442   WBC 6.79 6.12   HGB 17.6 16.8   HCT 50.3 47.8    192    and All labs within the past 24 hours have been reviewed    Pending Diagnostic Studies:     None         Medications:  Reconciled Home Medications:      Medication List      CHANGE how you take these medications    torsemide 20 MG Tab  Commonly known as: DEMADEX  Take 1 tablet (20 mg total) by mouth once daily.  What changed:   · how much to take  · when to take this        CONTINUE taking these medications    acetaminophen 325 MG tablet  Commonly known as: TYLENOL  Take 2 tablets (650 mg total) by mouth every 6 (six) hours as needed for Pain.     chlordiazepoxide 10 MG capsule  Commonly known as: LIBRIUM  Librium 10 mg po tid x 3 days then 10 mg po bid x 3 days then 10 mg po daily x 3 days then dose is completed     nicotine 21 mg/24 hr  Commonly known as: NICODERM CQ  Place 1 patch onto the skin once daily.     NIVA-FOL 2.5-25-2 mg Tab  Generic drug: folic acid-vit B6-vit B12 2.5-25-2 mg  Take 1 tablet by mouth once daily.     thiamine 100 MG tablet  Take 1 tablet (100 mg total) by mouth once daily.        STOP taking these  medications    ENTRESTO 24-26 mg per tablet  Generic drug: sacubitriL-valsartan     metoprolol succinate 25 MG 24 hr tablet  Commonly known as: TOPROL-XL     promethazine-dextromethorphan 6.25-15 mg/5 mL Syrp  Commonly known as: PROMETHAZINE-DM            Indwelling Lines/Drains at time of discharge:   Lines/Drains/Airways     None                 Time spent on the discharge of patient: 60 minutes         PRUDENCE Gutierrez-C  Department of Hospital Medicine  [facility] - Telemetry ([facility])

## 2022-09-29 NOTE — ASSESSMENT & PLAN NOTE
-last alcoholic drink prior to previous admission.    -continue low-dose diuretics due to hypotension

## 2022-09-29 NOTE — HPI
40 yo male, cardiology consult for CHFrEF and low BP  PMH NICM dilated CHFrEF 15% h/o alcohol abuse.  Recent cath don saurav 9/20/2022 normal coronary artery  09/18/2022 ECHO EF 15% dilated LV and RV normal  Patient reports compliance with diuretics at home, taking entresto metoprolol and torsemide 40 mg bid.  At home felt weak and fatigue.    In the ED, patient was tachycardic with O2 saturations of 94% on room air.  Chest x-ray was concerning for edema.  BNP: 700. He was started on empiric antibiotics for pneumonia.  CTA performed was negative for PE however did show cardiomegaly and interstitial edema.  Lasix 40 mg IV was given.  Patient placed in observation for new onset CHF.   Patient seen and examined with good diuresis overnight. Plan continued diuresis, review echo and add afterload reduction.

## 2022-09-29 NOTE — PLAN OF CARE
[facility] - Telemetry (Hospital)  Discharge Final Note    Primary Care Provider: [facility]    Expected Discharge Date: 9/29/2022    Final Discharge Note (most recent)       Final Note - 09/29/22 1155          Final Note    Assessment Type Final Discharge Note     Anticipated Discharge Disposition Home or Self Care                     Important Message from Medicare             Contact Info       [facility]   Specialty: Cardiology    [address] [facility] [address]   Phone: [phone]       Next Steps: Follow up in 1 day(s)          DC Dispo: home  PCP f/u: [facility] to schedule

## 2022-09-29 NOTE — ASSESSMENT & PLAN NOTE
Patient is identified as having Systolic (HFrEF) heart failure that is Acute. CHF is currently controlled. Latest ECHO performed and demonstrates- Results for orders placed during the hospital encounter of 09/17/22    Echo    Interpretation Summary  · The left ventricle is severely enlarged with severely decreased systolic function.  · The estimated ejection fraction is 15%.  · Left ventricular diastolic dysfunction.  · There is severe left ventricular global hypokinesis.  · Normal right ventricular size with low normal right ventricular systolic function.  · Moderate left atrial enlargement.  · Moderate right atrial enlargement.  · Mild mitral regurgitation.  · Mild tricuspid regurgitation.  · Intermediate central venous pressure (8 mmHg).  · The estimated PA systolic pressure is 32 mmHg.  . Continue Beta Blocker, Furosemide and ARNI and monitor clinical status closely. Monitor on telemetry. Patient is on CHF pathway.  Monitor strict Is&Os and daily weights.  Place on fluid restriction of 1.5 L. Continue to stress to patient importance of self efficacy and  on diet for CHF. Last BNP reviewed- and noted below   Recent Labs   Lab 09/28/22  1223   *   .      CHFrEF 15% NICm dilated alcohol related  CHF improved and medication related low BP    -hold entresto and metoprolol (only 12.5 mg daily)   -decrease Torsemide from 40 mg bid to 20 mg daily  -Daily weight. Please call the office if gain 3 pounds in 1 day or 5 pounds in 1 week.  -Fluid restriction 1.5 liters a day; Na< 2 gm  -continue Lifevest  -HF clinic f/u tomorrow  -ok to d/c today

## 2022-09-29 NOTE — PROGRESS NOTES
[facility] - Tele[facility] ([facility])  [facility]  Progress Note    Patient Name: Filemon La  MRN: [id number]  Patient Class: OP- Observation   Admission Date: 9/28/2022  Length of Stay: 0 days  Attending Physician: Fritz Peña MD  Primary Care Provider: [facility]        Subjective:     Principal Problem:Acute on chronic combined systolic and diastolic congestive heart failure        HPI:  Mr. La is a 41-year-old  female with PMH significant for chronic alcoholism, was hospitalized last week for a new onset CHF.  Echocardiogram revealed EF 15% with diastolic dysfunction, suggestive of alcoholic cardiomyopathy.  LHC did not reveal evidence of obstructive coronary disease.  Patient was started on beta-blocker, Entresto, diuretics and discharged home on 09/22 with a LifeVest..  He presented to [facility] ED today complaining of generalized weakness.  He was seen by his PCP earlier today, was told to proceed to the nearest ED due to hypotension, systolics in the 80s.  Patient has been complaining of lightheadedness, dizziness.  Patient reports compliance with diuretics at home, on torsemide 20 mg p.o. b.i.d..  ED physician discussed case with Cardiology on-call, recommended placing observation.            Overview/Hospital Course:  Patient was kept on OBS for acute on chronic systolic and diastolic CHF under the care of hospital medicine.      Interval History: Patient with hypotension - EF 10% on 9/20/22. Wearing lifevest but waiting for family member to bring . Discussed with cardiology - pt needs diuresing but bp is not supporting - will likely transfer to ICU for pressors - awaiting further cardiology recs.    Review of Systems   Constitutional:  Positive for activity change and fatigue. Negative for chills and fever.   HENT: Negative.  Negative for congestion, rhinorrhea, sore throat and trouble swallowing.    Eyes: Negative.    Respiratory:  Positive for shortness  of breath (With exertion). Negative for cough and wheezing.    Cardiovascular: Negative.  Negative for chest pain and palpitations.   Gastrointestinal: Negative.  Negative for abdominal pain, diarrhea, nausea and vomiting.   Endocrine: Negative.    Genitourinary: Negative.  Negative for dysuria and flank pain.   Musculoskeletal: Negative.  Negative for back pain.   Skin: Negative.  Negative for rash.   Allergic/Immunologic: Negative.    Neurological:  Positive for weakness (Generalized weakness) and light-headedness. Negative for speech difficulty, numbness and headaches.   Hematological: Negative.    Psychiatric/Behavioral: Negative.  Negative for hallucinations.    All other systems reviewed and are negative.  Objective:     Vital Signs (Most Recent):  Temp: 98 °F (36.7 °C) (09/29/22 0717)  Pulse: 89 (09/29/22 0717)  Resp: 16 (09/29/22 0717)  BP: (!) 100/59 (09/29/22 0717)  SpO2: 99 % (09/29/22 0717)   Vital Signs (24h Range):  Temp:  [97.3 °F (36.3 °C)-98.5 °F (36.9 °C)] 98 °F (36.7 °C)  Pulse:  [] 89  Resp:  [16-20] 16  SpO2:  [96 %-99 %] 99 %  BP: ()/(53-64) 100/59     Weight: 80.2 kg (176 lb 12.9 oz)  Body mass index is 32.34 kg/m².    Intake/Output Summary (Last 24 hours) at 9/29/2022 0821  Last data filed at 9/28/2022 2311  Gross per 24 hour   Intake 1200 ml   Output 1400 ml   Net -200 ml      Physical Exam  Vitals and nursing note reviewed.   Constitutional:       General: He is awake. He is not in acute distress.     Appearance: He is not ill-appearing.   HENT:      Head: Normocephalic and atraumatic.      Mouth/Throat:      Mouth: Mucous membranes are moist.   Eyes:      General: No scleral icterus.     Conjunctiva/sclera: Conjunctivae normal.   Cardiovascular:      Rate and Rhythm: Normal rate and regular rhythm.      Heart sounds: No murmur heard.  Pulmonary:      Effort: Pulmonary effort is normal. No respiratory distress.      Breath sounds: Rales (Minimal) present. No wheezing.    Abdominal:      Palpations: Abdomen is soft.      Tenderness: There is no abdominal tenderness.   Genitourinary:     Comments: deferred  Musculoskeletal:         General: No swelling. Normal range of motion.      Cervical back: Normal range of motion and neck supple.      Comments: No edema bilateral lower extremities   Skin:     General: Skin is warm.      Coloration: Skin is not jaundiced.   Neurological:      General: No focal deficit present.      Mental Status: He is alert and oriented to person, place, and time. Mental status is at baseline.   Psychiatric:         Attention and Perception: Attention normal.         Mood and Affect: Mood normal.         Speech: Speech normal.         Behavior: Behavior normal. Behavior is cooperative.       Significant Labs: All pertinent labs within the past 24 hours have been reviewed.  Recent Lab Results  (Last 5 results in the past 24 hours)        09/29/22  0442   09/29/22  0007   09/28/22  1319   09/28/22  1258   09/28/22  1245        POC Molecular Influenza A Ag     Negative           POC Molecular Influenza B Ag     Negative           Albumin 3.5               Alkaline Phosphatase 78               ALT 17               Anion Gap 12               Appearance, UA         Clear       AST 16               Baso # 0.05               Basophil % 0.8               Bilirubin (UA)         Negative       BILIRUBIN TOTAL 1.7  Comment: For infants and newborns, interpretation of results should be based  on gestational age, weight and in agreement with clinical  observations.    Premature Infant recommended reference ranges:  Up to 24 hours.............<8.0 mg/dL  Up to 48 hours............<12.0 mg/dL  3-5 days..................<15.0 mg/dL  6-29 days.................<15.0 mg/dL                 BUN 29               Calcium 9.2               Chloride 101               CO2 24               Color, UA         Yellow       Creatinine 1.1               Differential Method Automated                eGFR >60               Eos # 0.1               Eosinophil % 1.5               Glucose 105               Glucose, UA         Negative       Gran # (ANC) 2.8               Gran % 45.8               Hematocrit 47.8               Hemoglobin 16.8               Immature Grans (Abs) 0.01  Comment: Mild elevation in immature granulocytes is non specific and   can be seen in a variety of conditions including stress response,   acute inflammation, trauma and pregnancy. Correlation with other   laboratory and clinical findings is essential.                 Immature Granulocytes 0.2               Ketones, UA         Negative       Leukocytes, UA         Negative       Lymph # 2.5               Lymph % 40.8               Magnesium 2.0               MCH 34.1               MCHC 35.1               MCV 97               Mono # 0.7               Mono % 10.9               MPV 12.7               NITRITE UA         Negative       nRBC 0               Occult Blood UA         Negative       pH, UA         6.0       Platelets 192               Potassium 3.8               PROTEIN TOTAL 7.0               Protein, UA         Negative  Comment: Recommend a 24 hour urine protein or a urine   protein/creatinine ratio if globulin induced proteinuria is  clinically suspected.          Acceptable     Yes   Yes         RBC 4.92               RDW 11.9               SARS-CoV-2 RNA, Amplification, Qual       Negative         Sodium 137               Specific [place], UA         1.010       Specimen UA         Urine, Clean Catch       Troponin I 0.026  Comment: The reference interval for Troponin I represents the 99th percentile   cutoff   for our facility and is consistent with 3rd generation assay   performance.     0.023  Comment: The reference interval for Troponin I represents the 99th percentile   cutoff   for our facility and is consistent with 3rd generation assay   performance.               UROBILINOGEN UA         Negative        WBC 6.12                                      Significant Imaging: I have reviewed all pertinent imaging results/findings within the past 24 hours.      Assessment/Plan:      * Acute on chronic combined systolic and diastolic congestive heart failure  -you 15% with diastolic dysfunction on echocardiogram done last week.    -clinically does not appear volume overloaded at this time.    -but patient states unable to ambulate more than 4-5 feet without getting SOB.  -Lasix 20 mg IV x1 (limited due to hypotension).    -cardiology consult.    -Monitor on telemetry.  09/29:  --remains hypotensive  --needs additional diuresing  --cardiology to see this AM    Generalized weakness  -secondary to reduced EF, and hypotension.          Hypotension  -BP 87/55, limiting administration of aggressive IV diuresis.    -cardiology to follow up  -patient complains of lightheadedness, dizziness  09/29:  --cardiology considering placing in ICU for pressors  --currently difficulty to diurese 2/2 hypotension      Alcoholic cardiomyopathy  -last alcoholic drink prior to previous admission.    -continue low-dose diuretics due to hypotension        VTE Risk Mitigation (From admission, onward)         Ordered     Place sequential compression device  Until discontinued         09/28/22 1951                Discharge Planning   ABI:      Code Status: Prior   Is the patient medically ready for discharge?:     Reason for patient still in hospital (select all that apply): Patient trending condition, Treatment and Consult recommendations                     DEBORA Gutierrez  Department of Hospital Medicine   [facility] - Tele[facility] ([facility])

## 2022-09-29 NOTE — HOSPITAL COURSE
Patient was kept on OBS for acute on chronic systolic and diastolic CHF under the care of hospital medicine. Patient with hypotension - EF 10% on 9/20/22. Wearing lifevest but waiting for family member to bring . Discussed with cardiology who feels like pt is overmedicated. Dr. Barragan recommends: stop entresto, stop metorprolol, decrease torsemide to 20mg daily and f/u as OP with HF clinic tomorrow. Discussed all with pt who agrees and verbalizes understanding. Patient was seen and examined today and deemed stable for discharge home.

## 2022-09-29 NOTE — SUBJECTIVE & OBJECTIVE
Interval History: Patient with hypotension - EF 10% on 9/20/22. Wearing lifevest but waiting for family member to bring . Discussed with cardiology - pt needs diuresing but bp is not supporting - will likely transfer to ICU for pressors - awaiting further cardiology recs.    Review of Systems   Constitutional:  Positive for activity change and fatigue. Negative for chills and fever.   HENT: Negative.  Negative for congestion, rhinorrhea, sore throat and trouble swallowing.    Eyes: Negative.    Respiratory:  Positive for shortness of breath (With exertion). Negative for cough and wheezing.    Cardiovascular: Negative.  Negative for chest pain and palpitations.   Gastrointestinal: Negative.  Negative for abdominal pain, diarrhea, nausea and vomiting.   Endocrine: Negative.    Genitourinary: Negative.  Negative for dysuria and flank pain.   Musculoskeletal: Negative.  Negative for back pain.   Skin: Negative.  Negative for rash.   Allergic/Immunologic: Negative.    Neurological:  Positive for weakness (Generalized weakness) and light-headedness. Negative for speech difficulty, numbness and headaches.   Hematological: Negative.    Psychiatric/Behavioral: Negative.  Negative for hallucinations.    All other systems reviewed and are negative.  Objective:     Vital Signs (Most Recent):  Temp: 98 °F (36.7 °C) (09/29/22 0717)  Pulse: 89 (09/29/22 0717)  Resp: 16 (09/29/22 0717)  BP: (!) 100/59 (09/29/22 0717)  SpO2: 99 % (09/29/22 0717)   Vital Signs (24h Range):  Temp:  [97.3 °F (36.3 °C)-98.5 °F (36.9 °C)] 98 °F (36.7 °C)  Pulse:  [] 89  Resp:  [16-20] 16  SpO2:  [96 %-99 %] 99 %  BP: ()/(53-64) 100/59     Weight: 80.2 kg (176 lb 12.9 oz)  Body mass index is 32.34 kg/m².    Intake/Output Summary (Last 24 hours) at 9/29/2022 0821  Last data filed at 9/28/2022 2311  Gross per 24 hour   Intake 1200 ml   Output 1400 ml   Net -200 ml      Physical Exam  Vitals and nursing note reviewed.   Constitutional:        General: He is awake. He is not in acute distress.     Appearance: He is not ill-appearing.   HENT:      Head: Normocephalic and atraumatic.      Mouth/Throat:      Mouth: Mucous membranes are moist.   Eyes:      General: No scleral icterus.     Conjunctiva/sclera: Conjunctivae normal.   Cardiovascular:      Rate and Rhythm: Normal rate and regular rhythm.      Heart sounds: No murmur heard.  Pulmonary:      Effort: Pulmonary effort is normal. No respiratory distress.      Breath sounds: Rales (Minimal) present. No wheezing.   Abdominal:      Palpations: Abdomen is soft.      Tenderness: There is no abdominal tenderness.   Genitourinary:     Comments: deferred  Musculoskeletal:         General: No swelling. Normal range of motion.      Cervical back: Normal range of motion and neck supple.      Comments: No edema bilateral lower extremities   Skin:     General: Skin is warm.      Coloration: Skin is not jaundiced.   Neurological:      General: No focal deficit present.      Mental Status: He is alert and oriented to person, place, and time. Mental status is at baseline.   Psychiatric:         Attention and Perception: Attention normal.         Mood and Affect: Mood normal.         Speech: Speech normal.         Behavior: Behavior normal. Behavior is cooperative.       Significant Labs: All pertinent labs within the past 24 hours have been reviewed.  Recent Lab Results  (Last 5 results in the past 24 hours)        09/29/22  0442   09/29/22  0007   09/28/22  1319   09/28/22  1258   09/28/22  1245        POC Molecular Influenza A Ag     Negative           POC Molecular Influenza B Ag     Negative           Albumin 3.5               Alkaline Phosphatase 78               ALT 17               Anion Gap 12               Appearance, UA         Clear       AST 16               Baso # 0.05               Basophil % 0.8               Bilirubin (UA)         Negative       BILIRUBIN TOTAL 1.7  Comment: For infants and newborns,  interpretation of results should be based  on gestational age, weight and in agreement with clinical  observations.    Premature Infant recommended reference ranges:  Up to 24 hours.............<8.0 mg/dL  Up to 48 hours............<12.0 mg/dL  3-5 days..................<15.0 mg/dL  6-29 days.................<15.0 mg/dL                 BUN 29               Calcium 9.2               Chloride 101               CO2 24               Color, UA         Yellow       Creatinine 1.1               Differential Method Automated               eGFR >60               Eos # 0.1               Eosinophil % 1.5               Glucose 105               Glucose, UA         Negative       Gran # (ANC) 2.8               Gran % 45.8               Hematocrit 47.8               Hemoglobin 16.8               Immature Grans (Abs) 0.01  Comment: Mild elevation in immature granulocytes is non specific and   can be seen in a variety of conditions including stress response,   acute inflammation, trauma and pregnancy. Correlation with other   laboratory and clinical findings is essential.                 Immature Granulocytes 0.2               Ketones, UA         Negative       Leukocytes, UA         Negative       Lymph # 2.5               Lymph % 40.8               Magnesium 2.0               MCH 34.1               MCHC 35.1               MCV 97               Mono # 0.7               Mono % 10.9               MPV 12.7               NITRITE UA         Negative       nRBC 0               Occult Blood UA         Negative       pH, UA         6.0       Platelets 192               Potassium 3.8               PROTEIN TOTAL 7.0               Protein, UA         Negative  Comment: Recommend a 24 hour urine protein or a urine   protein/creatinine ratio if globulin induced proteinuria is  clinically suspected.          Acceptable     Yes   Yes         RBC 4.92               RDW 11.9               SARS-CoV-2 RNA, Amplification, Qual        Negative         Sodium 137               Specific [place], UA         1.010       Specimen UA         Urine, Clean Catch       Troponin I 0.026  Comment: The reference interval for Troponin I represents the 99th percentile   cutoff   for our facility and is consistent with 3rd generation assay   performance.     0.023  Comment: The reference interval for Troponin I represents the 99th percentile   cutoff   for our facility and is consistent with 3rd generation assay   performance.               UROBILINOGEN UA         Negative       WBC 6.12                                      Significant Imaging: I have reviewed all pertinent imaging results/findings within the past 24 hours.

## 2022-09-29 NOTE — H&P
"[facility] Medicine  History & Physical    Patient Name: Filemon La  MRN: [id number]  Patient Class: OP- Observation  Admission Date: 9/28/2022  Attending Physician: Fritz Peña MD   Primary Care Provider: Sergio [facility]         Patient information was obtained from patient, past medical records and ER records.     Subjective:     Principal Problem:Acute on chronic combined systolic and diastolic congestive heart failure    Chief Complaint:   Chief Complaint   Patient presents with    Hypotension     Pt came into er after having low BP at dr caba office. Pt admits to feeling "summer weak". Was recently admitted with CHF and pulmonary HTN.         HPI: Mr. La is a 41-year-old  female with PMH significant for chronic alcoholism, was hospitalized last week for a new onset CHF.  Echocardiogram revealed EF 15% with diastolic dysfunction, suggestive of alcoholic cardiomyopathy.  LHC did not reveal evidence of obstructive coronary disease.  Patient was started on beta-blocker, Entresto, diuretics and discharged home on 09/22 with a LifeVest..  He presented to [facility] ED today complaining of generalized weakness.  He was seen by his PCP earlier today, was told to proceed to the nearest ED due to hypotension, systolics in the 80s.  Patient has been complaining of lightheadedness, dizziness.  Patient reports compliance with diuretics at home, on torsemide 20 mg p.o. b.i.d..  ED physician discussed case with Cardiology on-call, recommended placing observation.            Past Medical History:   Diagnosis Date    CHF (congestive heart failure)     Pulmonary hypertension     pt reported       Past Surgical History:   Procedure Laterality Date    CATHETERIZATION OF BOTH LEFT AND RIGHT HEART N/A 9/20/2022    Procedure: CATHETERIZATION, HEART, BOTH LEFT AND RIGHT;  Surgeon: Yady Patterson MD;  Location: [facility] CATH LAB;  Service: Cardiology;  Laterality: " N/A;       Review of patient's allergies indicates:  No Known Allergies    No current facility-administered medications on file prior to encounter.     Current Outpatient Medications on File Prior to Encounter   Medication Sig    chlordiazepoxide (LIBRIUM) 10 MG capsule Librium 10 mg po tid x 3 days then 10 mg po bid x 3 days then 10 mg po daily x 3 days then dose is completed    folic acid-vit B6-vit B12 2.5-25-2 mg (FOLBIC OR EQUIV) 2.5-25-2 mg Tab Take 1 tablet by mouth once daily.    metoprolol succinate (TOPROL-XL) 25 MG 24 hr tablet Take 0.5 tablets (12.5 mg total) by mouth every evening.    nicotine (NICODERM CQ) 21 mg/24 hr Place 1 patch onto the skin once daily.    sacubitriL-valsartan (ENTRESTO) 24-26 mg per tablet Take 1 tablet by mouth once daily.    thiamine 100 MG tablet Take 1 tablet (100 mg total) by mouth once daily.    torsemide (DEMADEX) 20 MG Tab Take 2 tablets (40 mg total) by mouth 2 (two) times a day.    acetaminophen (TYLENOL) 325 MG tablet Take 2 tablets (650 mg total) by mouth every 6 (six) hours as needed for Pain.    promethazine-dextromethorphan (PROMETHAZINE-DM) 6.25-15 mg/5 mL Syrp Take 5 mLs by mouth nightly as needed (cough). (Patient not taking: No sig reported)     Family History       Problem Relation (Age of Onset)    Diabetes Mother    Hypertension Mother, Father          Tobacco Use    Smoking status: Former     Packs/day: 1.00     Types: Cigarettes    Smokeless tobacco: Never   Substance and Sexual Activity    Alcohol use: Not Currently     Comment: for about 2 weeks    Drug use: No    Sexual activity: Not on file     Review of Systems   Constitutional:  Positive for activity change and fatigue. Negative for chills and fever.   HENT: Negative.  Negative for congestion, rhinorrhea, sore throat and trouble swallowing.    Eyes: Negative.    Respiratory:  Positive for shortness of breath (With exertion). Negative for cough and wheezing.    Cardiovascular: Negative.   Negative for chest pain and palpitations.   Gastrointestinal: Negative.  Negative for abdominal pain, diarrhea, nausea and vomiting.   Endocrine: Negative.    Genitourinary: Negative.  Negative for dysuria and flank pain.   Musculoskeletal: Negative.  Negative for back pain.   Skin: Negative.  Negative for rash.   Allergic/Immunologic: Negative.    Neurological:  Positive for weakness (Generalized weakness) and light-headedness. Negative for speech difficulty, numbness and headaches.   Hematological: Negative.    Psychiatric/Behavioral: Negative.  Negative for hallucinations.    All other systems reviewed and are negative.  Objective:     Vital Signs (Most Recent):  Temp: 98.5 °F (36.9 °C) (09/28/22 1939)  Pulse: 88 (09/28/22 1830)  Resp: 19 (09/28/22 1939)  BP: 114/64 (09/28/22 1939)  SpO2: 99 % (09/28/22 1939) Vital Signs (24h Range):  Temp:  [97.7 °F (36.5 °C)-98.5 °F (36.9 °C)] 98.5 °F (36.9 °C)  Pulse:  [] 88  Resp:  [16-20] 19  SpO2:  [96 %-99 %] 99 %  BP: ()/(53-64) 114/64     Weight: 80.2 kg (176 lb 12.9 oz)  Body mass index is 30.35 kg/m².    Physical Exam  Vitals and nursing note reviewed.   Constitutional:       General: He is awake. He is not in acute distress.     Appearance: He is not ill-appearing.   HENT:      Head: Normocephalic and atraumatic.      Mouth/Throat:      Mouth: Mucous membranes are moist.   Eyes:      General: No scleral icterus.     Conjunctiva/sclera: Conjunctivae normal.   Cardiovascular:      Rate and Rhythm: Normal rate and regular rhythm.      Heart sounds: No murmur heard.  Pulmonary:      Effort: Pulmonary effort is normal. No respiratory distress.      Breath sounds: Rales (Minimal) present. No wheezing.   Abdominal:      Palpations: Abdomen is soft.      Tenderness: There is no abdominal tenderness.   Musculoskeletal:         General: No swelling. Normal range of motion.      Cervical back: Normal range of motion and neck supple.      Comments: No edema bilateral  lower extremities   Skin:     General: Skin is warm.      Coloration: Skin is not jaundiced.   Neurological:      General: No focal deficit present.      Mental Status: He is alert and oriented to person, place, and time. Mental status is at baseline.   Psychiatric:         Attention and Perception: Attention normal.         Mood and Affect: Mood normal.         Speech: Speech normal.         Behavior: Behavior normal. Behavior is cooperative.           Significant Labs: All pertinent labs within the past 24 hours have been reviewed.  CBC:   Recent Labs   Lab 09/28/22  1223   WBC 6.79   HGB 17.6   HCT 50.3        CMP:   Recent Labs   Lab 09/28/22  1223      K 3.9      CO2 24   GLU 91   BUN 35*   CREATININE 1.2   CALCIUM 9.7   PROT 8.2   ALBUMIN 4.0   BILITOT 1.6*   ALKPHOS 82   AST 17   ALT 21   ANIONGAP 12     Cardiac Markers:   Recent Labs   Lab 09/28/22  1223   *     Troponin:   Recent Labs   Lab 09/28/22  1223   TROPONINI 0.035*       Significant Imaging: I have reviewed all pertinent imaging results/findings within the past 24 hours.  I have reviewed and interpreted all pertinent imaging results/findings within the past 24 hours.    Imaging Results              X-Ray Chest AP Portable (Final result)  Result time 09/28/22 12:55:55      Final result by Derrell Westbrook MD (09/28/22 12:55:55)                   Impression:      No acute process seen.      Electronically signed by: Derrell Westbrook MD  Date:    09/28/2022  Time:    12:55               Narrative:    EXAMINATION:  XR CHEST AP PORTABLE    CLINICAL HISTORY:  weakness;    FINDINGS:  Single view of the chest.  Comparison 09/17/2022    Cardiac silhouette is normal.  The lungs demonstrate no evidence of active disease.  No evidence of pleural effusion or pneumothorax.  Bones appear intact.  Life vest noted.                                    I have independently reviewed and interpreted the EKG.     I have independently reviewed all  pertinent labs within the past 24 hours.    I have independently reviewed, visualized and interpreted all pertinent imaging results within the past 24 hours and discussed the findings with the ED physician.          Assessment/Plan:     * Acute on chronic combined systolic and diastolic congestive heart failure  -you 15% with diastolic dysfunction on echocardiogram done last week.    -clinically does not appear volume overloaded at this time.    -but patient states unable to ambulate more than 4-5 feet without getting SOB.  -Lasix 20 mg IV x1 (limited due to hypotension).    -cardiology consult.    -Monitor on telemetry.    Generalized weakness  -secondary to reduced EF, and hypotension.          Hypotension  -BP 87/55, limiting administration of aggressive IV diuresis.    -cardiology to follow up  -patient complains of lightheadedness, dizziness      Alcoholic cardiomyopathy  -last alcoholic drink prior to previous admission.    -continue low-dose diuretics due to hypotension        VTE Risk Mitigation (From admission, onward)         Ordered     Place sequential compression device  Until discontinued         09/28/22 1951                 The patient is placed in OBSERVATION status.      Josh Gee MD  Department of Hospital Medicine   [facility] - Telemetry ([facility])

## 2022-09-29 NOTE — ASSESSMENT & PLAN NOTE
-BP 87/55, limiting administration of aggressive IV diuresis.    -cardiology to follow up  -patient complains of lightheadedness, dizziness  09/29:  --cardiology considering placing in ICU for pressors  --currently difficulty to diurese 2/2 hypotension

## 2022-09-29 NOTE — PLAN OF CARE
[facility] - Telemetry (Hospital)  Discharge Assessment    Primary Care Provider: Sergio [facility]     Discharge Assessment (most recent)       BRIEF DISCHARGE ASSESSMENT - 09/29/22 1154          Discharge Planning    Assessment Type Discharge Planning Brief Assessment     Resource/Environmental Concerns none     Support Systems Children     Equipment Currently Used at Home none     Current Living Arrangements home/apartment/condo     Patient/Family Anticipates Transition to home     DME Needed Upon Discharge  none     Discharge Plan A Home

## 2022-09-29 NOTE — PLAN OF CARE
Problem: Adult Inpatient Plan of Care  Goal: Plan of Care Review  Outcome: Ongoing, Progressing  Flowsheets (Taken 9/29/2022 0317)  Plan of Care Reviewed With: patient  Goal: Patient-Specific Goal (Individualized)  Outcome: Ongoing, Progressing  Goal: Absence of Hospital-Acquired Illness or Injury  Outcome: Ongoing, Progressing  Goal: Optimal Comfort and Wellbeing  Outcome: Ongoing, Progressing     Problem: Fall Injury Risk  Goal: Absence of Fall and Fall-Related Injury  Outcome: Ongoing, Progressing

## 2022-09-29 NOTE — NURSING
Pt AAOx4. NSR on telemetry . He remains on room air; tolerating well. Pt voids spontaneoulsy without difficulty. Pt turned and repositioned independently with use of pillows. 20 G PIV in right forearm CDI with no redness, swelling, warmth, or drainage saline locked. Bed low, wheels locked, alarms audible. Plan of care reviewed. Vital signs monitored. Fall precautions in place. Call bell in reach.  Bed in lowest position.  Will continue with plan of care.

## 2022-09-29 NOTE — HPI
Mr. La is a 41-year-old  female with PMH significant for chronic alcoholism, was hospitalized last week for a new onset CHF.  Echocardiogram revealed EF 15% with diastolic dysfunction, suggestive of alcoholic cardiomyopathy.  LHC did not reveal evidence of obstructive coronary disease.  Patient was started on beta-blocker, Entresto, diuretics and discharged home on 09/22 with a LifeVest..  He presented to [facility] ED today complaining of generalized weakness.  He was seen by his PCP earlier today, was told to proceed to the nearest ED due to hypotension, systolics in the 80s.  Patient has been complaining of lightheadedness, dizziness.  Patient reports compliance with diuretics at home, on torsemide 20 mg p.o. b.i.d..  ED physician discussed case with Cardiology on-call, recommended placing observation.

## 2022-09-29 NOTE — SUBJECTIVE & OBJECTIVE
Past Medical History:   Diagnosis Date    CHF (congestive heart failure)     Pulmonary hypertension     pt reported       Past Surgical History:   Procedure Laterality Date    CATHETERIZATION OF BOTH LEFT AND RIGHT HEART N/A 9/20/2022    Procedure: CATHETERIZATION, HEART, BOTH LEFT AND RIGHT;  Surgeon: Yady Patterson MD;  Location: [facility] CATH LAB;  Service: Cardiology;  Laterality: N/A;       Review of patient's allergies indicates:  No Known Allergies    No current facility-administered medications on file prior to encounter.     Current Outpatient Medications on File Prior to Encounter   Medication Sig    chlordiazepoxide (LIBRIUM) 10 MG capsule Librium 10 mg po tid x 3 days then 10 mg po bid x 3 days then 10 mg po daily x 3 days then dose is completed    folic acid-vit B6-vit B12 2.5-25-2 mg (FOLBIC OR EQUIV) 2.5-25-2 mg Tab Take 1 tablet by mouth once daily.    metoprolol succinate (TOPROL-XL) 25 MG 24 hr tablet Take 0.5 tablets (12.5 mg total) by mouth every evening.    nicotine (NICODERM CQ) 21 mg/24 hr Place 1 patch onto the skin once daily.    sacubitriL-valsartan (ENTRESTO) 24-26 mg per tablet Take 1 tablet by mouth once daily.    thiamine 100 MG tablet Take 1 tablet (100 mg total) by mouth once daily.    torsemide (DEMADEX) 20 MG Tab Take 2 tablets (40 mg total) by mouth 2 (two) times a day.    acetaminophen (TYLENOL) 325 MG tablet Take 2 tablets (650 mg total) by mouth every 6 (six) hours as needed for Pain.    promethazine-dextromethorphan (PROMETHAZINE-DM) 6.25-15 mg/5 mL Syrp Take 5 mLs by mouth nightly as needed (cough). (Patient not taking: No sig reported)     Family History       Problem Relation (Age of Onset)    Diabetes Mother    Hypertension Mother, Father          Tobacco Use    Smoking status: Former     Packs/day: 1.00     Types: Cigarettes    Smokeless tobacco: Never   Substance and Sexual Activity    Alcohol use: Not Currently     Comment: for about 2 weeks    Drug use: No    Sexual  activity: Not on file     Review of Systems   Constitutional: Positive for malaise/fatigue. Negative for decreased appetite, diaphoresis, fever and night sweats.   HENT:  Negative for nosebleeds.    Eyes:  Negative for blurred vision and double vision.   Cardiovascular:  Positive for dyspnea on exertion. Negative for chest pain, claudication, irregular heartbeat, leg swelling, near-syncope, orthopnea, palpitations, paroxysmal nocturnal dyspnea and syncope.   Respiratory:  Negative for cough, shortness of breath, sleep disturbances due to breathing, snoring, sputum production and wheezing.    Endocrine: Negative for cold intolerance and polyuria.   Hematologic/Lymphatic: Does not bruise/bleed easily.   Skin:  Negative for rash.   Musculoskeletal:  Negative for back pain, falls, joint pain, joint swelling and neck pain.   Gastrointestinal:  Negative for abdominal pain, heartburn, nausea and vomiting.   Genitourinary:  Negative for dysuria, frequency and hematuria.   Neurological:  Positive for dizziness. Negative for difficulty with concentration, focal weakness, headaches, light-headedness, numbness, seizures and weakness.   Psychiatric/Behavioral:  Negative for depression, memory loss and substance abuse. The patient does not have insomnia.    Allergic/Immunologic: Negative for HIV exposure and hives.   Objective:     Vital Signs (Most Recent):  Temp: 98 °F (36.7 °C) (09/29/22 0717)  Pulse: 100 (09/29/22 0943)  Resp: 16 (09/29/22 0717)  BP: (!) 100/59 (09/29/22 0717)  SpO2: 99 % (09/29/22 0717)   Vital Signs (24h Range):  Temp:  [97.3 °F (36.3 °C)-98.5 °F (36.9 °C)] 98 °F (36.7 °C)  Pulse:  [] 100  Resp:  [16-20] 16  SpO2:  [96 %-99 %] 99 %  BP: ()/(53-64) 100/59     Weight: 80.2 kg (176 lb 12.9 oz)  Body mass index is 32.34 kg/m².    SpO2: 99 %  O2 Device (Oxygen Therapy): room air      Intake/Output Summary (Last 24 hours) at 9/29/2022 0967  Last data filed at 9/28/2022 2314  Gross per 24 hour   Intake  1200 ml   Output 1400 ml   Net -200 ml       Lines/Drains/Airways       Peripheral Intravenous Line  Duration                  Peripheral IV - Single Lumen 09/28/22 1222 20 G Right;Posterior;Lateral Hand <1 day                    Physical Exam  HENT:      Head: Normocephalic.   Eyes:      Pupils: Pupils are equal, round, and reactive to light.   Neck:      Thyroid: No thyromegaly.      Vascular: Normal carotid pulses. No carotid bruit or JVD.   Cardiovascular:      Rate and Rhythm: Normal rate and regular rhythm. No extrasystoles are present.     Chest Wall: PMI is not displaced.      Pulses: Normal pulses.      Heart sounds: Normal heart sounds. No murmur heard.    No gallop. No S3 sounds.   Pulmonary:      Effort: No respiratory distress.      Breath sounds: Normal breath sounds. No stridor.   Abdominal:      General: Bowel sounds are normal.      Palpations: Abdomen is soft.      Tenderness: There is no abdominal tenderness. There is no rebound.   Musculoskeletal:         General: Normal range of motion.   Skin:     Findings: No rash.   Neurological:      Mental Status: He is alert and oriented to person, place, and time.   Psychiatric:         Behavior: Behavior normal.       Significant Labs: ABG: No results for input(s): PH, PCO2, HCO3, POCSATURATED, BE in the last 48 hours., Blood Culture: No results for input(s): LABBLOO in the last 48 hours., BMP:   Recent Labs   Lab 09/28/22  1223 09/29/22  0442   GLU 91 105    137   K 3.9 3.8    101   CO2 24 24   BUN 35* 29*   CREATININE 1.2 1.1   CALCIUM 9.7 9.2   MG  --  2.0   , CMP   Recent Labs   Lab 09/28/22  1223 09/29/22  0442    137   K 3.9 3.8    101   CO2 24 24   GLU 91 105   BUN 35* 29*   CREATININE 1.2 1.1   CALCIUM 9.7 9.2   PROT 8.2 7.0   ALBUMIN 4.0 3.5   BILITOT 1.6* 1.7*   ALKPHOS 82 78   AST 17 16   ALT 21 17   ANIONGAP 12 12   , CBC   Recent Labs   Lab 09/28/22  1223 09/29/22  0442   WBC 6.79 6.12   HGB 17.6 16.8   HCT 50.3 47.8     192   , INR No results for input(s): INR, PROTIME in the last 48 hours., Lipid Panel No results for input(s): CHOL, HDL, LDLCALC, TRIG, CHOLHDL in the last 48 hours., and Troponin   Recent Labs   Lab 09/28/22  1223 09/29/22  0007 09/29/22  0442   TROPONINI 0.035* 0.023 0.026       Significant Imaging: EKG:

## 2022-09-29 NOTE — PLAN OF CARE
Discharge instructions reviewed with patient and questions answered/encouraged. IV and telemetry monitor removed per order. Patient remained free from falls during shift. Patient discharged home with personal belongings.

## 2022-09-29 NOTE — ASSESSMENT & PLAN NOTE
-you 15% with diastolic dysfunction on echocardiogram done last week.    -clinically does not appear volume overloaded at this time.    -but patient states unable to ambulate more than 4-5 feet without getting SOB.  -Lasix 20 mg IV x1 (limited due to hypotension).    -cardiology consult.    -Monitor on telemetry.

## 2022-09-29 NOTE — SUBJECTIVE & OBJECTIVE
Past Medical History:   Diagnosis Date    CHF (congestive heart failure)     Pulmonary hypertension     pt reported       Past Surgical History:   Procedure Laterality Date    CATHETERIZATION OF BOTH LEFT AND RIGHT HEART N/A 9/20/2022    Procedure: CATHETERIZATION, HEART, BOTH LEFT AND RIGHT;  Surgeon: Yady Patterson MD;  Location: [facility] CATH LAB;  Service: Cardiology;  Laterality: N/A;       Review of patient's allergies indicates:  No Known Allergies    No current facility-administered medications on file prior to encounter.     Current Outpatient Medications on File Prior to Encounter   Medication Sig    chlordiazepoxide (LIBRIUM) 10 MG capsule Librium 10 mg po tid x 3 days then 10 mg po bid x 3 days then 10 mg po daily x 3 days then dose is completed    folic acid-vit B6-vit B12 2.5-25-2 mg (FOLBIC OR EQUIV) 2.5-25-2 mg Tab Take 1 tablet by mouth once daily.    metoprolol succinate (TOPROL-XL) 25 MG 24 hr tablet Take 0.5 tablets (12.5 mg total) by mouth every evening.    nicotine (NICODERM CQ) 21 mg/24 hr Place 1 patch onto the skin once daily.    sacubitriL-valsartan (ENTRESTO) 24-26 mg per tablet Take 1 tablet by mouth once daily.    thiamine 100 MG tablet Take 1 tablet (100 mg total) by mouth once daily.    torsemide (DEMADEX) 20 MG Tab Take 2 tablets (40 mg total) by mouth 2 (two) times a day.    acetaminophen (TYLENOL) 325 MG tablet Take 2 tablets (650 mg total) by mouth every 6 (six) hours as needed for Pain.    promethazine-dextromethorphan (PROMETHAZINE-DM) 6.25-15 mg/5 mL Syrp Take 5 mLs by mouth nightly as needed (cough). (Patient not taking: No sig reported)     Family History       Problem Relation (Age of Onset)    Diabetes Mother    Hypertension Mother, Father          Tobacco Use    Smoking status: Former     Packs/day: 1.00     Types: Cigarettes    Smokeless tobacco: Never   Substance and Sexual Activity    Alcohol use: Not Currently     Comment: for about 2 weeks    Drug use: No    Sexual  activity: Not on file     Review of Systems   Constitutional:  Positive for activity change and fatigue. Negative for chills and fever.   HENT: Negative.  Negative for congestion, rhinorrhea, sore throat and trouble swallowing.    Eyes: Negative.    Respiratory:  Positive for shortness of breath (With exertion). Negative for cough and wheezing.    Cardiovascular: Negative.  Negative for chest pain and palpitations.   Gastrointestinal: Negative.  Negative for abdominal pain, diarrhea, nausea and vomiting.   Endocrine: Negative.    Genitourinary: Negative.  Negative for dysuria and flank pain.   Musculoskeletal: Negative.  Negative for back pain.   Skin: Negative.  Negative for rash.   Allergic/Immunologic: Negative.    Neurological:  Positive for weakness (Generalized weakness) and light-headedness. Negative for speech difficulty, numbness and headaches.   Hematological: Negative.    Psychiatric/Behavioral: Negative.  Negative for hallucinations.    All other systems reviewed and are negative.  Objective:     Vital Signs (Most Recent):  Temp: 98.5 °F (36.9 °C) (09/28/22 1939)  Pulse: 88 (09/28/22 1830)  Resp: 19 (09/28/22 1939)  BP: 114/64 (09/28/22 1939)  SpO2: 99 % (09/28/22 1939) Vital Signs (24h Range):  Temp:  [97.7 °F (36.5 °C)-98.5 °F (36.9 °C)] 98.5 °F (36.9 °C)  Pulse:  [] 88  Resp:  [16-20] 19  SpO2:  [96 %-99 %] 99 %  BP: ()/(53-64) 114/64     Weight: 80.2 kg (176 lb 12.9 oz)  Body mass index is 30.35 kg/m².    Physical Exam  Vitals and nursing note reviewed.   Constitutional:       General: He is awake. He is not in acute distress.     Appearance: He is not ill-appearing.   HENT:      Head: Normocephalic and atraumatic.      Mouth/Throat:      Mouth: Mucous membranes are moist.   Eyes:      General: No scleral icterus.     Conjunctiva/sclera: Conjunctivae normal.   Cardiovascular:      Rate and Rhythm: Normal rate and regular rhythm.      Heart sounds: No murmur heard.  Pulmonary:      Effort:  Pulmonary effort is normal. No respiratory distress.      Breath sounds: Rales (Minimal) present. No wheezing.   Abdominal:      Palpations: Abdomen is soft.      Tenderness: There is no abdominal tenderness.   Musculoskeletal:         General: No swelling. Normal range of motion.      Cervical back: Normal range of motion and neck supple.      Comments: No edema bilateral lower extremities   Skin:     General: Skin is warm.      Coloration: Skin is not jaundiced.   Neurological:      General: No focal deficit present.      Mental Status: He is alert and oriented to person, place, and time. Mental status is at baseline.   Psychiatric:         Attention and Perception: Attention normal.         Mood and Affect: Mood normal.         Speech: Speech normal.         Behavior: Behavior normal. Behavior is cooperative.           Significant Labs: All pertinent labs within the past 24 hours have been reviewed.  CBC:   Recent Labs   Lab 09/28/22  1223   WBC 6.79   HGB 17.6   HCT 50.3        CMP:   Recent Labs   Lab 09/28/22  1223      K 3.9      CO2 24   GLU 91   BUN 35*   CREATININE 1.2   CALCIUM 9.7   PROT 8.2   ALBUMIN 4.0   BILITOT 1.6*   ALKPHOS 82   AST 17   ALT 21   ANIONGAP 12     Cardiac Markers:   Recent Labs   Lab 09/28/22  1223   *     Troponin:   Recent Labs   Lab 09/28/22  1223   TROPONINI 0.035*       Significant Imaging: I have reviewed all pertinent imaging results/findings within the past 24 hours.  I have reviewed and interpreted all pertinent imaging results/findings within the past 24 hours.    Imaging Results              X-Ray Chest AP Portable (Final result)  Result time 09/28/22 12:55:55      Final result by Derrell Westbrook MD (09/28/22 12:55:55)                   Impression:      No acute process seen.      Electronically signed by: Derrell Westbrook MD  Date:    09/28/2022  Time:    12:55               Narrative:    EXAMINATION:  XR CHEST AP PORTABLE    CLINICAL  HISTORY:  weakness;    FINDINGS:  Single view of the chest.  Comparison 09/17/2022    Cardiac silhouette is normal.  The lungs demonstrate no evidence of active disease.  No evidence of pleural effusion or pneumothorax.  Bones appear intact.  Life vest noted.                                    I have independently reviewed and interpreted the EKG.     I have independently reviewed all pertinent labs within the past 24 hours.    I have independently reviewed, visualized and interpreted all pertinent imaging results within the past 24 hours and discussed the findings with the ED physician.

## 2022-09-29 NOTE — ASSESSMENT & PLAN NOTE
-BP 87/55, limiting administration of aggressive IV diuresis.    -cardiology to follow up  -patient complains of lightheadedness, dizziness

## 2022-09-29 NOTE — PROGRESS NOTES
"   09/28/22 1939   Vital Signs   Temp 98.5 °F (36.9 °C)   Pulse 76   Heart Rate Source Monitor   Resp 19   SpO2 99 %   O2 Device (Oxygen Therapy) room air   /64   BP Location Left arm   BP Method Automatic   Patient Position Sitting        Cardiac/Telemetry Details / Alarms   Cardiac/Telemetry Monitor On Yes   Cardiac/Telemetry Audible Yes   Cardiac/Telemetry Alarms Set Yes   Cardiac/Telemetry Box Number [id number]   Height and Weight   Height 5' 2" (1.575 m)   Weight 80.2 kg (176 lb 12.9 oz)   Weight Method Bed Scale   BSA (Calculated - sq m) 1.87 sq meters   BMI (Calculated) 32.3   Weight in (lb) to have BMI = 25 136.4   Assessments (Pre/Post)   Level of Consciousness (AVPU) alert     Patient arrived to department accompanied by ambulance via stretcher.  Patient noted awake and alert with no acute distress noted at this time.  He has a life vest in place.  Patient reports not having back up battery.  Lifevest removed.  Telemetry monitor applied with ekg scoping NSR rate 87 with AVB.  Patient denies chest pain and shortness of breath at this time. Vital signs assessed and documented. Notified provider of patient's arrival.  Call bell in reach.  Bed in lowest position.  Will continue with plan of care.   "

## 2022-09-29 NOTE — CONSULTS
[facility] - Tele[facility] ([facility])  Cardiology  Consult Note    Patient Name: Filemon La  MRN: [id number]  Admission Date: 9/28/2022  Hospital Length of Stay: 0 days  Code Status: Prior   Attending Provider: Fritz Peña MD   Consulting Provider: Aftab Barragan MD  Primary Care Physician: Sergio [facility]  Principal Problem:Acute on chronic combined systolic and diastolic congestive heart failure    Patient information was obtained from patient and ER records.     Inpatient consult to Cardiology  Consult performed by: Aftab Barragan MD  Consult ordered by: Josh Gee MD        Subjective:       HPI:   42 yo male, cardiology consult for CHFrEF and low BP  PMH NICM dilated CHFrEF 15% h/o alcohol abuse.  Recent cath don saurav 9/20/2022 normal coronary artery  09/18/2022 ECHO EF 15% dilated LV and RV normal  Patient reports compliance with diuretics at home, taking entresto metoprolol and torsemide 40 mg bid.  At home felt weak and fatigue.    In the ED, patient was tachycardic with O2 saturations of 94% on room air.  Chest x-ray was concerning for edema.  BNP: 700. He was started on empiric antibiotics for pneumonia.  CTA performed was negative for PE however did show cardiomegaly and interstitial edema.  Lasix 40 mg IV was given.  Patient placed in observation for new onset CHF.   Patient seen and examined with good diuresis overnight. Plan continued diuresis, review echo and add afterload reduction.                 Past Medical History:   Diagnosis Date    CHF (congestive heart failure)     Pulmonary hypertension     pt reported       Past Surgical History:   Procedure Laterality Date    CATHETERIZATION OF BOTH LEFT AND RIGHT HEART N/A 9/20/2022    Procedure: CATHETERIZATION, HEART, BOTH LEFT AND RIGHT;  Surgeon: Yady Patterson MD;  Location: [facility] CATH LAB;  Service: Cardiology;  Laterality: N/A;       Review of patient's allergies indicates:  No Known Allergies    No current facility-administered medications on  file prior to encounter.     Current Outpatient Medications on File Prior to Encounter   Medication Sig    chlordiazepoxide (LIBRIUM) 10 MG capsule Librium 10 mg po tid x 3 days then 10 mg po bid x 3 days then 10 mg po daily x 3 days then dose is completed    folic acid-vit B6-vit B12 2.5-25-2 mg (FOLBIC OR EQUIV) 2.5-25-2 mg Tab Take 1 tablet by mouth once daily.    metoprolol succinate (TOPROL-XL) 25 MG 24 hr tablet Take 0.5 tablets (12.5 mg total) by mouth every evening.    nicotine (NICODERM CQ) 21 mg/24 hr Place 1 patch onto the skin once daily.    sacubitriL-valsartan (ENTRESTO) 24-26 mg per tablet Take 1 tablet by mouth once daily.    thiamine 100 MG tablet Take 1 tablet (100 mg total) by mouth once daily.    torsemide (DEMADEX) 20 MG Tab Take 2 tablets (40 mg total) by mouth 2 (two) times a day.    acetaminophen (TYLENOL) 325 MG tablet Take 2 tablets (650 mg total) by mouth every 6 (six) hours as needed for Pain.    promethazine-dextromethorphan (PROMETHAZINE-DM) 6.25-15 mg/5 mL Syrp Take 5 mLs by mouth nightly as needed (cough). (Patient not taking: No sig reported)     Family History       Problem Relation (Age of Onset)    Diabetes Mother    Hypertension Mother, Father          Tobacco Use    Smoking status: Former     Packs/day: 1.00     Types: Cigarettes    Smokeless tobacco: Never   Substance and Sexual Activity    Alcohol use: Not Currently     Comment: for about 2 weeks    Drug use: No    Sexual activity: Not on file     Review of Systems   Constitutional: Positive for malaise/fatigue. Negative for decreased appetite, diaphoresis, fever and night sweats.   HENT:  Negative for nosebleeds.    Eyes:  Negative for blurred vision and double vision.   Cardiovascular:  Positive for dyspnea on exertion. Negative for chest pain, claudication, irregular heartbeat, leg swelling, near-syncope, orthopnea, palpitations, paroxysmal nocturnal dyspnea and syncope.   Respiratory:  Negative for cough,  shortness of breath, sleep disturbances due to breathing, snoring, sputum production and wheezing.    Endocrine: Negative for cold intolerance and polyuria.   Hematologic/Lymphatic: Does not bruise/bleed easily.   Skin:  Negative for rash.   Musculoskeletal:  Negative for back pain, falls, joint pain, joint swelling and neck pain.   Gastrointestinal:  Negative for abdominal pain, heartburn, nausea and vomiting.   Genitourinary:  Negative for dysuria, frequency and hematuria.   Neurological:  Positive for dizziness. Negative for difficulty with concentration, focal weakness, headaches, light-headedness, numbness, seizures and weakness.   Psychiatric/Behavioral:  Negative for depression, memory loss and substance abuse. The patient does not have insomnia.    Allergic/Immunologic: Negative for HIV exposure and hives.   Objective:     Vital Signs (Most Recent):  Temp: 98 °F (36.7 °C) (09/29/22 0717)  Pulse: 100 (09/29/22 0943)  Resp: 16 (09/29/22 0717)  BP: (!) 100/59 (09/29/22 0717)  SpO2: 99 % (09/29/22 0717)   Vital Signs (24h Range):  Temp:  [97.3 °F (36.3 °C)-98.5 °F (36.9 °C)] 98 °F (36.7 °C)  Pulse:  [] 100  Resp:  [16-20] 16  SpO2:  [96 %-99 %] 99 %  BP: ()/(53-64) 100/59     Weight: 80.2 kg (176 lb 12.9 oz)  Body mass index is 32.34 kg/m².    SpO2: 99 %  O2 Device (Oxygen Therapy): room air      Intake/Output Summary (Last 24 hours) at 9/29/2022 0946  Last data filed at 9/28/2022 2311  Gross per 24 hour   Intake 1200 ml   Output 1400 ml   Net -200 ml       Lines/Drains/Airways       Peripheral Intravenous Line  Duration                  Peripheral IV - Single Lumen 09/28/22 1222 20 G Right;Posterior;Lateral Hand <1 day                    Physical Exam  HENT:      Head: Normocephalic.   Eyes:      Pupils: Pupils are equal, round, and reactive to light.   Neck:      Thyroid: No thyromegaly.      Vascular: Normal carotid pulses. No carotid bruit or JVD.   Cardiovascular:      Rate and Rhythm: Normal  rate and regular rhythm. No extrasystoles are present.     Chest Wall: PMI is not displaced.      Pulses: Normal pulses.      Heart sounds: Normal heart sounds. No murmur heard.    No gallop. No S3 sounds.   Pulmonary:      Effort: No respiratory distress.      Breath sounds: Normal breath sounds. No stridor.   Abdominal:      General: Bowel sounds are normal.      Palpations: Abdomen is soft.      Tenderness: There is no abdominal tenderness. There is no rebound.   Musculoskeletal:         General: Normal range of motion.   Skin:     Findings: No rash.   Neurological:      Mental Status: He is alert and oriented to person, place, and time.   Psychiatric:         Behavior: Behavior normal.       Significant Labs: ABG: No results for input(s): PH, PCO2, HCO3, POCSATURATED, BE in the last 48 hours., Blood Culture: No results for input(s): LABBLOO in the last 48 hours., BMP:   Recent Labs   Lab 09/28/22  1223 09/29/22  0442   GLU 91 105    137   K 3.9 3.8    101   CO2 24 24   BUN 35* 29*   CREATININE 1.2 1.1   CALCIUM 9.7 9.2   MG  --  2.0   , CMP   Recent Labs   Lab 09/28/22  1223 09/29/22  0442    137   K 3.9 3.8    101   CO2 24 24   GLU 91 105   BUN 35* 29*   CREATININE 1.2 1.1   CALCIUM 9.7 9.2   PROT 8.2 7.0   ALBUMIN 4.0 3.5   BILITOT 1.6* 1.7*   ALKPHOS 82 78   AST 17 16   ALT 21 17   ANIONGAP 12 12   , CBC   Recent Labs   Lab 09/28/22  1223 09/29/22  0442   WBC 6.79 6.12   HGB 17.6 16.8   HCT 50.3 47.8    192   , INR No results for input(s): INR, PROTIME in the last 48 hours., Lipid Panel No results for input(s): CHOL, HDL, LDLCALC, TRIG, CHOLHDL in the last 48 hours., and Troponin   Recent Labs   Lab 09/28/22  1223 09/29/22  0007 09/29/22  0442   TROPONINI 0.035* 0.023 0.026       Significant Imaging: EKG:      Assessment and Plan:     * Acute on chronic combined systolic and diastolic congestive heart failure  Patient is identified as having Systolic (HFrEF) heart failure that  is Acute. CHF is currently controlled. Latest ECHO performed and demonstrates- Results for orders placed during the hospital encounter of 09/17/22    Echo    Interpretation Summary  · The left ventricle is severely enlarged with severely decreased systolic function.  · The estimated ejection fraction is 15%.  · Left ventricular diastolic dysfunction.  · There is severe left ventricular global hypokinesis.  · Normal right ventricular size with low normal right ventricular systolic function.  · Moderate left atrial enlargement.  · Moderate right atrial enlargement.  · Mild mitral regurgitation.  · Mild tricuspid regurgitation.  · Intermediate central venous pressure (8 mmHg).  · The estimated PA systolic pressure is 32 mmHg.  . Continue Beta Blocker, Furosemide and ARNI and monitor clinical status closely. Monitor on telemetry. Patient is on CHF pathway.  Monitor strict Is&Os and daily weights.  Place on fluid restriction of 1.5 L. Continue to stress to patient importance of self efficacy and  on diet for CHF. Last BNP reviewed- and noted below   Recent Labs   Lab 09/28/22  1223   *   .      CHFrEF 15% NICm dilated alcohol related  CHF improved and medication related low BP    -hold entresto and metoprolol (only 12.5 mg daily)   -decrease Torsemide from 40 mg bid to 20 mg daily  -Daily weight. Please call the office if gain 3 pounds in 1 day or 5 pounds in 1 week.  -Fluid restriction 1.5 liters a day; Na< 2 gm  -continue Lifevest  -HF clinic f/u tomorrow  -ok to d/c today     Alcoholism  Stopped         VTE Risk Mitigation (From admission, onward)         Ordered     Place sequential compression device  Until discontinued         09/28/22 1951                Thank you for your consult. I will follow-up with patient. Please contact us if you have any additional questions.    Aftab Barragan MD  Cardiology   [facility] - Telemetry ([facility])

## 2022-09-29 NOTE — ASSESSMENT & PLAN NOTE
-you 15% with diastolic dysfunction on echocardiogram done last week.    -clinically does not appear volume overloaded at this time.    -but patient states unable to ambulate more than 4-5 feet without getting SOB.  -Lasix 20 mg IV x1 (limited due to hypotension).    -cardiology consult.    -Monitor on telemetry.  09/29:  --remains hypotensive  --needs additional diuresing  --cardiology to see this AM

## 2022-09-30 ENCOUNTER — OFFICE VISIT (OUTPATIENT)
Dept: CARDIOLOGY | Facility: CLINIC | Age: 42
End: 2022-09-30
Payer: MEDICAID

## 2022-09-30 VITALS
WEIGHT: 180.56 LBS | SYSTOLIC BLOOD PRESSURE: 90 MMHG | HEART RATE: 100 BPM | OXYGEN SATURATION: 93 % | DIASTOLIC BLOOD PRESSURE: 60 MMHG | BODY MASS INDEX: 33.02 KG/M2

## 2022-09-30 DIAGNOSIS — I50.43 ACUTE ON CHRONIC COMBINED SYSTOLIC AND DIASTOLIC CONGESTIVE HEART FAILURE: Primary | ICD-10-CM

## 2022-09-30 PROCEDURE — 99999 PR PBB SHADOW E&M-EST. PATIENT-LVL III: CPT | Mod: PBBFAC,,, | Performed by: PHYSICIAN ASSISTANT

## 2022-09-30 PROCEDURE — 3074F PR MOST RECENT SYSTOLIC BLOOD PRESSURE < 130 MM HG: ICD-10-PCS | Mod: CPTII,,, | Performed by: PHYSICIAN ASSISTANT

## 2022-09-30 PROCEDURE — 99999 PR PBB SHADOW E&M-EST. PATIENT-LVL III: ICD-10-PCS | Mod: PBBFAC,,, | Performed by: PHYSICIAN ASSISTANT

## 2022-09-30 PROCEDURE — 99213 OFFICE O/P EST LOW 20 MIN: CPT | Mod: PBBFAC | Performed by: PHYSICIAN ASSISTANT

## 2022-09-30 PROCEDURE — 1111F PR DISCHARGE MEDS RECONCILED W/ CURRENT OUTPATIENT MED LIST: ICD-10-PCS | Mod: CPTII,,, | Performed by: PHYSICIAN ASSISTANT

## 2022-09-30 PROCEDURE — 1160F PR REVIEW ALL MEDS BY PRESCRIBER/CLIN PHARMACIST DOCUMENTED: ICD-10-PCS | Mod: CPTII,,, | Performed by: PHYSICIAN ASSISTANT

## 2022-09-30 PROCEDURE — 1159F MED LIST DOCD IN RCRD: CPT | Mod: CPTII,,, | Performed by: PHYSICIAN ASSISTANT

## 2022-09-30 PROCEDURE — 1111F DSCHRG MED/CURRENT MED MERGE: CPT | Mod: CPTII,,, | Performed by: PHYSICIAN ASSISTANT

## 2022-09-30 PROCEDURE — 3074F SYST BP LT 130 MM HG: CPT | Mod: CPTII,,, | Performed by: PHYSICIAN ASSISTANT

## 2022-09-30 PROCEDURE — 3008F PR BODY MASS INDEX (BMI) DOCUMENTED: ICD-10-PCS | Mod: CPTII,,, | Performed by: PHYSICIAN ASSISTANT

## 2022-09-30 PROCEDURE — 3008F BODY MASS INDEX DOCD: CPT | Mod: CPTII,,, | Performed by: PHYSICIAN ASSISTANT

## 2022-09-30 PROCEDURE — 3078F DIAST BP <80 MM HG: CPT | Mod: CPTII,,, | Performed by: PHYSICIAN ASSISTANT

## 2022-09-30 PROCEDURE — 1159F PR MEDICATION LIST DOCUMENTED IN MEDICAL RECORD: ICD-10-PCS | Mod: CPTII,,, | Performed by: PHYSICIAN ASSISTANT

## 2022-09-30 PROCEDURE — 4010F ACE/ARB THERAPY RXD/TAKEN: CPT | Mod: CPTII,,, | Performed by: PHYSICIAN ASSISTANT

## 2022-09-30 PROCEDURE — 99214 OFFICE O/P EST MOD 30 MIN: CPT | Mod: S$PBB,,, | Performed by: PHYSICIAN ASSISTANT

## 2022-09-30 PROCEDURE — 4010F PR ACE/ARB THEARPY RXD/TAKEN: ICD-10-PCS | Mod: CPTII,,, | Performed by: PHYSICIAN ASSISTANT

## 2022-09-30 PROCEDURE — 99214 PR OFFICE/OUTPT VISIT, EST, LEVL IV, 30-39 MIN: ICD-10-PCS | Mod: S$PBB,,, | Performed by: PHYSICIAN ASSISTANT

## 2022-09-30 PROCEDURE — 3078F PR MOST RECENT DIASTOLIC BLOOD PRESSURE < 80 MM HG: ICD-10-PCS | Mod: CPTII,,, | Performed by: PHYSICIAN ASSISTANT

## 2022-09-30 PROCEDURE — 1160F RVW MEDS BY RX/DR IN RCRD: CPT | Mod: CPTII,,, | Performed by: PHYSICIAN ASSISTANT

## 2022-09-30 NOTE — PROGRESS NOTES
[facility] Provider Note (Initial Clinic) Consult Note    Date of original referral: 9/29/2022  Age: 41 y.o.  Gender: male  Ethnicity: Not  or /a   Number of admissions for CHF within the preceding year: 3  Duration of CHF: one month  Type of Congestive Heart Failure: Systolic   Etiology: Non-ischemic    Social history: Alcohol intake  Enrolled in Infusion suite: no    Diagnostic Labs:   EKG - 09/28/2022  CXR - 09/28/2022  ECHO - 09/18/2022  Stress test -   Stress echo -   Pharmacologic stress -   Cardiac catheterization - 09/20/2022   Cardiac MRI -     Lab Results   Component Value Date     09/29/2022     09/28/2022    K 3.8 09/29/2022    K 3.9 09/28/2022     09/29/2022     09/28/2022    CO2 24 09/29/2022    CO2 24 09/28/2022     09/29/2022    GLU 91 09/28/2022    BUN 29 (H) 09/29/2022    BUN 35 (H) 09/28/2022    CREATININE 1.1 09/29/2022    CREATININE 1.2 09/28/2022    CALCIUM 9.2 09/29/2022    CALCIUM 9.7 09/28/2022    PROT 7.0 09/29/2022    PROT 8.2 09/28/2022    ALBUMIN 3.5 09/29/2022    ALBUMIN 4.0 09/28/2022    BILITOT 1.7 (H) 09/29/2022    BILITOT 1.6 (H) 09/28/2022    ALKPHOS 78 09/29/2022    ALKPHOS 82 09/28/2022    AST 16 09/29/2022    AST 17 09/28/2022    ALT 17 09/29/2022    ALT 21 09/28/2022    ANIONGAP 12 09/29/2022    ANIONGAP 12 09/28/2022    ESTGFRAFRICA >60.0 07/27/2022    ESTGFRAFRICA >60 05/22/2022    EGFRNONAA >60.0 07/27/2022    EGFRNONAA >60 05/22/2022       Lab Results   Component Value Date    WBC 6.12 09/29/2022    WBC 6.79 09/28/2022    RBC 4.92 09/29/2022    RBC 5.17 09/28/2022    HGB 16.8 09/29/2022    HGB 17.6 09/28/2022    HCT 47.8 09/29/2022    HCT 50.3 09/28/2022    MCV 97 09/29/2022    MCV 97 09/28/2022    MCH 34.1 (H) 09/29/2022    MCH 34.0 (H) 09/28/2022    MCHC 35.1 09/29/2022    MCHC 35.0 09/28/2022    RDW 11.9 09/29/2022    RDW 12.1 09/28/2022     09/29/2022     09/28/2022    MPV 12.7 09/29/2022    MPV 13.0 (H) 09/28/2022     IMMGR 0.2 09/29/2022    IMMGR 0.1 09/28/2022    IGABS 0.01 09/29/2022    IGABS 0.01 09/28/2022    LYMPH 2.5 09/29/2022    LYMPH 40.8 09/29/2022    MONO 0.7 09/29/2022    MONO 10.9 09/29/2022    EOS 0.1 09/29/2022    EOS 0.1 09/28/2022    BASO 0.05 09/29/2022    BASO 0.05 09/28/2022    NRBC 0 09/29/2022    NRBC 0 09/28/2022    GRAN 2.8 09/29/2022    GRAN 45.8 09/29/2022    EOSINOPHIL 1.5 09/29/2022    EOSINOPHIL 0.9 09/28/2022    BASOPHIL 0.8 09/29/2022    BASOPHIL 0.7 09/28/2022       Lab Results   Component Value Date     (H) 09/28/2022     (H) 09/19/2022    MG 2.0 09/29/2022    TROPONINI 0.026 09/29/2022    TROPONINI 0.023 09/29/2022       Lab Results   Component Value Date    COLORU Yellow 09/28/2022    APPEARANCEUA Clear 09/28/2022    PHUR 6.0 09/28/2022    SPECGRAV 1.010 09/28/2022    PROTEINUA Negative 09/28/2022    GLUCUA Negative 09/28/2022    KETONESU Negative 09/28/2022    BILIRUBINUA Negative 09/28/2022    OCCULTUA Negative 09/28/2022    NITRITE Negative 09/28/2022    LEUKOCYTESUR Negative 09/28/2022       List all implanted cardiac devices:   none    Current Outpatient Medications on File Prior to Visit   Medication Sig Dispense Refill    acetaminophen (TYLENOL) 325 MG tablet Take 2 tablets (650 mg total) by mouth every 6 (six) hours as needed for Pain.  0    chlordiazepoxide (LIBRIUM) 10 MG capsule Librium 10 mg po tid x 3 days then 10 mg po bid x 3 days then 10 mg po daily x 3 days then dose is completed 18 capsule 0    folic acid-vit B6-vit B12 2.5-25-2 mg (FOLBIC OR EQUIV) 2.5-25-2 mg Tab Take 1 tablet by mouth once daily. 30 tablet 0    nicotine (NICODERM CQ) 21 mg/24 hr Place 1 patch onto the skin once daily. 28 patch 0    thiamine 100 MG tablet Take 1 tablet (100 mg total) by mouth once daily. 30 tablet 0    torsemide (DEMADEX) 20 MG Tab Take 1 tablet (20 mg total) by mouth once daily. 30 tablet 0    [DISCONTINUED] metoprolol succinate (TOPROL-XL) 25 MG 24 hr tablet Take 0.5  tablets (12.5 mg total) by mouth every evening. 15 tablet 0    [DISCONTINUED] promethazine-dextromethorphan (PROMETHAZINE-DM) 6.25-15 mg/5 mL Syrp Take 5 mLs by mouth nightly as needed (cough). (Patient not taking: No sig reported) 120 mL 1    [DISCONTINUED] sacubitriL-valsartan (ENTRESTO) 24-26 mg per tablet Take 1 tablet by mouth once daily. 30 tablet 0     No current facility-administered medications on file prior to visit.         HPI:  Patient can walk around house but not much further   Patient sleeps on 2 pillows   Patient wakes up SOB, has to get out of bed, associated cough, sputum none   Palpitations - none   Dizzy, light-headed, pre-syncope or syncope none   Since discharge frequency of performing weights, home weight and weight change   Other information felt pertinent to HPI    Mr. La is a 41-year-old  female with PMH significant for chronic alcoholism, was hospitalized last week for a new onset CHF.  Echocardiogram revealed EF 15% with diastolic dysfunction, suggestive of alcoholic cardiomyopathy.  LHC did not reveal evidence of obstructive coronary disease.  Patient was started on beta-blocker, Entresto, diuretics and discharged home on 09/22 with a LifeVest..  He presented to [facility] ED today complaining of generalized weakness.  He was seen by his PCP earlier today, was told to proceed to the nearest ED due to hypotension, systolics in the 80s.  Patient has been complaining of lightheadedness, dizziness.  Patient reports compliance with diuretics at home, on torsemide 20 mg p.o. b.i.d..  ED physician discussed case with Cardiology on-call, recommended placing observation.                 Patient with hypotension - EF 10% on 9/20/22. Wearing lifevest but waiting for family member to bring . Discussed with cardiology who feels like pt is overmedicated. Dr. Barragan recommends: stop entresto, stop metorprolol, decrease torsemide to 20mg daily and f/u as OP with HF  clinic tomorrow.     Today he returns for first visit. Not SOB but has some dizziness when standing abruptly.  BP has improved some. No longer drinking. No scale at home            PHYSICAL:   Vitals:    09/30/22 0943   BP: 90/60   Pulse: 100      Wt Readings from Last 3 Encounters:   09/30/22 81.9 kg (180 lb 8.9 oz)   09/28/22 80.2 kg (176 lb 12.9 oz)   09/22/22 80.5 kg (177 lb 7.5 oz)       JVD: no   Heart rhythm: regular  Cardiac murmur: No    S3: no  S4: no  Lungs: clear  Liver span: 10 cm:   Hepatojugular reflux: no  Edema: no      ASSESSMENT: acute systolic HF    PLAN:      Patient Instructions:   Instruct the patient to notify this clinic if HH, a physician or an advanced care provider wants to change medication one of their HF medications   Activity and Diet restrictions:   Recommend 2-3 gram sodium restriction and 1500cc- 2000cc fluid restriction.  Encourage physical activity with graded exercise program.  Requested patient to weigh themselves daily, and to notify us if their weight increases by more than 3 lbs in 1 day or 5 lbs in 3 days.    Assigned dry weight on home scale: no scale at home  Medication changes (include current dose and changed dose)  Patient does not tolerate GDMT, remains with SBP of 90 which is improved from discharge but this will limit what we are able to add  Repeat echo in 3 months with life vest.  Pt with elevated Cr, bili, hypotension and not tolerating GDMT needs referral to [facility] advanced HF for LVAD/oht workup. Spoke with him about getting first appt here then [facility] with likely admission for [facility].   Upcoming labs and date anticipated: Oct 11, told him to please call before with any issues

## 2022-10-11 ENCOUNTER — OFFICE VISIT (OUTPATIENT)
Dept: TRANSPLANT | Facility: CLINIC | Age: 42
End: 2022-10-11
Payer: MEDICAID

## 2022-10-11 ENCOUNTER — TELEPHONE (OUTPATIENT)
Dept: TRANSPLANT | Facility: CLINIC | Age: 42
End: 2022-10-11
Payer: MEDICAID

## 2022-10-11 VITALS
SYSTOLIC BLOOD PRESSURE: 118 MMHG | HEIGHT: 64 IN | BODY MASS INDEX: 30.6 KG/M2 | DIASTOLIC BLOOD PRESSURE: 84 MMHG | HEART RATE: 100 BPM | WEIGHT: 179.25 LBS

## 2022-10-11 DIAGNOSIS — I42.8 CARDIOMYOPATHY, NONISCHEMIC: ICD-10-CM

## 2022-10-11 DIAGNOSIS — Z72.0 TOBACCO ABUSE: ICD-10-CM

## 2022-10-11 DIAGNOSIS — I50.42 CHRONIC COMBINED SYSTOLIC AND DIASTOLIC HEART FAILURE: Primary | ICD-10-CM

## 2022-10-11 DIAGNOSIS — Z78.9 ALCOHOL USE: ICD-10-CM

## 2022-10-11 DIAGNOSIS — I95.81 POSTPROCEDURAL HYPOTENSION: ICD-10-CM

## 2022-10-11 DIAGNOSIS — I27.20 PULMONARY HYPERTENSION: ICD-10-CM

## 2022-10-11 PROCEDURE — 99212 OFFICE O/P EST SF 10 MIN: CPT | Mod: PBBFAC | Performed by: INTERNAL MEDICINE

## 2022-10-11 PROCEDURE — 3008F BODY MASS INDEX DOCD: CPT | Mod: CPTII,,, | Performed by: INTERNAL MEDICINE

## 2022-10-11 PROCEDURE — 3079F DIAST BP 80-89 MM HG: CPT | Mod: CPTII,,, | Performed by: INTERNAL MEDICINE

## 2022-10-11 PROCEDURE — 99999 PR PBB SHADOW E&M-EST. PATIENT-LVL II: CPT | Mod: PBBFAC,,, | Performed by: INTERNAL MEDICINE

## 2022-10-11 PROCEDURE — 99205 OFFICE O/P NEW HI 60 MIN: CPT | Mod: S$PBB,,, | Performed by: INTERNAL MEDICINE

## 2022-10-11 PROCEDURE — 1111F PR DISCHARGE MEDS RECONCILED W/ CURRENT OUTPATIENT MED LIST: ICD-10-PCS | Mod: CPTII,,, | Performed by: INTERNAL MEDICINE

## 2022-10-11 PROCEDURE — 99999 PR PBB SHADOW E&M-EST. PATIENT-LVL II: ICD-10-PCS | Mod: PBBFAC,,, | Performed by: INTERNAL MEDICINE

## 2022-10-11 PROCEDURE — 3074F SYST BP LT 130 MM HG: CPT | Mod: CPTII,,, | Performed by: INTERNAL MEDICINE

## 2022-10-11 PROCEDURE — 4010F ACE/ARB THERAPY RXD/TAKEN: CPT | Mod: CPTII,,, | Performed by: INTERNAL MEDICINE

## 2022-10-11 PROCEDURE — 1111F DSCHRG MED/CURRENT MED MERGE: CPT | Mod: CPTII,,, | Performed by: INTERNAL MEDICINE

## 2022-10-11 PROCEDURE — 99205 PR OFFICE/OUTPT VISIT, NEW, LEVL V, 60-74 MIN: ICD-10-PCS | Mod: S$PBB,,, | Performed by: INTERNAL MEDICINE

## 2022-10-11 PROCEDURE — 3074F PR MOST RECENT SYSTOLIC BLOOD PRESSURE < 130 MM HG: ICD-10-PCS | Mod: CPTII,,, | Performed by: INTERNAL MEDICINE

## 2022-10-11 PROCEDURE — 3008F PR BODY MASS INDEX (BMI) DOCUMENTED: ICD-10-PCS | Mod: CPTII,,, | Performed by: INTERNAL MEDICINE

## 2022-10-11 PROCEDURE — 4010F PR ACE/ARB THEARPY RXD/TAKEN: ICD-10-PCS | Mod: CPTII,,, | Performed by: INTERNAL MEDICINE

## 2022-10-11 PROCEDURE — 3079F PR MOST RECENT DIASTOLIC BLOOD PRESSURE 80-89 MM HG: ICD-10-PCS | Mod: CPTII,,, | Performed by: INTERNAL MEDICINE

## 2022-10-11 NOTE — PROGRESS NOTES
Subjective:       HPI:  Mr. La is a very pleasant 42 yo black male with likely stage D HFrEF (EF=15%, LVEDD=7.6 cm), NICMP (possible COVID CMP but also has a Hx of alcohol use) with NYHA class III/IV symptoms who is referred by our colleague Berenice Barnes for evaluation for advanced HF therapies. As mentioned he reports NYHA class III-ICV symptoms. Was diagnosed few months ago but reports having frequent episodes of pneumonia last year. He did have COVID last December and feels that most of hid cardiac symptoms started afterwards. He has had  frequent HF admissions since. He was on good GDMT but now due to symptomatic hypotension most off it has been discontinued (was on Entresto, metoprolol) . Most recent Echo showed severely enlarged LV with severely depressed lV systolic function (EF=15%, LVEDD=7.6 cm). Coronary angiogram showed normal coronaries. RHC showed significantly elevated left and right sided filling pressures with lwo cardiac index (see report below). He is a forme smoker and recently quit. He was also a heavy drinker but quit.       2D Echo with CFD done on 9/18/2022  The left ventricle is severely enlarged with severely decreased systolic function (LVEDD 7.6 cm)  The estimated ejection fraction is 15%.  Left ventricular diastolic dysfunction.  There is severe left ventricular global hypokinesis.  Normal right ventricular size with low normal right ventricular systolic function.  Moderate left atrial enlargement.  Moderate right atrial enlargement.  Mild mitral regurgitation.  Mild tricuspid regurgitation.  Intermediate central venous pressure (8 mmHg).  The estimated PA systolic pressure is 32 mmHg.    RHC performed on 9/20/2022  RA: 19/ 15/ 15 RV: 56/ 13/ 18 PA: 59/ 32/ 44 PWP: 31/ 31/ 30 .   Cardiac output was 3.4. Cardiac index is 1.9 L/min/m2.      Coronary angiogram done on 9/20/2022  Left Anterior Descending   The vessel is large and is angiographically normal.      First Diagonal Branch  "  The vessel is large and is angiographically normal.      Left Circumflex   The vessel is large and is angiographically normal.      First Obtuse Marginal Branch   The vessel is large and is angiographically normal.      Right Coronary Artery   The vessel is large and is angiographically normal.      Right Posterior Descending Artery   The vessel is large and is angiographically normal.        Past Medical History:   Diagnosis Date    CHF (congestive heart failure)     Pulmonary hypertension     pt reported     Past Surgical History:   Procedure Laterality Date    CATHETERIZATION OF BOTH LEFT AND RIGHT HEART N/A 9/20/2022    Procedure: CATHETERIZATION, HEART, BOTH LEFT AND RIGHT;  Surgeon: Yady Patterson MD;  Location: [facility] [place] CATH LAB;  Service: Cardiology;  Laterality: N/A;       Review of Systems   Constitutional: Positive for malaise/fatigue. Negative for chills, decreased appetite, diaphoresis, fever, night sweats, weight gain and weight loss.   Eyes: Negative.    Cardiovascular:  Positive for dyspnea on exertion, leg swelling, orthopnea and paroxysmal nocturnal dyspnea. Negative for chest pain, claudication, cyanosis, irregular heartbeat, near-syncope, palpitations and syncope.   Respiratory:  Negative for cough, hemoptysis and shortness of breath.    Endocrine: Negative.    Hematologic/Lymphatic: Negative.    Skin:  Negative for color change, dry skin and nail changes.   Musculoskeletal: Negative.    Gastrointestinal:  Positive for bloating.   Genitourinary: Negative.    Neurological:  Negative for weakness.     Objective:   Blood pressure 118/84, pulse 100, height 5' 4" (1.626 m), weight 81.3 kg (179 lb 3.7 oz).body mass index is 30.77 kg/m².  Physical Exam  Vitals reviewed.   Constitutional:       Appearance: He is well-developed. He is ill-appearing.      Comments: /84   Pulse 100   Ht 5' 4" (1.626 m)   Wt 81.3 kg (179 lb 3.7 oz)   BMI 30.77 kg/m²      HENT:      Head: Normocephalic.   Neck:      " Vascular: No carotid bruit or JVD.   Cardiovascular:      Rate and Rhythm: Regular rhythm. Tachycardia present.      Chest Wall: PMI is displaced.      Pulses: Normal pulses.      Heart sounds: Normal heart sounds. No murmur heard.  Pulmonary:      Effort: Pulmonary effort is normal.      Breath sounds: Normal breath sounds.   Abdominal:      General: Bowel sounds are normal.      Palpations: Abdomen is soft.   Skin:     General: Skin is warm.   Neurological:      Mental Status: He is alert.       Labs:    Chemistry        Component Value Date/Time     09/29/2022 0442    K 3.8 09/29/2022 0442     09/29/2022 0442    CO2 24 09/29/2022 0442    BUN 29 (H) 09/29/2022 0442    CREATININE 1.1 09/29/2022 0442     09/29/2022 0442        Component Value Date/Time    CALCIUM 9.2 09/29/2022 0442    ALKPHOS 78 09/29/2022 0442    AST 16 09/29/2022 0442    ALT 17 09/29/2022 0442    BILITOT 1.7 (H) 09/29/2022 0442    ESTGFRAFRICA >60.0 07/27/2022 0731    EGFRNONAA >60.0 07/27/2022 0731          Magnesium   Date Value Ref Range Status   09/29/2022 2.0 1.6 - 2.6 mg/dL Final     Lab Results   Component Value Date    WBC 6.12 09/29/2022    HGB 16.8 09/29/2022    HCT 47.8 09/29/2022     09/29/2022     No results found for: INR, PROTIME  BNP   Date Value Ref Range Status   09/28/2022 612 (H) 0 - 99 pg/mL Final     Comment:     Values of less than 100 pg/ml are consistent with non-CHF populations.   09/19/2022 522 (H) 0 - 99 pg/mL Final     Comment:     Values of less than 100 pg/ml are consistent with non-CHF populations.   09/17/2022 766 (H) 0 - 99 pg/mL Final     Comment:     Values of less than 100 pg/ml are consistent with non-CHF populations.       Assessment:      1. Chronic combined systolic and diastolic heart failure    2. Cardiomyopathy, nonischemic    3. Pulmonary hypertension    4. Postprocedural hypotension        Plan:   In summary, Mr. La is a very pleasant 42 yo black male with likely stage D  HFrEF, NICMP (possible COVID CMP but also has a Hx of alcohol use) with NYHA class III/IV symptoms and frequent HF admissions and now with low BP limiting optimization of GDMT.   Recommend the following;  Risks stratification with a 2D echo with CFD to reevaluate LV/RV size and function  CPX to determine his PeakVO2 and VE//VCO2 slope  RHC to better assess his hemodynamics and need for inotropes.   Due to how ill he is, I have scheduled all these tests for Thursday. His RHC will likely show low output and high filling pressures so have also instructed him to plan for a hospital admission.  Recommend 2 gram sodium restriction and 1500cc fluid restriction.  Encourage physical activity with graded exercise program.  Requested patient to weigh themselves daily, and to notify us if their weight increases by more than 3 lbs in 1 day or 5 lbs in 1 week.     Transplant candidacy:  Patient is a 41 y.o. year old male with heart failure is being seen for possible LVAD and OHT. he is scheduled for risk stratification testing with CPX/RHC. The patient will follow up with pre-transplant. Main concerns are his recent smoking and alcohol use. His LFTs are WNL but may  need evaluation by hepatology.     Patient has very good family support (older Brother) and Sister in law who is an Cardiology MADDY in [facility]. Both were present during the clinic visit.     Thank you for allowing me to participate in the care of this very pleasant patient. Do not hesitate to contact me should you have any questions or concern. I will give further recs after reviewing his risk stratification.     Kofi Calixto MD

## 2022-10-11 NOTE — TELEPHONE ENCOUNTER
I spoke with Dr Calixto, who advised that this pt will undergo Risk stratification this week as part of adv heart failure option referral .Captured referral and sent to Financial coord for auth.

## 2022-10-11 NOTE — LETTER
October 11, 2022        Berenice Barnes  [address]  [place] [address]  Phone: [phone]  Fax: [phone]             [facility] (Medical Ofc Bldg)  [address] [place] [address]  Phone: [phone]  Fax: [phone]   Patient: Filemon La   MR Number: [id number]   YOB: 1980   Date of Visit: 10/11/2022       Dear Dr. Berenice Barnes    Thank you for referring Filemon La to me for evaluation. Attached you will find relevant portions of my assessment and plan of care.    If you have questions, please do not hesitate to call me. I look forward to following Filemon La along with you.    Sincerely,    Kofi Calixto MD    Enclosure    If you would like to receive this communication electronically, please contact [email or web address] or [phone] to request [employer] access.    [employer] is a tool which provides read-only access to select patient information with whom you have a relationship. Its easy to use and provides real time access to review your patients record including encounter summaries, notes, results, and demographic information.    If you feel you have received this communication in error or would no longer like to receive these types of communications, please e-mail [email or web address]

## 2022-10-11 NOTE — H&P (VIEW-ONLY)
Subjective:       HPI:  Mr. La is a very pleasant 42 yo black male with likely stage D HFrEF (EF=15%, LVEDD=7.6 cm), NICMP (possible COVID CMP but also has a Hx of alcohol use) with NYHA class III/IV symptoms who is referred by our colleague Berenice Barnes for evaluation for advanced HF therapies. As mentioned he reports NYHA class III-ICV symptoms. Was diagnosed few months ago but reports having frequent episodes of pneumonia last year. He did have COVID last December and feels that most of hid cardiac symptoms started afterwards. He has had  frequent HF admissions since. He was on good GDMT but now due to symptomatic hypotension most off it has been discontinued (was on Entresto, metoprolol) . Most recent Echo showed severely enlarged LV with severely depressed lV systolic function (EF=15%, LVEDD=7.6 cm). Coronary angiogram showed normal coronaries. RHC showed significantly elevated left and right sided filling pressures with lwo cardiac index (see report below). He is a forme smoker and recently quit. He was also a heavy drinker but quit.       2D Echo with CFD done on 9/18/2022  The left ventricle is severely enlarged with severely decreased systolic function (LVEDD 7.6 cm)  The estimated ejection fraction is 15%.  Left ventricular diastolic dysfunction.  There is severe left ventricular global hypokinesis.  Normal right ventricular size with low normal right ventricular systolic function.  Moderate left atrial enlargement.  Moderate right atrial enlargement.  Mild mitral regurgitation.  Mild tricuspid regurgitation.  Intermediate central venous pressure (8 mmHg).  The estimated PA systolic pressure is 32 mmHg.    RHC performed on 9/20/2022  RA: 19/ 15/ 15 RV: 56/ 13/ 18 PA: 59/ 32/ 44 PWP: 31/ 31/ 30 .   Cardiac output was 3.4. Cardiac index is 1.9 L/min/m2.      Coronary angiogram done on 9/20/2022  Left Anterior Descending   The vessel is large and is angiographically normal.      First Diagonal Branch  "  The vessel is large and is angiographically normal.      Left Circumflex   The vessel is large and is angiographically normal.      First Obtuse Marginal Branch   The vessel is large and is angiographically normal.      Right Coronary Artery   The vessel is large and is angiographically normal.      Right Posterior Descending Artery   The vessel is large and is angiographically normal.        Past Medical History:   Diagnosis Date    CHF (congestive heart failure)     Pulmonary hypertension     pt reported     Past Surgical History:   Procedure Laterality Date    CATHETERIZATION OF BOTH LEFT AND RIGHT HEART N/A 9/20/2022    Procedure: CATHETERIZATION, HEART, BOTH LEFT AND RIGHT;  Surgeon: Yady Patterson MD;  Location: [facility] CATH LAB;  Service: Cardiology;  Laterality: N/A;       Review of Systems   Constitutional: Positive for malaise/fatigue. Negative for chills, decreased appetite, diaphoresis, fever, night sweats, weight gain and weight loss.   Eyes: Negative.    Cardiovascular:  Positive for dyspnea on exertion, leg swelling, orthopnea and paroxysmal nocturnal dyspnea. Negative for chest pain, claudication, cyanosis, irregular heartbeat, near-syncope, palpitations and syncope.   Respiratory:  Negative for cough, hemoptysis and shortness of breath.    Endocrine: Negative.    Hematologic/Lymphatic: Negative.    Skin:  Negative for color change, dry skin and nail changes.   Musculoskeletal: Negative.    Gastrointestinal:  Positive for bloating.   Genitourinary: Negative.    Neurological:  Negative for weakness.     Objective:   Blood pressure 118/84, pulse 100, height 5' 4" (1.626 m), weight 81.3 kg (179 lb 3.7 oz).body mass index is 30.77 kg/m².  Physical Exam  Vitals reviewed.   Constitutional:       Appearance: He is well-developed. He is ill-appearing.      Comments: /84   Pulse 100   Ht 5' 4" (1.626 m)   Wt 81.3 kg (179 lb 3.7 oz)   BMI 30.77 kg/m²      HENT:      Head: Normocephalic.   Neck:      " Vascular: No carotid bruit or JVD.   Cardiovascular:      Rate and Rhythm: Regular rhythm. Tachycardia present.      Chest Wall: PMI is displaced.      Pulses: Normal pulses.      Heart sounds: Normal heart sounds. No murmur heard.  Pulmonary:      Effort: Pulmonary effort is normal.      Breath sounds: Normal breath sounds.   Abdominal:      General: Bowel sounds are normal.      Palpations: Abdomen is soft.   Skin:     General: Skin is warm.   Neurological:      Mental Status: He is alert.       Labs:    Chemistry        Component Value Date/Time     09/29/2022 0442    K 3.8 09/29/2022 0442     09/29/2022 0442    CO2 24 09/29/2022 0442    BUN 29 (H) 09/29/2022 0442    CREATININE 1.1 09/29/2022 0442     09/29/2022 0442        Component Value Date/Time    CALCIUM 9.2 09/29/2022 0442    ALKPHOS 78 09/29/2022 0442    AST 16 09/29/2022 0442    ALT 17 09/29/2022 0442    BILITOT 1.7 (H) 09/29/2022 0442    ESTGFRAFRICA >60.0 07/27/2022 0731    EGFRNONAA >60.0 07/27/2022 0731          Magnesium   Date Value Ref Range Status   09/29/2022 2.0 1.6 - 2.6 mg/dL Final     Lab Results   Component Value Date    WBC 6.12 09/29/2022    HGB 16.8 09/29/2022    HCT 47.8 09/29/2022     09/29/2022     No results found for: INR, PROTIME  BNP   Date Value Ref Range Status   09/28/2022 612 (H) 0 - 99 pg/mL Final     Comment:     Values of less than 100 pg/ml are consistent with non-CHF populations.   09/19/2022 522 (H) 0 - 99 pg/mL Final     Comment:     Values of less than 100 pg/ml are consistent with non-CHF populations.   09/17/2022 766 (H) 0 - 99 pg/mL Final     Comment:     Values of less than 100 pg/ml are consistent with non-CHF populations.       Assessment:      1. Chronic combined systolic and diastolic heart failure    2. Cardiomyopathy, nonischemic    3. Pulmonary hypertension    4. Postprocedural hypotension        Plan:   In summary, Mr. La is a very pleasant 40 yo black male with likely stage D  HFrEF, NICMP (possible COVID CMP but also has a Hx of alcohol use) with NYHA class III/IV symptoms and frequent HF admissions and now with low BP limiting optimization of GDMT.   Recommend the following;  Risks stratification with a 2D echo with CFD to reevaluate LV/RV size and function  CPX to determine his PeakVO2 and VE//VCO2 slope  RHC to better assess his hemodynamics and need for inotropes.   Due to how ill he is, I have scheduled all these tests for Thursday. His RHC will likely show low output and high filling pressures so have also instructed him to plan for a hospital admission.  Recommend 2 gram sodium restriction and 1500cc fluid restriction.  Encourage physical activity with graded exercise program.  Requested patient to weigh themselves daily, and to notify us if their weight increases by more than 3 lbs in 1 day or 5 lbs in 1 week.     Transplant candidacy:  Patient is a 41 y.o. year old male with heart failure is being seen for possible LVAD and OHT. he is scheduled for risk stratification testing with CPX/RHC. The patient will follow up with pre-transplant. Main concerns are his recent smoking and alcohol use. His LFTs are WNL but may  need evaluation by hepatology.     Patient has very good family support (older Brother) and Sister in law who is an Cardiology MADDY in [facility]. Both were present during the clinic visit.     Thank you for allowing me to participate in the care of this very pleasant patient. Do not hesitate to contact me should you have any questions or concern. I will give further recs after reviewing his risk stratification.     Kofi Calixto MD

## 2022-10-13 ENCOUNTER — HOSPITAL ENCOUNTER (OUTPATIENT)
Dept: CARDIOLOGY | Facility: HOSPITAL | Age: 42
Discharge: HOME | End: 2022-10-13
Attending: INTERNAL MEDICINE
Payer: MEDICAID

## 2022-10-13 ENCOUNTER — HOSPITAL ENCOUNTER (OUTPATIENT)
Facility: HOSPITAL | Age: 42
Discharge: HOME | End: 2022-10-13
Attending: INTERNAL MEDICINE | Admitting: INTERNAL MEDICINE
Payer: MEDICAID

## 2022-10-13 VITALS
HEART RATE: 93 BPM | RESPIRATION RATE: 17 BRPM | HEIGHT: 64 IN | SYSTOLIC BLOOD PRESSURE: 122 MMHG | HEART RATE: 102 BPM | SYSTOLIC BLOOD PRESSURE: 136 MMHG | WEIGHT: 177.56 LBS | BODY MASS INDEX: 30.31 KG/M2 | BODY MASS INDEX: 30.56 KG/M2 | TEMPERATURE: 97 F | DIASTOLIC BLOOD PRESSURE: 59 MMHG | WEIGHT: 179 LBS | HEIGHT: 64 IN | DIASTOLIC BLOOD PRESSURE: 70 MMHG | OXYGEN SATURATION: 99 %

## 2022-10-13 DIAGNOSIS — I50.42 CHRONIC COMBINED SYSTOLIC AND DIASTOLIC HEART FAILURE: ICD-10-CM

## 2022-10-13 LAB
ASCENDING AORTA: 2.4 CM
AV INDEX (PROSTH): 0.65
AV MEAN GRADIENT: 2 MMHG
AV PEAK GRADIENT: 5 MMHG
AV VALVE AREA: 2.37 CM2
AV VELOCITY RATIO: 0.67
BSA FOR ECHO PROCEDURE: 1.91 M2
CTP QC/QA: YES
CV ECHO LV RWT: 0.28 CM
DOP CALC AO PEAK VEL: 1.07 M/S
DOP CALC AO VTI: 15.26 CM
DOP CALC LVOT AREA: 3.6 CM2
DOP CALC LVOT DIAMETER: 2.15 CM
DOP CALC LVOT PEAK VEL: 0.72 M/S
DOP CALC LVOT STROKE VOLUME: 36.14 CM3
DOP CALCLVOT PEAK VEL VTI: 9.96 CM
E WAVE DECELERATION TIME: 120.74 MSEC
E/A RATIO: 2.37
E/E' RATIO: 15 M/S
ECHO LV POSTERIOR WALL: 0.97 CM (ref 0.6–1.1)
EJECTION FRACTION: 10 %
FRACTIONAL SHORTENING: 5 % (ref 28–44)
INTERVENTRICULAR SEPTUM: 0.94 CM (ref 0.6–1.1)
IVRT: 71.36 MSEC
LA MAJOR: 5.53 CM
LA MINOR: 6.31 CM
LA WIDTH: 4.26 CM
LEFT ATRIUM SIZE: 3.76 CM
LEFT ATRIUM VOLUME INDEX MOD: 61.5 ML/M2
LEFT ATRIUM VOLUME INDEX: 42.9 ML/M2
LEFT ATRIUM VOLUME MOD: 115 CM3
LEFT ATRIUM VOLUME: 80.25 CM3
LEFT INTERNAL DIMENSION IN SYSTOLE: 6.63 CM (ref 2.1–4)
LEFT VENTRICLE DIASTOLIC VOLUME INDEX: 136.15 ML/M2
LEFT VENTRICLE DIASTOLIC VOLUME: 254.6 ML
LEFT VENTRICLE MASS INDEX: 162 G/M2
LEFT VENTRICLE SYSTOLIC VOLUME INDEX: 120.7 ML/M2
LEFT VENTRICLE SYSTOLIC VOLUME: 225.79 ML
LEFT VENTRICULAR INTERNAL DIMENSION IN DIASTOLE: 7 CM (ref 3.5–6)
LEFT VENTRICULAR MASS: 303.74 G
LV LATERAL E/E' RATIO: 15 M/S
LV SEPTAL E/E' RATIO: 15 M/S
MV PEAK A VEL: 0.38 M/S
MV PEAK E VEL: 0.9 M/S
MV STENOSIS PRESSURE HALF TIME: 35.01 MS
MV VALVE AREA P 1/2 METHOD: 6.28 CM2
PISA MRMAX VEL: 0.04 M/S
PISA TR MAX VEL: 2.84 M/S
RA MAJOR: 4.89 CM
RA PRESSURE: 3 MMHG
RA WIDTH: 2.45 CM
RIGHT ATRIAL AREA: 15 CM2
RIGHT VENTRICULAR END-DIASTOLIC DIMENSION: 3.23 CM
RV TISSUE DOPPLER FREE WALL SYSTOLIC VELOCITY 1 (APICAL 4 CHAMBER VIEW): 8.67 CM/S
SARS-COV-2 AG RESP QL IA.RAPID: NEGATIVE
SINUS: 3.15 CM
STJ: 2.11 CM
TDI LATERAL: 0.06 M/S
TDI SEPTAL: 0.06 M/S
TDI: 0.06 M/S
TR MAX PG: 32 MMHG
TRICUSPID ANNULAR PLANE SYSTOLIC EXCURSION: 2.08 CM
TV REST PULMONARY ARTERY PRESSURE: 35 MMHG

## 2022-10-13 PROCEDURE — 25000003 PHARM REV CODE 250: Mod: NTX | Performed by: INTERNAL MEDICINE

## 2022-10-13 PROCEDURE — 25500020 PHARM REV CODE 255: Mod: NTX | Performed by: INTERNAL MEDICINE

## 2022-10-13 PROCEDURE — 93451 PR RIGHT HEART CATH O2 SATURATION & CARDIAC OUTPUT: ICD-10-PCS | Mod: 26,TXP,, | Performed by: INTERNAL MEDICINE

## 2022-10-13 PROCEDURE — C8929 TTE W OR WO FOL WCON,DOPPLER: HCPCS | Mod: NTX

## 2022-10-13 PROCEDURE — 93451 RIGHT HEART CATH: CPT | Mod: TXP | Performed by: INTERNAL MEDICINE

## 2022-10-13 PROCEDURE — C1751 CATH, INF, PER/CENT/MIDLINE: HCPCS | Mod: TXP | Performed by: INTERNAL MEDICINE

## 2022-10-13 PROCEDURE — 25000003 PHARM REV CODE 250: Mod: TXP | Performed by: INTERNAL MEDICINE

## 2022-10-13 PROCEDURE — 93451 RIGHT HEART CATH: CPT | Mod: 26,TXP,, | Performed by: INTERNAL MEDICINE

## 2022-10-13 PROCEDURE — 93306 ECHO (CUPID ONLY): ICD-10-PCS | Mod: 26,TXP,, | Performed by: INTERNAL MEDICINE

## 2022-10-13 PROCEDURE — 93306 TTE W/DOPPLER COMPLETE: CPT | Mod: 26,TXP,, | Performed by: INTERNAL MEDICINE

## 2022-10-13 PROCEDURE — C1894 INTRO/SHEATH, NON-LASER: HCPCS | Mod: TXP | Performed by: INTERNAL MEDICINE

## 2022-10-13 RX ORDER — TORSEMIDE 20 MG/1
20 TABLET ORAL DAILY
Qty: 30 TABLET | Refills: 0
Start: 2022-10-13 | End: 2022-12-27 | Stop reason: SDUPTHER

## 2022-10-13 RX ORDER — LIDOCAINE HYDROCHLORIDE 20 MG/ML
INJECTION, SOLUTION EPIDURAL; INFILTRATION; INTRACAUDAL; PERINEURAL
Status: DISCONTINUED | OUTPATIENT
Start: 2022-10-13 | End: 2022-10-13 | Stop reason: HOSPADM

## 2022-10-13 RX ORDER — TORSEMIDE 20 MG/1
20 TABLET ORAL EVERY OTHER DAY
Qty: 30 TABLET | Refills: 0
Start: 2022-10-13 | End: 2022-10-13 | Stop reason: SDUPTHER

## 2022-10-13 RX ADMIN — HUMAN ALBUMIN MICROSPHERES AND PERFLUTREN 0.66 MG: 10; .22 INJECTION, SOLUTION INTRAVENOUS at 04:10

## 2022-10-13 NOTE — NURSING
Received via wheelchair from cath lab procedure.  Report from RICHARD Oliver.  VSS.  No c/o pain or SOB.  Dressing to right neck clean dry and intact.  Tolerating fluids.  Pt and pt family verbalized an understanding of d/c instructions.

## 2022-10-13 NOTE — NURSING
Off unit for discharge.  Pt to Clinic for appt with family at side.  Denies pain or SOB.  Tolerated fluids.

## 2022-10-13 NOTE — PROGRESS NOTES
Procedure explained. 22 g sl started in left forearm for optison use. Optison given ivp via sl for imaging by emely jernigan [facility]. Tolerated well. Sl d/micheline after. Pressure applied.

## 2022-10-13 NOTE — DISCHARGE SUMMARY
[facility] Short Stay Cardiac Unit  Discharge Note  Short Stay    Procedure(s) (LRB):  INSERTION, CATHETER, RIGHT HEART (Right)      OUTCOME: Patient tolerated treatment/procedure well without complication and is now ready for discharge.    DISPOSITION: Home or Self Care    FINAL DIAGNOSIS:  <principal problem not specified>    FOLLOWUP: In clinic    DISCHARGE INSTRUCTIONS:  No discharge procedures on file.     TIME SPENT ON DISCHARGE: 10 minutes

## 2022-10-13 NOTE — Clinical Note
The PA catheter was repositioned to the main pulmonary artery. Hemodynamics were performed. O2 saturation was measured at 66%. C.O - 3.32  C.I - 1.79

## 2022-10-13 NOTE — BRIEF OP NOTE
Patient tolerated the procedure well. Please see complete procedure note for full details and results. Stable to return from cath lab to their room.  Procedure: Right heart catheterization   Procedure date: 10/13/2022  Discharge date: 10/13/2022  Estimated blood loss: less than 10 cc  Complications: none  Condition at discharge: stable  Discharge instructions: no heavy lifting greater than 5 lbs and no bearing down/coughing without holding pressure over incision site.

## 2022-10-14 ENCOUNTER — TELEPHONE (OUTPATIENT)
Dept: TRANSPLANT | Facility: CLINIC | Age: 42
End: 2022-10-14
Payer: MEDICAID

## 2022-10-14 DIAGNOSIS — I50.9 HEART FAILURE, UNSPECIFIED HF CHRONICITY, UNSPECIFIED HEART FAILURE TYPE: ICD-10-CM

## 2022-10-14 DIAGNOSIS — Z76.82 ORGAN TRANSPLANT CANDIDATE: Primary | ICD-10-CM

## 2022-10-14 NOTE — TELEPHONE ENCOUNTER
ECHO and RHC reviewed with Dr. Calixto. While volume status looks good at this time, Dr. Calixto would like to proceed with Adv. HF Options evaluation.     Called patient to discuss Dr. Calixto's recommendations. Explained the need to proceed with evaluation for adv. HF options at this time and discussed the evaluation proceed. Reviewed the heart transplant booklet education at this time and mailed pt a copy.     Questions and concerns addressed to patient's satisfaction at this time. He verbalizes understanding and agrees to the current plan of care.     Will request financial clearance for outpatient eval at this time.

## 2022-10-14 NOTE — TELEPHONE ENCOUNTER
PRE-EDUCATION BOOKLET NOTE:    Spoke with Filemon La via telephone and had a brief discussion regarding the advanced heart failure evaluation process including options for heart transplantation and/or left ventricular assist device (LVAD) implantation.     Heart Transplant Educational Booklet reviewed and a copy will be mailed to patient, which included the following handouts:  Treatment options for Advanced Heart Failure: A Referral Guide for patients  Pre Heart Transplant Coordinator Team Contact Information   Recipient Informed Consent, including testing for HIV   Wellness Contract  Multiple Listing Protocol and [employer] toll free numbers   VAD Education Packet   Advanced Directives  8 Step Plan for Heart Failure Patients     Significant History:  Prior sternotomies: no  ICD (if yes, indicate brand of device): No, currently has lifevest  Blood transfusions (if yes, enter date and location): No  Angiography (if yes, enter date and location): Yes: 10/2022, [facility]  Colonoscopy (if yes, enter date and location): N/A, age  Pap smear (if yes, enter date and location): N/A  Mammogram (if yes, enter date and location): No  Tobacco history (if yes, enter amount and date quit if applicable): yes, 14 packs per week for 20 years. Quit 10/2022  Stroke history:  no  Thromboembolism history:  no    Question and answer session was conducted.  Patient was advised to bring the educational packet to future appointments and/or admissions.  Patient was encouraged to contact the coordinator team with any questions or concerns.  Encourage patient to remain abstinent from tobacco products. Encourage patient to schedule appt with dentist as he will likely need clearance for transplant. Patient is not vaccinated against COVID, explained that this is a requirement for listing. Understanding verbalized.

## 2022-10-19 ENCOUNTER — TELEPHONE (OUTPATIENT)
Dept: INFECTIOUS DISEASES | Facility: CLINIC | Age: 42
End: 2022-10-19
Payer: MEDICAID

## 2022-10-19 NOTE — TELEPHONE ENCOUNTER
----- Message from Olivia Gilmore sent at 10/19/2022  3:02 PM CDT -----  Joce w/[facility] calling regarding Appointment Access  (message) for #Heart Evaluation, stating PT needs to be scheduled on 11/10/22,

## 2022-10-27 ENCOUNTER — HOSPITAL ENCOUNTER (OUTPATIENT)
Dept: RADIOLOGY | Facility: HOSPITAL | Age: 42
Discharge: HOME | End: 2022-10-27
Attending: INTERNAL MEDICINE
Payer: MEDICAID

## 2022-10-27 ENCOUNTER — CLINICAL SUPPORT (OUTPATIENT)
Dept: TRANSPLANT | Facility: CLINIC | Age: 42
End: 2022-10-27
Payer: MEDICAID

## 2022-10-27 ENCOUNTER — SOCIAL WORK (OUTPATIENT)
Dept: TRANSPLANT | Facility: CLINIC | Age: 42
End: 2022-10-27
Payer: MEDICAID

## 2022-10-27 ENCOUNTER — NUTRITION (OUTPATIENT)
Dept: TRANSPLANT | Facility: CLINIC | Age: 42
End: 2022-10-27
Payer: MEDICAID

## 2022-10-27 DIAGNOSIS — Z76.82 PRE-HEART TRANSPLANT, LISTED: Primary | ICD-10-CM

## 2022-10-27 DIAGNOSIS — Z76.82 ORGAN TRANSPLANT CANDIDATE: ICD-10-CM

## 2022-10-27 DIAGNOSIS — I50.9 HEART FAILURE, UNSPECIFIED HF CHRONICITY, UNSPECIFIED HEART FAILURE TYPE: ICD-10-CM

## 2022-10-27 PROCEDURE — 71046 X-RAY EXAM CHEST 2 VIEWS: CPT | Mod: TC,TXP

## 2022-10-27 PROCEDURE — 71046 X-RAY EXAM CHEST 2 VIEWS: CPT | Mod: 26,TXP,, | Performed by: RADIOLOGY

## 2022-10-27 PROCEDURE — 71250 CT THORAX DX C-: CPT | Mod: 26,TXP,, | Performed by: RADIOLOGY

## 2022-10-27 PROCEDURE — 86696 HERPES SIMPLEX TYPE 2 TEST: CPT | Mod: TXP

## 2022-10-27 PROCEDURE — 74176 CT ABD & PELVIS W/O CONTRAST: CPT | Mod: TC,TXP

## 2022-10-27 PROCEDURE — 70450 CT HEAD/BRAIN W/O DYE: CPT | Mod: TC,TXP

## 2022-10-27 PROCEDURE — 74176 CT ABD & PELVIS W/O CONTRAST: CPT | Mod: 26,TXP,, | Performed by: RADIOLOGY

## 2022-10-27 PROCEDURE — 74176 CT CHEST ABDOMEN PELVIS WITHOUT CONTRAST(XPD): ICD-10-PCS | Mod: 26,TXP,, | Performed by: RADIOLOGY

## 2022-10-27 PROCEDURE — 71250 CT CHEST ABDOMEN PELVIS WITHOUT CONTRAST(XPD): ICD-10-PCS | Mod: 26,TXP,, | Performed by: RADIOLOGY

## 2022-10-27 PROCEDURE — 70450 CT HEAD WITHOUT CONTRAST: ICD-10-PCS | Mod: 26,TXP,, | Performed by: RADIOLOGY

## 2022-10-27 PROCEDURE — 71046 XR CHEST PA AND LATERAL: ICD-10-PCS | Mod: 26,TXP,, | Performed by: RADIOLOGY

## 2022-10-27 PROCEDURE — 70450 CT HEAD/BRAIN W/O DYE: CPT | Mod: 26,TXP,, | Performed by: RADIOLOGY

## 2022-10-27 NOTE — PROGRESS NOTES
"TRANSPLANT NUTRITIONAL ASSESSMENT    Referring Provider:    LILO Castillo       Reason for Visit: Pre-heart transplant work-up    Age: 41 y.o.  Sex: male    Patient Active Problem List   Diagnosis    Community acquired pneumonia of right lower lobe of lung    Fall (on) (from) other stairs and steps, initial encounter    Acute on chronic combined systolic and diastolic congestive heart failure    Alcoholism    Pulmonary hypertension    Tobacco abuse    SOB (shortness of breath)    Alcohol use    Elevated liver enzymes    Tachycardia    Elevated troponin    Alcoholic cardiomyopathy    Hypotension    Generalized weakness     Past Medical History:   Diagnosis Date    CHF (congestive heart failure)     Pulmonary hypertension     pt reported     Lab Results   Component Value Date    GLU 91 10/27/2022    K 4.0 10/27/2022    MG 1.5 (L) 10/27/2022    CHOL 177 10/27/2022    HDL 47 10/27/2022    TRIG 73 10/27/2022    ALBUMIN 4.2 10/27/2022    PREALBUMIN 27 10/27/2022    HGBA1C 5.0 10/27/2022    CALCIUM 10.0 10/27/2022     Other Pertinent Labs: T.Bili: 1.7 (H))    Current Outpatient Medications   Medication Sig    acetaminophen (TYLENOL) 325 MG tablet Take 2 tablets (650 mg total) by mouth every 6 (six) hours as needed for Pain.    chlordiazepoxide (LIBRIUM) 10 MG capsule Librium 10 mg po tid x 3 days then 10 mg po bid x 3 days then 10 mg po daily x 3 days then dose is completed    torsemide (DEMADEX) 20 MG Tab Take 1 tablet (20 mg total) by mouth once daily.     No current facility-administered medications for this visit.     Allergies: Patient has no known allergies.    Ht Readings from Last 1 Encounters:   10/13/22 5' 4" (1.626 m)     Wt Readings from Last 1 Encounters:   10/13/22 81.2 kg (179 lb)      BMI:  30.73 kg/m²    Usual Weight: pt reports 180 lbs at home  Weight Change/Time: n/a  Current Diet: MARIANGEL, 1500ml flyuid res  Appetite/Current Intake: good   Exercise/Physical Activity: limited  Nutritional/Herbal " "Supplements: none  Potential Food/Medication Interactions: none   Chewing/Swallowing Problems: none  Symptoms: none  Assessment of Lab Values: diuretics and cardiac dysfunction   Support System: brother-in-law and sister    ABW: 64.66 kg(142 lbs)  Estimated Kcal Need: 1810 -1939 kcals (28-30 kcals/kg ABW)  Estimated Protein Need: 81g (1.0 g/kg CBW)    Nutritional History: pt eats 3 meals a day with several snacks. Pt's brother-in-law reports he is "always eating." Pt eats breakfast of egg grits sausage. Lunch and dinner are TV dinners ([employer] steak more times than not) or deli chicken or turkey sandwich. Pt will snack on 3-4 oranges per day or will snack on chips or cake. Pt drinks 8oz of orange juice for breakfast, 4x16oz water bottles, and 16oz unsweet tea. Pt uses no sodium seasonings and does not eat out    Nutritional Diagnoses  Problem: overweight/obesity  Etiology: excessive calorie intake   Symptoms: BMI over 30 kg/m²    Educational Need? yes  Barriers: none identified  Discussed with: patient, sister, and brother  Interventions: Patient taught nutrition information regarding Pre-heart transplant work-up  .  Pt was educated on how to read nutrition labels to understand food has natural sodium present. Recc'd of 600mg Na per meal to allow for snacks. Pt recc to drink no more than 2000ml / 60oz. Pt given nutrition handout and contact info if needs arise. Will follow up in clinic as needed  Goals/Recommendations: diet adherence and limit fluid intake  Actions Taken: instruct/provide written information  Strategies Used: problem solving, goal setting, motivational interviewing  Patient and/or family comprehend instructions: yes , adherence expected  Outcome: Verbalizes understanding  Monitoring: Contact information provided, will f/u in clinic and communicate with the care team as needed.     Counseling Time: 15 minutes        "

## 2022-10-27 NOTE — PROGRESS NOTES
EDUCATION NOTE:    Filemon La was seen today for pre-heart transplant education.  Patient signed wellness contract and informed consent to undergo heart transplant evaluation work-up.  Thorough pre-transplant education conducted.      Information presented included:  Evaluation process  Members of the transplant team  Selection committee members and role of the committee  Listing process for transplant  Different listing designations, including status 7  1-year graft survival statistics  LVAD as bridge to transplant or DT  Need to reach patient within 15 minutes of donor offer  PHS Donors with Risk Factors  Blood transfusions  Process for matching donor with recipient  Need for weight loss and how it relates to the wait time  Post-transplant immunosuppression for life with need to be able to afford post-transplant medications  Need for a caregiver to be with them at all times beginning with discharge from ICU, through at least the first 6 weeks post-transplant  Need to find local housing for the first 6 weeks post-transplant  How to reach team members at any time  OS website with written instructions regarding how to look up information specific to [facility]'s transplant program  Use of Hepatitis C organs    Patient was accompanied by brother and sister in-law (Maurizio Mendoza and Taina Jones) .  Patient asked pertinent questions, which were answered to their satisfaction.  Patient was also given a copy of the wellness contract and informed consent to undergo evaluation work-up.

## 2022-10-27 NOTE — PROGRESS NOTES
Left Ventricular Assist Device (LVAD) and Transplant Recipient Adult Psychosocial Assessment    Filemon La  [address]  [place] [address]  Telephone Information:   Mobile [phone]   Home  [phone] (home)  Work  [phone] (work)  E-mail  [email or web address]    Sex: male  YOB: 1980  Age: 41 y.o.    Encounter Date: 10/27/2022  [place] Citizen: yes  Primary Language: English   Needed: no    Emergency Contact:  Name: Maurizio Mendoza   Relationship: brother  Address: [place]   Phone Numbers:  [phone]     Family/Social Support:   Number of dependents/: Pt has three children ages 16,10, 7  Marital history: Pt legally .   Other family dynamics: Pt states he has a lot of family who will be able to provide care and support.     Household Composition:  Pt states he lives alone.     Do you and your caregivers have access to reliable transportation? yes  PRIMARY CAREGIVER: Maurizio Mendoza will be primary caregiver, phone number [phone].      provided in-depth information to Patient and Caregiver regarding  regarding pre- and post-LVAD and pre- and post-transplant caregiver role.   strongly encourages Patient and Caregiver to have concrete plan regarding post-transplant care giving, including back-up caregiver(s) to ensure care giving needs are met as needed.    Patient and Caregiver states understanding all aspects of caregiver role/commitment and is able/willing/committed to being caregiver to the fullest extent necessary.    Patient and Caregiver verbalizes understanding of the education provided today and caregiver responsibilities.       remains available. Patient and Caregiver agree to contact  in a timely manner if concerns arise.      Able to take time off work without financial concerns: yes. Maurizio does not work (receives SSDI), Taina is self employed as an NP.     Additional Significant  Others who will Assist with LVAD/Transplant:  Name: Maurizio Mendoza   City: [place] State: [place]  Relationship: brother  Does person drive? yes    Name: Taina Mendoza - NP   City: [place] State: [place]  Relationship:  Sister in Law   Does person drive? yes    Living Will: no  Healthcare Power of : no  Advance Directives on file: <<no information> per medical record.  Verbally reviewed LW/HCPA information.   provided patient with copy of LW/HCPA documents and provided education on completion of forms    Living Donors: N/A    Highest Education Level: High School (9-12) or GED - 9th grade   Reading Ability: 12th grade  Reports difficulty with: seeing - States he sees a flash of light around the sides of his eyes   Learns Best By:  Reading and watching!      Status: no  VA Benefits: no     Working for Income: No  If no, reason not working: Demands of Treatment - Applied for SSDI Sep 2022.   Spouse/Significant Other Employment: NA    Disabled: no    Monthly Income:  Pt report having zero income. States family and friends are helping him at this time.   Able to afford all costs now and if transplanted or receives LVAD, including medications: no  Pt reports secure power source? yes  Pt reports ability to afford monthly electric bill? no - Pt has no income at this time.   Pt reports ability to afford LVAD dressing supplies?  NA  Patient and Caregiver verbalizes understanding of personal responsibilities related to LVAD and transplant costs and the importance of having a financial plan to ensure that patients LVAD and transplant costs are fully covered.       provided fundraising information/education.  Patient and Caregiver verbalizes understanding.   remains available.    Insurance:   Payer/Plan Subscr  Sex Relation Sub. Ins. ID Effective Group Num   1. MEDICAID - HE* SAFIA DEVRIES 1980 Male Self [id number] 3/1/19 [id number]                                   [address] [id number]     Primary Insurance (for UNOS reporting): Public Insurance - Medicaid  Secondary Insurance (for UNOS reporting): None  Patient and Caregiver verbalizes clear understanding that patient may experience difficulty obtaining and/or be denied insurance coverage post-surgery. This includes and is not limited to disability insurance, life insurance, health insurance, burial insurance, long term care insurance, and other insurances.      Patient and Caregiver also reports understanding that future health concerns   related to or unrelated to LVAD or transplantation may not be covered by patient's insurance.  Resources and information provided and reviewed.      Patient and Caregiver provides verbal permission to release any necessary information to outside resources for patient care and discharge planning.  Resources and information provided are reviewed.      Dialysis History:  Pt denies hx of dialysis.     Infusion Service: patient utilizing? no  Home Health: patient utilizing? no  DME: yes - Life vest, and BP cuff   Pulmonary/Cardiac Rehab: Pt denies   ADLS:  Pt reports difficulty with ADLS, stating he cannot walk or exercise like he use to.     Adherence:   Pt reports a high adherence to attending his medical appts and taking his medications.  Adherence education and counseling provided.     Per History Section:  Past Medical History:   Diagnosis Date    CHF (congestive heart failure)     Pulmonary hypertension     pt reported     Social History     Tobacco Use    Smoking status: Former     Packs/day: 1.00     Types: Cigarettes    Smokeless tobacco: Never   Substance Use Topics    Alcohol use: Not Currently     Comment: for about 2 weeks     Social History     Substance and Sexual Activity   Drug Use No     Social History     Substance and Sexual Activity   Sexual Activity Not on file       Per Today's Psychosocial:  Tobacco: none, patient denies any use. Pt states he quit smoking Sep 2022 and uses the  patch.   Alcohol: none, patient denies any use. Pt states he use to drink 1/5 per day and quit in sep 2022 during a hospitalization.   Illicit Drugs/Non-prescribed Medications: none, patient denies any use.    Patient and Caregiver states clear understanding of the potential impact of substance use as it relates to LVAD and transplant candidacy and is aware of possible random substance screening.  Substance abstinence/cessation counseling, education and resources provided and reviewed.     Arrests/DWI/Treatment/Rehab: yes - DWI in 2020, probation served, no future obligations to fulfill.     Psychiatric History:    Mental Health:  Pt denies.   Psychiatrist/Counselor: Pt denies.   Medications:  Pt denies, states he was given medications   Suicide/Homicide Issues: Pt denies.   Safety at home: Pt states he feels safe at home.     Knowledge: Patient and Caregiver states having clear understanding and realistic expectations regarding the potential risks and potential benefits LVAD implantation and organ transplantation and organ donation and agrees to discuss with health care team members and support system members, as well as to utilize available resources and express questions and/or concerns in order to further facilitate the pt informed decision-making.  Resources and information provided and reviewed.     Patient and Caregiver is aware of [facility]'s affiliation and/or partnership with agencies in home health care, LTAC, SNF, [facility], and other hospitals and clinics.    Understanding: Patient and Caregiver reports having a clear understanding of the many lifetime commitments involved with being an LVAD and transplant recipient, including costs, compliance, medications, lab work, procedures, appointments, concrete and financial planning, preparedness, timely and appropriate communication of concerns, abstinence (ETOH, tobacco, illicit non-prescribed drugs), adherence to all health care team recommendations, support system  and caregiver involvement, appropriate and timely resource utilization and follow-through, mental health counseling as needed/recommended, and patient and caregiver responsibilities.  Social Service Handbook, resources and detailed educational information provided and reviewed.  Educational information provided.    Patient and Caregiver also reports current and expected compliance with health care regime and states having a clear understanding of the importance of compliance.       Patient and Caregiver reports a clear understanding that risks and benefits may be involved with LVAD heart failure treatment and organ transplantation and with organ donation.     Patient and Caregiver also reports clear understanding that psychosocial risk factors may affect patient, and include but are not limited to feelings of depression, generalized anxiety, anxiety regarding dependence on others, post traumatic stress disorder, feelings of guilt and other emotional and/or mental concerns, and/or exacerbation of existing mental health concerns.  Detailed resources provided and discussed.      Patient and Caregiver agrees to access appropriate resources in a timely manner as needed and/or as recommended, and to communicate concerns appropriately.     Patient and Caregiver also reports a clear understanding of treatment options available.      Feelings or Concerns: Pt states he wants to grow old, and see his children grow up.     Coping: Identify Patient & Caregiver Strategies to [place]:   1. Currently & Pre-transplant - Talking to his brother, sister, and sister in law. Praying, music, and TV.    2. At the time of surgery - sab    3. During post-Transplant & Recovery Period - sab    Goals: to grow old, to see his children grow up. To be able to exercise and swim again.  Patient referred to Vocational Rehabilitation.    Interview Behavior: Patient and Caregiver presents as alert and oriented x 4, pleasant, good eye contact,  concentration/judgement good, calm, communicative, cooperative, and asking and answering questions appropriately.           Transplant Social Work - Candidacy  Assessment/Plan:     Psychosocial Suitability: Patient presents as a suitable candidate for LVAD or transplant at this time. Based on psychosocial risk factors, patient presents as medium to higher risk, due to lack of income, history of alcohol use - Pt has not used alcohol since Sep 2022. Pt strengths include adequate health insurance and supportive family members.      Recommendations/Additional Comments:     Patient should follow up with mental health resources in order to support his continued abstinence from alcohol.     Discussed [employer] apts and other local accommodations - discussed need to stay locally for 4-6 weeks post -OHT and discussed rules of No Pets, No children at [facility]    Encouraged Pt & caregiver(s) to consider fundraising for OHT. Education provided on discussing setting up fundraising account with bank to protect gifts/donations.    Discussed [facility] lodging, and booking reservation once LVAD surgery date is set.        Transplant committee to determine final decision regarding transplant eligibility.      Lucho Woods LCSW

## 2022-11-08 DIAGNOSIS — Z76.82 ORGAN TRANSPLANT CANDIDATE: Primary | ICD-10-CM

## 2022-11-09 NOTE — PROGRESS NOTES
Subjective:      Patient ID: Filemon La is a 42 y.o. male.    Chief Complaint: No chief complaint on file.      HPI:  Filemon La is a 42 y.o. male who presents for advanced heart failure options evaluation, with medical conditions stage D HFrEF (EF=15%, LVEDD=7.6 cm), NICMP (possible COVID CMP vs alcoholic cardiomyopathy ) with NYHA class III/IV symptoms . As mentioned he reports NYHA class III-ICV symptoms. Was diagnosed few months ago but reports having frequent episodes of pneumonia last year. He did have COVID last December and feels that most of hid cardiac symptoms started afterwards. He has had  frequent HF admissions since. He was on good GDMT but now due to symptomatic hypotension most of it has been discontinued (was on Entresto, metoprolol) . Most recent Echo showed severely enlarged LV with severely depressed lV systolic function (EF=15%, LVEDD=7.6 cm). Coronary angiogram showed normal coronaries. RHC showed significantly elevated left and right sided filling pressures with lwo cardiac index (see report below). He is a former smoker (nicotine test positive on 10/27/22). He was also a heavy drinker but quit. When asked how much he drank patient stated 'a lot.' Said that he quit smoking and drinking in September when he was diagnosed with heart failure. Has been admitted a couple times since then for heart failure. Reports ANGUIANO. Says that he could probably walk from our clinic to his car before having to take a break. Since getting out of the hospital his fluid status has been controlled on medications. Denies chest pain. Occasional dizziness. No prior strokes, seizures, blood clots, stents, or sternotomies. Reports mother and aunt had heart disease.       PRA 0%  O+  Tbili 1.7    Current medications Reviewed  Current Outpatient Medications   Medication Instructions    acetaminophen (TYLENOL) 650 mg, Oral, Every 6 hours PRN    chlordiazepoxide (LIBRIUM) 10 MG capsule Librium 10 mg po tid x 3 days then 10  mg po bid x 3 days then 10 mg po daily x 3 days then dose is completed    nicotine (NICODERM CQ) 21 mg/24 hr 1 patch, Daily    torsemide (DEMADEX) 20 mg, Oral, Daily    WESTAB MAX 2.5-25-2 mg Tab 1 tablet, Oral, Daily       Review of Systems   Constitutional:  Positive for activity change and fatigue.   HENT:  Negative for nosebleeds.    Eyes:  Negative for visual disturbance.   Respiratory:  Positive for shortness of breath.    Cardiovascular:  Negative for chest pain, palpitations and leg swelling.   Gastrointestinal:  Negative for nausea and vomiting.   Musculoskeletal:  Negative for gait problem.   Skin:  Negative for color change.   Neurological:  Positive for dizziness. Negative for seizures.   Hematological:  Does not bruise/bleed easily.   Psychiatric/Behavioral:  Negative for sleep disturbance.    Objective:   Physical Exam  Vitals reviewed.   Constitutional:       General: He is not in acute distress.     Appearance: He is well-developed. He is not diaphoretic.   HENT:      Head: Normocephalic and atraumatic.   Eyes:      General: Scleral icterus present.      Pupils: Pupils are equal, round, and reactive to light.   Neck:      Vascular: No JVD.   Cardiovascular:      Rate and Rhythm: Normal rate.   Pulmonary:      Effort: Pulmonary effort is normal. No respiratory distress.   Abdominal:      General: There is no distension.   Musculoskeletal:         General: Normal range of motion.      Cervical back: Normal range of motion.   Skin:     Coloration: Skin is not pale.   Neurological:      Mental Status: He is alert and oriented to person, place, and time.   Psychiatric:         Speech: Speech normal.         Behavior: Behavior normal.         Thought Content: Thought content normal.         Judgment: Judgment normal.       Diagnotic Results: reviewed     CT C/A/P 10/27/22  No acute findings in the chest abdomen or pelvis.    TTE 10/13/22  The left ventricle is severely enlarged with eccentric hypertrophy  and severely decreased systolic function.  The estimated ejection fraction is 10%.  There is left ventricular global hypokinesis.  Grade III left ventricular diastolic dysfunction.  Mild mitral regurgitation.  Normal right ventricular size with mildly reduced right ventricular systolic function.  The estimated PA systolic pressure is 35 mmHg.  Normal central venous pressure (3 mmHg).  Severe left atrial enlargement.  LV 7cm   TAPSE 2.08cm     RHC 10/13/22  Filling pressures: RA 5v7, PCWP 15v18  CO/CI: 3.3/1.79  SVR: 2198  PA saturation 66%  Mean PA pressure 21 mmHg, PVR 1.8 RUSSELL, consistent with post-capillary pulmonary venous hypertension  /82, MAP 96, HR 94 SR  Helder 3    Abdominal US 7/27/22  Limited right upper quadrant ultrasound performed.  The liver measures 16.4 cm in length and demonstrates increased echogenicity consistent with hepatic steatosis.  Common bile duct is within normal limits at 4 mm..  No gallstones, gallbladder wall thickening, or focal tenderness over the gallbladder.  The visualized portions of the pancreas and IVC are within normal limits.  The right kidney is normal in length measuring 11.5 cm. No right sided hydronephrosis or renal masses identified.    Assessment:   NICM / Dilated CM  Plan:     CTS Attending Note:    I have personally taken the history and examined this patient and agree with the MADDY's note as stated above.  Very pleasant 42-year-old gentleman with probable alcoholic cardiomyopathy.  His ejection fraction is 10%.  I reviewed his CT scan.  There are no anatomic contraindications to transplantation.  I do think it would be prudent to investigate his liver further.  Given his ejection fraction and his blood group, which is 0, MCS should be considered.  We will plan to discuss at selection.

## 2022-11-10 ENCOUNTER — HOSPITAL ENCOUNTER (OUTPATIENT)
Dept: CARDIOLOGY | Facility: HOSPITAL | Age: 42
Discharge: HOME | End: 2022-11-10
Attending: INTERNAL MEDICINE
Payer: MEDICAID

## 2022-11-10 ENCOUNTER — OFFICE VISIT (OUTPATIENT)
Dept: CARDIOTHORACIC SURGERY | Facility: CLINIC | Age: 42
End: 2022-11-10
Payer: MEDICAID

## 2022-11-10 ENCOUNTER — HOSPITAL ENCOUNTER (OUTPATIENT)
Dept: PULMONOLOGY | Facility: CLINIC | Age: 42
Discharge: HOME | End: 2022-11-10
Payer: MEDICAID

## 2022-11-10 ENCOUNTER — CLINICAL SUPPORT (OUTPATIENT)
Dept: INFECTIOUS DISEASES | Facility: CLINIC | Age: 42
End: 2022-11-10
Payer: MEDICAID

## 2022-11-10 ENCOUNTER — OFFICE VISIT (OUTPATIENT)
Dept: TRANSPLANT | Facility: CLINIC | Age: 42
End: 2022-11-10
Attending: INTERNAL MEDICINE
Payer: MEDICAID

## 2022-11-10 ENCOUNTER — CLINICAL SUPPORT (OUTPATIENT)
Dept: TRANSPLANT | Facility: CLINIC | Age: 42
End: 2022-11-10
Payer: MEDICAID

## 2022-11-10 ENCOUNTER — PATIENT MESSAGE (OUTPATIENT)
Dept: TRANSPLANT | Facility: CLINIC | Age: 42
End: 2022-11-10

## 2022-11-10 ENCOUNTER — OFFICE VISIT (OUTPATIENT)
Dept: INFECTIOUS DISEASES | Facility: CLINIC | Age: 42
End: 2022-11-10
Payer: MEDICAID

## 2022-11-10 VITALS
WEIGHT: 185.19 LBS | HEART RATE: 52 BPM | HEIGHT: 62 IN | DIASTOLIC BLOOD PRESSURE: 67 MMHG | SYSTOLIC BLOOD PRESSURE: 123 MMHG | BODY MASS INDEX: 34.08 KG/M2

## 2022-11-10 VITALS
TEMPERATURE: 98 F | BODY MASS INDEX: 31.27 KG/M2 | SYSTOLIC BLOOD PRESSURE: 122 MMHG | HEIGHT: 64 IN | WEIGHT: 183.19 LBS | DIASTOLIC BLOOD PRESSURE: 73 MMHG | HEART RATE: 50 BPM

## 2022-11-10 VITALS
SYSTOLIC BLOOD PRESSURE: 95 MMHG | BODY MASS INDEX: 31.24 KG/M2 | HEIGHT: 64 IN | DIASTOLIC BLOOD PRESSURE: 78 MMHG | HEART RATE: 89 BPM | WEIGHT: 183 LBS

## 2022-11-10 VITALS
RESPIRATION RATE: 18 BRPM | BODY MASS INDEX: 31.07 KG/M2 | OXYGEN SATURATION: 99 % | WEIGHT: 182 LBS | SYSTOLIC BLOOD PRESSURE: 128 MMHG | HEART RATE: 50 BPM | DIASTOLIC BLOOD PRESSURE: 62 MMHG | HEIGHT: 64 IN

## 2022-11-10 VITALS — BODY MASS INDEX: 31.24 KG/M2 | WEIGHT: 183 LBS | HEIGHT: 64 IN

## 2022-11-10 DIAGNOSIS — I50.9 HEART FAILURE, UNSPECIFIED HF CHRONICITY, UNSPECIFIED HEART FAILURE TYPE: ICD-10-CM

## 2022-11-10 DIAGNOSIS — I50.43 ACUTE ON CHRONIC COMBINED SYSTOLIC AND DIASTOLIC CONGESTIVE HEART FAILURE: Primary | ICD-10-CM

## 2022-11-10 DIAGNOSIS — Z78.9 ALCOHOL USE: ICD-10-CM

## 2022-11-10 DIAGNOSIS — Z76.82 ORGAN TRANSPLANT CANDIDATE: ICD-10-CM

## 2022-11-10 DIAGNOSIS — I50.42 CHRONIC COMBINED SYSTOLIC AND DIASTOLIC HEART FAILURE: ICD-10-CM

## 2022-11-10 DIAGNOSIS — Z01.818 PRE-TRANSPLANT EVALUATION FOR HEART TRANSPLANT: Primary | ICD-10-CM

## 2022-11-10 DIAGNOSIS — I50.22 CHRONIC SYSTOLIC CONGESTIVE HEART FAILURE: Primary | ICD-10-CM

## 2022-11-10 DIAGNOSIS — I42.6 ALCOHOLIC CARDIOMYOPATHY: ICD-10-CM

## 2022-11-10 LAB
CV STRESS BASE HR: 89 BPM
DIASTOLIC BLOOD PRESSURE: 78 MMHG
OHS CV CPX 1 MINUTE RECOVERY HEART RATE: 120 BPM
OHS CV CPX 85 PERCENT MAX PREDICTED HEART RATE MALE: 152
OHS CV CPX ANAEROBIC THRESHOLD DIASTOLIC BLOOD PRESSURE: 64 MMHG
OHS CV CPX ANAEROBIC THRESHOLD HEART RATE: 121
OHS CV CPX ANAEROBIC THRESHOLD RATE PRESSURE PRODUCT: NORMAL
OHS CV CPX ANAEROBIC THRESHOLD SYSTOLIC BLOOD PRESSURE: 144
OHS CV CPX DATA GRADE - AT: 2
OHS CV CPX DATA GRADE - PEAK: 5.7
OHS CV CPX DATA O2 SAT - PEAK: 99
OHS CV CPX DATA O2 SAT - REST: 97
OHS CV CPX DATA SPEED - AT: 2.1
OHS CV CPX DATA SPEED - PEAK: 3.2
OHS CV CPX DATA TIME - AT: 3.25
OHS CV CPX DATA TIME - PEAK: 8.75
OHS CV CPX DATA VE/VCO2 - AT: 45
OHS CV CPX DATA VE/VCO2 - PEAK: 41
OHS CV CPX DATA VE/VO2 - AT: 31
OHS CV CPX DATA VE/VO2 - PEAK: 33
OHS CV CPX DATA VO2 - AT: 13.1
OHS CV CPX DATA VO2 - PEAK: 16.2
OHS CV CPX DATA VO2 - REST: 4.8
OHS CV CPX FEV1/FVC: 0.82
OHS CV CPX FORCED EXPIRATORY VOLUME: 2.3
OHS CV CPX FORCED VITAL CAPACITY (FVC): 2.79
OHS CV CPX HIGHEST VO: 40.4
OHS CV CPX MAX PREDICTED HEART RATE: 179
OHS CV CPX MAXIMAL VOLUNTARY VENTILATION (MVV) PREDICTED: 92
OHS CV CPX MAXIMAL VOLUNTARY VENTILATION (MVV): 79
OHS CV CPX MAXIUMUM EXERCISE VENTILATION (VE MAX): 43.9
OHS CV CPX PATIENT AGE: 41
OHS CV CPX PATIENT HEIGHT IN: 64
OHS CV CPX PATIENT IS FEMALE AGE 11-19: 0
OHS CV CPX PATIENT IS FEMALE AGE GREATER THAN 19: 0
OHS CV CPX PATIENT IS FEMALE AGE LESS THAN 11: 0
OHS CV CPX PATIENT IS FEMALE: 0
OHS CV CPX PATIENT IS MALE AGE 11-25: 0
OHS CV CPX PATIENT IS MALE AGE GREATER THAN 25: 1
OHS CV CPX PATIENT IS MALE AGE LESS THAN 11: 0
OHS CV CPX PATIENT IS MALE GREATER THAN 18: 1
OHS CV CPX PATIENT IS MALE LESS THAN OR EQUAL TO 18: 0
OHS CV CPX PATIENT IS MALE: 1
OHS CV CPX PATIENT WEIGHT RETURNED IN OZ: 2927.71
OHS CV CPX PEAK DIASTOLIC BLOOD PRESSURE: 72 MMHG
OHS CV CPX PEAK HEAR RATE: 134 BPM
OHS CV CPX PEAK RATE PRESSURE PRODUCT: NORMAL
OHS CV CPX PEAK SYSTOLIC BLOOD PRESSURE: 129 MMHG
OHS CV CPX PERCENT BODY FAT: 18
OHS CV CPX PERCENT MAX PREDICTED HEART RATE ACHIEVED: 75
OHS CV CPX PREDICTED VO2: 40.4 ML/KG/MIN
OHS CV CPX RATE PRESSURE PRODUCT PRESENTING: 8455
OHS CV CPX REST PET CO2: 22
OHS CV CPX VE/VCO2 SLOPE: 39.4
STRESS ECHO POST EXERCISE DUR MIN: 8 MINUTES
STRESS ECHO POST EXERCISE DUR SEC: 45 SECONDS
SYSTOLIC BLOOD PRESSURE: 95 MMHG

## 2022-11-10 PROCEDURE — 3008F BODY MASS INDEX DOCD: CPT | Mod: CPTII,TXP,, | Performed by: INTERNAL MEDICINE

## 2022-11-10 PROCEDURE — 1160F PR REVIEW ALL MEDS BY PRESCRIBER/CLIN PHARMACIST DOCUMENTED: ICD-10-PCS | Mod: CPTII,TXP,, | Performed by: INTERNAL MEDICINE

## 2022-11-10 PROCEDURE — 3078F PR MOST RECENT DIASTOLIC BLOOD PRESSURE < 80 MM HG: ICD-10-PCS | Mod: CPTII,TXP,, | Performed by: INTERNAL MEDICINE

## 2022-11-10 PROCEDURE — 94729 DIFFUSING CAPACITY: CPT | Mod: PBBFAC,TXP | Performed by: INTERNAL MEDICINE

## 2022-11-10 PROCEDURE — 3044F PR MOST RECENT HEMOGLOBIN A1C LEVEL <7.0%: ICD-10-PCS | Mod: CPTII,TXP,, | Performed by: PHYSICIAN ASSISTANT

## 2022-11-10 PROCEDURE — 94621 CARDIOPULM EXERCISE TESTING: CPT | Mod: TXP

## 2022-11-10 PROCEDURE — 4010F ACE/ARB THERAPY RXD/TAKEN: CPT | Mod: CPTII,TXP,, | Performed by: INTERNAL MEDICINE

## 2022-11-10 PROCEDURE — 3074F SYST BP LT 130 MM HG: CPT | Mod: CPTII,TXP,, | Performed by: THORACIC SURGERY (CARDIOTHORACIC VASCULAR SURGERY)

## 2022-11-10 PROCEDURE — 90677 PCV20 VACCINE IM: CPT | Mod: PBBFAC,TXP

## 2022-11-10 PROCEDURE — 94618 PULMONARY STRESS TESTING: CPT | Mod: 26,S$PBB,TXP, | Performed by: INTERNAL MEDICINE

## 2022-11-10 PROCEDURE — 94727 GAS DIL/WSHOT DETER LNG VOL: CPT | Mod: 26,S$PBB,TXP, | Performed by: INTERNAL MEDICINE

## 2022-11-10 PROCEDURE — 3074F PR MOST RECENT SYSTOLIC BLOOD PRESSURE < 130 MM HG: ICD-10-PCS | Mod: CPTII,TXP,, | Performed by: THORACIC SURGERY (CARDIOTHORACIC VASCULAR SURGERY)

## 2022-11-10 PROCEDURE — 94010 BREATHING CAPACITY TEST: CPT | Mod: 26,S$PBB,TXP, | Performed by: INTERNAL MEDICINE

## 2022-11-10 PROCEDURE — 94621 CARDIOPULM EXERCISE TESTING: CPT | Mod: 26,TXP,, | Performed by: INTERNAL MEDICINE

## 2022-11-10 PROCEDURE — 4010F PR ACE/ARB THEARPY RXD/TAKEN: ICD-10-PCS | Mod: CPTII,TXP,, | Performed by: THORACIC SURGERY (CARDIOTHORACIC VASCULAR SURGERY)

## 2022-11-10 PROCEDURE — 3044F PR MOST RECENT HEMOGLOBIN A1C LEVEL <7.0%: ICD-10-PCS | Mod: CPTII,TXP,, | Performed by: THORACIC SURGERY (CARDIOTHORACIC VASCULAR SURGERY)

## 2022-11-10 PROCEDURE — 1160F RVW MEDS BY RX/DR IN RCRD: CPT | Mod: CPTII,TXP,, | Performed by: INTERNAL MEDICINE

## 2022-11-10 PROCEDURE — 3078F PR MOST RECENT DIASTOLIC BLOOD PRESSURE < 80 MM HG: ICD-10-PCS | Mod: CPTII,TXP,, | Performed by: PHYSICIAN ASSISTANT

## 2022-11-10 PROCEDURE — 4010F PR ACE/ARB THEARPY RXD/TAKEN: ICD-10-PCS | Mod: CPTII,TXP,, | Performed by: PHYSICIAN ASSISTANT

## 2022-11-10 PROCEDURE — 94727 PR PULM FUNCTION TEST BY GAS: ICD-10-PCS | Mod: 26,S$PBB,TXP, | Performed by: INTERNAL MEDICINE

## 2022-11-10 PROCEDURE — 3008F BODY MASS INDEX DOCD: CPT | Mod: CPTII,TXP,, | Performed by: THORACIC SURGERY (CARDIOTHORACIC VASCULAR SURGERY)

## 2022-11-10 PROCEDURE — 99215 OFFICE O/P EST HI 40 MIN: CPT | Mod: S$PBB,TXP,, | Performed by: INTERNAL MEDICINE

## 2022-11-10 PROCEDURE — 3008F PR BODY MASS INDEX (BMI) DOCUMENTED: ICD-10-PCS | Mod: CPTII,TXP,, | Performed by: INTERNAL MEDICINE

## 2022-11-10 PROCEDURE — 99213 OFFICE O/P EST LOW 20 MIN: CPT | Mod: PBBFAC,25,27,TXP | Performed by: THORACIC SURGERY (CARDIOTHORACIC VASCULAR SURGERY)

## 2022-11-10 PROCEDURE — 99999 PR PBB SHADOW E&M-EST. PATIENT-LVL III: CPT | Mod: PBBFAC,TXP,, | Performed by: PHYSICIAN ASSISTANT

## 2022-11-10 PROCEDURE — 3074F PR MOST RECENT SYSTOLIC BLOOD PRESSURE < 130 MM HG: ICD-10-PCS | Mod: CPTII,TXP,, | Performed by: INTERNAL MEDICINE

## 2022-11-10 PROCEDURE — 90472 IMMUNIZATION ADMIN EACH ADD: CPT | Mod: PBBFAC,TXP

## 2022-11-10 PROCEDURE — 94729 DIFFUSING CAPACITY: CPT | Mod: 26,S$PBB,TXP, | Performed by: INTERNAL MEDICINE

## 2022-11-10 PROCEDURE — 3008F BODY MASS INDEX DOCD: CPT | Mod: CPTII,TXP,, | Performed by: PHYSICIAN ASSISTANT

## 2022-11-10 PROCEDURE — 1159F PR MEDICATION LIST DOCUMENTED IN MEDICAL RECORD: ICD-10-PCS | Mod: CPTII,TXP,, | Performed by: PHYSICIAN ASSISTANT

## 2022-11-10 PROCEDURE — 99215 PR OFFICE/OUTPT VISIT, EST, LEVL V, 40-54 MIN: ICD-10-PCS | Mod: S$PBB,TXP,, | Performed by: INTERNAL MEDICINE

## 2022-11-10 PROCEDURE — 3074F PR MOST RECENT SYSTOLIC BLOOD PRESSURE < 130 MM HG: ICD-10-PCS | Mod: CPTII,TXP,, | Performed by: PHYSICIAN ASSISTANT

## 2022-11-10 PROCEDURE — 99204 PR OFFICE/OUTPT VISIT, NEW, LEVL IV, 45-59 MIN: ICD-10-PCS | Mod: S$PBB,TXP,, | Performed by: PHYSICIAN ASSISTANT

## 2022-11-10 PROCEDURE — 4010F ACE/ARB THERAPY RXD/TAKEN: CPT | Mod: CPTII,TXP,, | Performed by: PHYSICIAN ASSISTANT

## 2022-11-10 PROCEDURE — 3078F DIAST BP <80 MM HG: CPT | Mod: CPTII,TXP,, | Performed by: INTERNAL MEDICINE

## 2022-11-10 PROCEDURE — 99205 OFFICE O/P NEW HI 60 MIN: CPT | Mod: S$PBB,TXP,, | Performed by: THORACIC SURGERY (CARDIOTHORACIC VASCULAR SURGERY)

## 2022-11-10 PROCEDURE — 1159F PR MEDICATION LIST DOCUMENTED IN MEDICAL RECORD: ICD-10-PCS | Mod: CPTII,TXP,, | Performed by: INTERNAL MEDICINE

## 2022-11-10 PROCEDURE — 3044F HG A1C LEVEL LT 7.0%: CPT | Mod: CPTII,TXP,, | Performed by: INTERNAL MEDICINE

## 2022-11-10 PROCEDURE — 4010F ACE/ARB THERAPY RXD/TAKEN: CPT | Mod: CPTII,TXP,, | Performed by: THORACIC SURGERY (CARDIOTHORACIC VASCULAR SURGERY)

## 2022-11-10 PROCEDURE — 3044F PR MOST RECENT HEMOGLOBIN A1C LEVEL <7.0%: ICD-10-PCS | Mod: CPTII,TXP,, | Performed by: INTERNAL MEDICINE

## 2022-11-10 PROCEDURE — 3008F PR BODY MASS INDEX (BMI) DOCUMENTED: ICD-10-PCS | Mod: CPTII,TXP,, | Performed by: PHYSICIAN ASSISTANT

## 2022-11-10 PROCEDURE — 3074F SYST BP LT 130 MM HG: CPT | Mod: CPTII,TXP,, | Performed by: INTERNAL MEDICINE

## 2022-11-10 PROCEDURE — 94010 BREATHING CAPACITY TEST: ICD-10-PCS | Mod: 26,S$PBB,TXP, | Performed by: INTERNAL MEDICINE

## 2022-11-10 PROCEDURE — 94618 PULMONARY STRESS TESTING: CPT | Mod: PBBFAC,TXP | Performed by: INTERNAL MEDICINE

## 2022-11-10 PROCEDURE — 94729 PR C02/MEMBANE DIFFUSE CAPACITY: ICD-10-PCS | Mod: 26,S$PBB,TXP, | Performed by: INTERNAL MEDICINE

## 2022-11-10 PROCEDURE — 94618 PULMONARY STRESS TESTING: ICD-10-PCS | Mod: 26,S$PBB,TXP, | Performed by: INTERNAL MEDICINE

## 2022-11-10 PROCEDURE — 94010 BREATHING CAPACITY TEST: CPT | Mod: PBBFAC,TXP | Performed by: INTERNAL MEDICINE

## 2022-11-10 PROCEDURE — 3078F PR MOST RECENT DIASTOLIC BLOOD PRESSURE < 80 MM HG: ICD-10-PCS | Mod: CPTII,TXP,, | Performed by: THORACIC SURGERY (CARDIOTHORACIC VASCULAR SURGERY)

## 2022-11-10 PROCEDURE — 3008F PR BODY MASS INDEX (BMI) DOCUMENTED: ICD-10-PCS | Mod: CPTII,TXP,, | Performed by: THORACIC SURGERY (CARDIOTHORACIC VASCULAR SURGERY)

## 2022-11-10 PROCEDURE — 99999 PR PBB SHADOW E&M-EST. PATIENT-LVL III: ICD-10-PCS | Mod: PBBFAC,TXP,, | Performed by: PHYSICIAN ASSISTANT

## 2022-11-10 PROCEDURE — 90694 VACC AIIV4 NO PRSRV 0.5ML IM: CPT | Mod: PBBFAC,TXP

## 2022-11-10 PROCEDURE — 99999 PR PBB SHADOW E&M-EST. PATIENT-LVL III: CPT | Mod: PBBFAC,TXP,, | Performed by: INTERNAL MEDICINE

## 2022-11-10 PROCEDURE — 99999 PR PBB SHADOW E&M-EST. PATIENT-LVL III: CPT | Mod: PBBFAC,TXP,, | Performed by: THORACIC SURGERY (CARDIOTHORACIC VASCULAR SURGERY)

## 2022-11-10 PROCEDURE — 3078F DIAST BP <80 MM HG: CPT | Mod: CPTII,TXP,, | Performed by: THORACIC SURGERY (CARDIOTHORACIC VASCULAR SURGERY)

## 2022-11-10 PROCEDURE — 3044F HG A1C LEVEL LT 7.0%: CPT | Mod: CPTII,TXP,, | Performed by: THORACIC SURGERY (CARDIOTHORACIC VASCULAR SURGERY)

## 2022-11-10 PROCEDURE — 99205 PR OFFICE/OUTPT VISIT, NEW, LEVL V, 60-74 MIN: ICD-10-PCS | Mod: S$PBB,TXP,, | Performed by: THORACIC SURGERY (CARDIOTHORACIC VASCULAR SURGERY)

## 2022-11-10 PROCEDURE — 3074F SYST BP LT 130 MM HG: CPT | Mod: CPTII,TXP,, | Performed by: PHYSICIAN ASSISTANT

## 2022-11-10 PROCEDURE — 99213 OFFICE O/P EST LOW 20 MIN: CPT | Mod: PBBFAC,TXP,25 | Performed by: PHYSICIAN ASSISTANT

## 2022-11-10 PROCEDURE — 3078F DIAST BP <80 MM HG: CPT | Mod: CPTII,TXP,, | Performed by: PHYSICIAN ASSISTANT

## 2022-11-10 PROCEDURE — 99204 OFFICE O/P NEW MOD 45 MIN: CPT | Mod: S$PBB,TXP,, | Performed by: PHYSICIAN ASSISTANT

## 2022-11-10 PROCEDURE — 94621 CARDIOPULMONARY EXERCISE TESTING (CUPID ONLY): ICD-10-PCS | Mod: 26,TXP,, | Performed by: INTERNAL MEDICINE

## 2022-11-10 PROCEDURE — 1159F MED LIST DOCD IN RCRD: CPT | Mod: CPTII,TXP,, | Performed by: INTERNAL MEDICINE

## 2022-11-10 PROCEDURE — 3044F HG A1C LEVEL LT 7.0%: CPT | Mod: CPTII,TXP,, | Performed by: PHYSICIAN ASSISTANT

## 2022-11-10 PROCEDURE — 94727 GAS DIL/WSHOT DETER LNG VOL: CPT | Mod: PBBFAC,TXP | Performed by: INTERNAL MEDICINE

## 2022-11-10 PROCEDURE — 99999 PR PBB SHADOW E&M-EST. PATIENT-LVL III: ICD-10-PCS | Mod: PBBFAC,TXP,, | Performed by: INTERNAL MEDICINE

## 2022-11-10 PROCEDURE — 1159F MED LIST DOCD IN RCRD: CPT | Mod: CPTII,TXP,, | Performed by: PHYSICIAN ASSISTANT

## 2022-11-10 PROCEDURE — 99999 PR PBB SHADOW E&M-EST. PATIENT-LVL III: ICD-10-PCS | Mod: PBBFAC,TXP,, | Performed by: THORACIC SURGERY (CARDIOTHORACIC VASCULAR SURGERY)

## 2022-11-10 PROCEDURE — 99213 OFFICE O/P EST LOW 20 MIN: CPT | Mod: PBBFAC,25,27,TXP | Performed by: INTERNAL MEDICINE

## 2022-11-10 PROCEDURE — 4010F PR ACE/ARB THEARPY RXD/TAKEN: ICD-10-PCS | Mod: CPTII,TXP,, | Performed by: INTERNAL MEDICINE

## 2022-11-10 RX ORDER — IBUPROFEN 200 MG
1 TABLET ORAL DAILY
COMMUNITY
Start: 2022-10-29

## 2022-11-10 RX ORDER — CYANOCOBALAMIN/FOLIC AC/VIT B6 2-2.5-25MG
1 TABLET ORAL DAILY
COMMUNITY
Start: 2022-10-25

## 2022-11-10 NOTE — PROGRESS NOTES
Patient received Prevnar 20 vaccines in the left deltoid, and Tdap and HD flu vaccines in the right deltoid. Pt tolerated well. Pt asked to wait in the clinic 15 minutes after injection in the event of an allergic reaction. Pt verbalized understanding. Pt left in NAD.

## 2022-11-10 NOTE — LETTER
November 13, 2022        Berenice Barnes  [address]  [facility] [address]  Phone: [phone]  Fax: [phone]             [id number]  [address]  [facility] [address]  Phone: [phone]   Patient: Filemon La   MR Number: [id number]   YOB: 1980   Date of Visit: 11/10/2022       Dear Dr. Berenice Barnes    Thank you for referring Filemon La to me for evaluation. Attached you will find relevant portions of my assessment and plan of care.    If you have questions, please do not hesitate to call me. I look forward to following Filemon La along with you.    Sincerely,    Khris Hansen Jr, MD    Enclosure    If you would like to receive this communication electronically, please contact [email or web address] or [phone] to request [employer] access.    [employer] is a tool which provides read-only access to select patient information with whom you have a relationship. Its easy to use and provides real time access to review your patients record including encounter summaries, notes, results, and demographic information.    If you feel you have received this communication in error or would no longer like to receive these types of communications, please e-mail [email or web address]

## 2022-11-10 NOTE — PROGRESS NOTES
PRE-TRANSPLANT INFECTIOUS DISEASE CONSULT    Reason for Visit:  Pre-transplant evaluation  Referring Provider: Aaareferral Self     History of Present Illness:    42 y.o. male with a history of CHF presents for pre-heart transplant evaluation.    Infectious History:  Recent hospital admissions: Yes 2/2 COVID 1/2022 and CHF and PNA   Recent infections: No  Recent or current antibiotic use: No  History of recurrent infections *(sinus / pneumonia / UTI / SBP)*: No  Recent dental infections, issues or procedures:  Small cavity.  One dental extraction multiple years ago  History of chicken pox or shingles: Yes  History of STI: No   History of COVID infection: Yes 1/2022    History of Immunosuppression:  Prior chemotherapy / immunosuppression: No  Prior transplant: No  History of splenectomy: No    Tuberculosis:  Prior screening for latent TB: No  Prior diagnosis of latent TB: No  Risk factors for TB *known exposure, incarceration, homelessness*: none    Geographical exposures:  Currently lives in [facility] with alone  Lived in the following states: none  Lived in [facility] [place]: No  International travel: No  Travel-associated illness: No    Social/Environmental:  Occupation:  . worked in chemical plants.   Pets: Yes outdoor dog  Livestock: No  Fishing / hunting: No  Hobbies: none  Water: bottled   Consumption of raw/undercooked meat or seafood?  No  Tobacco: Yes in the past   Alcohol: Yes in the past. No longer smoking or drinking   Recreational drug use:  No  Sexual partners: single partner with woman       Past Histories:   Past Medical History:   Diagnosis Date    CHF (congestive heart failure)     Pulmonary hypertension     pt reported     Past Surgical History:   Procedure Laterality Date    CATHETERIZATION OF BOTH LEFT AND RIGHT HEART N/A 9/20/2022    Procedure: CATHETERIZATION, HEART, BOTH LEFT AND RIGHT;  Surgeon: Yady Patterson MD;  [facility] CATH LAB;  Service: Cardiology;  Laterality: N/A;    RIGHT  HEART CATHETERIZATION Right 10/13/2022    Procedure: INSERTION, CATHETER, RIGHT HEART;  Surgeon: La Nena Ramirez DO;  Location: [facility] CATH LAB;  Service: Cardiology;  Laterality: Right;     Family History   Problem Relation Age of Onset    Hypertension Mother     Diabetes Mother     Hypertension Father      Social History     Tobacco Use    Smoking status: Former     Packs/day: 1.00     Types: Cigarettes    Smokeless tobacco: Never   Substance Use Topics    Alcohol use: Not Currently     Comment: for about 2 weeks    Drug use: No     Review of patient's allergies indicates:  No Known Allergies      Immunization History:  Received all childhood vaccines: Yes  All household members receive annual flu vaccine: No  All household members are up to date on COVID vaccine: No    There is no immunization history for the selected administration types on file for this patient.       Current antibiotics:  Antibiotics (From admission, onward)      None              Review of Systems  Review of Systems   Constitutional: Negative for chills, decreased appetite, fever, malaise/fatigue, night sweats, weight gain and weight loss.   HENT:  Negative for congestion, ear pain, hearing loss, hoarse voice, sore throat and tinnitus.    Eyes:  Negative for blurred vision, pain, vision loss in left eye, vision loss in right eye and visual disturbance.   Cardiovascular:  Positive for dyspnea on exertion. Negative for chest pain, leg swelling and palpitations.   Respiratory:  Positive for shortness of breath. Negative for cough, sputum production and wheezing.    Skin:  Negative for dry skin, itching, rash and suspicious lesions.   Musculoskeletal:  Negative for back pain, joint pain, myalgias and neck pain.   Gastrointestinal:  Negative for abdominal pain, constipation, diarrhea, heartburn, nausea and vomiting.   Genitourinary:  Negative for dysuria, flank pain, frequency, hematuria, hesitancy and urgency.   Neurological:  Negative for  dizziness, headaches, numbness, paresthesias and weakness.   Psychiatric/Behavioral:  Negative for depression and memory loss. The patient does not have insomnia and is not nervous/anxious.         Objective  Physical Exam  Vitals and nursing note reviewed.   Constitutional:       General: He is not in acute distress.     Appearance: He is well-developed. He is not diaphoretic.   HENT:      Head: Normocephalic and atraumatic.   Eyes:      Pupils: Pupils are equal, round, and reactive to light.   Cardiovascular:      Rate and Rhythm: Normal rate and regular rhythm.      Heart sounds: Normal heart sounds. No murmur heard.    No friction rub. No gallop.   Pulmonary:      Effort: Pulmonary effort is normal. No respiratory distress.      Breath sounds: Normal breath sounds. No wheezing or rales.   Chest:      Chest wall: No tenderness.   Abdominal:      General: Bowel sounds are normal. There is no distension.      Palpations: There is no mass.      Tenderness: There is no abdominal tenderness. There is no guarding.   Musculoskeletal:         General: No deformity. Normal range of motion.      Cervical back: Normal range of motion and neck supple.   Skin:     General: Skin is warm and dry.      Findings: No erythema or rash.   Neurological:      Mental Status: He is alert and oriented to person, place, and time.   Psychiatric:         Behavior: Behavior normal.         Thought Content: Thought content normal.         Judgment: Judgment normal.       MELD: *liver transplant only*  MELD-Na score: 8 at 10/27/2022 10:02 AM  MELD score: 8 at 10/27/2022 10:02 AM  Calculated from:  Serum Creatinine: 1.0 mg/dL at 10/27/2022 10:02 AM  Serum Sodium: 138 mmol/L (Using max of 137 mmol/L) at 10/27/2022 10:02 AM  Total Bilirubin: 1.7 mg/dL at 10/27/2022 10:02 AM  INR(ratio): 1.0 at 10/27/2022 10:02 AM  Age: 41 years      Labs:    CBC:   Lab Results   Component Value Date    WBC 7.22 10/27/2022    HGB 15.8 10/27/2022    HCT 46.1  10/27/2022    MCV 96 10/27/2022     10/27/2022    GRAN 3.3 10/27/2022    GRAN 45.2 10/27/2022    LYMPH 3.3 10/27/2022    LYMPH 45.6 10/27/2022    MONO 0.5 10/27/2022    MONO 7.1 10/27/2022    EOSINOPHIL 1.1 10/27/2022       CMP:   Lab Results   Component Value Date     10/27/2022    K 4.0 10/27/2022     10/27/2022    CO2 23 10/27/2022    GLU 91 10/27/2022    BUN 15 10/27/2022    CREATININE 1.0 10/27/2022    CALCIUM 10.0 10/27/2022    MG 1.5 (L) 10/27/2022    ALBUMIN 4.2 10/27/2022    PROT 7.8 10/27/2022    ALT 18 10/27/2022    AST 18 10/27/2022    ALKPHOS 82 10/27/2022    BILITOT 1.7 (H) 10/27/2022    ESTGFRAFRICA >60.0 07/27/2022       Syphilis screening:   Lab Results   Component Value Date    RPR Non-reactive 10/27/2022        TB screening:   Lab Results   Component Value Date    TBGOLDPLUS Negative 10/27/2022       HIV screening:   Lab Results   Component Value Date    BVB22CMJC Non-reactive 10/27/2022       Strongyloides IgG:   Lab Results   Component Value Date    STRONGANTIGG Negative 10/27/2022       Hepatitis Serologies:   Lab Results   Component Value Date    HEPAIGG Non-reactive 10/27/2022    HEPBCAB Non-reactive 10/27/2022    HEPBSAB <3.00 10/27/2022    HEPBSAB Non-reactive 10/27/2022    HEPCAB Non-reactive 10/27/2022        Varicella IgG:   Lab Results   Component Value Date    VARICELLAINT Positive (A) 10/27/2022       Recent Microbiology:   Microbiology Results (last 7 days)       ** No results found for the last 168 hours. **              Radiology:          Assessment and Plan    1. Risks of Infection: Available serologies were reviewed. No unusual risks of infection or significant barriers to transplantation were identified from the infectious disease standpoint.   - Pending serologies:  HIV RPR STRONGY HEP C QUANT GOLD NEGATIVE    2. Immunizations:  Based on the patient's immunization history and serologies, the following immunizations are recommended:  - Hepatitis A rx given      Patient does not have immunity to hepatitis A    Vaccination required: Yes   - Hepatitis B rx given    Patient does not have immunity to hepatitis B    Vaccination ordered today: Yes    If not ordered, reason for not vaccinating: Other (specify)   - COVID rx given     Current CDC vaccination recommendations were discussed with the patient   - Influenza today     Annual, high dose preferred   - Prevnar 20 today   - Tdap today   - Shingrix rx given     Recommended Pre-Transplant Immunization Schedule  Vaccine  0m 1m 2m 6m   Pneumococcal conjugate vaccine (Prevnar 20) X      Tetanus-diphtheria-pertussis (Tdap)* X      Hepatitis A Vaccine (Havrix)** X   X   Hepatitis B Vaccine (Heplisav)** X X     Influenza X      Zoster Recombinant Vaccine (Shingrix) X  X           *Administer booster every 10 years.       **Administer if no immunity demonstrated on serologies                 3. Counseling:   I discussed with the patient the risk for increased susceptibility to infections following transplantation including increased risk for infection right after transplant and if rejection should occur.  The patient has been counseled on the importance of vaccinations including but not limited to a yearly flu vaccine. Patient was also instructed to encourage that family/caretakers receive their flu vaccine yearly. The patient was encouraged to contact us about any problems that may develop after immunizations and possible side effects were reviewed.     Specific guidance has been provided to the patient regarding the patient's occupation, hobbies and activities to avoid future infectious complications. These include but are not limited to: avoiding raw/undercooked meats and seafood, avoiding unpasteurized milk/cheeses, proper (hand) hygiene, contact with animals and appropriate vaccination of animals, use of mosquito/tick precautions, avoiding walking barefoot, avoiding sick contacts, and seeking medical advice prior to foreign  travel (specifically developing countries).     4. Transplant Candidacy: Based on available information, there are no identified significant barriers to transplantation from an infectious disease standpoint.  Final determination of transplant candidacy will be made once evaluation is complete and reviewed by the Selection Committee.      Follow up with infectious disease as needed. Please call if any pending serologic testing is positive.    RX given for hepatitis A, hep B, shingrix, covid series   Vaccines today  Tdap  Prevnar 20  Influenza     The total time for evaluation and management services performed on 11/10/22 was greater than  5 minutes.

## 2022-11-10 NOTE — PROGRESS NOTES
At the request of the team, I have been asked to meet patient and provide VAD education. Introduced self and reason for visit. Pt, brother and sister in law AAAO.  Provided phase 1 written VAD education. Included in Phase 1 folder is the following:     Evaluation Eval for [facility]  [employer] [email or web address] Flyer  [employer] Education Videos  Pictures of examples of VADs     Explained the work up process including possible outcomes.     Explained to look over the entire contents and read Evaluation Eval for [facility] acknowledgement form. Encouraged patient to partake in [employer] Message Education Videos. Also explained that they should bring this folder with them to all clinic visits and if they are admitted to the hospital so that we can continue education as needed. Should there be any questions, please write them down and bring with you or feel free to call and we can talk on the phone. All questions answered to their satisfaction as evidence by verbal acknowledgement

## 2022-11-10 NOTE — Clinical Note
Schedulers he needs a BMP on November 28th; pre we are going to up titrate his medications so he could go to heart failure high risk pool

## 2022-11-11 NOTE — PROCEDURES
Filemon La is a 42 y.o.  male patient, who presents for a 6 minute walk test ordered by MD Durga.  The diagnosis is Cardiomyopathy.  The patient's BMI is 31.4 kg/m2.  Predicted distance (lower limit of normal) is 526.31 meters.      Test Results:    The test was completed without stopping.  The total time walked was 360 seconds.  During walking, the patient reported:  Dyspnea, Leg pain, Lightheadedness.  The patient used no assistive devices during testing.     11/10/2022---------Distance: 457.2 meters (1500 feet)     O2 Sat % Supplemental Oxygen Heart Rate Blood Pressure Harish Scale   Pre-exercise  (Resting) 99 % Room Air 84 bpm 114/69 mmHg 0   During Exercise 94 % Room Air 52 bpm Unable to obtain 4   Post-exercise  (Recovery) 99 % Room Air  76 bpm       Recovery Time: 120 seconds    Performing nurse/tech: TANYA Garcia      PREVIOUS STUDY:   The patient has not had a previous study.      CLINICAL INTERPRETATION:  Six minute walk distance is 457.2 meters (1500 feet) with somewhat heavy dyspnea.  During exercise, there was significant desaturation while breathing room air.  Heart rate decreased significantly with walking.  This may represent an abnormal cardiovascular response to exercise.  The patient reported non-pulmonary symptoms during exercise.  No previous study performed.  Based upon age and body mass index, exercise capacity is less than predicted.

## 2022-11-13 PROBLEM — I50.42 CHRONIC COMBINED SYSTOLIC AND DIASTOLIC CONGESTIVE HEART FAILURE: Status: ACTIVE | Noted: 2022-09-17

## 2022-11-13 PROBLEM — J18.9 COMMUNITY ACQUIRED PNEUMONIA OF RIGHT LOWER LOBE OF LUNG: Status: RESOLVED | Noted: 2022-05-21 | Resolved: 2022-11-13

## 2022-11-13 RX ORDER — VALSARTAN 40 MG/1
40 TABLET ORAL 2 TIMES DAILY
Qty: 60 TABLET | Refills: 1 | Status: SHIPPED | OUTPATIENT
Start: 2022-11-13 | End: 2022-11-29

## 2022-11-13 NOTE — PROGRESS NOTES
Subjective:     HPI:  Mr. La is a 42 y.o. year old Black or  male who saw Dr. Calixto 10/11/22 and arrangements made to evaluate for advanced options.  He was referred by our colleague LILO Trejo.  his heart failure symptoms began September 2022 and at that time he was drinking heavily, consuming 1.5-2 pt of hard liquor per day.  He was able to quit drinking and smoking in September 2022.  He reports when initially diagnosed that he was intolerant to medical therapy due to hypotension.  At this time he is only on torsemide 20 mg once daily but no GDMT.      His 1st admission for congestive heart failure occurred in September 2022 and he was discharged after 2 days on Entresto and Toprol.  The second was less than a week later, a 1 day admission to the observation unit for dizzy and lightheaded sensations at which time all GDMT was stopped.  Puffs disease admissions were in September 2022.  He has not been in hospital since he stopped drinking.  He also reports that since he stopped drinking in on his torsemide he has been feeling much better.  He has been afraid to get too active physically has someone told him he could hurt his heart by doing too much but is able to function with NYHA class 2 symptoms at present.  He has been wearing a LifeVest since diagnosis but has received no alarms.  A 6 minute walk test today revealed a 6 minute walk distance of 457.5 m.  He is able to sleep in the bed on 1-2 pillows without PND.  No palpitations presyncope or syncope.    Current Outpatient Medications on File Prior to Visit   Medication Sig Dispense Refill    acetaminophen (TYLENOL) 325 MG tablet Take 2 tablets (650 mg total) by mouth every 6 (six) hours as needed for Pain.  0    torsemide (DEMADEX) 20 MG Tab Take 1 tablet (20 mg total) by mouth once daily. 30 tablet 0     Past Medical History:   Diagnosis Date    CHF (congestive heart failure)     Chronic combined systolic and diastolic congestive  "heart failure 9/17/2022    Pulmonary hypertension     pt reported     Past Surgical History:   Procedure Laterality Date    CATHETERIZATION OF BOTH LEFT AND RIGHT HEART N/A 9/20/2022    Procedure: CATHETERIZATION, HEART, BOTH LEFT AND RIGHT;  Surgeon: Yady Patterson MD;  Location: [facility] CATH LAB;  Service: Cardiology;  Laterality: N/A;    RIGHT HEART CATHETERIZATION Right 10/13/2022    Procedure: INSERTION, CATHETER, RIGHT HEART;  Surgeon: La Nena Ramirez DO;  Location: [facility] CATH LAB;  Service: Cardiology;  Laterality: Right;     SOCIAL HISTORY:  He was drinking 1.5-2 pt of hard liquor per day until September 2022 and had done this for a number of years.  He quit smoking in September 2022 as well.  He has never used drugs.  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 currently not working as result of his illness.    Review of Systems   Constitutional: Positive for malaise/fatigue (Some fatigue but this has improved). Negative for decreased appetite and weight gain.   Cardiovascular:  Positive for dyspnea on exertion (still limiting his activity describes NYHA class 2 symptoms). Negative for chest pain, irregular heartbeat, leg swelling, near-syncope, orthopnea, palpitations, paroxysmal nocturnal dyspnea and syncope.   Respiratory:  Negative for cough, sputum production and wheezing.    Gastrointestinal:  Negative for bloating, hematemesis, hematochezia, melena, nausea and vomiting.   Genitourinary:  Negative for hematuria.   Neurological:  Negative for brief paralysis, dizziness, focal weakness, light-headedness, seizures and weakness.     Objective:   Blood pressure 123/67, pulse (!) 52, height 5' 2" (1.575 m), weight 84 kg (185 lb 3 oz).body mass index is 33.87 kg/m².  Physical Exam  Constitutional:       General: He is not in acute distress.     Appearance: He is well-developed. He is not ill-appearing, toxic-appearing or diaphoretic.      Comments: BP " "123/67 (BP Method: Medium (Automatic))   Pulse (!) 52   Ht 5' 2" (1.575 m)   Wt 84 kg (185 lb 3 oz)   BMI 33.87 kg/m²   Well-developed well-nourished black male in no acute distress, friendly and cooperative   HENT:      Head: Normocephalic and atraumatic.   Eyes:      General: No scleral icterus.        Right eye: No discharge.         Left eye: No discharge.      Conjunctiva/sclera: Conjunctivae normal.   Neck:      Thyroid: No thyromegaly.      Vascular: No JVD.      Trachea: No tracheal deviation.      Comments: They veins not elevated central venous pressure is estimated less than 6 cm of water  Cardiovascular:      Rate and Rhythm: Normal rate and regular rhythm.      Heart sounds: Normal heart sounds. No murmur heard.    No gallop.   Pulmonary:      Effort: Pulmonary effort is normal.      Breath sounds: Normal breath sounds.   Abdominal:      General: Bowel sounds are normal. There is no distension.      Palpations: Abdomen is soft. There is no mass.      Tenderness: There is no abdominal tenderness. There is no guarding or rebound.      Comments: Liver span 10 cm   Musculoskeletal:         General: No swelling or tenderness.      Right lower leg: No edema.      Left lower leg: No edema.   Skin:     General: Skin is warm and dry.   Neurological:      General: No focal deficit present.      Mental Status: He is alert and oriented to person, place, and time. Mental status is at baseline.   Psychiatric:         Mood and Affect: Mood normal.         Behavior: Behavior normal.         Thought Content: Thought content normal.         Judgment: Judgment normal.       11/10/2022 CPX  Metabolic Findings Resting spirometry reveals an FVC = 2.79L which is 75.24% of predicted, an FEV1 of 2.30L, which is 74.04% of predicted and an FEV1/FVC ratio of 82.44%. The MVV = 92 L/min, which is 63.55% of predicted.     The respiratory exchange ratio (RER) was 0.81, suggesting poor effort.     The breathing reserve is " calculated at 52.28%, which is normal. Oxygen saturation with exercise remained normal.     The peak VO2 was 16.2 ml/kg/min which is 40.1% of predicted equating to a functional capacity of 4.63 METS indicating severe functional impairment.     The anaerobic threshold (AT), which occurred at a heart rate of 121bpm, was 13.1 ml/kg/min, which is 32.43% of the predicted VO2 and is reduced.     The VE/VCO2 [place] was 39.4. The Resting PetCO2 was 22.0.     Severe functional impairment associated with a normal breathing reserve, normal oxygen stauration, poor effort, and a reduced AT. These findings are indicative of functional impairment secondary to circulatory insufficiency, poor effort.         2D Echo with CFD done on 9/18/2022  The left ventricle is severely enlarged with severely decreased systolic function (LVEDD 7.6 cm)  The estimated ejection fraction is 15%.  Left ventricular diastolic dysfunction.  There is severe left ventricular global hypokinesis.  Normal right ventricular size with low normal right ventricular systolic function.  Moderate left atrial enlargement.  Moderate right atrial enlargement.  Mild mitral regurgitation.  Mild tricuspid regurgitation.  Intermediate central venous pressure (8 mmHg).  The estimated PA systolic pressure is 32 mmHg.    RHC performed on 9/20/2022  RA: 19/ 15/ 15 RV: 56/ 13/ 18 PA: 59/ 32/ 44 PWP: 31/ 31/ 30 .   Cardiac output was 3.4. Cardiac index is 1.9 L/min/m2.      Coronary angiogram done on 9/20/2022  Left Anterior Descending   The vessel is large and is angiographically normal.      First Diagonal Branch   The vessel is large and is angiographically normal.      Left Circumflex   The vessel is large and is angiographically normal.      First Obtuse Marginal Branch   The vessel is large and is angiographically normal.      Right Coronary Artery   The vessel is large and is angiographically normal.      Right Posterior Descending Artery   The vessel is large and is  angiographically normal.          Assessment:      1. Chronic systolic congestive heart failure    2. Alcoholic cardiomyopathy    3. Alcohol use        Plan:   He has witnessed significant improvement in symptoms since giving up alcohol in September 2022 despite being on no GDMT.  He has had only 2 hospitalizations for heart failure the first of which was when he was diagnosed the second for BP 80/s with dizzy, LH spells.  All of his GDMT was stopped on admission to the observation unit and he was discharged the next morning.  Neither his NYHA class 2 symptoms nor the result of today's CPX on no GDMT would support a survival advantage to advanced options over medical therapy at this time.  The VE/VCO2 slope does place him at a higher risk for decompensation/hospitalization within the next 6 months but again this was performed on no GDMT.    His best option at this time is to initiate an up titrate medical therapy.  I suspect that if Entresto was used he would not required daily diuretic and in fact might not require even a weekly diuretic dose.  We discussed other options besides Entresto and a plan of care to up-titrate meds at 2 weeks intervals over the phone.  He should be closely monitored 4-6 week intervals at this point but anticipate having him on target doses of guideline directed medical therapy within the next 8 weeks.    I have encouraged him to start a regular walking program on a daily basis working up to a 45-60 minute walk at least 5 days per week.    He expressed understanding of the importance of complete abstinence from alcohol and the risk to his heart condition, worsening of his heart failure and death from his heart failure should he relapse.    He has excellent support from his brother and sister-in-law (nurse practitioner) both of whom were in attendance for today's visit    The patient seems genuinely interested in his health, heart condition and medical treatment plan.  I believe his  compliance will be good.    Patient is now NYHA II    Recommend 3 gram sodium restriction and 2000cc fluid restriction.    Encourage physical activity with graded exercise program.  Requested patient to weigh themselves daily, and to notify us if their weight increases by more than 3 lbs in 1 day or 5 lbs in 1 week.     Patient advised that it is recommended that all transplant candidates, and their close contacts and household members receive Covid vaccination.    UNOS Patient Status  Functional Status: 70% - Cares for self: unable to carry on normal activity or active work  Physical Capacity: No Limitations  Working for Income: No  If no, reason not working: Demands of Treatment    Discussed with the patient and family [facility] program outcomes as reported in INTERMACS (Interagency Registry for Mechanically Assisted Circulatory Support):    1 year survival = 92.7%  2 year survival = 86.7%  3 year survival = 86.7%    Patient and family acknowledged receipt of this information and the role of GDMT, importance of remaining off alcohol, etc.  All questions were answered.     Patient met with MCS coordinator prior to this visit and I updated them on above plan of care and improvement in his symptoms.    11/13/2022 ADDENDUM:  Lab Results   Component Value Date     (H) 11/10/2022     (H) 10/27/2022     (H) 09/28/2022   BNP on 9/17/22 was 766  Lab Results   Component Value Date     11/10/2022    K 3.5 11/10/2022     11/10/2022    CO2 27 11/10/2022    BUN 17 11/10/2022    CREATININE 1.1 11/10/2022    CALCIUM 10.0 11/10/2022    ANIONGAP 10 11/10/2022    ESTGFRAFRICA >60.0 07/27/2022    EGFRNONAA >60.0 07/27/2022     I left a message on his voicemail (sounded like his sister-in-law the nurse practitioner voice greeting) regarding my plan to initiate valsartan 40 mg twice daily and up titrate at 2 week intervals to target dose.  Plan to switch over to Entresto at  some point and add beta-blocker once on an effective unloading regimen.  I also reminded her that we likely would need less diuretic particularly on Entresto.  Will ask coordinator to arrange for BMP in 2 weeks on November 28th.

## 2022-11-14 ENCOUNTER — OFFICE VISIT (OUTPATIENT)
Dept: PALLIATIVE MEDICINE | Facility: CLINIC | Age: 42
End: 2022-11-14
Payer: MEDICAID

## 2022-11-14 DIAGNOSIS — Z51.5 PALLIATIVE CARE ENCOUNTER: ICD-10-CM

## 2022-11-14 DIAGNOSIS — I50.42 CHRONIC COMBINED SYSTOLIC AND DIASTOLIC CONGESTIVE HEART FAILURE: Primary | ICD-10-CM

## 2022-11-14 DIAGNOSIS — Z76.82 ORGAN TRANSPLANT CANDIDATE: ICD-10-CM

## 2022-11-14 DIAGNOSIS — R06.02 SOB (SHORTNESS OF BREATH): ICD-10-CM

## 2022-11-14 PROCEDURE — 4010F ACE/ARB THERAPY RXD/TAKEN: CPT | Mod: CPTII,95,NTX, | Performed by: STUDENT IN AN ORGANIZED HEALTH CARE EDUCATION/TRAINING PROGRAM

## 2022-11-14 PROCEDURE — 99204 PR OFFICE/OUTPT VISIT, NEW, LEVL IV, 45-59 MIN: ICD-10-PCS | Mod: 95,NTX,, | Performed by: STUDENT IN AN ORGANIZED HEALTH CARE EDUCATION/TRAINING PROGRAM

## 2022-11-14 PROCEDURE — 99497 PR ADVNCD CARE PLAN 30 MIN: ICD-10-PCS | Mod: 95,NTX,, | Performed by: STUDENT IN AN ORGANIZED HEALTH CARE EDUCATION/TRAINING PROGRAM

## 2022-11-14 PROCEDURE — 3044F HG A1C LEVEL LT 7.0%: CPT | Mod: CPTII,95,NTX, | Performed by: STUDENT IN AN ORGANIZED HEALTH CARE EDUCATION/TRAINING PROGRAM

## 2022-11-14 PROCEDURE — 3044F PR MOST RECENT HEMOGLOBIN A1C LEVEL <7.0%: ICD-10-PCS | Mod: CPTII,95,NTX, | Performed by: STUDENT IN AN ORGANIZED HEALTH CARE EDUCATION/TRAINING PROGRAM

## 2022-11-14 PROCEDURE — 99204 OFFICE O/P NEW MOD 45 MIN: CPT | Mod: 95,NTX,, | Performed by: STUDENT IN AN ORGANIZED HEALTH CARE EDUCATION/TRAINING PROGRAM

## 2022-11-14 PROCEDURE — 99497 ADVNCD CARE PLAN 30 MIN: CPT | Mod: 95,NTX,, | Performed by: STUDENT IN AN ORGANIZED HEALTH CARE EDUCATION/TRAINING PROGRAM

## 2022-11-14 PROCEDURE — 4010F PR ACE/ARB THEARPY RXD/TAKEN: ICD-10-PCS | Mod: CPTII,95,NTX, | Performed by: STUDENT IN AN ORGANIZED HEALTH CARE EDUCATION/TRAINING PROGRAM

## 2022-11-14 NOTE — PROGRESS NOTES
[facility]  Palliative Care   Psychosocial Assessment    Patient Name: Filemon La  MRN: [id number]  Palliative Care Provider:  Sondra Thompson MD   Primary Care Physician: [facility]  Principal Problem: Heart Failure     Reason for Referral:  Advanced Care Planning and Psychosocial Support       Present during Interview: patient.      Primary Language:English   Needed: no      Past Medical Situation:   PMH:   Past Medical History:   Diagnosis Date    Alcoholism in recovery 09/2022    Cardiomyopathy 09/2022    Alcoholic    CHF (congestive heart failure)     Chronic combined systolic and diastolic congestive heart failure 09/17/2022    Pulmonary hypertension     pt reported     Mental Health/Substance Use History:Documented history of ETOH use disorder (Sober since 9/2022)  Risk of Abuse, neglect or exploitation: None identified   Current or Previous Trauma and/or evidence of PTSD: None identified   Non-traditional Health practices: None identified     Understanding of diagnosis and prognosis: Fair   Experience/Comfort level with health care system: Fair     Patients Mental Status: Alert and oriented to person, place, time and situation with stable mood.     Socio-Economic Factors/Resources:  Address: [address] [place] [address]  Phone Number: [phone] (home) [phone] (work)    Marital Status:   Household composition: Patient and three children (when not at their mother's residence)  Children:Three children ages 16, 10, and 7    Patient/Family perceptions about Caregiving Needs; availability and capacity: Patient reports his brother and sister-and law(who works as a cardiac NP) have volunteered to provide assistance to patient if needs advance.     Family Dynamics/Relationships: Healthy     Patient/Family Strengths/Resilience: Patient and family appear to be a tight-knit and supportive unit, as evidenced by their willingness  to provide each other with care despite their various physical challenges.   Patient/Family Coping: Fair.  Patient acknowledges he is dealing with a great deal of stress attributed to his condition and lack of income. Team will assess for need for further psychotherapy (Patient declined referral at this time).    Activities of Daily Living: Independent   Support Systems-Family & Community (Home Health, HME etc): n/a    Transportation:  yes    Work/Education History: unemployed  Self-Care Activities/Hobbies: Spending time with his children and siblings, feeding his pets      History: no    Financial Resources:Medicaid      Advanced Care Planning & Legal Concerns:   Advanced Directives/Living Will: no  LaPOST/POLST: no   Planning:  no    Power of : no    Emergency Contacts: Jessica La/Sister    Spirituality, Culture & Coping Mechanisms:  F- Deisi and Belief: Mu-ism     I - Importance: High    C - Community/Culture Values: Patient member of [employer]. Patient admits he prefers independent practice.      A - Address in Care: Needs met at this time.       Goals/Hopes/Expectations:Patient hopes to see his children grow up  Fears/Anxiety/Concerns: Patient expresses concern regarding the severity of his condition and his lack of income.           Complicated Bereavement Risk Assessment Tool (CBRAT)  Reference:  [facility] Clinical Practice Group (May 2016). Bereavement Risk Screening and Management Guidelines.  Retrieved from: http://www.grpcc.com.au/wp-content/uploads//RRDIT-Ezwxjbrhxab-Xihvaimst-and-Management-Guideline-2016.pdf      Bereaved Client Characteristics   Under 18      yes  Was a Twin   no  Young Spouse   no  Elderly Spouse    no  Isolated    no  Lacks Meaningful Social Support   no  Dissatisfied with help available during illness   no  New to [employer] [place] no  New to Decision-Making   no    Illness  Inherited  "Disorder   no  Stigmatized Disease in the family/community   no  Lengthy/Burdensome   no     Bereaved Client's History of Loss   Cumulative Multiple Losses   no  Previous Mental Health Illnesses   no  Current Mental Health Illness   no  Other Significant Health Issues   yes   Migrant/Refugee   no Death  Sudden or Unexpected   no  Traumatic Circumstances Associated with Death   no  Significant Cultural/Social Burdens as a result of Death   yes   Relationship with   Profound Lifelong Partner   no  Highly Dependent    yes  Antagonistic   no  Ambivalent   no  Deeply Connected   yes  Culturally Defined   yes   Risk Factors Scores  0-2  Low  3-5  Moderate  5+  High  All persons scoring moderate to high presume to be at risk**    (** It is acknowledged that protective factors and resilience may outweigh apparent risk factors.      Total Risk Factors Score:   Moderate to high.  Patient is an active caregiver to his sister who recently received treatment for brain cancer and his three children ages 16, 10, and 7.  Family would benefit from additional support leading up to and following patient's passing.       SW accompanied MD during initial visit with patient.  Patient presents Oriented x4 with normal mood. Patient appears to have a fair understanding of his illness. Patient states he understands is heart is "very bad and beating at 30%".  Patient acknowledges this news is "worrisome and overwhelming". Patient reports he hopes to live as long as possible and watch his three children (ages 7, 10, and 16) grow up. Patient reports adding to this stress is his inability to meet his financial obligations now that he is no longer employed.  Patient reports he applied for SSDI however believes his application "just sat on the 's desk". SW to reach out to [facility] for assistance/follow-up on patient's application.  Patient's EMR indicates a history of ETOH use disorder with sobriety " "beginning in Sept 2022. Patient reports he christopher with the aforementioned stressors through prayer and talking with his sister. SW offered services of Behavioral Health [facility], patient declines at this time. Patient reports having good support from his siblings and the mother of his children. Patient adds his brother and sister-in-law have volunteered to act as patient's caregiver if patient's needs advance.  GoC discussion initiated by MD.  Patient reports he would wish to assign his brother/Maurizio Mendoza as HCPOA.  Team to mail ACP guide for completion. Patient reports he wishes to receive all invasive therapies however would not want to "be a vegetable or a burden". Patient does not want to "come back" if he cannot "give his children a hug".  Patient denies further psychosocial concerns. SW remains available to provide assistance as needed. HILLARY will continue to follow.       Geraldine Godfrey [facility]  Outpatient   Palliative Medicine                    "

## 2022-11-14 NOTE — PROGRESS NOTES
Consult Note  Palliative Medicine Clinic      Consult Requested By: Grady Peng    Primary Care Physician: [facility]    Reason for Consult: Advance care planning and symptom management in the setting of Stage D HF and heart Transplant workup    The patient location is: [place]    The chief complaint leading to consultation is: Advance Care Planning    Visit type: audiovisual    Face to Face time with patient: 20 min  45 minutes of total time spent on the encounter, which includes face to face time and non-face to face time preparing to see the patient (eg, review of tests), Obtaining and/or reviewing separately obtained history, Documenting clinical information in the electronic or other health record, Independently interpreting results (not separately reported) and communicating results to the patient/family/caregiver, or Care coordination (not separately reported).       Each patient provided with medical services by telemedicine is:  (1) informed of the relationship between the physician and patient and the respective role of any other health care provider with respect to management of the patient; and (2) notified that he or she may decline to receive medical services by telemedicine and may withdraw from such care at any time.      ASSESSMENT/PLAN:     Plan/Recommendations:  Diagnoses and all orders for this visit:    Chronic combined systolic and diastolic congestive heart failure / Organ transplant candidate / SOB (shortness of breath)  -stage D HFrEF (EF=15%, LVEDD=7.6 cm), NICMP  -followed by cardiology current plan is for GDMT  -also undergoing workup for advanced therapies  -He is exercising at home- continue graded exercise  - main goal is to prolong his life as long as possible and he would be willing to undergo any treatment or therapies that will help him achieve that goal.      Palliative care encounter  Medicolegal: Has decision making capacity.  Father is surrogate  "decision maker.  He wants to name his brother Maurizio Mendoza and his sister-in-law Taina Mendoza as HCPOA.     Psychosocial:  support system consists of brother and sister-in-law      Spiritual: Holiness        Understanding of disease and Illness Trajectory: Patient  has  adequate understanding of his illness, they can benefit from continued education on what to expect in the future.      Goals of care:    Advance Care Planning     Date: 11/14/2022    I initiated the process of advance care planning today and explained the importance of this process to the patient.  I introduced the concept of advance directives to the patient, as well. Then the patient received detailed information about the importance of designating a Health Care Power of  (HCPOA). He was also instructed to communicate with this person about their wishes for future healthcare, should he become sick and lose decision-making capacity. The patient has not previously appointed a HCPOA. After our discussion, the patient has decided to appoint  a HCPOA and wants to name his brother Maurizio Mendoza and his sister-in-law Taina Mendoza.     We explored the patient's values and preferences for future care.  The patient endorses that what is most important right now is to focus on curative/life-prolongation (regardless of treatment burdens).  States that he wants to live as long as possible in order to be there for his 3 kids.  He states that he wants to be present in their lives and see them grow up.     Accordingly, we have decided that the best plan to meet the patient's goals includes continuing with treatment    He confirmed that he wants to be full code as long as there are treatments that will him get better.  However he said that he would not want be a burden and he would not want to be a "vegetable".  He said that in [place] quality of life would be to be able to speak in heart his gets and tell them that he loves them.              Code status: Full " Code    Advance directives:none on File will mail him HCPOA         16 min time was spent on advance care planning, goals of care discussion, emotional support, formulating and communicating prognosis and goals of care, exploring burden/benefit of various approaches of treatment.        Follow up: 3m      SUBJECTIVE:     History obtained from: patient     complaint: No chief complaint on file.        History of Present Illness:  Mr. Filemon La is 42 y.o. year old male presenting with stage D HFrEF undergoing heart Transplant workup.  Referred to Palliative Care for evaluation and management of advance care planning, and additional support.. He attended the appointment with his daughter       Interval History:  He states that he knows that his heart is in very bad condition and not being a strong as it should.   He feels overwhelmed at times because he wants to make sure he lives as long as possible for his kids.  He is independent on all ADLs and IADLs, he sees her his kids often and he visits his sister who has brain cancer.  His brother and his sister-in-law are his biggest support.     He endorses some shortness of breath and fatigue however he has learned to pace himself and not over exert himself so that he is not losing his breath as often.     He endorses some anxiety over things like applying for disability, and day-to-day life    Disease History:   stage D HFrEF (EF=15%, LVEDD=7.6 cm), NICMP (possible COVID CMP vs alcoholic cardiomyopathy ) with NYHA class III/IV symptoms     Past Medical History:   Diagnosis Date    Alcoholism in recovery 09/2022    Cardiomyopathy 09/2022    Alcoholic    CHF (congestive heart failure)     Chronic combined systolic and diastolic congestive heart failure 09/17/2022    Pulmonary hypertension     pt reported     Past Surgical History:   Procedure Laterality Date    CATHETERIZATION OF BOTH LEFT AND RIGHT HEART N/A 9/20/2022    Procedure: CATHETERIZATION, HEART, BOTH LEFT  AND RIGHT;  Surgeon: Yady Patterson MD;  Location: [facility] CATH LAB;  Service: Cardiology;  Laterality: N/A;    RIGHT HEART CATHETERIZATION Right 10/13/2022    Procedure: INSERTION, CATHETER, RIGHT HEART;  Surgeon: La Nena Ramirez DO;  Location: [facility] CATH LAB;  Service: Cardiology;  Laterality: Right;     Family History   Problem Relation Age of Onset    Hypertension Mother     Diabetes Mother     Hypertension Father      Review of patient's allergies indicates:  No Known Allergies    Medications:    Current Outpatient Medications:     acetaminophen (TYLENOL) 325 MG tablet, Take 2 tablets (650 mg total) by mouth every 6 (six) hours as needed for Pain., Disp: , Rfl: 0    nicotine (NICODERM CQ) 21 mg/24 hr, 1 patch once daily., Disp: , Rfl:     torsemide (DEMADEX) 20 MG Tab, Take 1 tablet (20 mg total) by mouth once daily., Disp: 30 tablet, Rfl: 0    valsartan (DIOVAN) 40 MG tablet, Take 1 tablet (40 mg total) by mouth 2 (two) times daily., Disp: 60 tablet, Rfl: 1    WESTAB MAX 2.5-25-2 mg Tab, Take 1 tablet by mouth once daily., Disp: , Rfl:      database queried on 11/14/2022  by Sondra Thompson . The results reviewed and considered with the clinical data in the decision whether or not to prescribe a controlled substance.     09/22/2022 09/22/2022   1  Chlordiazepoxide 10 Mg Capsule 18.00  9  [place] Ent  [id number]   [place] ([id number])  0  0.80 LME  Comm Ins  [place]     09/22/2022 09/22/2022   1  Chlordiazepoxide 25 Mg Capsule 6.00  2  La Ent  [id number]   [place] ([id number])  0  3.00 LME  [place]  [place]     09/02/2022 09/01/2022   1  Promethazine-Dm 6.25-15 Mg/5ml 120.00  24  Mi Owe  [id number]   Wal ([id number])  0   Private Pay  [place]     07/27/2022 07/27/2022   1  Hydrocodone-Acetamin 5-325 Mg 11.00  1  Na Maninder  [id number]   Llii ([id number])  0  55.00 MME  [place]icaid  [place]     05/29/2022 05/28/2022   1  Hydrocodone-Acetamin 5-325 Mg 18.00  3  Br Ser  [id number]   Wal ([id number])  0  30.00 MME  [place]  [place]       OBJECTIVE:       ROS:  Review of Systems    Constitutional:  Positive for fatigue. Negative for activity change and appetite change.   HENT:  Negative for hearing loss and sore throat.    Eyes:  Negative for visual disturbance.   Respiratory:  Positive for shortness of breath.    Cardiovascular:  Negative for chest pain.   Gastrointestinal:  Negative for constipation, diarrhea and nausea.   Genitourinary:  Negative for dysuria.   Musculoskeletal:  Negative for back pain and gait problem.   Neurological:  Negative for headaches and memory loss.   Psychiatric/Behavioral:  Negative for confusion. The patient is not nervous/anxious.        Review of Symptoms      Symptom Assessment (ESAS 0-10 Scale)  Pain:  0  Dyspnea:  4  Anxiety:  2  Nausea:  0  Depression:  0  Anorexia:  0  Fatigue:  3  Insomnia:  0  Restlessness:  0  Agitation:  0     CAM / Delirium:  Negative  Constipation:  Negative  Diarrhea:  Negative    Anxiety:  Is not nervous/anxious  Constipation:  No constipation    Modified Harish Scale:  3    Performance Status:  70    Living Arrangements:  Lives alone    Psychosocial/Cultural: Lives alone, , he has 3 children 16, 10 and a 6y/o daughter     Spiritual:  F - Deisi and Belief:  Uatsdin  I - Importance:  High    Advance Care Planning   Advance Directives:   Living Will: No    LaPOST: No    Do Not Resuscitate Status: No    Medical Power of : No        Oral Declaration: Yes   Witnesses:  Sondra Peña MD and Geraldine Godfrey [facility]   Agent's Name:  Maurizio Mendoza (brother)    Decision Making:  Patient answered questions  Goals of Care: The patient endorses that what is most important right now is to focus on curative/life-prolongation (regardless of treatment burdens)    Accordingly, we have decided that the best plan to meet the patient's goals includes continuing with treatment            Physical Exam:  Vitals:    Physical Exam  Constitutional:       General: He is not in acute distress.  HENT:      Head: Normocephalic and atraumatic.   Eyes:       General: No scleral icterus.  Pulmonary:      Effort: Pulmonary effort is normal. No respiratory distress.   Musculoskeletal:      Cervical back: Neck supple.   Neurological:      Mental Status: He is alert and oriented to person, place, and time.   Psychiatric:         Mood and Affect: Mood and affect normal.         Labs:  CBC:   WBC   Date Value Ref Range Status   10/27/2022 7.22 3.90 - 12.70 K/uL Final       Hemoglobin   Date Value Ref Range Status   10/27/2022 15.8 14.0 - 18.0 g/dL Final       Hematocrit   Date Value Ref Range Status   10/27/2022 46.1 40.0 - 54.0 % Final       MCV   Date Value Ref Range Status   10/27/2022 96 82 - 98 fL Final       Platelets   Date Value Ref Range Status   10/27/2022 191 150 - 450 K/uL Final           LFT:   Lab Results   Component Value Date    AST 18 10/27/2022    ALKPHOS 82 10/27/2022    BILITOT 1.7 (H) 10/27/2022       Albumin:   Albumin   Date Value Ref Range Status   10/27/2022 4.2 3.5 - 5.2 g/dL Final     Protein:   Total Protein   Date Value Ref Range Status   10/27/2022 7.8 6.0 - 8.4 g/dL Final       Radiology:I have reviewed all pertinent imaging results/findings within the past 24 hours.  Results for orders placed or performed during the hospital encounter of 10/27/22 (from the past 2160 hour(s))   CT Chest Abdomen Pelvis Without Contrast (XPD)    Impression    1. No acute findings in the chest abdomen or pelvis.    Electronically signed by resident: Ivis French  Date:    10/27/2022  Time:    15:47    Electronically signed by: Ruben Middleton MD  Date:    10/27/2022  Time:    17:43   Results for orders placed or performed during the hospital encounter of 10/27/22 (from the past 2160 hour(s))   CT Head Without Contrast    Impression    No acute intracranial findings as detailed above specifically without evidence for acute intracranial hemorrhage or sulcal effacement to suggest large territory recent infarction.  Clinical correlation and further evaluation as  warranted.      Electronically signed by: Gómez Rivera DO  Date:    10/27/2022  Time:    15:38   Results for orders placed or performed during the hospital encounter of 09/17/22 (from the past 2160 hour(s))   CTA Chest Non-Coronary (PE Studies)    Impression    Negative for pulmonary embolus.    Cardiomegaly with hazy central airspace edema and interstitial edema.  Trivial right pleural effusion.  Hepatic reflux with small amount perihepatic ascites.  Findings most consistent with CHF..    Mild mediastinal lymphadenopathy, perhaps reactive.  Attention on next follow-up.    All CT scans at this facility are performed  using dose modulation techniques as appropriate to performed exam including the following:  automated exposure control; adjustment of mA and/or kV according to the patients size (this includes techniques or standardized protocols for targeted exams where dose is matched to indication/reason for exam: i.e. extremities or head);  iterative reconstruction technique.      Electronically signed by: Fox Everett MD  Date:    09/17/2022  Time:    13:16             16 minutes spent in discussing ACP    This note was partially created using [facility] Voice Recognition software. Typographical and content errors may occur with this process. While efforts are made to detect and correct such errors, in some cases errors will persist. For this reason, wording in this document should be considered in the proper context and not strictly verbatim.      Encounter occurred during period of COVID-19 emergency. Encounter performed under the concurrent guidelines, limitations and protocols.      Signature: Sondra Thompson MD

## 2022-11-15 ENCOUNTER — DOCUMENTATION ONLY (OUTPATIENT)
Dept: TRANSFUSION MEDICINE | Facility: HOSPITAL | Age: 42
End: 2022-11-15
Payer: MEDICAID

## 2022-11-15 NOTE — PROGRESS NOTES
[facility] TRANSFUSION MEDICINE  Section of Transfusion Medicine and Histocompatibility  HLA Note    Case Details   Diagnosis:  No primary diagnosis found.  Blood Type: O POS  HLA Type:   Class I:  Lab Results   Component Value Date    RDYL6HD 30 10/27/2022    DNFO6UN 34 10/27/2022    COPB1IR 72 10/27/2022    KICQ0XQ 42 10/27/2022    VEDEK3MX 6 10/27/2022    LVNQJ9HZ XX 10/27/2022    ZSIUQ9TZ 12 10/27/2022    KNTPL3VW 17 10/27/2022     Class II:  Lab Results   Component Value Date    PTTZGG43NW 1 10/27/2022    GMQOUP35WH 18 10/27/2022    YIDPZL112WI 52 10/27/2022    ZOXNTA2049 XX 10/27/2022    WSUGR0FN 4 10/27/2022    VURYZ8DJ 5 10/27/2022     Recent Antibody Screen/ID Results:   Lab Results   Component Value Date    CIABCLM WEAK----B76(2579) 10/27/2022    CIIAB Negative 10/27/2022     Auto T Cell Crossmatch Results:  Lab Results   Component Value Date    XMTCELLRES Negative 10/27/2022     Auto B Cell Crossmatch Results:  Lab Results   Component Value Date    BCELLRES Negative 10/27/2022     Assessment     Interpretation: No HLA antibodies are detected. A virtual crossmatch is recommended.    Strongly Recommended Unacceptable Antigens: None     Optional Unacceptable Antigens: None    Crossmatch Expectations: (Given strongly recommended unacceptable antigens) A retrospective or prospective flow cytometric crossmatch is expected to be negative.    Please call the HLA Lab [phone] with any concerns or questions.    HOSSEIN Jose MD, KATHERINE  Section of Transfusion Medicine & Histocompatibility  Department of Pathology and Laboratory Medicine  [facility]  11/15/2022

## 2022-11-16 ENCOUNTER — COMMITTEE REVIEW (OUTPATIENT)
Dept: TRANSPLANT | Facility: CLINIC | Age: 42
End: 2022-11-16
Payer: MEDICAID

## 2022-11-16 ENCOUNTER — TELEPHONE (OUTPATIENT)
Dept: TRANSPLANT | Facility: CLINIC | Age: 42
End: 2022-11-16
Payer: MEDICAID

## 2022-11-16 DIAGNOSIS — I50.22 CHRONIC SYSTOLIC CONGESTIVE HEART FAILURE: ICD-10-CM

## 2022-11-16 DIAGNOSIS — Z76.82 ORGAN TRANSPLANT CANDIDATE: Primary | ICD-10-CM

## 2022-11-16 DIAGNOSIS — Z78.9 ALCOHOL USE: ICD-10-CM

## 2022-11-16 NOTE — TELEPHONE ENCOUNTER
Called patient to discuss committee decision to defer transplant and LVAD candidacy at this time due to the positive nicotine/cotinine screens. Patient admitted to intermittent ongoing use of tobacco. He states that he is not smoking everyday and has significantly improved from the previous 2ppd use. Patient reports that he was using the lowest dose nicotine patch before relapsing, but states that he stopped feeling the effects of the patch. Reinforced requirement of abstinence of all tobacco/nicotine use at this time. Encouraged patient to discuss higher dose patch options with PCP and to ask about tobacco cessation programs for additional support.   Discussed plan to repeat nicotine/continine screen at next clinic appt on 12/20. Patient verbalized understanding of needing to abstain from all tobacco use and states he will arrange an appt with PCP to restart nicotine patches.     In addition to the ongoing tobacco use, patient has had elevated T. Bilirubin. Explained to the patient that he will need to be seen by a hepatologist to assess liver function and rule out liver disease before committee can make decision on transplant/LVAD candidacy. Patient verbalized understanding.     After additional evaluation has been completed, will plan to re-present patient to Committee for decision.     RTC scheduled 12/20/22.

## 2022-11-16 NOTE — COMMITTEE REVIEW
Native Organ Dx: Dilated Myopathy: Idiopathic     Patient's case presented at selection conference today. It was the Committee's decision to defer patient's for transplant and LVAD candidacy at this time due to ongoing tobacco use as evidence by positive nicotine and cotinine screens. In addition, patient has had an elevated total bilirubin that will need to be evaluated by hepatology.     Will plan to consult hepatology and get repeat CPX and nicotine/cotinine screen completed at next clinic appt in 12/2022.      Note was written by Bria Villa.    ==========================================================    I agree and attest to the decision of the committee.

## 2022-11-28 ENCOUNTER — LAB VISIT (OUTPATIENT)
Dept: LAB | Facility: HOSPITAL | Age: 42
End: 2022-11-28
Attending: INTERNAL MEDICINE
Payer: MEDICAID

## 2022-11-28 DIAGNOSIS — I50.22 CHRONIC SYSTOLIC CONGESTIVE HEART FAILURE: ICD-10-CM

## 2022-11-28 PROCEDURE — 80048 BASIC METABOLIC PNL TOTAL CA: CPT | Mod: TXP | Performed by: INTERNAL MEDICINE

## 2022-11-28 PROCEDURE — 36415 COLL VENOUS BLD VENIPUNCTURE: CPT | Mod: TXP | Performed by: INTERNAL MEDICINE

## 2022-11-29 ENCOUNTER — PATIENT MESSAGE (OUTPATIENT)
Dept: TRANSPLANT | Facility: CLINIC | Age: 42
End: 2022-11-29
Payer: MEDICAID

## 2022-11-29 DIAGNOSIS — I50.22 CHRONIC SYSTOLIC CONGESTIVE HEART FAILURE: ICD-10-CM

## 2022-11-29 LAB
ANION GAP SERPL CALC-SCNC: 8 MMOL/L (ref 8–16)
BUN SERPL-MCNC: 15 MG/DL (ref 6–20)
CALCIUM SERPL-MCNC: 9.8 MG/DL (ref 8.7–10.5)
CHLORIDE SERPL-SCNC: 101 MMOL/L (ref 95–110)
CO2 SERPL-SCNC: 29 MMOL/L (ref 23–29)
CREAT SERPL-MCNC: 1 MG/DL (ref 0.5–1.4)
EST. GFR  (NO RACE VARIABLE): >60 ML/MIN/1.73 M^2
GLUCOSE SERPL-MCNC: 85 MG/DL (ref 70–110)
POTASSIUM SERPL-SCNC: 4.5 MMOL/L (ref 3.5–5.1)
SODIUM SERPL-SCNC: 138 MMOL/L (ref 136–145)

## 2022-11-29 NOTE — TELEPHONE ENCOUNTER
Called patient to discuss increasing Valsartan from 40 mg to 80 mg twice a day.     Patient reports that he is feeling well overall. He reports that he has quit smoking cigarettes completely and is using a nicotine patch for support. He states his appetite has improved and he has started to walk daily for exercise. Reminded pt of BMI requirement for transplant listing and encouraged him to maintain healthy weight. Patient verbalized understanding.     He has received his first COVID vaccine 11/14/2022. He is working to get the additional vaccines recommended by ID locally.     Instructed patient to increase his Valsartan to 80 mg twice a day and to get labs repeated in 2 weeks. Will try to arrange for labs to be done same day as other appts.     ----- Message from Khris Hansen Jr., MD sent at 11/29/2022  1:54 PM CST -----  Please see last clinic note and plan to up-titrate GDMT  Increase valsartan from 40 mg to 80 mg twice a day and repeat BMP 2 weeks later    I will ask Bria to call him and update yesterday's instructions to patient.

## 2022-11-29 NOTE — PROGRESS NOTES
Please see last clinic note and plan to up-titrate GDMT  Increase valsartan from 40  mg to 80 mg twice a day and repeat BMP 2 weeks later    I will ask Bria to call him and update yesterday's instructions to patient

## 2022-12-01 RX ORDER — VALSARTAN 40 MG/1
80 TABLET ORAL 2 TIMES DAILY
Qty: 60 TABLET | Refills: 2 | Status: SHIPPED | OUTPATIENT
Start: 2022-12-01 | End: 2022-12-20 | Stop reason: SDUPTHER

## 2022-12-09 ENCOUNTER — HOSPITAL ENCOUNTER (OUTPATIENT)
Dept: CARDIOLOGY | Facility: HOSPITAL | Age: 42
Discharge: HOME | End: 2022-12-09
Attending: INTERNAL MEDICINE
Payer: MEDICAID

## 2022-12-09 VITALS
SYSTOLIC BLOOD PRESSURE: 99 MMHG | BODY MASS INDEX: 34.96 KG/M2 | DIASTOLIC BLOOD PRESSURE: 65 MMHG | HEIGHT: 62 IN | HEART RATE: 78 BPM | WEIGHT: 190 LBS

## 2022-12-09 DIAGNOSIS — I50.22 CHRONIC SYSTOLIC CONGESTIVE HEART FAILURE: ICD-10-CM

## 2022-12-09 DIAGNOSIS — Z76.82 ORGAN TRANSPLANT CANDIDATE: ICD-10-CM

## 2022-12-09 LAB
CV STRESS BASE HR: 78 BPM
DIASTOLIC BLOOD PRESSURE: 65 MMHG
OHS CV CPX 1 MINUTE RECOVERY HEART RATE: 100 BPM
OHS CV CPX 85 PERCENT MAX PREDICTED HEART RATE MALE: 152
OHS CV CPX DATA GRADE - PEAK: 4.5
OHS CV CPX DATA O2 SAT - PEAK: 98
OHS CV CPX DATA O2 SAT - REST: 100
OHS CV CPX DATA SPEED - PEAK: 2.9
OHS CV CPX DATA TIME - PEAK: 7
OHS CV CPX DATA VE/VCO2 - PEAK: 34
OHS CV CPX DATA VE/VO2 - PEAK: 29
OHS CV CPX DATA VO2 - PEAK: 16.5
OHS CV CPX DATA VO2 - REST: 4.1
OHS CV CPX FEV1/FVC: 0.83
OHS CV CPX FORCED EXPIRATORY VOLUME: 2.25
OHS CV CPX FORCED VITAL CAPACITY (FVC): 2.7
OHS CV CPX HIGHEST VO: 40.4
OHS CV CPX MAX PREDICTED HEART RATE: 179
OHS CV CPX MAXIMAL VOLUNTARY VENTILATION (MVV) PREDICTED: 90
OHS CV CPX MAXIMAL VOLUNTARY VENTILATION (MVV): 98
OHS CV CPX MAXIUMUM EXERCISE VENTILATION (VE MAX): 33
OHS CV CPX PATIENT AGE: 41
OHS CV CPX PATIENT HEIGHT IN: 62
OHS CV CPX PATIENT IS FEMALE AGE 11-19: 0
OHS CV CPX PATIENT IS FEMALE AGE GREATER THAN 19: 0
OHS CV CPX PATIENT IS FEMALE AGE LESS THAN 11: 0
OHS CV CPX PATIENT IS FEMALE: 0
OHS CV CPX PATIENT IS MALE AGE 11-25: 0
OHS CV CPX PATIENT IS MALE AGE GREATER THAN 25: 1
OHS CV CPX PATIENT IS MALE AGE LESS THAN 11: 0
OHS CV CPX PATIENT IS MALE GREATER THAN 18: 1
OHS CV CPX PATIENT IS MALE LESS THAN OR EQUAL TO 18: 0
OHS CV CPX PATIENT IS MALE: 1
OHS CV CPX PATIENT WEIGHT RETURNED IN OZ: 3040
OHS CV CPX PEAK DIASTOLIC BLOOD PRESSURE: 73 MMHG
OHS CV CPX PEAK HEAR RATE: 117 BPM
OHS CV CPX PEAK RATE PRESSURE PRODUCT: NORMAL
OHS CV CPX PEAK SYSTOLIC BLOOD PRESSURE: 106 MMHG
OHS CV CPX PERCENT BODY FAT: 10.9
OHS CV CPX PERCENT MAX PREDICTED HEART RATE ACHIEVED: 65
OHS CV CPX PREDICTED VO2: 40.4 ML/KG/MIN
OHS CV CPX RATE PRESSURE PRODUCT PRESENTING: 7722
OHS CV CPX REST PET CO2: 24
OHS CV CPX VE/VCO2 SLOPE: 30.5
STRESS ECHO POST EXERCISE DUR MIN: 7 MINUTES
STRESS ECHO POST EXERCISE DUR SEC: 0 SECONDS
SYSTOLIC BLOOD PRESSURE: 99 MMHG

## 2022-12-09 PROCEDURE — 94621 CARDIOPULMONARY EXERCISE TESTING (CUPID ONLY): ICD-10-PCS | Mod: 26,TXP,, | Performed by: INTERNAL MEDICINE

## 2022-12-09 PROCEDURE — 94621 CARDIOPULM EXERCISE TESTING: CPT | Mod: 26,TXP,, | Performed by: INTERNAL MEDICINE

## 2022-12-09 PROCEDURE — 94621 CARDIOPULM EXERCISE TESTING: CPT | Mod: TXP

## 2022-12-15 ENCOUNTER — OFFICE VISIT (OUTPATIENT)
Dept: HEPATOLOGY | Facility: CLINIC | Age: 42
End: 2022-12-15
Payer: MEDICAID

## 2022-12-15 ENCOUNTER — LAB VISIT (OUTPATIENT)
Dept: LAB | Facility: HOSPITAL | Age: 42
End: 2022-12-15
Attending: INTERNAL MEDICINE
Payer: MEDICAID

## 2022-12-15 VITALS
HEART RATE: 80 BPM | SYSTOLIC BLOOD PRESSURE: 109 MMHG | DIASTOLIC BLOOD PRESSURE: 78 MMHG | BODY MASS INDEX: 36.23 KG/M2 | HEIGHT: 62 IN | WEIGHT: 196.88 LBS

## 2022-12-15 DIAGNOSIS — Z76.82 ORGAN TRANSPLANT CANDIDATE: ICD-10-CM

## 2022-12-15 DIAGNOSIS — F10.21 ALCOHOL USE DISORDER, MODERATE, IN SUSTAINED REMISSION: Primary | ICD-10-CM

## 2022-12-15 DIAGNOSIS — Z78.9 ALCOHOL USE: ICD-10-CM

## 2022-12-15 DIAGNOSIS — E80.6 HYPERBILIRUBINEMIA: ICD-10-CM

## 2022-12-15 DIAGNOSIS — I50.22 CHRONIC SYSTOLIC CONGESTIVE HEART FAILURE: ICD-10-CM

## 2022-12-15 PROCEDURE — 36415 COLL VENOUS BLD VENIPUNCTURE: CPT | Mod: TXP | Performed by: INTERNAL MEDICINE

## 2022-12-15 PROCEDURE — 3078F PR MOST RECENT DIASTOLIC BLOOD PRESSURE < 80 MM HG: ICD-10-PCS | Mod: CPTII,TXP,, | Performed by: INTERNAL MEDICINE

## 2022-12-15 PROCEDURE — 4010F PR ACE/ARB THEARPY RXD/TAKEN: ICD-10-PCS | Mod: CPTII,TXP,, | Performed by: INTERNAL MEDICINE

## 2022-12-15 PROCEDURE — 1160F RVW MEDS BY RX/DR IN RCRD: CPT | Mod: CPTII,TXP,, | Performed by: INTERNAL MEDICINE

## 2022-12-15 PROCEDURE — 3044F PR MOST RECENT HEMOGLOBIN A1C LEVEL <7.0%: ICD-10-PCS | Mod: CPTII,TXP,, | Performed by: INTERNAL MEDICINE

## 2022-12-15 PROCEDURE — 4010F ACE/ARB THERAPY RXD/TAKEN: CPT | Mod: CPTII,TXP,, | Performed by: INTERNAL MEDICINE

## 2022-12-15 PROCEDURE — 99205 PR OFFICE/OUTPT VISIT, NEW, LEVL V, 60-74 MIN: ICD-10-PCS | Mod: S$PBB,TXP,, | Performed by: INTERNAL MEDICINE

## 2022-12-15 PROCEDURE — 1159F MED LIST DOCD IN RCRD: CPT | Mod: CPTII,TXP,, | Performed by: INTERNAL MEDICINE

## 2022-12-15 PROCEDURE — 3044F HG A1C LEVEL LT 7.0%: CPT | Mod: CPTII,TXP,, | Performed by: INTERNAL MEDICINE

## 2022-12-15 PROCEDURE — 99205 OFFICE O/P NEW HI 60 MIN: CPT | Mod: S$PBB,TXP,, | Performed by: INTERNAL MEDICINE

## 2022-12-15 PROCEDURE — 3008F BODY MASS INDEX DOCD: CPT | Mod: CPTII,TXP,, | Performed by: INTERNAL MEDICINE

## 2022-12-15 PROCEDURE — 3074F PR MOST RECENT SYSTOLIC BLOOD PRESSURE < 130 MM HG: ICD-10-PCS | Mod: CPTII,TXP,, | Performed by: INTERNAL MEDICINE

## 2022-12-15 PROCEDURE — 3008F PR BODY MASS INDEX (BMI) DOCUMENTED: ICD-10-PCS | Mod: CPTII,TXP,, | Performed by: INTERNAL MEDICINE

## 2022-12-15 PROCEDURE — 80048 BASIC METABOLIC PNL TOTAL CA: CPT | Mod: NTX | Performed by: INTERNAL MEDICINE

## 2022-12-15 PROCEDURE — 3074F SYST BP LT 130 MM HG: CPT | Mod: CPTII,TXP,, | Performed by: INTERNAL MEDICINE

## 2022-12-15 PROCEDURE — 3078F DIAST BP <80 MM HG: CPT | Mod: CPTII,TXP,, | Performed by: INTERNAL MEDICINE

## 2022-12-15 PROCEDURE — 99999 PR PBB SHADOW E&M-EST. PATIENT-LVL IV: CPT | Mod: PBBFAC,TXP,, | Performed by: INTERNAL MEDICINE

## 2022-12-15 PROCEDURE — 99214 OFFICE O/P EST MOD 30 MIN: CPT | Mod: PBBFAC,TXP | Performed by: INTERNAL MEDICINE

## 2022-12-15 PROCEDURE — 1160F PR REVIEW ALL MEDS BY PRESCRIBER/CLIN PHARMACIST DOCUMENTED: ICD-10-PCS | Mod: CPTII,TXP,, | Performed by: INTERNAL MEDICINE

## 2022-12-15 PROCEDURE — 1159F PR MEDICATION LIST DOCUMENTED IN MEDICAL RECORD: ICD-10-PCS | Mod: CPTII,TXP,, | Performed by: INTERNAL MEDICINE

## 2022-12-15 PROCEDURE — 99999 PR PBB SHADOW E&M-EST. PATIENT-LVL IV: ICD-10-PCS | Mod: PBBFAC,TXP,, | Performed by: INTERNAL MEDICINE

## 2022-12-15 NOTE — PROGRESS NOTES
Subjective:     Filemon La is here for evaluation of abnormal LFTs    History of Present Illness:  Filemon La  is a 42-year-old male with history of drinking a pt of whiskey per day for 10 years, when he got  from his wife intake increased to 2 pints a day for  the past 3 yrs.  He was taking care of his 3 children.  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  He is being evaluated for heart transplant now.      He used to get withdrawal symptoms if he didn't drink alcohol, no DUI, now denies having withdrawal symptoms, no craving for alcohol, denies alcohol intake since discharge from the hospital in September, also quit smoking.  He does report staying around his 5 siblings who smoke.  Denies liver related symptoms, he strongly hopes to undergo Cardiac  transplant for his children      Review of Systems   Constitutional:  Positive for fatigue. Negative for unexpected weight change.   Respiratory:  Positive for shortness of breath.    Gastrointestinal:  Negative for abdominal distention, abdominal pain, blood in stool, nausea and vomiting.     Objective:     Physical Exam  Constitutional:       Appearance: Normal appearance.   Eyes:      General: No scleral icterus.  Pulmonary:      Effort: Pulmonary effort is normal.   Abdominal:      General: There is no distension.      Tenderness: There is no abdominal tenderness.   Musculoskeletal:      Right lower leg: No edema.      Left lower leg: No edema.   Skin:     Coloration: Skin is not jaundiced.   Neurological:      Mental Status: He is oriented to person, place, and time.   Psychiatric:         Thought Content: Thought content normal.       MELD-Na score: 8 at 10/27/2022 10:02 AM  MELD score: 8 at 10/27/2022 10:02 AM  Calculated from:  Serum  Creatinine: 1.0 mg/dL at 10/27/2022 10:02 AM  Serum Sodium: 138 mmol/L (Using max of 137 mmol/L) at 10/27/2022 10:02 AM  Total Bilirubin: 1.7 mg/dL at 10/27/2022 10:02 AM  INR(ratio): 1.0 at 10/27/2022 10:02 AM  Age: 41 years    WBC   Date Value Ref Range Status   10/27/2022 7.22 3.90 - 12.70 K/uL Final     Hemoglobin   Date Value Ref Range Status   10/27/2022 15.8 14.0 - 18.0 g/dL Final     Hematocrit   Date Value Ref Range Status   10/27/2022 46.1 40.0 - 54.0 % Final     Platelets   Date Value Ref Range Status   10/27/2022 191 150 - 450 K/uL Final     BUN   Date Value Ref Range Status   11/28/2022 15 6 - 20 mg/dL Final     Creatinine   Date Value Ref Range Status   11/28/2022 1.0 0.5 - 1.4 mg/dL Final     Glucose   Date Value Ref Range Status   11/28/2022 85 70 - 110 mg/dL Final     Calcium   Date Value Ref Range Status   11/28/2022 9.8 8.7 - 10.5 mg/dL Final     Sodium   Date Value Ref Range Status   11/28/2022 138 136 - 145 mmol/L Final     Potassium   Date Value Ref Range Status   11/28/2022 4.5 3.5 - 5.1 mmol/L Final     Chloride   Date Value Ref Range Status   11/28/2022 101 95 - 110 mmol/L Final     Magnesium   Date Value Ref Range Status   10/27/2022 1.5 (L) 1.6 - 2.6 mg/dL Final     AST   Date Value Ref Range Status   10/27/2022 18 10 - 40 U/L Final     ALT   Date Value Ref Range Status   10/27/2022 18 10 - 44 U/L Final     Alkaline Phosphatase   Date Value Ref Range Status   10/27/2022 82 55 - 135 U/L Final     Total Bilirubin   Date Value Ref Range Status   10/27/2022 1.7 (H) 0.1 - 1.0 mg/dL Final     Comment:     For infants and newborns, interpretation of results should be based  on gestational age, weight and in agreement with clinical  observations.    Premature Infant recommended reference ranges:  Up to 24 hours.............<8.0 mg/dL  Up to 48 hours............<12.0 mg/dL  3-5 days..................<15.0 mg/dL  6-29 days.................<15.0 mg/dL       Albumin   Date Value Ref Range Status    10/27/2022 4.2 3.5 - 5.2 g/dL Final     INR   Date Value Ref Range Status   10/27/2022 1.0 0.8 - 1.2 Final     Comment:     Coumadin Therapy:  2.0 - 3.0 for INR for all indicators except mechanical heart valves  and antiphospholipid syndromes which should use 2.5 - 3.5.           Assessment/Plan:     1. Alcohol use disorder, moderate, in sustained remission        2. Organ transplant candidate  Ambulatory referral/consult to Hepatology    Phosphatidylethanol (PETH)    US Elastography Liver    Anti-Smooth Muscle Antibody    Immunoglobulins (IgG, IgA, IgM) Quantitative    JASE Screen w/Reflex    Antimitochondrial Antibody    UGT1A1 Genotype    Reticulocytes    Haptoglobin    Lactate Dehydrogenase      3. Alcohol use  Ambulatory referral/consult to Hepatology      4. Hyperbilirubinemia          Patient has significant history of alcohol intake, long enough to cause liver injury and fibrosis, will obtain a FibroScan to assess scarring.   He reports total abstinence since September of this year, his transaminases did normalize since then, however continues to have high total bilirubin consistently less than 3, most of it is indirect.  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 Calixto after work up complete    I have reviewed existing labs, imaging, procedures. Educated patient about disease process, prognosis. Ordered required labs, images and discussed treatment plan.     Mela Watt MD  Transplant Hepatologist  Dept of Hepatology, [place] [facility]

## 2022-12-16 ENCOUNTER — TELEPHONE (OUTPATIENT)
Dept: TRANSPLANT | Facility: CLINIC | Age: 42
End: 2022-12-16
Payer: MEDICAID

## 2022-12-16 LAB
ANION GAP SERPL CALC-SCNC: 8 MMOL/L (ref 8–16)
BUN SERPL-MCNC: 16 MG/DL (ref 6–20)
CALCIUM SERPL-MCNC: 8.8 MG/DL (ref 8.7–10.5)
CHLORIDE SERPL-SCNC: 102 MMOL/L (ref 95–110)
CO2 SERPL-SCNC: 25 MMOL/L (ref 23–29)
CREAT SERPL-MCNC: 1.1 MG/DL (ref 0.5–1.4)
EST. GFR  (NO RACE VARIABLE): >60 ML/MIN/1.73 M^2
GLUCOSE SERPL-MCNC: 93 MG/DL (ref 70–110)
POTASSIUM SERPL-SCNC: 3.9 MMOL/L (ref 3.5–5.1)
SODIUM SERPL-SCNC: 135 MMOL/L (ref 136–145)

## 2022-12-16 NOTE — TELEPHONE ENCOUNTER
Repeat BMP done after increasing Valsartan dose from 40 mg BID to 80 mg BID on 12/1/2022.     Patient reports stable BP that tends to run 110-130/75-90. His weights have been stable in the low 190 lbs. He is trying to increase his exercise, walking several days a week to help regain strength and for weight management. Patient states that he is continuing to use a nicotine patch for tobacco abstinence support, but is going to try to wean himself off.     Will discuss labs and recent BP trends with Dr. Calixto as we have been trying to increase GDMT.      12/15/22 15:13   Sodium 135 (L)   Potassium 3.9   Chloride 102   CO2 25   ANION GAP 8   BUN 16   Creatinine 1.1   eGFR >60.0   Glucose 93   Calcium 8.8

## 2022-12-20 ENCOUNTER — OFFICE VISIT (OUTPATIENT)
Dept: TRANSPLANT | Facility: CLINIC | Age: 42
End: 2022-12-20
Payer: MEDICAID

## 2022-12-20 ENCOUNTER — LAB VISIT (OUTPATIENT)
Dept: LAB | Facility: HOSPITAL | Age: 42
End: 2022-12-20
Attending: INTERNAL MEDICINE
Payer: MEDICAID

## 2022-12-20 VITALS
BODY MASS INDEX: 36.07 KG/M2 | HEART RATE: 80 BPM | DIASTOLIC BLOOD PRESSURE: 76 MMHG | WEIGHT: 196 LBS | HEIGHT: 62 IN | SYSTOLIC BLOOD PRESSURE: 110 MMHG

## 2022-12-20 DIAGNOSIS — I50.22 CHRONIC SYSTOLIC CONGESTIVE HEART FAILURE: ICD-10-CM

## 2022-12-20 DIAGNOSIS — Z76.82 ORGAN TRANSPLANT CANDIDATE: ICD-10-CM

## 2022-12-20 DIAGNOSIS — I42.6 ALCOHOLIC CARDIOMYOPATHY: ICD-10-CM

## 2022-12-20 DIAGNOSIS — I42.8 CARDIOMYOPATHY, NONISCHEMIC: ICD-10-CM

## 2022-12-20 DIAGNOSIS — I50.42 CHRONIC COMBINED SYSTOLIC AND DIASTOLIC HEART FAILURE: Primary | ICD-10-CM

## 2022-12-20 DIAGNOSIS — I27.20 PULMONARY HYPERTENSION: ICD-10-CM

## 2022-12-20 DIAGNOSIS — I50.42 CHRONIC COMBINED SYSTOLIC AND DIASTOLIC HEART FAILURE: ICD-10-CM

## 2022-12-20 LAB
ALBUMIN SERPL BCP-MCNC: 4.1 G/DL (ref 3.5–5.2)
ALP SERPL-CCNC: 89 U/L (ref 55–135)
ALT SERPL W/O P-5'-P-CCNC: 17 U/L (ref 10–44)
ANION GAP SERPL CALC-SCNC: 9 MMOL/L (ref 8–16)
AST SERPL-CCNC: 20 U/L (ref 10–40)
BILIRUB SERPL-MCNC: 1.7 MG/DL (ref 0.1–1)
BNP SERPL-MCNC: 83 PG/ML (ref 0–99)
BUN SERPL-MCNC: 13 MG/DL (ref 6–20)
CALCIUM SERPL-MCNC: 9.7 MG/DL (ref 8.7–10.5)
CHLORIDE SERPL-SCNC: 105 MMOL/L (ref 95–110)
CO2 SERPL-SCNC: 26 MMOL/L (ref 23–29)
CREAT SERPL-MCNC: 1 MG/DL (ref 0.5–1.4)
EST. GFR  (NO RACE VARIABLE): >60 ML/MIN/1.73 M^2
GLUCOSE SERPL-MCNC: 81 MG/DL (ref 70–110)
POTASSIUM SERPL-SCNC: 4.2 MMOL/L (ref 3.5–5.1)
PROT SERPL-MCNC: 7.8 G/DL (ref 6–8.4)
SODIUM SERPL-SCNC: 140 MMOL/L (ref 136–145)

## 2022-12-20 PROCEDURE — 99212 OFFICE O/P EST SF 10 MIN: CPT | Mod: PBBFAC,NTX | Performed by: INTERNAL MEDICINE

## 2022-12-20 PROCEDURE — 4010F PR ACE/ARB THEARPY RXD/TAKEN: ICD-10-PCS | Mod: CPTII,TXP,, | Performed by: INTERNAL MEDICINE

## 2022-12-20 PROCEDURE — 3044F HG A1C LEVEL LT 7.0%: CPT | Mod: CPTII,TXP,, | Performed by: INTERNAL MEDICINE

## 2022-12-20 PROCEDURE — 3044F PR MOST RECENT HEMOGLOBIN A1C LEVEL <7.0%: ICD-10-PCS | Mod: CPTII,TXP,, | Performed by: INTERNAL MEDICINE

## 2022-12-20 PROCEDURE — 99215 PR OFFICE/OUTPT VISIT, EST, LEVL V, 40-54 MIN: ICD-10-PCS | Mod: S$PBB,TXP,, | Performed by: INTERNAL MEDICINE

## 2022-12-20 PROCEDURE — 3008F BODY MASS INDEX DOCD: CPT | Mod: CPTII,TXP,, | Performed by: INTERNAL MEDICINE

## 2022-12-20 PROCEDURE — 36415 COLL VENOUS BLD VENIPUNCTURE: CPT | Mod: NTX | Performed by: INTERNAL MEDICINE

## 2022-12-20 PROCEDURE — 3078F PR MOST RECENT DIASTOLIC BLOOD PRESSURE < 80 MM HG: ICD-10-PCS | Mod: CPTII,TXP,, | Performed by: INTERNAL MEDICINE

## 2022-12-20 PROCEDURE — 3074F SYST BP LT 130 MM HG: CPT | Mod: CPTII,TXP,, | Performed by: INTERNAL MEDICINE

## 2022-12-20 PROCEDURE — 80323 ALKALOIDS NOS: CPT | Mod: TXP | Performed by: INTERNAL MEDICINE

## 2022-12-20 PROCEDURE — 99215 OFFICE O/P EST HI 40 MIN: CPT | Mod: S$PBB,TXP,, | Performed by: INTERNAL MEDICINE

## 2022-12-20 PROCEDURE — 3078F DIAST BP <80 MM HG: CPT | Mod: CPTII,TXP,, | Performed by: INTERNAL MEDICINE

## 2022-12-20 PROCEDURE — 4010F ACE/ARB THERAPY RXD/TAKEN: CPT | Mod: CPTII,TXP,, | Performed by: INTERNAL MEDICINE

## 2022-12-20 PROCEDURE — 3074F PR MOST RECENT SYSTOLIC BLOOD PRESSURE < 130 MM HG: ICD-10-PCS | Mod: CPTII,TXP,, | Performed by: INTERNAL MEDICINE

## 2022-12-20 PROCEDURE — 99999 PR PBB SHADOW E&M-EST. PATIENT-LVL II: CPT | Mod: PBBFAC,TXP,, | Performed by: INTERNAL MEDICINE

## 2022-12-20 PROCEDURE — 99999 PR PBB SHADOW E&M-EST. PATIENT-LVL II: ICD-10-PCS | Mod: PBBFAC,TXP,, | Performed by: INTERNAL MEDICINE

## 2022-12-20 PROCEDURE — 80053 COMPREHEN METABOLIC PANEL: CPT | Mod: NTX | Performed by: INTERNAL MEDICINE

## 2022-12-20 PROCEDURE — 3008F PR BODY MASS INDEX (BMI) DOCUMENTED: ICD-10-PCS | Mod: CPTII,TXP,, | Performed by: INTERNAL MEDICINE

## 2022-12-20 PROCEDURE — 83880 ASSAY OF NATRIURETIC PEPTIDE: CPT | Mod: NTX | Performed by: INTERNAL MEDICINE

## 2022-12-20 RX ORDER — VALSARTAN 80 MG/1
80 TABLET ORAL 2 TIMES DAILY
Qty: 180 TABLET | Refills: 3 | Status: SHIPPED | OUTPATIENT
Start: 2022-12-20 | End: 2022-12-20 | Stop reason: SDUPTHER

## 2022-12-20 RX ORDER — SPIRONOLACTONE 25 MG/1
TABLET ORAL
Qty: 90 TABLET | Refills: 3 | Status: SHIPPED | OUTPATIENT
Start: 2022-12-20 | End: 2023-01-20 | Stop reason: SDUPTHER

## 2022-12-20 RX ORDER — VALSARTAN 80 MG/1
80 TABLET ORAL 2 TIMES DAILY
Qty: 180 TABLET | Refills: 3 | Status: SHIPPED | OUTPATIENT
Start: 2022-12-20 | End: 2023-08-07 | Stop reason: SDUPTHER

## 2022-12-20 NOTE — PROGRESS NOTES
Subjective:   Transplant status: deferred    HPI:  Mr. La is a very pleasant 42 y.o. year old black male who was referred by our colleague Berenice Barnes for evaluation for advanced HF therapies. THis is his 3rd visit with us.  His first HF symptoms began September 2022 and at that time he was drinking heavily, consuming 1.5-2 pt of hard liquor per day.  He was able to quit drinking and smoking at that time and has been sober now for almost 3 months.  We had made some adjustments in his GDMT but because of his low BP, OHTx work-up was completed and he was eventually presented at our selection meeting.  It was the Committee's decision to defer patient's for transplant and LVAD candidacy at this time due to ongoing tobacco use as evidence by positive nicotine and cotinine screens. In addition, patient has had an elevated total bilirubin that will need to be evaluated by hepatology. Fortunately, he has done well since his last visit. Current HF regimen includes; valsartan 80 mg twice daily and Torsemide 20 mg daily.     RHC done on 10/13/2022  RA: 7/ 5 RV: 32/ 7 PA: 32/ 17/ 21 PWP: 18/ 15 .   Cardiac output was 3.3  by Annetta. Cardiac index is 1.79 L/min/m2.   O2 Sat: PA 66%.   Pulmonary vascular resistance: 1.8. Systemic vascular resistance: 2198.   CPX done on 12/9/2022  Resting spirometry reveals an FVC = 2.70L which is 82.33% of predicted, an FEV1 of 2.25L, which is 81.27% of predicted and an FEV1/FVC ratio of 83.33%. The MVV = 98 L/min, which is 71.03% of predicted.     The respiratory exchange ratio (RER) was 0.85, suggesting poor effort.   The breathing reserve is calculated at 66.33%, which is normal. Oxygen saturation with exercise remained normal.   The peak VO2 was 16.5 ml/kg/min which is 40.84% of predicted equating to a functional capacity of 4.71 METS indicating moderate to severe functional impairment.   The anaerobic threshold was not attained.   The peak VO2 Lean was 18.52 ml/kg of lean body  "weight/min indicating a poor prognosis in heart failure.   The VE/VCO2 Imperial was 30.5. The Resting PetCO2 was 24.0.     Past Medical History:   Diagnosis Date    Alcoholism in recovery 09/2022    Cardiomyopathy 09/2022    Alcoholic    CHF (congestive heart failure)     Chronic combined systolic and diastolic congestive heart failure 09/17/2022    Pulmonary hypertension     pt reported     Past Surgical History:   Procedure Laterality Date    CATHETERIZATION OF BOTH LEFT AND RIGHT HEART N/A 9/20/2022    Procedure: CATHETERIZATION, HEART, BOTH LEFT AND RIGHT;  Surgeon: Yady Patterson MD;  Location: [facility] C[facility] LAB;  Service: Cardiology;  Laterality: N/A;    RIGHT HEART CATHETERIZATION Right 10/13/2022    Procedure: INSERTION, CATHETER, RIGHT HEART;  Surgeon: La Nena Ramirez DO;  Location: [facility] CATH LAB;  Service: Cardiology;  Laterality: Right;       Review of Systems   Constitutional: Negative. Negative for chills, decreased appetite, diaphoresis, fever, malaise/fatigue, night sweats, weight gain and weight loss.   Eyes: Negative.    Cardiovascular:  Positive for dyspnea on exertion. Negative for chest pain, claudication, cyanosis, irregular heartbeat, leg swelling, near-syncope, orthopnea, palpitations, paroxysmal nocturnal dyspnea and syncope.   Respiratory:  Negative for cough, hemoptysis and shortness of breath.    Endocrine: Negative.    Hematologic/Lymphatic: Negative.    Skin:  Negative for color change, dry skin and nail changes.   Musculoskeletal: Negative.    Gastrointestinal: Negative.    Genitourinary: Negative.    Neurological:  Negative for weakness.     Objective:   Blood pressure 110/76, pulse 80, height 5' 2" (1.575 m), weight 88.9 kg (195 lb 15.8 oz).body mass index is 35.85 kg/m².  Physical Exam  Vitals reviewed.   Constitutional:       Appearance: He is well-developed.      Comments: /76   Pulse 80   Ht 5' 2" (1.575 m)   Wt 88.9 kg (195 lb 15.8 oz)   BMI 35.85 kg/m²      HENT:      " Head: Normocephalic.   Neck:      Vascular: No carotid bruit or JVD.   Cardiovascular:      Rate and Rhythm: Regular rhythm.      Chest Wall: PMI is displaced.      Pulses: Normal pulses.      Heart sounds: Normal heart sounds. No murmur heard.  Pulmonary:      Effort: Pulmonary effort is normal.      Breath sounds: Normal breath sounds.   Abdominal:      General: Bowel sounds are normal.      Palpations: Abdomen is soft.   Skin:     General: Skin is warm.   Neurological:      Mental Status: He is alert.       Labs:    Chemistry        Component Value Date/Time     (L) 12/15/2022 1513    K 3.9 12/15/2022 1513     12/15/2022 1513    CO2 25 12/15/2022 1513    BUN 16 12/15/2022 1513    CREATININE 1.1 12/15/2022 1513    GLU 93 12/15/2022 1513        Component Value Date/Time    CALCIUM 8.8 12/15/2022 1513    ALKPHOS 82 10/27/2022 1002    AST 18 10/27/2022 1002    ALT 18 10/27/2022 1002    BILITOT 1.7 (H) 10/27/2022 1002    ESTGFRAFRICA >60.0 07/27/2022 0731    EGFRNONAA >60.0 07/27/2022 0731          Magnesium   Date Value Ref Range Status   10/27/2022 1.5 (L) 1.6 - 2.6 mg/dL Final     Lab Results   Component Value Date    WBC 7.22 10/27/2022    HGB 15.8 10/27/2022    HCT 46.1 10/27/2022     10/27/2022     Lab Results   Component Value Date    INR 1.0 10/27/2022    INR 1.0 10/13/2022     BNP   Date Value Ref Range Status   11/10/2022 190 (H) 0 - 99 pg/mL Final     Comment:     Values of less than 100 pg/ml are consistent with non-CHF populations.   10/27/2022 236 (H) 0 - 99 pg/mL Final     Comment:     Values of less than 100 pg/ml are consistent with non-CHF populations.   09/28/2022 612 (H) 0 - 99 pg/mL Final     Comment:     Values of less than 100 pg/ml are consistent with non-CHF populations.     LD   Date Value Ref Range Status   12/15/2022 148 110 - 260 U/L Final     Comment:     Results are increased in hemolyzed samples.   10/27/2022 147 110 - 260 U/L Final     Comment:     Results are  increased in hemolyzed samples.     Assessment:      1. Chronic combined systolic and diastolic heart failure    2. Chronic systolic congestive heart failure    3. Alcoholic cardiomyopathy    4. Cardiomyopathy, nonischemic    5. Pulmonary hypertension        Plan:   Stage C HFrEF, NICMP likely alcohol induced. He has not stopped drinking (sober for almost 3 months). So far tolerating GDMT.  Will continue to slowly optimize his regimen   Start spironolactone 12.5 mg daily x 1 week. Labs next week. If K and creat are stable increase to 25 mg daily.  Will add Dapa or Empagliflozin next month   Recommend 2 gram sodium restriction and 1500cc fluid restriction.  Encourage physical activity with graded exercise program.  Requested patient to weigh themselves daily, and to notify us if their weight increases by more than 3 lbs in 1 day or 5 lbs in 1 week.   In view of his improved symptoms, we will move to HF (high risk list)  RTC in 3 months    Kofi Calixto MD

## 2022-12-20 NOTE — LETTER
December 20, 2022        Berenice Barnes  [address]  [place] [address]  Phone: [phone]  Fax: [phone]             [facility] Of[facility] Bld[facility]  [address] [place] [address]  Phone: [phone]  Fax: [phone]   Patient: Filemon La   MR Number: [id number]   YOB: 1980   Date of Visit: 12/20/2022       Dear Dr. Berenice Barnes    Thank you for referring Filemon La to me for evaluation. Attached you will find relevant portions of my assessment and plan of care.    If you have questions, please do not hesitate to call me. I look forward to following Filemon La along with you.    Sincerely,    Kofi Calixto MD    Enclosure    If you would like to receive this communication electronically, please contact [email or web address] or [phone] to request [employer] access.    [employer] is a tool which provides read-only access to select patient information with whom you have a relationship. Its easy to use and provides real time access to review your patients record including encounter summaries, notes, results, and demographic information.    If you feel you have received this communication in error or would no longer like to receive these types of communications, please e-mail [email or web address]

## 2022-12-22 LAB
COTININE SERPL-MCNC: 26 NG/ML
NICOTINE SERPL-MCNC: <3 NG/ML

## 2022-12-27 ENCOUNTER — LAB VISIT (OUTPATIENT)
Dept: LAB | Facility: HOSPITAL | Age: 42
End: 2022-12-27
Attending: INTERNAL MEDICINE
Payer: MEDICAID

## 2022-12-27 DIAGNOSIS — I50.42 CHRONIC COMBINED SYSTOLIC AND DIASTOLIC HEART FAILURE: ICD-10-CM

## 2022-12-27 DIAGNOSIS — I50.42 CHRONIC COMBINED SYSTOLIC AND DIASTOLIC HEART FAILURE: Primary | ICD-10-CM

## 2022-12-27 LAB
ANION GAP SERPL CALC-SCNC: 10 MMOL/L (ref 8–16)
BUN SERPL-MCNC: 19 MG/DL (ref 6–20)
CALCIUM SERPL-MCNC: 9.8 MG/DL (ref 8.7–10.5)
CHLORIDE SERPL-SCNC: 102 MMOL/L (ref 95–110)
CO2 SERPL-SCNC: 24 MMOL/L (ref 23–29)
CREAT SERPL-MCNC: 1 MG/DL (ref 0.5–1.4)
EST. GFR  (NO RACE VARIABLE): >60 ML/MIN/1.73 M^2
GLUCOSE SERPL-MCNC: 85 MG/DL (ref 70–110)
POTASSIUM SERPL-SCNC: 3.8 MMOL/L (ref 3.5–5.1)
SODIUM SERPL-SCNC: 136 MMOL/L (ref 136–145)

## 2022-12-27 PROCEDURE — 80048 BASIC METABOLIC PNL TOTAL CA: CPT | Mod: PO,TXP | Performed by: INTERNAL MEDICINE

## 2022-12-27 PROCEDURE — 36415 COLL VENOUS BLD VENIPUNCTURE: CPT | Mod: PO,NTX | Performed by: INTERNAL MEDICINE

## 2022-12-27 RX ORDER — TORSEMIDE 20 MG/1
20 TABLET ORAL DAILY
Qty: 90 TABLET | Refills: 1
Start: 2022-12-27 | End: 2023-11-21

## 2022-12-27 NOTE — TELEPHONE ENCOUNTER
Patient started on Spironolactone at clinic visit with Dr. Calixto on 12/20/22. Per Dr. Calixto, patient to take 12.5 mg (0.5 tablet) daily for one week with plans to increase to 25 mg daily if repeat labs are stable.     Lab results below are stable and consistent with recent trends. Called patient and instructed him to increase Spironolactone to 25 mg daily (1 tablet). Patient verbalized understanding.     Mr. La currently wears a life-vest. He does not have a ICD at this time. He had questions about how long he would need to wear the life-vest. I let him know that I would discuss with Dr. Calixto, as he may need to see an EP provider for possible ICD placement.      12/27/22 10:44   Sodium 136   Potassium 3.8   Chloride 102   CO2 24   ANION GAP 10   BUN 19   Creatinine 1.0   eGFR >60.0   Glucose 85   Calcium 9.8

## 2023-01-04 ENCOUNTER — TELEPHONE (OUTPATIENT)
Dept: TRANSPLANT | Facility: CLINIC | Age: 43
End: 2023-01-04
Payer: MEDICAID

## 2023-01-04 ENCOUNTER — COMMITTEE REVIEW (OUTPATIENT)
Dept: TRANSPLANT | Facility: CLINIC | Age: 43
End: 2023-01-04
Payer: MEDICAID

## 2023-01-04 DIAGNOSIS — I50.42 CHRONIC COMBINED SYSTOLIC AND DIASTOLIC HEART FAILURE: Primary | ICD-10-CM

## 2023-01-04 NOTE — TELEPHONE ENCOUNTER
After being discussed at pre selection, pt has been denied for heart transplant and LVAD at this time. Pt will follow in heart failure section and placed on high risk list.     Care of patient is being transferred to CHF section from Preht section, per Dr. Calixto.    Dx:Dilated Myopathy:Idiopathic   Reason:medical stability   Pt is not on home inotrope therapy.  Follow up appt:  3/14/23 in [place] with Dr. Calixto.

## 2023-01-04 NOTE — COMMITTEE REVIEW
Native Organ Dx: Dilated Myopathy: Idiopathic    Denied    After being discussed at pre selection, pt has been denied for heart transplant and LVAD at this time due to medical stability. Pt will follow in heart failure section.     Note was written by Debi Flores.    ==========================================================    I agree and attest to the decision of the committee.

## 2023-01-04 NOTE — TELEPHONE ENCOUNTER
Called patient to discuss selection committee discussion. Explained to the patient that given his medical stability on GDMT, he was being denied for heart transplant/LVAD at this time. Patient can be re-evaluated for advanced options should he need it in the future. Encouraged patient to remain abstinent from alcohol and tobacco use, patient verbalized understanding and agreement.     Patient asked about continued use of life-vest. Patient has been wearing life-vest daily since admission to [facility] since 9/2022. It does not appear that he has been seen by EP. Consult placed at this time to assess for need of ICD, and advised patient that he should get a call to set up an appt and to continue wearing life-vest until he is seen.     RTC with Dr. Calixto scheduled 3/14/2023.

## 2023-01-06 ENCOUNTER — PATIENT MESSAGE (OUTPATIENT)
Dept: HEPATOLOGY | Facility: CLINIC | Age: 43
End: 2023-01-06

## 2023-01-06 ENCOUNTER — LAB VISIT (OUTPATIENT)
Dept: LAB | Facility: HOSPITAL | Age: 43
End: 2023-01-06
Payer: MEDICAID

## 2023-01-06 ENCOUNTER — TELEPHONE (OUTPATIENT)
Dept: HEPATOLOGY | Facility: CLINIC | Age: 43
End: 2023-01-06

## 2023-01-06 ENCOUNTER — PROCEDURE VISIT (OUTPATIENT)
Dept: HEPATOLOGY | Facility: CLINIC | Age: 43
End: 2023-01-06
Attending: INTERNAL MEDICINE
Payer: MEDICAID

## 2023-01-06 VITALS
BODY MASS INDEX: 36.02 KG/M2 | HEART RATE: 80 BPM | DIASTOLIC BLOOD PRESSURE: 76 MMHG | HEIGHT: 62 IN | WEIGHT: 195.75 LBS | SYSTOLIC BLOOD PRESSURE: 110 MMHG

## 2023-01-06 DIAGNOSIS — I50.22 CHRONIC SYSTOLIC CONGESTIVE HEART FAILURE: ICD-10-CM

## 2023-01-06 DIAGNOSIS — Z76.82 ORGAN TRANSPLANT CANDIDATE: ICD-10-CM

## 2023-01-06 LAB
ANION GAP SERPL CALC-SCNC: 10 MMOL/L (ref 8–16)
BUN SERPL-MCNC: 18 MG/DL (ref 6–20)
CALCIUM SERPL-MCNC: 9.8 MG/DL (ref 8.7–10.5)
CHLORIDE SERPL-SCNC: 102 MMOL/L (ref 95–110)
CO2 SERPL-SCNC: 24 MMOL/L (ref 23–29)
CREAT SERPL-MCNC: 0.9 MG/DL (ref 0.5–1.4)
EST. GFR  (NO RACE VARIABLE): >60 ML/MIN/1.73 M^2
GLUCOSE SERPL-MCNC: 118 MG/DL (ref 70–110)
POTASSIUM SERPL-SCNC: 4.1 MMOL/L (ref 3.5–5.1)
SODIUM SERPL-SCNC: 136 MMOL/L (ref 136–145)

## 2023-01-06 PROCEDURE — 91200 LIVER ELASTOGRAPHY: CPT | Mod: PBBFAC | Performed by: NURSE PRACTITIONER

## 2023-01-06 PROCEDURE — 36415 COLL VENOUS BLD VENIPUNCTURE: CPT | Performed by: INTERNAL MEDICINE

## 2023-01-06 PROCEDURE — 76981 PR US, ELASTOGRAPHY, PARENCHYMA: ICD-10-PCS | Mod: 26,S$PBB,, | Performed by: NURSE PRACTITIONER

## 2023-01-06 PROCEDURE — 80048 BASIC METABOLIC PNL TOTAL CA: CPT | Performed by: INTERNAL MEDICINE

## 2023-01-06 PROCEDURE — 76981 USE PARENCHYMA: CPT | Mod: 26,S$PBB,, | Performed by: NURSE PRACTITIONER

## 2023-01-06 NOTE — PROCEDURES
Fibroscan Procedure     Name: Filemon La  Date of Procedure : 2023   :: PRUDENCE Anand  Diagnosis: Transplant    Probe: M    Fibroscan reading: 3.5 KPa    Fibrosis:F 0-1     CAP readin dB/m    Steatosis: :S1       Interpretation:   Mild steatosis without fibrosis

## 2023-01-06 NOTE — PROGRESS NOTES
Patient presented in clinic today for fibroscan examination of their liver. Patient verbalized that they have fasted 4 hours prior to their examination. Patient denies having any active implantable metal devices; such as a pacemaker, defibrillator, or pump.     JH RamanN, RN   Registered Nurse Lead- Hepatology   [facility] [place] [facility]

## 2023-01-09 ENCOUNTER — DOCUMENTATION ONLY (OUTPATIENT)
Dept: TRANSPLANT | Facility: CLINIC | Age: 43
End: 2023-01-09
Payer: MEDICAID

## 2023-01-09 NOTE — PROGRESS NOTES
0930 am:  F/U on labs 1/6/23  See NN by RICHARD Burgos as well as Dr. Velasco 12/2022 clinic note: re: for labs increase aldactone to 25 mg once daily  K:  4.1    Cr:  0.9    Results are in epic and are stable    Will ask HF Luis RUELAS to contact pt and let him know these lab results are good and to continue Spironolactone 25 mg once daily

## 2023-01-11 ENCOUNTER — TELEPHONE (OUTPATIENT)
Dept: ELECTROPHYSIOLOGY | Facility: CLINIC | Age: 43
End: 2023-01-11
Payer: MEDICAID

## 2023-01-18 ENCOUNTER — OFFICE VISIT (OUTPATIENT)
Dept: PALLIATIVE MEDICINE | Facility: CLINIC | Age: 43
End: 2023-01-18
Payer: MEDICAID

## 2023-01-18 DIAGNOSIS — I50.42 CHRONIC COMBINED SYSTOLIC AND DIASTOLIC HEART FAILURE: Primary | ICD-10-CM

## 2023-01-18 DIAGNOSIS — F41.9 ANXIETY: ICD-10-CM

## 2023-01-18 DIAGNOSIS — Z51.5 PALLIATIVE CARE ENCOUNTER: ICD-10-CM

## 2023-01-18 PROCEDURE — 1159F PR MEDICATION LIST DOCUMENTED IN MEDICAL RECORD: ICD-10-PCS | Mod: CPTII,95,, | Performed by: STUDENT IN AN ORGANIZED HEALTH CARE EDUCATION/TRAINING PROGRAM

## 2023-01-18 PROCEDURE — 1159F MED LIST DOCD IN RCRD: CPT | Mod: CPTII,95,, | Performed by: STUDENT IN AN ORGANIZED HEALTH CARE EDUCATION/TRAINING PROGRAM

## 2023-01-18 PROCEDURE — 99215 OFFICE O/P EST HI 40 MIN: CPT | Mod: 95,,, | Performed by: STUDENT IN AN ORGANIZED HEALTH CARE EDUCATION/TRAINING PROGRAM

## 2023-01-18 PROCEDURE — 99215 PR OFFICE/OUTPT VISIT, EST, LEVL V, 40-54 MIN: ICD-10-PCS | Mod: 95,,, | Performed by: STUDENT IN AN ORGANIZED HEALTH CARE EDUCATION/TRAINING PROGRAM

## 2023-01-18 NOTE — PROGRESS NOTES
Consult Note  Palliative Medicine Clinic      Consult Requested By: No ref. provider found    Primary Care Physician: [facility]    Reason for Consult: Advance care planning and symptom management in the setting of Stage D HF and heart Transplant workup    The patient location is: [place]    The chief complaint leading to consultation is: Advance Care Planning    Visit type: audiovisual    Face to Face time with patient: 20 min  42 minutes of total time spent on the encounter, which includes face to face time and non-face to face time preparing to see the patient (eg, review of tests), Obtaining and/or reviewing separately obtained history, Documenting clinical information in the electronic or other health record, Independently interpreting results (not separately reported) and communicating results to the patient/family/caregiver, or Care coordination (not separately reported).       Each patient provided with medical services by telemedicine is:  (1) informed of the relationship between the physician and patient and the respective role of any other health care provider with respect to management of the patient; and (2) notified that he or she may decline to receive medical services by telemedicine and may withdraw from such care at any time.      ASSESSMENT/PLAN:     Plan/Recommendations:  Diagnoses and all orders for this visit:    Chronic combined systolic and diastolic congestive heart failure / Organ transplant candidate / SOB (shortness of breath)  -stage D HFrEF (EF=15%, LVEDD=7.6 cm), NICMP  -followed by cardiology current plan is for GDMT  -Declined for advanced therapies due to medical stability w/ GDMT  - main goal is to prolong his life as long as possible and he would be willing to undergo any treatment or therapies that will help him achieve that goal.  -He is exercising at home- continue graded exercise, continue med compliance and diet  -aware that he needs an appt w/  EP  -refilled his spironolactone per his request     Anxiety  -Will refer to  therapy     Palliative care encounter  Medicolegal: Has decision making capacity.  Father is surrogate decision maker.  He wants to name his brother Maurizio Mendoza and his sister-in-law Taina Mendoza as HCPOA.     Psychosocial:  support system consists of brother and sister-in-law      Spiritual: Druze    Understanding of disease and Illness Trajectory: Patient  has  adequate understanding of his illness, they can benefit from continued education on what to expect in the future.      Goals of care:  Life prolongation    ACP Date: 11/14/2022  I initiated the process of advance care planning today and explained the importance of this process to the patient.  I introduced the concept of advance directives to the patient, as well. Then the patient received detailed information about the importance of designating a Health Care Power of  (HCPOA). He was also instructed to communicate with this person about their wishes for future healthcare, should he become sick and lose decision-making capacity. The patient has not previously appointed a HCPOA. After our discussion, the patient has decided to appoint  a HCPOA and wants to name his brother Maurizio Mendoza and his sister-in-law Taina Mendoza.We explored the patient's values and preferences for future care.  The patient endorses that what is most important right now is to focus on curative/life-prolongation (regardless of treatment burdens).  States that he wants to live as long as possible in order to be there for his 3 kids.  He states that he wants to be present in their lives and see them grow up. Accordingly, we have decided that the best plan to meet the patient's goals includes continuing with treatment  He confirmed that he wants to be full code as long as there are treatments that will him get better.  However he said that he would not want be a burden and he would not want to be  "a "vegetable".  He said that in [place] quality of life would be to be able to speak in heart his gets and tell them that he loves them.          Code status: Full Code    Advance directives:none on File will mail him HCPOA          min time was spent on advance care planning, goals of care discussion, emotional support, formulating and communicating prognosis and goals of care, exploring burden/benefit of various approaches of treatment.          SUBJECTIVE:     History obtained from: patient     complaint: No chief complaint on file.          History of Present Illness:  Mr. Filemon La is 42 y.o. year old male presenting with stage D HFrEF undergoing heart Transplant workup.  Referred to Palliative Care for evaluation and management of advance care planning, and additional support.. He attended the appointment with his daughter       Interval History:  He feels ok on GDMT  Occasional HA, tylenol works well    SOB on exertion   Helping sister w/ cancer and father also w/ cancer     Has significant anxiety because of psychosocial issues, now on SSI, living w/ brother, conitnming ti be involved with his daughters     Disease History:   stage D HFrEF (EF=15%, LVEDD=7.6 cm), NICMP (possible COVID CMP vs alcoholic cardiomyopathy ) with NYHA class III/IV symptoms     Past Medical History:   Diagnosis Date    Alcoholism in recovery 09/2022    Cardiomyopathy 09/2022    Alcoholic    CHF (congestive heart failure)     Chronic combined systolic and diastolic congestive heart failure 09/17/2022    Pulmonary hypertension     pt reported     Past Surgical History:   Procedure Laterality Date    CATHETERIZATION OF BOTH LEFT AND RIGHT HEART N/A 9/20/2022    Procedure: CATHETERIZATION, HEART, BOTH LEFT AND RIGHT;  Surgeon: Yady Patterson MD;  Location: [facility] CATH LAB;  Service: Cardiology;  Laterality: N/A;    RIGHT HEART CATHETERIZATION Right 10/13/2022    Procedure: INSERTION, CATHETER, RIGHT HEART;  Surgeon: La Nena Ramirez, " DO;  Location: [facility] CATH LAB;  Service: Cardiology;  Laterality: Right;     Family History   Problem Relation Age of Onset    Hypertension Mother     Diabetes Mother     Hypertension Father      Review of patient's allergies indicates:  No Known Allergies    Medications:    Current Outpatient Medications:     acetaminophen (TYLENOL) 325 MG tablet, Take 2 tablets (650 mg total) by mouth every 6 (six) hours as needed for Pain., Disp: , Rfl: 0    nicotine (NICODERM CQ) 21 mg/24 hr, 1 patch once daily., Disp: , Rfl:     spironolactone (ALDACTONE) 25 MG tablet, Take half a tab (12.5) mg daily for 1 week. Labs in 1 week. If labs look good increase to 25 mg (1 full tab) daily., Disp: 90 tablet, Rfl: 3    torsemide (DEMADEX) 20 MG Tab, Take 1 tablet (20 mg total) by mouth once daily., Disp: 90 tablet, Rfl: 1    valsartan (DIOVAN) 80 MG tablet, Take 1 tablet (80 mg total) by mouth 2 (two) times daily., Disp: 180 tablet, Rfl: 3    WESTAB MAX 2.5-25-2 mg Tab, Take 1 tablet by mouth once daily., Disp: , Rfl:      database queried on 01/18/2023  by Sondra Thompson . The results reviewed and considered with the clinical data in the decision whether or not to prescribe a controlled substance.      OBJECTIVE:       ROS:  Review of Systems   Constitutional:  Negative for activity change, appetite change and fatigue.   HENT:  Negative for hearing loss and sore throat.    Eyes:  Negative for visual disturbance.   Respiratory:  Positive for shortness of breath.    Cardiovascular:  Negative for chest pain.   Gastrointestinal:  Negative for constipation, diarrhea and nausea.   Genitourinary:  Negative for dysuria.   Musculoskeletal:  Negative for back pain and gait problem.   Neurological:  Negative for headaches and memory loss.   Psychiatric/Behavioral:  Negative for confusion. The patient is nervous/anxious.        Review of Symptoms      Symptom Assessment (ESAS 0-10 Scale)  Pain:  0  Dyspnea:  4  Anxiety:  8  Nausea:   0  Depression:  7  Anorexia:  0  Fatigue:  0  Insomnia:  0  Restlessness:  0  Agitation:  0     CAM / Delirium:  Negative  Constipation:  Negative  Diarrhea:  Negative    Anxiety:  Is nervous/anxious  Constipation:  No constipation    Modified Harish Scale:  3    Performance Status:  70    Living Arrangements:  Lives with family    Psychosocial/Cultural:   See Palliative Psychosocial Note: No  Lives with broher, , he has 3 children 16, 10 and a 6y/o daughter   **Primary  to Follow**  Palliative Care  Consult: No    Spiritual:  F - Deisi and Belief:  Yazidi  I - Importance:  High    Advance Care Planning   Advance Directives:   Living Will: No    LaPOST: No    Do Not Resuscitate Status: No    Medical Power of : No        Oral Declaration: Yes   Witnesses:  Sondra Peña MD and Geraldine Godfrey LCSW   Agent's Name:  Maurizio Mendoza (brother)    Decision Making:  Patient answered questions  Goals of Care: What is most important right now is to focus on curative/life-prolongation (regardless of treatment burdens). Accordingly, we have decided that the best plan to meet the patient's goals includes continuing with treatment.            Physical Exam:  Vitals:    Physical Exam  Constitutional:       General: He is not in acute distress.  HENT:      Head: Normocephalic and atraumatic.   Eyes:      General: No scleral icterus.  Pulmonary:      Effort: Pulmonary effort is normal. No respiratory distress.   Musculoskeletal:      Cervical back: Neck supple.   Neurological:      Mental Status: He is alert and oriented to person, place, and time.   Psychiatric:         Mood and Affect: Mood and affect normal.         Labs:  CBC:   WBC   Date Value Ref Range Status   10/27/2022 7.22 3.90 - 12.70 K/uL Final       Hemoglobin   Date Value Ref Range Status   10/27/2022 15.8 14.0 - 18.0 g/dL Final       Hematocrit   Date Value Ref Range Status   10/27/2022 46.1 40.0 - 54.0 % Final       MCV   Date  Value Ref Range Status   10/27/2022 96 82 - 98 fL Final       Platelets   Date Value Ref Range Status   10/27/2022 191 150 - 450 K/uL Final           LFT:   Lab Results   Component Value Date    AST 20 12/20/2022    ALKPHOS 89 12/20/2022    BILITOT 1.7 (H) 12/20/2022       Albumin:   Albumin   Date Value Ref Range Status   12/20/2022 4.1 3.5 - 5.2 g/dL Final     Protein:   Total Protein   Date Value Ref Range Status   12/20/2022 7.8 6.0 - 8.4 g/dL Final       Radiology:I have reviewed all pertinent imaging results/findings within the past 24 hours.  Results for orders placed or performed during the hospital encounter of 10/27/22 (from the past 2160 hour(s))   CT Chest Abdomen Pelvis Without Contrast (XPD)    Impression    1. No acute findings in the chest abdomen or pelvis.    Electronically signed by resident: Ivis French  Date:    10/27/2022  Time:    15:47    Electronically signed by: Ruben Middleton MD  Date:    10/27/2022  Time:    17:43   Results for orders placed or performed during the hospital encounter of 10/27/22 (from the past 2160 hour(s))   CT Head Without Contrast    Impression    No acute intracranial findings as detailed above specifically without evidence for acute intracranial hemorrhage or sulcal effacement to suggest large territory recent infarction.  Clinical correlation and further evaluation as warranted.      Electronically signed by: Gómez Rivera DO  Date:    10/27/2022  Time:    15:38       I spent a total of 42 minutes on the day of the visit. This includes face to face time in discussion of goals of care, symptom assessment, coordination of care and emotional support.  This also includes non-face to face time preparing to see the patient (eg, review of tests/imaging), obtaining and/or reviewing separately obtained history, documenting clinical information in the electronic or other health record, independently interpreting results and communicating results to the  patient/family/caregiver, or care coordinator.         This note was partially created using [employer] Voice Recognition software. Typographical and content errors may occur with this process. While efforts are made to detect and correct such errors, in some cases errors will persist. For this reason, wording in this document should be considered in the proper context and not strictly verbatim.      Encounter occurred during period of COVID-19 emergency. Encounter performed under the concurrent guidelines, limitations and protocols.      Signature: Sondra Thompson MD

## 2023-01-19 ENCOUNTER — PATIENT MESSAGE (OUTPATIENT)
Dept: PALLIATIVE MEDICINE | Facility: CLINIC | Age: 43
End: 2023-01-19
Payer: MEDICAID

## 2023-01-20 RX ORDER — SPIRONOLACTONE 25 MG/1
TABLET ORAL
Qty: 90 TABLET | Refills: 3 | Status: SHIPPED | OUTPATIENT
Start: 2023-01-20 | End: 2023-02-10 | Stop reason: SDUPTHER

## 2023-01-20 RX ORDER — BUSPIRONE HYDROCHLORIDE 5 MG/1
5 TABLET ORAL 2 TIMES DAILY
Qty: 60 TABLET | Refills: 11 | Status: SHIPPED | OUTPATIENT
Start: 2023-01-20 | End: 2023-07-25 | Stop reason: SDUPTHER

## 2023-01-25 ENCOUNTER — TELEPHONE (OUTPATIENT)
Dept: ELECTROPHYSIOLOGY | Facility: CLINIC | Age: 43
End: 2023-01-25
Payer: MEDICAID

## 2023-01-30 DIAGNOSIS — I50.41 ACUTE COMBINED SYSTOLIC AND DIASTOLIC CONGESTIVE HEART FAILURE: ICD-10-CM

## 2023-01-30 DIAGNOSIS — I50.22 CHRONIC SYSTOLIC CONGESTIVE HEART FAILURE: Primary | ICD-10-CM

## 2023-01-31 NOTE — PROGRESS NOTES
Subjective:    Patient ID:  Filemon La is a 42 y.o. male who presents for follow-up of chf and Cardiomyopathy (Pt is wearing a life vest. Pt states that his heart is better and he will not receive transplant)      42 yoM referred for ICD consideration. He was diagnosed with NICM 9/22 after presenting with HF symptoms. GDMT has been limited by hypotension. He is currently on diovan and aldactone. EF 10-15%. Normal coronary arteries. He admits to heavy EtHO use prior to presentation. He has been sober since 9/22. His symptoms have improved however he still gets exertional dyspnea. No syncope or near syncope. He is wearing a life vest.     Echo 9/18/22:  · The left ventricle is severely enlarged with severely decreased systolic function.  · The estimated ejection fraction is 15%.  · Left ventricular diastolic dysfunction.  · There is severe left ventricular global hypokinesis.  · Normal right ventricular size with low normal right ventricular systolic function.  · Moderate left atrial enlargement.  · Moderate right atrial enlargement.  · Mild mitral regurgitation.  · Mild tricuspid regurgitation.  · Intermediate central venous pressure (8 mmHg).  · The estimated PA systolic pressure is 32 mmHg.     [facility] 9/22:  ·  The ejection fraction was calculated to be 10%.  ·  The pre-procedure left ventricular end diastolic pressure was 28.  ·  The post-procedure left ventricular end diastolic pressure was 30.  ·  The estimated blood loss was none.  ·  The coronary arteries were normal..  ·  There was diastolic dysfunction.  ·  The filling pressures on the right and left were moderately elevated. Pulmonary hypertension was moderate.    Past Medical History:  09/2022: Alcoholism in recovery  09/2022: Cardiomyopathy      Comment:  Alcoholic  No date: CHF (congestive heart failure)  09/17/2022: Chronic combined systolic and diastolic congestive heart   failure  No date: Pulmonary hypertension      Comment:  pt reported    Past  Surgical History:  2022: CATHETERIZATION OF BOTH LEFT AND RIGHT HEART; N/A      Comment:  Procedure: CATHETERIZATION, HEART, BOTH LEFT AND RIGHT;                Surgeon: Yady Patterson MD;  Location: [facility] CATH LAB;                 Service: Cardiology;  Laterality: N/A;  10/13/2022: RIGHT HEART CATHETERIZATION; Right      Comment:  Procedure: INSERTION, CATHETER, RIGHT HEART;  Surgeon:                La Nena Ramirez DO;  Location: [facility] CATH LAB;                 Service: Cardiology;  Laterality: Right;    Social History    Socioeconomic History      Marital status:     Tobacco Use      Smoking status: Former        Packs/day: 1.00        Types: Cigarettes        Quit date: 2022        Years since quittin.4      Smokeless tobacco: Never    Substance and Sexual Activity      Alcohol use: Not Currently        Comment: was drinking 1.5-2.0 pints of hard liquor/day until 2022       Drug use: Never    Review of patient's family history indicates:    Current Outpatient Medications:  acetaminophen (TYLENOL) 325 MG tablet, Take 2 tablets (650 mg total) by mouth every 6 (six) hours as needed for Pain., Disp: , Rfl: 0  busPIRone (BUSPAR) 5 MG Tab, Take 1 tablet (5 mg total) by mouth 2 (two) times daily., Disp: 60 tablet, Rfl: 11  nicotine (NICODERM CQ) 21 mg/24 hr, 1 patch once daily., Disp: , Rfl:   spironolactone (ALDACTONE) 25 MG tablet, Take half a tab (12.5) mg daily for 1 week. Labs in 1 week. If labs look good increase to 25 mg (1 full tab) daily., Disp: 90 tablet, Rfl: 3  torsemide (DEMADEX) 20 MG Tab, Take 1 tablet (20 mg total) by mouth once daily., Disp: 90 tablet, Rfl: 1  valsartan (DIOVAN) 80 MG tablet, Take 1 tablet (80 mg total) by mouth 2 (two) times daily., Disp: 180 tablet, Rfl: 3  WESTAB MAX 2.5-25-2 mg Tab, Take 1 tablet by mouth once daily., Disp: , Rfl:     No current facility-administered medications for this visit.            Review of Systems   Constitutional: Negative.  Negative for chills, decreased appetite, diaphoresis, fever, malaise/fatigue, night sweats, weight gain and weight loss.   Eyes: Negative.    Cardiovascular:  Positive for dyspnea on exertion. Negative for chest pain, claudication, cyanosis, irregular heartbeat, leg swelling, near-syncope, orthopnea, palpitations, paroxysmal nocturnal dyspnea and syncope.   Respiratory:  Negative for cough, hemoptysis and shortness of breath.    Endocrine: Negative.    Hematologic/Lymphatic: Negative.    Skin:  Negative for color change, dry skin and nail changes.   Musculoskeletal: Negative.    Gastrointestinal: Negative.    Genitourinary: Negative.    Neurological:  Negative for weakness.      Objective:    Physical Exam  Vitals reviewed.   Constitutional:       Appearance: He is well-developed.   HENT:      Head: Normocephalic.   Neck:      Vascular: No carotid bruit or JVD.   Cardiovascular:      Rate and Rhythm: Regular rhythm.      Chest Wall: PMI is displaced.      Pulses: Normal pulses.      Heart sounds: Normal heart sounds. No murmur heard.  Pulmonary:      Effort: Pulmonary effort is normal.      Breath sounds: Normal breath sounds.   Abdominal:      General: Bowel sounds are normal.      Palpations: Abdomen is soft.   Skin:     General: Skin is warm.   Neurological:      Mental Status: He is alert.     ECg: SR nl SD, QRS, QTc        Assessment:       1. Alcoholic cardiomyopathy    2. Chronic combined systolic and diastolic heart failure    3. Chronic combined systolic and diastolic congestive heart failure         Plan:       42 yoM NICM referred for ICD consideration. He has NICM with EF <35% on his initial echo as well as NYHA Class II symptoms. Will perform 90+d follow up echo to assess candidacy for ICD. If his EF remains impaired, will plan on DC ICD implantation. Will await echo findings.

## 2023-02-01 ENCOUNTER — OFFICE VISIT (OUTPATIENT)
Dept: CARDIOLOGY | Facility: CLINIC | Age: 43
End: 2023-02-01
Payer: MEDICAID

## 2023-02-01 ENCOUNTER — HOSPITAL ENCOUNTER (OUTPATIENT)
Dept: CARDIOLOGY | Facility: HOSPITAL | Age: 43
Discharge: HOME | End: 2023-02-01
Attending: INTERNAL MEDICINE
Payer: MEDICAID

## 2023-02-01 VITALS
DIASTOLIC BLOOD PRESSURE: 74 MMHG | HEIGHT: 64 IN | BODY MASS INDEX: 35.26 KG/M2 | HEART RATE: 98 BPM | SYSTOLIC BLOOD PRESSURE: 124 MMHG | WEIGHT: 206.56 LBS

## 2023-02-01 DIAGNOSIS — I50.42 CHRONIC COMBINED SYSTOLIC AND DIASTOLIC CONGESTIVE HEART FAILURE: ICD-10-CM

## 2023-02-01 DIAGNOSIS — I50.42 CHRONIC COMBINED SYSTOLIC AND DIASTOLIC HEART FAILURE: ICD-10-CM

## 2023-02-01 DIAGNOSIS — I42.6 ALCOHOLIC CARDIOMYOPATHY: Primary | ICD-10-CM

## 2023-02-01 DIAGNOSIS — I50.22 CHRONIC SYSTOLIC CONGESTIVE HEART FAILURE: ICD-10-CM

## 2023-02-01 DIAGNOSIS — I50.41 ACUTE COMBINED SYSTOLIC AND DIASTOLIC CONGESTIVE HEART FAILURE: ICD-10-CM

## 2023-02-01 PROCEDURE — 3008F PR BODY MASS INDEX (BMI) DOCUMENTED: ICD-10-PCS | Mod: CPTII,,, | Performed by: INTERNAL MEDICINE

## 2023-02-01 PROCEDURE — 93010 ELECTROCARDIOGRAM REPORT: CPT | Mod: ,,, | Performed by: INTERNAL MEDICINE

## 2023-02-01 PROCEDURE — 3008F BODY MASS INDEX DOCD: CPT | Mod: CPTII,,, | Performed by: INTERNAL MEDICINE

## 2023-02-01 PROCEDURE — 99999 PR PBB SHADOW E&M-EST. PATIENT-LVL IV: ICD-10-PCS | Mod: PBBFAC,,, | Performed by: INTERNAL MEDICINE

## 2023-02-01 PROCEDURE — 1159F PR MEDICATION LIST DOCUMENTED IN MEDICAL RECORD: ICD-10-PCS | Mod: CPTII,,, | Performed by: INTERNAL MEDICINE

## 2023-02-01 PROCEDURE — 4010F PR ACE/ARB THEARPY RXD/TAKEN: ICD-10-PCS | Mod: CPTII,,, | Performed by: INTERNAL MEDICINE

## 2023-02-01 PROCEDURE — 3074F PR MOST RECENT SYSTOLIC BLOOD PRESSURE < 130 MM HG: ICD-10-PCS | Mod: CPTII,,, | Performed by: INTERNAL MEDICINE

## 2023-02-01 PROCEDURE — 99214 OFFICE O/P EST MOD 30 MIN: CPT | Mod: S$PBB,,, | Performed by: INTERNAL MEDICINE

## 2023-02-01 PROCEDURE — 3078F DIAST BP <80 MM HG: CPT | Mod: CPTII,,, | Performed by: INTERNAL MEDICINE

## 2023-02-01 PROCEDURE — 1160F PR REVIEW ALL MEDS BY PRESCRIBER/CLIN PHARMACIST DOCUMENTED: ICD-10-PCS | Mod: CPTII,,, | Performed by: INTERNAL MEDICINE

## 2023-02-01 PROCEDURE — 4010F ACE/ARB THERAPY RXD/TAKEN: CPT | Mod: CPTII,,, | Performed by: INTERNAL MEDICINE

## 2023-02-01 PROCEDURE — 3078F PR MOST RECENT DIASTOLIC BLOOD PRESSURE < 80 MM HG: ICD-10-PCS | Mod: CPTII,,, | Performed by: INTERNAL MEDICINE

## 2023-02-01 PROCEDURE — 99214 PR OFFICE/OUTPT VISIT, EST, LEVL IV, 30-39 MIN: ICD-10-PCS | Mod: S$PBB,,, | Performed by: INTERNAL MEDICINE

## 2023-02-01 PROCEDURE — 1160F RVW MEDS BY RX/DR IN RCRD: CPT | Mod: CPTII,,, | Performed by: INTERNAL MEDICINE

## 2023-02-01 PROCEDURE — 3074F SYST BP LT 130 MM HG: CPT | Mod: CPTII,,, | Performed by: INTERNAL MEDICINE

## 2023-02-01 PROCEDURE — 1159F MED LIST DOCD IN RCRD: CPT | Mod: CPTII,,, | Performed by: INTERNAL MEDICINE

## 2023-02-01 PROCEDURE — 99214 OFFICE O/P EST MOD 30 MIN: CPT | Mod: PBBFAC | Performed by: INTERNAL MEDICINE

## 2023-02-01 PROCEDURE — 93010 EKG 12-LEAD: ICD-10-PCS | Mod: ,,, | Performed by: INTERNAL MEDICINE

## 2023-02-01 PROCEDURE — 99999 PR PBB SHADOW E&M-EST. PATIENT-LVL IV: CPT | Mod: PBBFAC,,, | Performed by: INTERNAL MEDICINE

## 2023-02-01 PROCEDURE — 93005 ELECTROCARDIOGRAM TRACING: CPT

## 2023-02-10 ENCOUNTER — HOSPITAL ENCOUNTER (OUTPATIENT)
Dept: CARDIOLOGY | Facility: HOSPITAL | Age: 43
Discharge: HOME | End: 2023-02-10
Attending: INTERNAL MEDICINE
Payer: MEDICAID

## 2023-02-10 VITALS
SYSTOLIC BLOOD PRESSURE: 124 MMHG | WEIGHT: 206 LBS | HEIGHT: 64 IN | DIASTOLIC BLOOD PRESSURE: 74 MMHG | BODY MASS INDEX: 35.17 KG/M2 | HEART RATE: 82 BPM

## 2023-02-10 DIAGNOSIS — I50.42 CHRONIC COMBINED SYSTOLIC AND DIASTOLIC HEART FAILURE: ICD-10-CM

## 2023-02-10 LAB
AORTIC ROOT ANNULUS: 3.08 CM
ASCENDING AORTA: 2.41 CM
AV INDEX (PROSTH): 0.57
AV MEAN GRADIENT: 4 MMHG
AV PEAK GRADIENT: 7 MMHG
AV VALVE AREA: 2.04 CM2
AV VELOCITY RATIO: 0.56
BSA FOR ECHO PROCEDURE: 2.05 M2
CV ECHO LV RWT: 0.36 CM
DOP CALC AO PEAK VEL: 1.3 M/S
DOP CALC AO VTI: 20.9 CM
DOP CALC LVOT AREA: 3.6 CM2
DOP CALC LVOT DIAMETER: 2.13 CM
DOP CALC LVOT PEAK VEL: 0.73 M/S
DOP CALC LVOT STROKE VOLUME: 42.74 CM3
DOP CALC RVOT PEAK VEL: 0.64 M/S
DOP CALC RVOT VTI: 12.4 CM
DOP CALCLVOT PEAK VEL VTI: 12 CM
ECHO LV POSTERIOR WALL: 1.08 CM (ref 0.6–1.1)
EJECTION FRACTION: 20 %
FRACTIONAL SHORTENING: 9 % (ref 28–44)
INTERVENTRICULAR SEPTUM: 1.04 CM (ref 0.6–1.1)
IVRT: 99.9 MSEC
LA MAJOR: 4.27 CM
LA MINOR: 4.22 CM
LA WIDTH: 3.3 CM
LEFT ATRIUM SIZE: 2.98 CM
LEFT ATRIUM VOLUME INDEX MOD: 17.2 ML/M2
LEFT ATRIUM VOLUME INDEX: 17.9 ML/M2
LEFT ATRIUM VOLUME MOD: 34.08 CM3
LEFT ATRIUM VOLUME: 35.48 CM3
LEFT INTERNAL DIMENSION IN SYSTOLE: 5.53 CM (ref 2.1–4)
LEFT VENTRICLE DIASTOLIC VOLUME INDEX: 93.19 ML/M2
LEFT VENTRICLE DIASTOLIC VOLUME: 184.52 ML
LEFT VENTRICLE MASS INDEX: 137 G/M2
LEFT VENTRICLE SYSTOLIC VOLUME INDEX: 75.4 ML/M2
LEFT VENTRICLE SYSTOLIC VOLUME: 149.29 ML
LEFT VENTRICULAR INTERNAL DIMENSION IN DIASTOLE: 6.07 CM (ref 3.5–6)
LEFT VENTRICULAR MASS: 271.59 G
LVOT MG: 1.38 MMHG
LVOT MV: 0.56 CM/S
PISA TR MAX VEL: 1.87 M/S
PV MEAN GRADIENT: 0.88 MMHG
PV PEAK VELOCITY: 1.09 CM/S
RA MAJOR: 3.64 CM
RA PRESSURE: 3 MMHG
RA WIDTH: 3.45 CM
RIGHT VENTRICULAR END-DIASTOLIC DIMENSION: 3.25 CM
SINUS: 2.62 CM
STJ: 2.02 CM
TDI LATERAL: 0.07 M/S
TDI SEPTAL: 0.1 M/S
TDI: 0.09 M/S
TR MAX PG: 14 MMHG
TV REST PULMONARY ARTERY PRESSURE: 17 MMHG

## 2023-02-10 PROCEDURE — 93306 TTE W/DOPPLER COMPLETE: CPT

## 2023-02-10 PROCEDURE — 93306 TTE W/DOPPLER COMPLETE: CPT | Mod: 26,,, | Performed by: INTERNAL MEDICINE

## 2023-02-10 PROCEDURE — 93306 ECHO (CUPID ONLY): ICD-10-PCS | Mod: 26,,, | Performed by: INTERNAL MEDICINE

## 2023-02-10 RX ORDER — SPIRONOLACTONE 25 MG/1
TABLET ORAL
Qty: 90 TABLET | Refills: 0 | Status: SHIPPED | OUTPATIENT
Start: 2023-02-10

## 2023-02-23 ENCOUNTER — HOSPITAL ENCOUNTER (EMERGENCY)
Facility: HOSPITAL | Age: 43
Discharge: HOME | End: 2023-02-23
Attending: EMERGENCY MEDICINE
Payer: MEDICAID

## 2023-02-23 VITALS
TEMPERATURE: 97 F | BODY MASS INDEX: 35.99 KG/M2 | OXYGEN SATURATION: 99 % | WEIGHT: 209.69 LBS | SYSTOLIC BLOOD PRESSURE: 135 MMHG | DIASTOLIC BLOOD PRESSURE: 65 MMHG | RESPIRATION RATE: 20 BRPM | HEART RATE: 94 BPM

## 2023-02-23 DIAGNOSIS — M25.561 RIGHT KNEE PAIN: ICD-10-CM

## 2023-02-23 DIAGNOSIS — S83.91XA SPRAIN OF RIGHT KNEE, UNSPECIFIED LIGAMENT, INITIAL ENCOUNTER: Primary | ICD-10-CM

## 2023-02-23 PROCEDURE — 99283 EMERGENCY DEPT VISIT LOW MDM: CPT | Mod: 25,ER

## 2023-02-23 RX ORDER — OXYCODONE AND ACETAMINOPHEN 10; 325 MG/1; MG/1
1 TABLET ORAL EVERY 6 HOURS PRN
Qty: 12 TABLET | Refills: 0 | Status: SHIPPED | OUTPATIENT
Start: 2023-02-23

## 2023-02-23 NOTE — ED PROVIDER NOTES
Hiren Encounter Date: 2023       History     Chief Complaint   Patient presents with    Leg Pain     Right leg pain, worse with ambulation. Previous injury that he thinks he may have aggravated. X2 days     The history is provided by the patient.   Leg Pain   The incident occurred at home. The injury mechanism was torsion. The incident occurred several days ago. The pain is present in the right knee. The quality of the pain is described as sharp. The pain is at a severity of 5/10. The pain has been Constant since onset. Pertinent negatives include no numbness, no inability to bear weight, no loss of motion, no muscle weakness, no loss of sensation and no tingling. He reports no foreign bodies present. The symptoms are aggravated by activity and bearing weight.   Review of patient's allergies indicates:  No Known Allergies  Past Medical History:   Diagnosis Date    Alcoholism in recovery 2022    Cardiomyopathy 2022    Alcoholic    CHF (congestive heart failure)     Chronic combined systolic and diastolic congestive heart failure 2022    Pulmonary hypertension     pt reported     Past Surgical History:   Procedure Laterality Date    CATHETERIZATION OF BOTH LEFT AND RIGHT HEART N/A 2022    Procedure: CATHETERIZATION, HEART, BOTH LEFT AND RIGHT;  Surgeon: Yady Patterson MD;  Location: [facility] CATH LAB;  Service: Cardiology;  Laterality: N/A;    RIGHT HEART CATHETERIZATION Right 10/13/2022    Procedure: INSERTION, CATHETER, RIGHT HEART;  Surgeon: La Nena Ramirez DO;  Location: [facility] CATH LAB;  Service: Cardiology;  Laterality: Right;     Family History   Problem Relation Age of Onset    Hypertension Mother     Diabetes Mother     Hypertension Father      Social History     Tobacco Use    Smoking status: Former     Packs/day: 1.00     Types: Cigarettes     Quit date: 2022     Years since quittin.4    Smokeless tobacco: Never   Substance Use Topics    Alcohol use: Not Currently      Comment: was drinking 1.5-2.0 pints of hard liquor/day until Sept 2022    Drug use: Never     Review of Systems   Constitutional:  Negative for fever.   HENT:  Negative for congestion.    Eyes:  Negative for photophobia.   Respiratory:  Negative for cough.    Cardiovascular:  Negative for chest pain.   Gastrointestinal:  Negative for abdominal pain.   Genitourinary:  Negative for flank pain.   Musculoskeletal:         Right knee pain   Neurological:  Negative for tingling and numbness.   Hematological:  Does not bruise/bleed easily.     Physical Exam     Initial Vitals [02/23/23 0854]   BP Pulse Resp Temp SpO2   135/65 94 20 97.3 °F (36.3 °C) 99 %      MAP       --         Physical Exam    Nursing note and vitals reviewed.  Constitutional: He appears well-developed and well-nourished.   HENT:   Head: Normocephalic and atraumatic.   Mouth/Throat: Oropharynx is clear and moist.   Eyes: Conjunctivae and EOM are normal. Pupils are equal, round, and reactive to light.   Neck: Neck supple.   Normal range of motion.  Cardiovascular:  Normal rate, regular rhythm and normal heart sounds.           Pulmonary/Chest: Breath sounds normal.   Abdominal: Abdomen is soft. Bowel sounds are normal.   Musculoskeletal:         General: Normal range of motion.      Cervical back: Normal range of motion and neck supple.      Right knee: Normal.      Left knee: Normal.      Right lower leg: Normal.      Left lower leg: Normal.     Neurological: He is alert and oriented to person, place, and time. He has normal strength.   Skin: Skin is warm and dry.   Psychiatric: He has a normal mood and affect. Thought content normal.       ED Course   Splint Application    Date/Time: 2/23/2023 10:54 AM  Performed by: González Wilkes MD  Authorized by: González Wilkes MD   Location details: right knee  Supplies used: elastic bandage  Post-procedure: The splinted body part was neurovascularly unchanged following the procedure.  Patient  tolerance: Patient tolerated the procedure well with no immediate complications      Labs Reviewed - No data to display       Imaging Results              X-Ray Knee Complete 4 Or More Views Right (Final result)  Result time 02/23/23 09:49:14      Final result by KARINE Barrientos Sr., MD (02/23/23 09:49:14)                   Impression:      1. There is a 10 mm curvilinear calcific density medial to the medial femoral condyle.  This is characteristic of a Monik-Stieda lesion.  2. There is a moderate sized joint effusion in the right knee.  3. If additional imaging evaluation is clinically indicated, I recommend consideration of an MRI examination of the right knee without IV contrast.      Electronically signed by: Praveen Barrientos MD  Date:    02/23/2023  Time:    09:49               Narrative:    EXAMINATION:  XR KNEE COMP 4 OR MORE VIEWS RIGHT    CLINICAL HISTORY:  Pain in right knee    COMPARISON:  05/21/2022    FINDINGS:  There is no fracture. There is no dislocation.  There is a 10 mm curvilinear calcific density medial to the medial femoral condyle.  There is a moderate sized joint effusion in the right knee.                                  10:00 AM - Counseling: Spoke with the patient and discussed todays findings, in addition to providing specific details for the plan of care and counseling regarding the diagnosis and prognosis. Questions are answered at this time.       Medications - No data to display  Medical Decision Making:   Initial Assessment:   43 yo male with right knee pain for several days, may have twisted it but denies specific traumatic event  Differential Diagnosis:   Right knee sprain, strain, ligamental injury, Meniscus tear, Joint effusion, Gout, Septic arthritis Fracture, dislocation, DVT  Clinical Tests:   Radiological Study: Ordered and Reviewed  ED Management:  Patient presents with right knee joint pain. Given history, exam and workup patient likely has arthritis. I have low  suspicion for fracture, dislocation, significant ligamentous injury, septic arthritis, gout flare, new autoimmune arthropathy, or gonococcal arthropathy. ACE wrap placed on right knee, pain medication provided and Orthopedic referral                             Clinical Impression:   Final diagnoses:  [M25.561] Right knee pain  [S83.91XA] Sprain of right knee, unspecified ligament, initial encounter (Primary)        ED Disposition Condition    Discharge Stable          ED Prescriptions       Medication Sig Dispense Start Date End Date Auth. Provider    oxyCODONE-acetaminophen (PERCOCET)  mg per tablet Take 1 tablet by mouth every 6 (six) hours as needed for Pain. 12 tablet 2/23/2023 -- González Wilkes MD          Follow-up Information       Follow up With Specialties Details Why Contact Info    Orthopedics  Call in 3 days As needed     [facility] - Emergency Dept Emergency Medicine  If symptoms worsen [address] [address] [id number] [phone]             González Wilkes MD  02/23/23 1007       González Wilkes MD  02/23/23 1053

## 2023-03-14 ENCOUNTER — LAB VISIT (OUTPATIENT)
Dept: LAB | Facility: HOSPITAL | Age: 43
End: 2023-03-14
Attending: INTERNAL MEDICINE
Payer: MEDICAID

## 2023-03-14 ENCOUNTER — OFFICE VISIT (OUTPATIENT)
Dept: TRANSPLANT | Facility: CLINIC | Age: 43
End: 2023-03-14
Payer: MEDICAID

## 2023-03-14 VITALS
HEIGHT: 64 IN | BODY MASS INDEX: 36.55 KG/M2 | SYSTOLIC BLOOD PRESSURE: 126 MMHG | HEART RATE: 80 BPM | WEIGHT: 214.06 LBS | DIASTOLIC BLOOD PRESSURE: 60 MMHG

## 2023-03-14 DIAGNOSIS — I50.42 CHRONIC COMBINED SYSTOLIC AND DIASTOLIC CONGESTIVE HEART FAILURE: Primary | ICD-10-CM

## 2023-03-14 DIAGNOSIS — I42.6 ALCOHOLIC CARDIOMYOPATHY: ICD-10-CM

## 2023-03-14 DIAGNOSIS — I50.42 CHRONIC COMBINED SYSTOLIC AND DIASTOLIC CONGESTIVE HEART FAILURE: ICD-10-CM

## 2023-03-14 DIAGNOSIS — I42.8 CARDIOMYOPATHY, NONISCHEMIC: ICD-10-CM

## 2023-03-14 DIAGNOSIS — I27.20 PULMONARY HYPERTENSION: ICD-10-CM

## 2023-03-14 LAB
ANION GAP SERPL CALC-SCNC: 10 MMOL/L (ref 8–16)
BUN SERPL-MCNC: 14 MG/DL (ref 6–20)
CALCIUM SERPL-MCNC: 9.8 MG/DL (ref 8.7–10.5)
CHLORIDE SERPL-SCNC: 103 MMOL/L (ref 95–110)
CO2 SERPL-SCNC: 25 MMOL/L (ref 23–29)
CREAT SERPL-MCNC: 1 MG/DL (ref 0.5–1.4)
EST. GFR  (NO RACE VARIABLE): >60 ML/MIN/1.73 M^2
GLUCOSE SERPL-MCNC: 85 MG/DL (ref 70–110)
POTASSIUM SERPL-SCNC: 4.6 MMOL/L (ref 3.5–5.1)
SODIUM SERPL-SCNC: 138 MMOL/L (ref 136–145)

## 2023-03-14 PROCEDURE — 4010F PR ACE/ARB THEARPY RXD/TAKEN: ICD-10-PCS | Mod: CPTII,,, | Performed by: INTERNAL MEDICINE

## 2023-03-14 PROCEDURE — 99215 PR OFFICE/OUTPT VISIT, EST, LEVL V, 40-54 MIN: ICD-10-PCS | Mod: S$PBB,,, | Performed by: INTERNAL MEDICINE

## 2023-03-14 PROCEDURE — 3078F PR MOST RECENT DIASTOLIC BLOOD PRESSURE < 80 MM HG: ICD-10-PCS | Mod: CPTII,,, | Performed by: INTERNAL MEDICINE

## 2023-03-14 PROCEDURE — 3008F BODY MASS INDEX DOCD: CPT | Mod: CPTII,,, | Performed by: INTERNAL MEDICINE

## 2023-03-14 PROCEDURE — 1159F MED LIST DOCD IN RCRD: CPT | Mod: CPTII,,, | Performed by: INTERNAL MEDICINE

## 2023-03-14 PROCEDURE — 3008F PR BODY MASS INDEX (BMI) DOCUMENTED: ICD-10-PCS | Mod: CPTII,,, | Performed by: INTERNAL MEDICINE

## 2023-03-14 PROCEDURE — 99999 PR PBB SHADOW E&M-EST. PATIENT-LVL III: ICD-10-PCS | Mod: PBBFAC,,, | Performed by: INTERNAL MEDICINE

## 2023-03-14 PROCEDURE — 3078F DIAST BP <80 MM HG: CPT | Mod: CPTII,,, | Performed by: INTERNAL MEDICINE

## 2023-03-14 PROCEDURE — 80048 BASIC METABOLIC PNL TOTAL CA: CPT | Performed by: INTERNAL MEDICINE

## 2023-03-14 PROCEDURE — 99999 PR PBB SHADOW E&M-EST. PATIENT-LVL III: CPT | Mod: PBBFAC,,, | Performed by: INTERNAL MEDICINE

## 2023-03-14 PROCEDURE — 99215 OFFICE O/P EST HI 40 MIN: CPT | Mod: S$PBB,,, | Performed by: INTERNAL MEDICINE

## 2023-03-14 PROCEDURE — 99213 OFFICE O/P EST LOW 20 MIN: CPT | Mod: PBBFAC | Performed by: INTERNAL MEDICINE

## 2023-03-14 PROCEDURE — 1159F PR MEDICATION LIST DOCUMENTED IN MEDICAL RECORD: ICD-10-PCS | Mod: CPTII,,, | Performed by: INTERNAL MEDICINE

## 2023-03-14 PROCEDURE — 4010F ACE/ARB THERAPY RXD/TAKEN: CPT | Mod: CPTII,,, | Performed by: INTERNAL MEDICINE

## 2023-03-14 PROCEDURE — 3074F PR MOST RECENT SYSTOLIC BLOOD PRESSURE < 130 MM HG: ICD-10-PCS | Mod: CPTII,,, | Performed by: INTERNAL MEDICINE

## 2023-03-14 PROCEDURE — 3074F SYST BP LT 130 MM HG: CPT | Mod: CPTII,,, | Performed by: INTERNAL MEDICINE

## 2023-03-14 PROCEDURE — 36415 COLL VENOUS BLD VENIPUNCTURE: CPT | Performed by: INTERNAL MEDICINE

## 2023-03-14 RX ORDER — DAPAGLIFLOZIN 10 MG/1
10 TABLET, FILM COATED ORAL DAILY
Qty: 90 TABLET | Refills: 3 | Status: SHIPPED | OUTPATIENT
Start: 2023-03-14 | End: 2023-03-15 | Stop reason: SDUPTHER

## 2023-03-14 NOTE — LETTER
March 14, 2023        Berenice Barnes  [address]  [place] [address]  Phone: [phone]  Fax: [phone]             [facility] (Medical Ofc Bldg)  [address] [place] [address]  Phone: [phone]  Fax: [phone]   Patient: Filemon La   MR Number: [id number]   YOB: 1980   Date of Visit: 3/14/2023       Dear Dr. Berenice Barnes    Thank you for referring Filemon La to me for evaluation. Attached you will find relevant portions of my assessment and plan of care.    If you have questions, please do not hesitate to call me. I look forward to following Filemon La along with you.    Sincerely,    Kofi Calixto MD    Enclosure    If you would like to receive this communication electronically, please contact [email or web address] or [phone] to request [employer] access.    [employer] is a tool which provides read-only access to select patient information with whom you have a relationship. Its easy to use and provides real time access to review your patients record including encounter summaries, notes, results, and demographic information.    If you feel you have received this communication in error or would no longer like to receive these types of communications, please e-mail [email or web address]

## 2023-03-15 DIAGNOSIS — I50.42 CHRONIC COMBINED SYSTOLIC AND DIASTOLIC CONGESTIVE HEART FAILURE: ICD-10-CM

## 2023-03-15 RX ORDER — DAPAGLIFLOZIN 10 MG/1
10 TABLET, FILM COATED ORAL DAILY
Qty: 90 TABLET | Refills: 3 | Status: SHIPPED | OUTPATIENT
Start: 2023-03-15 | End: 2023-06-06 | Stop reason: SDUPTHER

## 2023-03-15 NOTE — TELEPHONE ENCOUNTER
----- Message from Cristóbal Russell sent at 3/15/2023 10:26 AM CDT -----  Contact: Filemon Jaquez is calling in regards to his dapagliflozin (FARXIGA) 10 mg tablet. Pt is needing it to be sent over to the   [facility]/pharmacy #[id number] - [place], [place] - [address] [address] [address]  Phone: [phone] Fax: [phone]        Pt contact number is [phone] (home). Thanks KB

## 2023-03-15 NOTE — PROGRESS NOTES
Subjective:       HPI:   Mr. La is a very pleasant 42 y.o. year old black male who was referred by our colleague Berenice Barnes for evaluation for advanced HF therapies. THis is his 4th visit with us.  His first HF symptoms began September 2022 and at that time he was drinking heavily, consuming 1.5-2 pt of hard liquor per day.  He was able to quit drinking and smoking at that time and has been sober now for almost 7 months.  We had made some adjustments in his GDMT but because of his low BP, OHTx work-up was completed and he was eventually presented at our selection meeting.  It was the Committee's decision to defer patient's for transplant and LVAD candidacy at this time due to ongoing tobacco use as evidence by positive nicotine and cotinine screens. In addition, patient has had an elevated total bilirubin that will need to be evaluated by hepatology. Fortunately, he has done well since his last visit. Today reports NYHA class II-III symptoms. No PND or orthopnea. Has been tolerating slow uptitration of GDMT. Repeat echo as ordered by Dr. Levine showed EF=20%.     2D Echo done on 2/10/2023  The left ventricle is severely enlarged with eccentric hypertrophy and severely decreased systolic function.  Left ventricular diastolic dysfunction.  The estimated PA systolic pressure is 17 mmHg.  Normal right ventricular size with normal right ventricular systolic function.  Normal central venous pressure (3 mmHg).  The estimated ejection fraction is 20%.  There is severe left ventricular global hypokinesis.  Mild mitral regurgitation.  Mild tricuspid regurgitation.       Past Medical History:   Diagnosis Date    Alcoholism in recovery 09/2022    Cardiomyopathy 09/2022    Alcoholic    CHF (congestive heart failure)     Chronic combined systolic and diastolic congestive heart failure 09/17/2022    Pulmonary hypertension     pt reported     Past Surgical History:   Procedure Laterality Date    CATHETERIZATION OF BOTH  "LEFT AND RIGHT HEART N/A 9/20/2022    Procedure: CATHETERIZATION, HEART, BOTH LEFT AND RIGHT;  Surgeon: Yady Patterson MD;  Location: [facility] CATH LAB;  Service: Cardiology;  Laterality: N/A;    RIGHT HEART CATHETERIZATION Right 10/13/2022    Procedure: INSERTION, CATHETER, RIGHT HEART;  Surgeon: La Nena Ramirez DO;  Location: [facility] CATH LAB;  Service: Cardiology;  Laterality: Right;       Review of Systems   Constitutional: Negative. Negative for chills, decreased appetite, diaphoresis, fever, malaise/fatigue, night sweats, weight gain and weight loss.   Eyes: Negative.    Cardiovascular:  Positive for dyspnea on exertion. Negative for chest pain, claudication, cyanosis, irregular heartbeat, leg swelling, near-syncope, orthopnea, palpitations, paroxysmal nocturnal dyspnea and syncope.   Respiratory:  Negative for cough, hemoptysis and shortness of breath.    Endocrine: Negative.    Hematologic/Lymphatic: Negative.    Skin:  Negative for color change, dry skin and nail changes.   Musculoskeletal: Negative.    Gastrointestinal: Negative.    Genitourinary: Negative.    Neurological:  Negative for weakness.     Objective:   Blood pressure 126/60, pulse 80, height 5' 4" (1.626 m), weight 97.1 kg (214 lb 1.1 oz).body mass index is 36.74 kg/m².  Physical Exam  Vitals reviewed.   Constitutional:       Appearance: He is well-developed.      Comments: /60   Pulse 80 Comment: radial  Ht 5' 4" (1.626 m)   Wt 97.1 kg (214 lb 1.1 oz)   BMI 36.74 kg/m²      HENT:      Head: Normocephalic.   Neck:      Vascular: No carotid bruit or JVD.   Cardiovascular:      Rate and Rhythm: Regular rhythm.      Chest Wall: PMI is displaced.      Pulses: Normal pulses.      Heart sounds: Normal heart sounds. No murmur heard.  Pulmonary:      Effort: Pulmonary effort is normal.      Breath sounds: Normal breath sounds.   Abdominal:      General: Bowel sounds are normal.      Palpations: Abdomen is soft.   Skin:     General: Skin is " warm.   Neurological:      Mental Status: He is alert.       Labs:    Chemistry        Component Value Date/Time     03/14/2023 1136    K 4.6 03/14/2023 1136     03/14/2023 1136    CO2 25 03/14/2023 1136    BUN 14 03/14/2023 1136    CREATININE 1.0 03/14/2023 1136    GLU 85 03/14/2023 1136        Component Value Date/Time    CALCIUM 9.8 03/14/2023 1136    ALKPHOS 89 12/20/2022 1022    AST 20 12/20/2022 1022    ALT 17 12/20/2022 1022    BILITOT 1.7 (H) 12/20/2022 1022    ESTGFRAFRICA >60.0 07/27/2022 0731    EGFRNONAA >60.0 07/27/2022 0731          Magnesium   Date Value Ref Range Status   10/27/2022 1.5 (L) 1.6 - 2.6 mg/dL Final     Lab Results   Component Value Date    WBC 7.22 10/27/2022    HGB 15.8 10/27/2022    HCT 46.1 10/27/2022     10/27/2022     Lab Results   Component Value Date    INR 1.0 10/27/2022    INR 1.0 10/13/2022     BNP   Date Value Ref Range Status   12/20/2022 83 0 - 99 pg/mL Final     Comment:     Values of less than 100 pg/ml are consistent with non-CHF populations.   11/10/2022 190 (H) 0 - 99 pg/mL Final     Comment:     Values of less than 100 pg/ml are consistent with non-CHF populations.   10/27/2022 236 (H) 0 - 99 pg/mL Final     Comment:     Values of less than 100 pg/ml are consistent with non-CHF populations.     LD   Date Value Ref Range Status   12/15/2022 148 110 - 260 U/L Final     Comment:     Results are increased in hemolyzed samples.   10/27/2022 147 110 - 260 U/L Final     Comment:     Results are increased in hemolyzed samples.     No results found for this or any previous visit.    No results found for this or any previous visit.      Assessment:      1. Chronic combined systolic and diastolic congestive heart failure    2. Cardiomyopathy, nonischemic    3. Alcoholic cardiomyopathy    4. Pulmonary hypertension        Plan:   Stage C HFrEF, NICMP likely alcohol induced. In remission (sober for 7 months now). So far tolerating low dose GDMT.  Will continue to  slowly optimize his regimen   Start Dapagliflozin 10 mg daily  Labs today and repeat next week.   Continue Valsartan and spironolactone  In view of his persistent low EF despite optimization of GDMT for now almost 6 months, he would benefit from ICD for primary prevention. Will discuss with Dr. Levine.   Recommend 2 gram sodium restriction and 1500cc fluid restriction.  Encourage physical activity with graded exercise program.  Requested patient to weigh themselves daily, and to notify us if their weight increases by more than 3 lbs in 1 day or 5 lbs in 1 week.   RTC in 3 months     Kofi Calixto MD

## 2023-03-21 ENCOUNTER — LAB VISIT (OUTPATIENT)
Dept: LAB | Facility: HOSPITAL | Age: 43
End: 2023-03-21
Attending: INTERNAL MEDICINE
Payer: MEDICAID

## 2023-03-21 DIAGNOSIS — I50.42 CHRONIC COMBINED SYSTOLIC AND DIASTOLIC CONGESTIVE HEART FAILURE: ICD-10-CM

## 2023-03-21 LAB
ANION GAP SERPL CALC-SCNC: 11 MMOL/L (ref 8–16)
BUN SERPL-MCNC: 16 MG/DL (ref 6–20)
CALCIUM SERPL-MCNC: 9.6 MG/DL (ref 8.7–10.5)
CHLORIDE SERPL-SCNC: 104 MMOL/L (ref 95–110)
CO2 SERPL-SCNC: 24 MMOL/L (ref 23–29)
CREAT SERPL-MCNC: 1.4 MG/DL (ref 0.5–1.4)
EST. GFR  (NO RACE VARIABLE): >60 ML/MIN/1.73 M^2
GLUCOSE SERPL-MCNC: 130 MG/DL (ref 70–110)
POTASSIUM SERPL-SCNC: 4.1 MMOL/L (ref 3.5–5.1)
SODIUM SERPL-SCNC: 139 MMOL/L (ref 136–145)

## 2023-03-21 PROCEDURE — 80048 BASIC METABOLIC PNL TOTAL CA: CPT | Mod: PO | Performed by: INTERNAL MEDICINE

## 2023-03-21 PROCEDURE — 36415 COLL VENOUS BLD VENIPUNCTURE: CPT | Mod: PO | Performed by: INTERNAL MEDICINE

## 2023-04-02 ENCOUNTER — HOSPITAL ENCOUNTER (EMERGENCY)
Facility: HOSPITAL | Age: 43
Discharge: HOME | End: 2023-04-02
Attending: EMERGENCY MEDICINE
Payer: MEDICAID

## 2023-04-02 VITALS
DIASTOLIC BLOOD PRESSURE: 71 MMHG | HEART RATE: 99 BPM | SYSTOLIC BLOOD PRESSURE: 132 MMHG | RESPIRATION RATE: 18 BRPM | HEIGHT: 64 IN | WEIGHT: 205 LBS | OXYGEN SATURATION: 100 % | BODY MASS INDEX: 35 KG/M2 | TEMPERATURE: 99 F

## 2023-04-02 DIAGNOSIS — M25.561 ACUTE PAIN OF RIGHT KNEE: Primary | ICD-10-CM

## 2023-04-02 PROCEDURE — 99284 EMERGENCY DEPT VISIT MOD MDM: CPT | Mod: ER

## 2023-04-02 PROCEDURE — 63600175 PHARM REV CODE 636 W HCPCS: Mod: ER | Performed by: EMERGENCY MEDICINE

## 2023-04-02 PROCEDURE — 96372 THER/PROPH/DIAG INJ SC/IM: CPT | Performed by: EMERGENCY MEDICINE

## 2023-04-02 PROCEDURE — 25000003 PHARM REV CODE 250: Mod: ER | Performed by: EMERGENCY MEDICINE

## 2023-04-02 RX ORDER — OXYCODONE AND ACETAMINOPHEN 10; 325 MG/1; MG/1
1 TABLET ORAL EVERY 6 HOURS PRN
Qty: 12 TABLET | Refills: 0 | Status: SHIPPED | OUTPATIENT
Start: 2023-04-02

## 2023-04-02 RX ORDER — ONDANSETRON 4 MG/1
4 TABLET, ORALLY DISINTEGRATING ORAL
Status: COMPLETED | OUTPATIENT
Start: 2023-04-02 | End: 2023-04-02

## 2023-04-02 RX ORDER — HYDROMORPHONE HYDROCHLORIDE 2 MG/ML
1 INJECTION, SOLUTION INTRAMUSCULAR; INTRAVENOUS; SUBCUTANEOUS
Status: COMPLETED | OUTPATIENT
Start: 2023-04-02 | End: 2023-04-02

## 2023-04-02 RX ADMIN — HYDROMORPHONE HYDROCHLORIDE 1 MG: 2 INJECTION INTRAMUSCULAR; INTRAVENOUS; SUBCUTANEOUS at 03:04

## 2023-04-02 RX ADMIN — ONDANSETRON 4 MG: 4 TABLET, ORALLY DISINTEGRATING ORAL at 03:04

## 2023-04-02 NOTE — ED PROVIDER NOTES
Encounter Date: 2023       History     Chief Complaint   Patient presents with    Leg Pain     C/o right leg pain denies injury     The history is provided by the patient.   Leg Pain   The incident occurred at home. There was no injury mechanism. The incident occurred two days ago. The pain is present in the right knee. The pain is at a severity of 7/10. The pain has been Fluctuating since onset. Pertinent negatives include no numbness, no inability to bear weight, no loss of motion, no muscle weakness, no loss of sensation and no tingling. The symptoms are aggravated by activity and bearing weight.   Review of patient's allergies indicates:  No Known Allergies  Past Medical History:   Diagnosis Date    Alcoholism in recovery 2022    Cardiomyopathy 2022    Alcoholic    CHF (congestive heart failure)     Chronic combined systolic and diastolic congestive heart failure 2022    Pulmonary hypertension     pt reported     Past Surgical History:   Procedure Laterality Date    CATHETERIZATION OF BOTH LEFT AND RIGHT HEART N/A 2022    Procedure: CATHETERIZATION, HEART, BOTH LEFT AND RIGHT;  Surgeon: Yady Patterson MD;  Location: [facility] CATH LAB;  Service: Cardiology;  Laterality: N/A;    RIGHT HEART CATHETERIZATION Right 10/13/2022    Procedure: INSERTION, CATHETER, RIGHT HEART;  Surgeon: La Nena Ramirez DO;  Location: [facility] CATH LAB;  Service: Cardiology;  Laterality: Right;     Family History   Problem Relation Age of Onset    Hypertension Mother     Diabetes Mother     Hypertension Father      Social History     Tobacco Use    Smoking status: Former     Packs/day: 1.00     Types: Cigarettes     Quit date: 2022     Years since quittin.5    Smokeless tobacco: Never   Substance Use Topics    Alcohol use: Not Currently     Comment: was drinking 1.5-2.0 pints of hard liquor/day until 2022    Drug use: Never     Review of Systems   Musculoskeletal:         Right knee pain   Neurological:   Negative for tingling and numbness.   All other systems reviewed and are negative.    Physical Exam     Initial Vitals [04/02/23 0240]   BP Pulse Resp Temp SpO2   137/77 103 20 98.7 °F (37.1 °C) 100 %      MAP       --         Physical Exam    Nursing note and vitals reviewed.  Constitutional: He appears well-developed and well-nourished.   HENT:   Head: Normocephalic and atraumatic.   Mouth/Throat: Oropharynx is clear and moist.   Eyes: Conjunctivae and EOM are normal. Pupils are equal, round, and reactive to light.   Neck: Neck supple.   Normal range of motion.  Cardiovascular:  Normal rate, regular rhythm and normal heart sounds.           Pulmonary/Chest: Breath sounds normal.   Abdominal: Abdomen is soft. Bowel sounds are normal.   Musculoskeletal:      Cervical back: Normal range of motion and neck supple.      Right knee: No swelling, deformity, effusion, erythema, ecchymosis, bony tenderness or crepitus. Decreased range of motion. Tenderness present. Normal alignment.     Neurological: He is alert and oriented to person, place, and time. He has normal strength.   Skin: Skin is warm and dry.   Psychiatric: He has a normal mood and affect. Thought content normal.       ED Course   Procedures  Labs Reviewed - No data to display       Imaging Results    None     Reviewed previous Xray, pt unable to follow up with Orthopedics as discussed on previous visit. Pt agrees c plan and will follow up as directed.    3:16 AM - Counseling: Spoke with the patient and discussed todays findings, in addition to providing specific details for the plan of care and counseling regarding the diagnosis and prognosis. Questions are answered at this time.       Medications   ondansetron disintegrating tablet 4 mg (has no administration in time range)   HYDROmorphone (PF) injection 1 mg (has no administration in time range)                              Clinical Impression:   Final diagnoses:  [M25.561] Acute pain of right knee  (Primary)        ED Disposition Condition    Discharge Stable          ED Prescriptions       Medication Sig Dispense Start Date End Date Auth. Provider    oxyCODONE-acetaminophen (PERCOCET)  mg per tablet Take 1 tablet by mouth every 6 (six) hours as needed for Pain. 12 tablet 4/2/2023 -- González Wilkes MD          Follow-up Information       Follow up With Specialties Details Why Contact Info    PCP  Call in 2 days      Orthopedics  Call in 1 week               González Wilkes MD  04/02/23 0797

## 2023-04-04 ENCOUNTER — PATIENT MESSAGE (OUTPATIENT)
Dept: RESEARCH | Facility: HOSPITAL | Age: 43
End: 2023-04-04
Payer: MEDICAID

## 2023-06-06 ENCOUNTER — OFFICE VISIT (OUTPATIENT)
Dept: TRANSPLANT | Facility: CLINIC | Age: 43
End: 2023-06-06
Payer: MEDICAID

## 2023-06-06 ENCOUNTER — LAB VISIT (OUTPATIENT)
Dept: LAB | Facility: HOSPITAL | Age: 43
End: 2023-06-06
Attending: INTERNAL MEDICINE
Payer: MEDICAID

## 2023-06-06 VITALS
BODY MASS INDEX: 36.35 KG/M2 | SYSTOLIC BLOOD PRESSURE: 106 MMHG | HEIGHT: 64 IN | WEIGHT: 212.94 LBS | DIASTOLIC BLOOD PRESSURE: 66 MMHG | HEART RATE: 100 BPM

## 2023-06-06 DIAGNOSIS — I42.6 ALCOHOLIC CARDIOMYOPATHY: ICD-10-CM

## 2023-06-06 DIAGNOSIS — I42.8 CARDIOMYOPATHY, NONISCHEMIC: ICD-10-CM

## 2023-06-06 DIAGNOSIS — I95.89 OTHER SPECIFIED HYPOTENSION: ICD-10-CM

## 2023-06-06 DIAGNOSIS — I50.42 CHRONIC COMBINED SYSTOLIC AND DIASTOLIC HEART FAILURE: Primary | ICD-10-CM

## 2023-06-06 DIAGNOSIS — I50.42 CHRONIC COMBINED SYSTOLIC AND DIASTOLIC HEART FAILURE: ICD-10-CM

## 2023-06-06 DIAGNOSIS — I50.42 CHRONIC COMBINED SYSTOLIC AND DIASTOLIC CONGESTIVE HEART FAILURE: ICD-10-CM

## 2023-06-06 DIAGNOSIS — Z72.0 TOBACCO ABUSE: ICD-10-CM

## 2023-06-06 LAB
ALBUMIN SERPL BCP-MCNC: 4.3 G/DL (ref 3.5–5.2)
ALP SERPL-CCNC: 138 U/L (ref 55–135)
ALT SERPL W/O P-5'-P-CCNC: 20 U/L (ref 10–44)
ANION GAP SERPL CALC-SCNC: 11 MMOL/L (ref 8–16)
AST SERPL-CCNC: 17 U/L (ref 10–40)
BILIRUB SERPL-MCNC: 0.8 MG/DL (ref 0.1–1)
BNP SERPL-MCNC: 20 PG/ML (ref 0–99)
BUN SERPL-MCNC: 17 MG/DL (ref 6–20)
CALCIUM SERPL-MCNC: 10.3 MG/DL (ref 8.7–10.5)
CHLORIDE SERPL-SCNC: 99 MMOL/L (ref 95–110)
CO2 SERPL-SCNC: 26 MMOL/L (ref 23–29)
CREAT SERPL-MCNC: 1.1 MG/DL (ref 0.5–1.4)
EST. GFR  (NO RACE VARIABLE): >60 ML/MIN/1.73 M^2
GLUCOSE SERPL-MCNC: 99 MG/DL (ref 70–110)
POTASSIUM SERPL-SCNC: 3.8 MMOL/L (ref 3.5–5.1)
PROT SERPL-MCNC: 8.4 G/DL (ref 6–8.4)
SODIUM SERPL-SCNC: 136 MMOL/L (ref 136–145)

## 2023-06-06 PROCEDURE — 3078F DIAST BP <80 MM HG: CPT | Mod: CPTII,,, | Performed by: INTERNAL MEDICINE

## 2023-06-06 PROCEDURE — 1160F PR REVIEW ALL MEDS BY PRESCRIBER/CLIN PHARMACIST DOCUMENTED: ICD-10-PCS | Mod: CPTII,,, | Performed by: INTERNAL MEDICINE

## 2023-06-06 PROCEDURE — 3008F PR BODY MASS INDEX (BMI) DOCUMENTED: ICD-10-PCS | Mod: CPTII,,, | Performed by: INTERNAL MEDICINE

## 2023-06-06 PROCEDURE — 1159F MED LIST DOCD IN RCRD: CPT | Mod: CPTII,,, | Performed by: INTERNAL MEDICINE

## 2023-06-06 PROCEDURE — 4010F ACE/ARB THERAPY RXD/TAKEN: CPT | Mod: CPTII,,, | Performed by: INTERNAL MEDICINE

## 2023-06-06 PROCEDURE — 99999 PR PBB SHADOW E&M-EST. PATIENT-LVL II: ICD-10-PCS | Mod: PBBFAC,,, | Performed by: INTERNAL MEDICINE

## 2023-06-06 PROCEDURE — 99215 OFFICE O/P EST HI 40 MIN: CPT | Mod: S$PBB,,, | Performed by: INTERNAL MEDICINE

## 2023-06-06 PROCEDURE — 36415 COLL VENOUS BLD VENIPUNCTURE: CPT | Performed by: INTERNAL MEDICINE

## 2023-06-06 PROCEDURE — 4010F PR ACE/ARB THEARPY RXD/TAKEN: ICD-10-PCS | Mod: CPTII,,, | Performed by: INTERNAL MEDICINE

## 2023-06-06 PROCEDURE — 99215 PR OFFICE/OUTPT VISIT, EST, LEVL V, 40-54 MIN: ICD-10-PCS | Mod: S$PBB,,, | Performed by: INTERNAL MEDICINE

## 2023-06-06 PROCEDURE — 99999 PR PBB SHADOW E&M-EST. PATIENT-LVL II: CPT | Mod: PBBFAC,,, | Performed by: INTERNAL MEDICINE

## 2023-06-06 PROCEDURE — 3008F BODY MASS INDEX DOCD: CPT | Mod: CPTII,,, | Performed by: INTERNAL MEDICINE

## 2023-06-06 PROCEDURE — 83880 ASSAY OF NATRIURETIC PEPTIDE: CPT | Performed by: INTERNAL MEDICINE

## 2023-06-06 PROCEDURE — 3074F PR MOST RECENT SYSTOLIC BLOOD PRESSURE < 130 MM HG: ICD-10-PCS | Mod: CPTII,,, | Performed by: INTERNAL MEDICINE

## 2023-06-06 PROCEDURE — 1160F RVW MEDS BY RX/DR IN RCRD: CPT | Mod: CPTII,,, | Performed by: INTERNAL MEDICINE

## 2023-06-06 PROCEDURE — 1159F PR MEDICATION LIST DOCUMENTED IN MEDICAL RECORD: ICD-10-PCS | Mod: CPTII,,, | Performed by: INTERNAL MEDICINE

## 2023-06-06 PROCEDURE — 3078F PR MOST RECENT DIASTOLIC BLOOD PRESSURE < 80 MM HG: ICD-10-PCS | Mod: CPTII,,, | Performed by: INTERNAL MEDICINE

## 2023-06-06 PROCEDURE — 3074F SYST BP LT 130 MM HG: CPT | Mod: CPTII,,, | Performed by: INTERNAL MEDICINE

## 2023-06-06 PROCEDURE — 99212 OFFICE O/P EST SF 10 MIN: CPT | Mod: PBBFAC | Performed by: INTERNAL MEDICINE

## 2023-06-06 PROCEDURE — 80053 COMPREHEN METABOLIC PANEL: CPT | Performed by: INTERNAL MEDICINE

## 2023-06-06 RX ORDER — METOPROLOL SUCCINATE 25 MG/1
25 TABLET, EXTENDED RELEASE ORAL NIGHTLY
Qty: 30 TABLET | Refills: 11 | Status: SHIPPED | OUTPATIENT
Start: 2023-06-06 | End: 2024-06-05

## 2023-06-06 RX ORDER — DAPAGLIFLOZIN 10 MG/1
10 TABLET, FILM COATED ORAL DAILY
Qty: 90 TABLET | Refills: 3 | Status: SHIPPED | OUTPATIENT
Start: 2023-06-06

## 2023-06-06 NOTE — PROGRESS NOTES
Subjective:   Transplant status: declined    HPI:  Mr. La is a very pleasant 42 y.o. year old black male who was referred by our colleague Berenice Barnes for evaluation for advanced HF therapies. THis is his 4th visit with us.  His first HF symptoms began September 2022 and at that time he was drinking heavily, consuming 1.5-2 pt of hard liquor per day.  He was able to quit drinking and smoking at that time and has been sober now for almost 7 months.  We had made some adjustments in his GDMT but because of his low BP, OHTx work-up was completed and he was eventually presented at our selection meeting.  It was the Committee's decision to defer patient's for transplant and LVAD candidacy at this time due to ongoing tobacco use as evidence by positive nicotine and cotinine screens. In addition, patient has had an elevated total bilirubin that will need to be evaluated by hepatology. Fortunately, he has done well since his last visit. Today reports NYHA class II symptoms. No PND or orthopnea. Has been tolerating slow uptitration of GDMT. Repeat echo as ordered by Dr. Levine showed EF=20%. Labs are still pending. He is wearing a LifeVest.      2D Echo done on 2/10/2023  The left ventricle is severely enlarged with eccentric hypertrophy and severely decreased systolic function.  Left ventricular diastolic dysfunction.  The estimated PA systolic pressure is 17 mmHg.  Normal right ventricular size with normal right ventricular systolic function.  Normal central venous pressure (3 mmHg).  The estimated ejection fraction is 20%.  There is severe left ventricular global hypokinesis.  Mild mitral regurgitation.  Mild tricuspid regurgitation.          Past Medical History:   Diagnosis Date    Alcoholism in recovery 09/2022    Cardiomyopathy 09/2022    Alcoholic    CHF (congestive heart failure)     Chronic combined systolic and diastolic congestive heart failure 09/17/2022    Pulmonary hypertension     pt reported     Past  "Surgical History:   Procedure Laterality Date    CATHETERIZATION OF BOTH LEFT AND RIGHT HEART N/A 9/20/2022    Procedure: CATHETERIZATION, HEART, BOTH LEFT AND RIGHT;  Surgeon: Yady Patterson MD;  Location: [facility] CATH LAB;  Service: Cardiology;  Laterality: N/A;    RIGHT HEART CATHETERIZATION Right 10/13/2022    Procedure: INSERTION, CATHETER, RIGHT HEART;  Surgeon: La Nena Ramirez DO;  Location: [facility] CATH LAB;  Service: Cardiology;  Laterality: Right;       Review of Systems   Constitutional: Negative. Negative for chills, decreased appetite, diaphoresis, fever, malaise/fatigue, night sweats, weight gain and weight loss.   Eyes: Negative.    Cardiovascular:  Positive for dyspnea on exertion. Negative for chest pain, claudication, cyanosis, irregular heartbeat, leg swelling, near-syncope, orthopnea, palpitations, paroxysmal nocturnal dyspnea and syncope.   Respiratory:  Negative for cough, hemoptysis and shortness of breath.    Endocrine: Negative.    Hematologic/Lymphatic: Negative.    Skin:  Negative for color change, dry skin and nail changes.   Musculoskeletal: Negative.    Gastrointestinal: Negative.    Genitourinary: Negative.    Neurological:  Negative for weakness.     Objective:   Blood pressure 106/66, pulse 100, height 5' 4" (1.626 m), weight 96.6 kg (212 lb 15.4 oz).body mass index is 36.56 kg/m².  Physical Exam  Vitals reviewed.   Constitutional:       Appearance: He is well-developed.      Comments: /66   Pulse 100   Ht 5' 4" (1.626 m)   Wt 96.6 kg (212 lb 15.4 oz)   BMI 36.56 kg/m²      HENT:      Head: Normocephalic.   Neck:      Vascular: No carotid bruit or JVD.   Cardiovascular:      Rate and Rhythm: Regular rhythm. Tachycardia present.      Chest Wall: PMI is displaced.      Pulses: Normal pulses.      Heart sounds: Normal heart sounds. No murmur heard.  Pulmonary:      Effort: Pulmonary effort is normal.      Breath sounds: Normal breath sounds.   Abdominal:      General: Bowel " sounds are normal.      Palpations: Abdomen is soft.   Skin:     General: Skin is warm.   Neurological:      Mental Status: He is alert.       Labs:    Chemistry        Component Value Date/Time     03/21/2023 1008    K 4.1 03/21/2023 1008     03/21/2023 1008    CO2 24 03/21/2023 1008    BUN 16 03/21/2023 1008    CREATININE 1.4 03/21/2023 1008     (H) 03/21/2023 1008        Component Value Date/Time    CALCIUM 9.6 03/21/2023 1008    ALKPHOS 89 12/20/2022 1022    AST 20 12/20/2022 1022    ALT 17 12/20/2022 1022    BILITOT 1.7 (H) 12/20/2022 1022    ESTGFRAFRICA >60.0 07/27/2022 0731    EGFRNONAA >60.0 07/27/2022 0731          Magnesium   Date Value Ref Range Status   10/27/2022 1.5 (L) 1.6 - 2.6 mg/dL Final     Lab Results   Component Value Date    WBC 7.22 10/27/2022    HGB 15.8 10/27/2022    HCT 46.1 10/27/2022     10/27/2022     Lab Results   Component Value Date    INR 1.0 10/27/2022    INR 1.0 10/13/2022     BNP   Date Value Ref Range Status   12/20/2022 83 0 - 99 pg/mL Final     Comment:     Values of less than 100 pg/ml are consistent with non-CHF populations.   11/10/2022 190 (H) 0 - 99 pg/mL Final     Comment:     Values of less than 100 pg/ml are consistent with non-CHF populations.   10/27/2022 236 (H) 0 - 99 pg/mL Final     Comment:     Values of less than 100 pg/ml are consistent with non-CHF populations.     LD   Date Value Ref Range Status   12/15/2022 148 110 - 260 U/L Final     Comment:     Results are increased in hemolyzed samples.   10/27/2022 147 110 - 260 U/L Final     Comment:     Results are increased in hemolyzed samples.       Assessment:      1. Chronic combined systolic and diastolic heart failure    2. Cardiomyopathy, nonischemic    3. Alcoholic cardiomyopathy    4. Other specified hypotension    5. Tobacco abuse        Plan:   Stage C HFrEF, NICMP likely alcohol induced. In remission (sober for 10 months now). So far tolerating low dose GDMT.  Will continue to  slowly optimize his regimen   Start low dose metoprolol succinate 25 mg at night   Continue Dapagliflozin, Valsartan and spironolactone  In view of his persistent low EF despite optimization of GDMT for now almost 6 months, he would benefit from ICD for primary prevention. Will discuss with Dr. Levine.   Recommend 2 gram sodium restriction and 1500cc fluid restriction.  Encourage physical activity with graded exercise program.  Requested patient to weigh themselves daily, and to notify us if their weight increases by more than 3 lbs in 1 day or 5 lbs in 1 week.   RTC in 3 months     Kofi Calixto MD

## 2023-06-06 NOTE — LETTER
June 6, 2023        Berenice Barnes  [address]  [place] [address]  Phone: [phone]  Fax: [phone]             [facility] ([facility] Bld[facility])  [address] [place] [address]  Phone: [phone]  Fax: [phone]   Patient: Filemon La   MR Number: [id number]   YOB: 1980   Date of Visit: 6/6/2023       Dear Dr. Berenice Barnes    Thank you for referring Filemon La to me for evaluation. Attached you will find relevant portions of my assessment and plan of care.    If you have questions, please do not hesitate to call me. I look forward to following Filemon La along with you.    Sincerely,    Kofi Calixto MD    Enclosure    If you would like to receive this communication electronically, please contact [email or web address] or [phone] to request [employer] access.    [employer] is a tool which provides read-only access to select patient information with whom you have a relationship. Its easy to use and provides real time access to review your patients record including encounter summaries, notes, results, and demographic information.    If you feel you have received this communication in error or would no longer like to receive these types of communications, please e-mail [email or web address]

## 2023-06-21 ENCOUNTER — HOSPITAL ENCOUNTER (EMERGENCY)
Facility: HOSPITAL | Age: 43
Discharge: HOME | End: 2023-06-21
Attending: EMERGENCY MEDICINE
Payer: MEDICAID

## 2023-06-21 VITALS
OXYGEN SATURATION: 98 % | SYSTOLIC BLOOD PRESSURE: 110 MMHG | HEART RATE: 88 BPM | BODY MASS INDEX: 36.76 KG/M2 | DIASTOLIC BLOOD PRESSURE: 66 MMHG | RESPIRATION RATE: 19 BRPM | TEMPERATURE: 98 F | WEIGHT: 214.19 LBS

## 2023-06-21 DIAGNOSIS — R07.9 CHEST PAIN: Primary | ICD-10-CM

## 2023-06-21 LAB
ALBUMIN SERPL BCP-MCNC: 4.2 G/DL (ref 3.5–5.2)
ALP SERPL-CCNC: 118 U/L (ref 55–135)
ALT SERPL W/O P-5'-P-CCNC: 19 U/L (ref 10–44)
AMPHET+METHAMPHET UR QL: NEGATIVE
ANION GAP SERPL CALC-SCNC: 12 MMOL/L (ref 8–16)
AST SERPL-CCNC: 18 U/L (ref 10–40)
BACTERIA #/AREA URNS AUTO: NORMAL /HPF
BARBITURATES UR QL SCN>200 NG/ML: NEGATIVE
BASOPHILS # BLD AUTO: ABNORMAL K/UL (ref 0–0.2)
BASOPHILS NFR BLD: 1 % (ref 0–1.9)
BENZODIAZ UR QL SCN>200 NG/ML: NEGATIVE
BILIRUB SERPL-MCNC: 1.2 MG/DL (ref 0.1–1)
BILIRUB UR QL STRIP: NEGATIVE
BNP SERPL-MCNC: 24 PG/ML (ref 0–99)
BUN SERPL-MCNC: 15 MG/DL (ref 6–20)
BZE UR QL SCN: NEGATIVE
CALCIUM SERPL-MCNC: 9.5 MG/DL (ref 8.7–10.5)
CANNABINOIDS UR QL SCN: NEGATIVE
CHLORIDE SERPL-SCNC: 103 MMOL/L (ref 95–110)
CLARITY UR REFRACT.AUTO: CLEAR
CO2 SERPL-SCNC: 22 MMOL/L (ref 23–29)
COLOR UR AUTO: YELLOW
CREAT SERPL-MCNC: 1 MG/DL (ref 0.5–1.4)
CREAT UR-MCNC: 153 MG/DL (ref 23–375)
CTP QC/QA: YES
CTP QC/QA: YES
DIFFERENTIAL METHOD: ABNORMAL
EOSINOPHIL # BLD AUTO: ABNORMAL K/UL (ref 0–0.5)
EOSINOPHIL NFR BLD: 0 % (ref 0–8)
ERYTHROCYTE [DISTWIDTH] IN BLOOD BY AUTOMATED COUNT: 12.6 % (ref 11.5–14.5)
EST. GFR  (NO RACE VARIABLE): >60 ML/MIN/1.73 M^2
ETHANOL SERPL-MCNC: <10 MG/DL
GLUCOSE SERPL-MCNC: 91 MG/DL (ref 70–110)
GLUCOSE UR QL STRIP: ABNORMAL
HCT VFR BLD AUTO: 42.4 % (ref 40–54)
HGB BLD-MCNC: 14.9 G/DL (ref 14–18)
HGB UR QL STRIP: NEGATIVE
IMM GRANULOCYTES # BLD AUTO: ABNORMAL K/UL (ref 0–0.04)
IMM GRANULOCYTES NFR BLD AUTO: ABNORMAL % (ref 0–0.5)
KETONES UR QL STRIP: NEGATIVE
LEUKOCYTE ESTERASE UR QL STRIP: NEGATIVE
LYMPHOCYTES # BLD AUTO: ABNORMAL K/UL (ref 1–4.8)
LYMPHOCYTES NFR BLD: 27 % (ref 18–48)
MCH RBC QN AUTO: 32.3 PG (ref 27–31)
MCHC RBC AUTO-ENTMCNC: 35.1 G/DL (ref 32–36)
MCV RBC AUTO: 92 FL (ref 82–98)
METHADONE UR QL SCN>300 NG/ML: NEGATIVE
MICROSCOPIC COMMENT: NORMAL
MONOCYTES # BLD AUTO: ABNORMAL K/UL (ref 0.3–1)
MONOCYTES NFR BLD: 5 % (ref 4–15)
NEUTROPHILS NFR BLD: 67 % (ref 38–73)
NITRITE UR QL STRIP: NEGATIVE
NRBC BLD-RTO: 0 /100 WBC
OPIATES UR QL SCN: NEGATIVE
PCP UR QL SCN>25 NG/ML: NEGATIVE
PH UR STRIP: 6 [PH] (ref 5–8)
PLATELET # BLD AUTO: 220 K/UL (ref 150–450)
PMV BLD AUTO: 11.7 FL (ref 9.2–12.9)
POC MOLECULAR INFLUENZA A AGN: NEGATIVE
POC MOLECULAR INFLUENZA B AGN: NEGATIVE
POTASSIUM SERPL-SCNC: 3.6 MMOL/L (ref 3.5–5.1)
PROT SERPL-MCNC: 7.9 G/DL (ref 6–8.4)
PROT UR QL STRIP: NEGATIVE
RBC # BLD AUTO: 4.62 M/UL (ref 4.6–6.2)
RBC #/AREA URNS AUTO: 1 /HPF (ref 0–4)
SARS-COV-2 RDRP RESP QL NAA+PROBE: NEGATIVE
SMUDGE CELLS BLD QL SMEAR: PRESENT
SODIUM SERPL-SCNC: 137 MMOL/L (ref 136–145)
SP GR UR STRIP: 1.02 (ref 1–1.03)
TOXICOLOGY INFORMATION: NORMAL
TROPONIN I SERPL DL<=0.01 NG/ML-MCNC: 0.01 NG/ML (ref 0–0.03)
TROPONIN I SERPL DL<=0.01 NG/ML-MCNC: 0.01 NG/ML (ref 0–0.03)
URN SPEC COLLECT METH UR: ABNORMAL
UROBILINOGEN UR STRIP-ACNC: ABNORMAL EU/DL
WBC # BLD AUTO: 13.06 K/UL (ref 3.9–12.7)
WBC TOXIC VACUOLES BLD QL SMEAR: PRESENT
YEAST UR QL AUTO: NORMAL

## 2023-06-21 PROCEDURE — 85027 COMPLETE CBC AUTOMATED: CPT | Mod: ER | Performed by: EMERGENCY MEDICINE

## 2023-06-21 PROCEDURE — 87502 INFLUENZA DNA AMP PROBE: CPT | Mod: ER

## 2023-06-21 PROCEDURE — 84484 ASSAY OF TROPONIN QUANT: CPT | Mod: ER | Performed by: EMERGENCY MEDICINE

## 2023-06-21 PROCEDURE — 93010 EKG 12-LEAD: ICD-10-PCS | Mod: ,,, | Performed by: INTERNAL MEDICINE

## 2023-06-21 PROCEDURE — 82077 ASSAY SPEC XCP UR&BREATH IA: CPT | Mod: ER | Performed by: EMERGENCY MEDICINE

## 2023-06-21 PROCEDURE — 80053 COMPREHEN METABOLIC PANEL: CPT | Mod: ER | Performed by: EMERGENCY MEDICINE

## 2023-06-21 PROCEDURE — 87635 SARS-COV-2 COVID-19 AMP PRB: CPT | Mod: ER | Performed by: EMERGENCY MEDICINE

## 2023-06-21 PROCEDURE — 85007 BL SMEAR W/DIFF WBC COUNT: CPT | Mod: ER | Performed by: EMERGENCY MEDICINE

## 2023-06-21 PROCEDURE — 83880 ASSAY OF NATRIURETIC PEPTIDE: CPT | Mod: ER | Performed by: EMERGENCY MEDICINE

## 2023-06-21 PROCEDURE — 93005 ELECTROCARDIOGRAM TRACING: CPT | Mod: ER

## 2023-06-21 PROCEDURE — 80307 DRUG TEST PRSMV CHEM ANLYZR: CPT | Mod: ER | Performed by: EMERGENCY MEDICINE

## 2023-06-21 PROCEDURE — 93010 ELECTROCARDIOGRAM REPORT: CPT | Mod: ,,, | Performed by: INTERNAL MEDICINE

## 2023-06-21 PROCEDURE — 99285 EMERGENCY DEPT VISIT HI MDM: CPT | Mod: 25,ER

## 2023-06-21 PROCEDURE — 81000 URINALYSIS NONAUTO W/SCOPE: CPT | Mod: ER,59 | Performed by: EMERGENCY MEDICINE

## 2023-06-22 NOTE — ED PROVIDER NOTES
Encounter Date: 2023       History     Chief Complaint   Patient presents with    Chest Pain     Pt reports to ED with reports of R sided chest pain for two days. Pt has a life vest in place and states she is waiting for pacemaker placement      The history is provided by the patient.   Chest Pain  The current episode started several days ago. Chest pain occurs intermittently. The chest pain is improving. At its most intense, the chest pain is at 6/10. The chest pain is currently at 4/10. The quality of the pain is described as aching. The pain does not radiate. Pertinent negatives for primary symptoms include no fever, no fatigue, no syncope, no shortness of breath, no cough, no wheezing, no palpitations, no abdominal pain, no nausea, no vomiting, no dizziness and no altered mental status.   Review of patient's allergies indicates:  No Known Allergies  Past Medical History:   Diagnosis Date    Alcoholism in recovery 2022    Cardiomyopathy 2022    Alcoholic    CHF (congestive heart failure)     Chronic combined systolic and diastolic congestive heart failure 2022    Pulmonary hypertension     pt reported     Past Surgical History:   Procedure Laterality Date    CATHETERIZATION OF BOTH LEFT AND RIGHT HEART N/A 2022    Procedure: CATHETERIZATION, HEART, BOTH LEFT AND RIGHT;  Surgeon: Yady Patterson MD;  Location: [facility] CATH LAB;  Service: Cardiology;  Laterality: N/A;    RIGHT HEART CATHETERIZATION Right 10/13/2022    Procedure: INSERTION, CATHETER, RIGHT HEART;  Surgeon: La Nena Ramirez DO;  Location: [facility] CATH LAB;  Service: Cardiology;  Laterality: Right;     Family History   Problem Relation Age of Onset    Hypertension Mother     Diabetes Mother     Hypertension Father      Social History     Tobacco Use    Smoking status: Former     Packs/day: 1.00     Types: Cigarettes     Quit date: 2022     Years since quittin.8    Smokeless tobacco: Never   Substance Use Topics    Alcohol  use: Not Currently     Comment: was drinking 1.5-2.0 pints of hard liquor/day until Sept 2022    Drug use: Never     Review of Systems   Constitutional:  Negative for fatigue and fever.   HENT:  Negative for congestion.    Eyes:  Negative for photophobia.   Respiratory:  Negative for cough, shortness of breath and wheezing.    Cardiovascular:  Positive for chest pain. Negative for palpitations and syncope.   Gastrointestinal:  Negative for abdominal pain, nausea and vomiting.   Genitourinary:  Negative for dysuria.   Musculoskeletal:  Negative for back pain.   Neurological:  Negative for dizziness.     Physical Exam     Initial Vitals [06/21/23 2001]   BP Pulse Resp Temp SpO2   123/72 101 20 97.9 °F (36.6 °C) 95 %      MAP       --         Physical Exam    Nursing note and vitals reviewed.  Constitutional: He appears well-developed and well-nourished.   HENT:   Head: Normocephalic and atraumatic.   Mouth/Throat: Oropharynx is clear and moist.   Eyes: Conjunctivae and EOM are normal. Pupils are equal, round, and reactive to light.   Neck: Neck supple.   Normal range of motion.  Cardiovascular:  Normal rate, regular rhythm and normal heart sounds.           Pulmonary/Chest: Breath sounds normal.   Abdominal: Abdomen is soft. Bowel sounds are normal.   Musculoskeletal:         General: Normal range of motion.      Cervical back: Normal range of motion and neck supple.     Neurological: He is alert and oriented to person, place, and time. He has normal strength.   Skin: Skin is warm and dry.   Psychiatric: He has a normal mood and affect. Thought content normal.       ED Course   Procedures  Labs Reviewed   CBC W/ AUTO DIFFERENTIAL - Abnormal; Notable for the following components:       Result Value    WBC 13.06 (*)     MCH 32.3 (*)     All other components within normal limits   COMPREHENSIVE METABOLIC PANEL - Abnormal; Notable for the following components:    CO2 22 (*)     Total Bilirubin 1.2 (*)     All other  components within normal limits   URINALYSIS, REFLEX TO URINE CULTURE - Abnormal; Notable for the following components:    Glucose, UA 3+ (*)     Urobilinogen, UA 2.0-3.0 (*)     All other components within normal limits    Narrative:     Specimen Source->Urine   TROPONIN I   B-TYPE NATRIURETIC PEPTIDE   DRUG SCREEN PANEL, URINE EMERGENCY    Narrative:     Specimen Source->Urine   ALCOHOL,MEDICAL (ETHANOL)   ALCOHOL,MEDICAL (ETHANOL)   URINALYSIS MICROSCOPIC    Narrative:     Specimen Source->Urine   TROPONIN I   SARS-COV-2 RDRP GENE   POCT INFLUENZA A/B MOLECULAR     Results for orders placed or performed during the hospital encounter of 06/21/23   CBC auto differential   Result Value Ref Range    WBC 13.06 (H) 3.90 - 12.70 K/uL    RBC 4.62 4.60 - 6.20 M/uL    Hemoglobin 14.9 14.0 - 18.0 g/dL    Hematocrit 42.4 40.0 - 54.0 %    MCV 92 82 - 98 fL    MCH 32.3 (H) 27.0 - 31.0 pg    MCHC 35.1 32.0 - 36.0 g/dL    RDW 12.6 11.5 - 14.5 %    Platelets 220 150 - 450 K/uL    MPV 11.7 9.2 - 12.9 fL    Immature Granulocytes CANCELED 0.0 - 0.5 %    Immature Grans (Abs) CANCELED 0.00 - 0.04 K/uL    Lymph # CANCELED 1.0 - 4.8 K/uL    Mono # CANCELED 0.3 - 1.0 K/uL    Eos # CANCELED 0.0 - 0.5 K/uL    Baso # CANCELED 0.00 - 0.20 K/uL    nRBC 0 0 /100 WBC    Gran % 67.0 38.0 - 73.0 %    Lymph % 27.0 18.0 - 48.0 %    Mono % 5.0 4.0 - 15.0 %    Eosinophil % 0.0 0.0 - 8.0 %    Basophil % 1.0 0.0 - 1.9 %    Smudge Cells Present     Vacuolated Granulocytes Present     Differential Method Manual    Comprehensive metabolic panel   Result Value Ref Range    Sodium 137 136 - 145 mmol/L    Potassium 3.6 3.5 - 5.1 mmol/L    Chloride 103 95 - 110 mmol/L    CO2 22 (L) 23 - 29 mmol/L    Glucose 91 70 - 110 mg/dL    BUN 15 6 - 20 mg/dL    Creatinine 1.0 0.5 - 1.4 mg/dL    Calcium 9.5 8.7 - 10.5 mg/dL    Total Protein 7.9 6.0 - 8.4 g/dL    Albumin 4.2 3.5 - 5.2 g/dL    Total Bilirubin 1.2 (H) 0.1 - 1.0 mg/dL    Alkaline Phosphatase 118 55 - 135 U/L     AST 18 10 - 40 U/L    ALT 19 10 - 44 U/L    eGFR >60.0 >60 mL/min/1.73 m^2    Anion Gap 12 8 - 16 mmol/L   Troponin I #1   Result Value Ref Range    Troponin I 0.007 0.000 - 0.026 ng/mL   BNP   Result Value Ref Range    BNP 24 0 - 99 pg/mL   Urinalysis, Reflex to Urine Culture Urine, Clean Catch    Specimen: Urine   Result Value Ref Range    Specimen UA Urine, Clean Catch     Color, UA Yellow Yellow, Straw, Jessy    Appearance, UA Clear Clear    pH, UA 6.0 5.0 - 8.0    Specific Gravity, UA 1.020 1.005 - 1.030    Protein, UA Negative Negative    Glucose, UA 3+ (A) Negative    Ketones, UA Negative Negative    Bilirubin (UA) Negative Negative    Occult Blood UA Negative Negative    Nitrite, UA Negative Negative    Urobilinogen, UA 2.0-3.0 (A) <2.0 EU/dL    Leukocytes, UA Negative Negative   Drug screen panel, emergency   Result Value Ref Range    Benzodiazepines Negative Negative    Methadone metabolites Negative Negative    Cocaine (Metab.) Negative Negative    Opiate Scrn, Ur Negative Negative    Barbiturate Screen, Ur Negative Negative    Amphetamine Screen, Ur Negative Negative    THC Negative Negative    Phencyclidine Negative Negative    Creatinine, Urine 153.0 23.0 - 375.0 mg/dL    Toxicology Information SEE COMMENT    Ethanol   Result Value Ref Range    Alcohol, Serum <10 <10 mg/dL   Urinalysis Microscopic   Result Value Ref Range    RBC, UA 1 0 - 4 /hpf    Bacteria Rare None-Occ /hpf    Yeast, UA None None    Microscopic Comment SEE COMMENT    Troponin I #2   Result Value Ref Range    Troponin I 0.012 0.000 - 0.026 ng/mL   POCT COVID-19 Rapid Screening   Result Value Ref Range    POC Rapid COVID Negative Negative     Acceptable Yes    POCT Influenza A/B Molecular   Result Value Ref Range    POC Molecular Influenza A Ag Negative Negative, Not Reported    POC Molecular Influenza B Ag Negative Negative, Not Reported     Acceptable Yes        EKG Readings: (Independently  Interpreted)   Rhythm: Normal Sinus Rhythm. Heart Rate: 100. Ectopy: No Ectopy. T Waves Flipped: II, III, AVF, V4, V5 and V6. Other Findings: Prolonged QT Interval.     Imaging Results              X-Ray Chest AP Portable (Final result)  Result time 06/21/23 20:36:33      Final result by Delvin White MD (06/21/23 20:36:33)                   Impression:      No acute abnormality.      Electronically signed by: Delvin White  Date:    06/21/2023  Time:    20:36               Narrative:    EXAMINATION:  XR CHEST AP PORTABLE    CLINICAL HISTORY:  Chest Pain;    TECHNIQUE:  Single frontal view of the chest was performed.    COMPARISON:  None    FINDINGS:  The lungs are clear, with normal appearance of pulmonary vasculature and no pleural effusion or pneumothorax.    The cardiac silhouette is normal in size. The hilar and mediastinal contours are unremarkable.    Bones are intact.                                       Medications - No data to display  Medical Decision Making:   Initial Assessment:   41 yo male with hx Alcoholic Cardiomyopathy, CHF presents c CP X 2 d  Differential Diagnosis:   ACS, CHF, Anxiety  Independently Interpreted Test(s):   I have ordered and independently interpreted EKG Reading(s) - see prior notes  Clinical Tests:   Lab Tests: Ordered and Reviewed  The following lab test(s) were unremarkable: CBC, BNP, Urinalysis, CMP and Troponin  Radiological Study: Ordered and Reviewed  Medical Tests: Ordered and Reviewed  ED Management:  I have discussed with patient and/or family/caretaker chest pain precautions, specifically to return for worsening chest pain, shortness of breath, fever, or any concern.  I have low suspicion for cardiopulmonary, vascular, infectious, respiratory, or other emergent medical condition based on my evaluation in the ED.                          Clinical Impression:   Final diagnoses:  [R07.9] Chest pain (Primary)        ED Disposition Condition    Discharge Stable           ED Prescriptions    None       Follow-up Information       Follow up With Specialties Details Why Contact Info    Cardiology  Call in 2 days      [facility] - Emergency Dept Emergency Medicine  If symptoms worsen [id number] [address] [id number] [phone]             González Wilkes MD  06/21/23 0220

## 2023-07-26 RX ORDER — BUSPIRONE HYDROCHLORIDE 5 MG/1
5 TABLET ORAL 2 TIMES DAILY
Qty: 60 TABLET | Refills: 11 | Status: SHIPPED | OUTPATIENT
Start: 2023-07-26 | End: 2024-07-25

## 2023-08-04 DIAGNOSIS — I50.22 CHRONIC SYSTOLIC CONGESTIVE HEART FAILURE: ICD-10-CM

## 2023-08-04 DIAGNOSIS — I50.42 CHRONIC COMBINED SYSTOLIC AND DIASTOLIC HEART FAILURE: ICD-10-CM

## 2023-08-07 DIAGNOSIS — I50.42 CHRONIC COMBINED SYSTOLIC AND DIASTOLIC HEART FAILURE: ICD-10-CM

## 2023-08-07 DIAGNOSIS — I50.22 CHRONIC SYSTOLIC CONGESTIVE HEART FAILURE: ICD-10-CM

## 2023-08-07 RX ORDER — VALSARTAN 80 MG/1
80 TABLET ORAL 2 TIMES DAILY
Qty: 180 TABLET | Refills: 3 | Status: SHIPPED | OUTPATIENT
Start: 2023-08-07 | End: 2024-08-06

## 2023-09-09 ENCOUNTER — PATIENT MESSAGE (OUTPATIENT)
Dept: CARDIOLOGY | Facility: CLINIC | Age: 43
End: 2023-09-09
Payer: MEDICAID

## 2023-09-15 DIAGNOSIS — I50.42 CHRONIC COMBINED SYSTOLIC AND DIASTOLIC CONGESTIVE HEART FAILURE: Primary | ICD-10-CM

## 2023-09-19 ENCOUNTER — HOSPITAL ENCOUNTER (OUTPATIENT)
Dept: CARDIOLOGY | Facility: HOSPITAL | Age: 43
Discharge: HOME | End: 2023-09-19
Attending: INTERNAL MEDICINE
Payer: MEDICAID

## 2023-09-19 VITALS
DIASTOLIC BLOOD PRESSURE: 66 MMHG | BODY MASS INDEX: 36.54 KG/M2 | HEART RATE: 88 BPM | HEIGHT: 64 IN | WEIGHT: 214 LBS | SYSTOLIC BLOOD PRESSURE: 110 MMHG

## 2023-09-19 DIAGNOSIS — I50.42 CHRONIC COMBINED SYSTOLIC AND DIASTOLIC CONGESTIVE HEART FAILURE: ICD-10-CM

## 2023-09-19 LAB
AORTIC ROOT ANNULUS: 2.84 CM
ASCENDING AORTA: 2.41 CM
AV INDEX (PROSTH): 0.58
AV MEAN GRADIENT: 4 MMHG
AV PEAK GRADIENT: 8 MMHG
AV VALVE AREA BY VELOCITY RATIO: 1.85 CM²
AV VALVE AREA: 1.92 CM²
AV VELOCITY RATIO: 0.56
BSA FOR ECHO PROCEDURE: 2.09 M2
CV ECHO LV RWT: 0.31 CM
DOP CALC AO PEAK VEL: 1.41 M/S
DOP CALC AO VTI: 25.6 CM
DOP CALC LVOT AREA: 3.3 CM2
DOP CALC LVOT DIAMETER: 2.05 CM
DOP CALC LVOT PEAK VEL: 0.79 M/S
DOP CALC LVOT STROKE VOLUME: 49.15 CM3
DOP CALC RVOT PEAK VEL: 0.62 M/S
DOP CALC RVOT VTI: 13.5 CM
DOP CALCLVOT PEAK VEL VTI: 14.9 CM
E WAVE DECELERATION TIME: 189.2 MSEC
E/A RATIO: 1.11
E/E' RATIO: 8 M/S
ECHO LV POSTERIOR WALL: 0.93 CM (ref 0.6–1.1)
EJECTION FRACTION: 40 %
FRACTIONAL SHORTENING: 19 % (ref 28–44)
INTERVENTRICULAR SEPTUM: 0.89 CM (ref 0.6–1.1)
IVRT: 97.05 MSEC
LA MAJOR: 4.75 CM
LA MINOR: 3.88 CM
LA WIDTH: 3.2 CM
LEFT ATRIUM SIZE: 3.15 CM
LEFT ATRIUM VOLUME INDEX MOD: 17.7 ML/M2
LEFT ATRIUM VOLUME INDEX: 18.2 ML/M2
LEFT ATRIUM VOLUME MOD: 35.6 CM3
LEFT ATRIUM VOLUME: 36.6 CM3
LEFT INTERNAL DIMENSION IN SYSTOLE: 4.86 CM (ref 2.1–4)
LEFT VENTRICLE DIASTOLIC VOLUME INDEX: 89.69 ML/M2
LEFT VENTRICLE DIASTOLIC VOLUME: 180.28 ML
LEFT VENTRICLE MASS INDEX: 109 G/M2
LEFT VENTRICLE SYSTOLIC VOLUME INDEX: 55.1 ML/M2
LEFT VENTRICLE SYSTOLIC VOLUME: 110.85 ML
LEFT VENTRICULAR INTERNAL DIMENSION IN DIASTOLE: 6 CM (ref 3.5–6)
LEFT VENTRICULAR MASS: 218.76 G
LV LATERAL E/E' RATIO: 8 M/S
LV SEPTAL E/E' RATIO: 8 M/S
LVOT MG: 1.59 MMHG
LVOT MV: 0.61 CM/S
MV PEAK A VEL: 0.65 M/S
MV PEAK E VEL: 0.72 M/S
MV STENOSIS PRESSURE HALF TIME: 54.87 MS
MV VALVE AREA P 1/2 METHOD: 4.01 CM2
OHS LV EJECTION FRACTION SIMPSONS BIPLANE MOD: 46 %
PISA TR MAX VEL: 2.12 M/S
PULM VEIN S/D RATIO: 1.2
PV MEAN GRADIENT: 1 MMHG
PV PEAK D VEL: 0.51 M/S
PV PEAK GRADIENT: 6 MMHG
PV PEAK S VEL: 0.61 M/S
PV PEAK VELOCITY: 1.22 M/S
RA MAJOR: 3.85 CM
RA PRESSURE ESTIMATED: 3 MMHG
RA WIDTH: 3.53 CM
RIGHT VENTRICULAR END-DIASTOLIC DIMENSION: 3.14 CM
RV TB RVSP: 5 MMHG
SINUS: 2.49 CM
STJ: 2.32 CM
TDI LATERAL: 0.09 M/S
TDI SEPTAL: 0.09 M/S
TDI: 0.09 M/S
TR MAX PG: 18 MMHG
TV REST PULMONARY ARTERY PRESSURE: 21 MMHG
Z-SCORE OF LEFT VENTRICULAR DIMENSION IN END DIASTOLE: 0.19
Z-SCORE OF LEFT VENTRICULAR DIMENSION IN END SYSTOLE: 2.34

## 2023-09-19 PROCEDURE — 93306 ECHO (CUPID ONLY): ICD-10-PCS | Mod: 26,,, | Performed by: INTERNAL MEDICINE

## 2023-09-19 PROCEDURE — 93306 TTE W/DOPPLER COMPLETE: CPT | Mod: 26,,, | Performed by: INTERNAL MEDICINE

## 2023-09-19 PROCEDURE — 93306 TTE W/DOPPLER COMPLETE: CPT

## 2023-11-08 ENCOUNTER — LAB VISIT (OUTPATIENT)
Dept: LAB | Facility: HOSPITAL | Age: 43
End: 2023-11-08
Attending: NURSE PRACTITIONER
Payer: MEDICAID

## 2023-11-08 DIAGNOSIS — I50.9 HEART FAILURE, UNSPECIFIED: Primary | ICD-10-CM

## 2023-11-08 DIAGNOSIS — I10 ESSENTIAL HYPERTENSION, MALIGNANT: ICD-10-CM

## 2023-11-08 DIAGNOSIS — I50.9 HEART FAILURE, UNSPECIFIED: ICD-10-CM

## 2023-11-08 LAB
ALBUMIN SERPL BCP-MCNC: 4.3 G/DL (ref 3.5–5.2)
ALP SERPL-CCNC: 135 U/L (ref 55–135)
ALT SERPL W/O P-5'-P-CCNC: 26 U/L (ref 10–44)
ANION GAP SERPL CALC-SCNC: 13 MMOL/L (ref 8–16)
AST SERPL-CCNC: 19 U/L (ref 10–40)
BASOPHILS # BLD AUTO: 0.05 K/UL (ref 0–0.2)
BASOPHILS NFR BLD: 0.5 % (ref 0–1.9)
BILIRUB SERPL-MCNC: 0.9 MG/DL (ref 0.1–1)
BNP SERPL-MCNC: <10 PG/ML (ref 0–99)
BUN SERPL-MCNC: 12 MG/DL (ref 6–20)
CALCIUM SERPL-MCNC: 9.7 MG/DL (ref 8.7–10.5)
CHLORIDE SERPL-SCNC: 101 MMOL/L (ref 95–110)
CHOLEST SERPL-MCNC: 174 MG/DL (ref 120–199)
CHOLEST/HDLC SERPL: 5.6 {RATIO} (ref 2–5)
CO2 SERPL-SCNC: 22 MMOL/L (ref 23–29)
COMPLEXED PSA SERPL-MCNC: 1.1 NG/ML (ref 0–4)
CREAT SERPL-MCNC: 1 MG/DL (ref 0.5–1.4)
DIFFERENTIAL METHOD: ABNORMAL
EOSINOPHIL # BLD AUTO: 0.1 K/UL (ref 0–0.5)
EOSINOPHIL NFR BLD: 1.4 % (ref 0–8)
ERYTHROCYTE [DISTWIDTH] IN BLOOD BY AUTOMATED COUNT: 12.6 % (ref 11.5–14.5)
EST. GFR  (NO RACE VARIABLE): >60 ML/MIN/1.73 M^2
GLUCOSE SERPL-MCNC: 108 MG/DL (ref 70–110)
HCT VFR BLD AUTO: 46.2 % (ref 40–54)
HDLC SERPL-MCNC: 31 MG/DL (ref 40–75)
HDLC SERPL: 17.8 % (ref 20–50)
HGB BLD-MCNC: 16 G/DL (ref 14–18)
IMM GRANULOCYTES # BLD AUTO: 0.06 K/UL (ref 0–0.04)
IMM GRANULOCYTES NFR BLD AUTO: 0.6 % (ref 0–0.5)
LDLC SERPL CALC-MCNC: 118.2 MG/DL (ref 63–159)
LYMPHOCYTES # BLD AUTO: 3.5 K/UL (ref 1–4.8)
LYMPHOCYTES NFR BLD: 33.6 % (ref 18–48)
MCH RBC QN AUTO: 31.9 PG (ref 27–31)
MCHC RBC AUTO-ENTMCNC: 34.6 G/DL (ref 32–36)
MCV RBC AUTO: 92 FL (ref 82–98)
MONOCYTES # BLD AUTO: 0.7 K/UL (ref 0.3–1)
MONOCYTES NFR BLD: 6.8 % (ref 4–15)
NEUTROPHILS # BLD AUTO: 5.9 K/UL (ref 1.8–7.7)
NEUTROPHILS NFR BLD: 57.1 % (ref 38–73)
NONHDLC SERPL-MCNC: 143 MG/DL
NRBC BLD-RTO: 0 /100 WBC
PLATELET # BLD AUTO: 230 K/UL (ref 150–450)
PMV BLD AUTO: 11.5 FL (ref 9.2–12.9)
POTASSIUM SERPL-SCNC: 3.8 MMOL/L (ref 3.5–5.1)
PROT SERPL-MCNC: 8.4 G/DL (ref 6–8.4)
RBC # BLD AUTO: 5.02 M/UL (ref 4.6–6.2)
SODIUM SERPL-SCNC: 136 MMOL/L (ref 136–145)
TESTOST SERPL-MCNC: 236 NG/DL (ref 304–1227)
TRIGL SERPL-MCNC: 124 MG/DL (ref 30–150)
TSH SERPL DL<=0.005 MIU/L-ACNC: 1.68 UIU/ML (ref 0.4–4)
WBC # BLD AUTO: 10.31 K/UL (ref 3.9–12.7)

## 2023-11-08 PROCEDURE — 36415 COLL VENOUS BLD VENIPUNCTURE: CPT | Mod: PO | Performed by: NURSE PRACTITIONER

## 2023-11-08 PROCEDURE — 84153 ASSAY OF PSA TOTAL: CPT | Performed by: NURSE PRACTITIONER

## 2023-11-08 PROCEDURE — 84443 ASSAY THYROID STIM HORMONE: CPT | Mod: PO | Performed by: NURSE PRACTITIONER

## 2023-11-08 PROCEDURE — 83880 ASSAY OF NATRIURETIC PEPTIDE: CPT | Mod: PO | Performed by: NURSE PRACTITIONER

## 2023-11-08 PROCEDURE — 85025 COMPLETE CBC W/AUTO DIFF WBC: CPT | Mod: PO | Performed by: NURSE PRACTITIONER

## 2023-11-08 PROCEDURE — 83036 HEMOGLOBIN GLYCOSYLATED A1C: CPT | Performed by: NURSE PRACTITIONER

## 2023-11-08 PROCEDURE — 84403 ASSAY OF TOTAL TESTOSTERONE: CPT | Performed by: NURSE PRACTITIONER

## 2023-11-08 PROCEDURE — 80061 LIPID PANEL: CPT | Performed by: NURSE PRACTITIONER

## 2023-11-08 PROCEDURE — 80053 COMPREHEN METABOLIC PANEL: CPT | Mod: PO | Performed by: NURSE PRACTITIONER

## 2023-11-09 LAB
ESTIMATED AVG GLUCOSE: 111 MG/DL (ref 68–131)
HBA1C MFR BLD: 5.5 % (ref 4–5.6)

## 2023-11-11 ENCOUNTER — HOSPITAL ENCOUNTER (EMERGENCY)
Facility: HOSPITAL | Age: 43
Discharge: HOME | End: 2023-11-11
Attending: EMERGENCY MEDICINE
Payer: MEDICAID

## 2023-11-11 VITALS
HEIGHT: 64 IN | WEIGHT: 225.31 LBS | HEART RATE: 112 BPM | DIASTOLIC BLOOD PRESSURE: 77 MMHG | SYSTOLIC BLOOD PRESSURE: 131 MMHG | RESPIRATION RATE: 24 BRPM | TEMPERATURE: 99 F | OXYGEN SATURATION: 97 % | BODY MASS INDEX: 38.47 KG/M2

## 2023-11-11 DIAGNOSIS — R05.9 COUGH: ICD-10-CM

## 2023-11-11 DIAGNOSIS — J10.1 INFLUENZA A: Primary | ICD-10-CM

## 2023-11-11 LAB
CTP QC/QA: YES
CTP QC/QA: YES
POC MOLECULAR INFLUENZA A AGN: POSITIVE
POC MOLECULAR INFLUENZA B AGN: NEGATIVE
SARS-COV-2 RDRP RESP QL NAA+PROBE: NEGATIVE

## 2023-11-11 PROCEDURE — 87635 SARS-COV-2 COVID-19 AMP PRB: CPT | Mod: ER | Performed by: EMERGENCY MEDICINE

## 2023-11-11 PROCEDURE — 99284 EMERGENCY DEPT VISIT MOD MDM: CPT | Mod: 25,ER

## 2023-11-11 PROCEDURE — 25000003 PHARM REV CODE 250: Mod: ER | Performed by: EMERGENCY MEDICINE

## 2023-11-11 PROCEDURE — 87502 INFLUENZA DNA AMP PROBE: CPT | Mod: ER

## 2023-11-11 RX ORDER — OSELTAMIVIR PHOSPHATE 75 MG/1
75 CAPSULE ORAL 2 TIMES DAILY
Qty: 10 CAPSULE | Refills: 0 | Status: SHIPPED | OUTPATIENT
Start: 2023-11-11 | End: 2023-11-16

## 2023-11-11 RX ORDER — OSELTAMIVIR PHOSPHATE 75 MG/1
75 CAPSULE ORAL
Status: COMPLETED | OUTPATIENT
Start: 2023-11-11 | End: 2023-11-11

## 2023-11-11 RX ORDER — PROMETHAZINE HYDROCHLORIDE AND DEXTROMETHORPHAN HYDROBROMIDE 6.25; 15 MG/5ML; MG/5ML
5 SYRUP ORAL EVERY 4 HOURS PRN
Qty: 118 ML | Refills: 0 | Status: SHIPPED | OUTPATIENT
Start: 2023-11-11 | End: 2023-11-21

## 2023-11-11 RX ADMIN — OSELTAMIVIR PHOSPHATE 75 MG: 75 CAPSULE ORAL at 08:11

## 2023-11-11 NOTE — ED PROVIDER NOTES
Encounter Date: 2023       History     Chief Complaint   Patient presents with    Cough     Cough, chills, body aches X2 days.      The history is provided by the patient.   Cough  This is a new problem. The current episode started two days ago. The problem occurs constantly. The problem has been unchanged. The cough is Non-productive. There has been no fever. Associated symptoms include chills, rhinorrhea and myalgias. Pertinent negatives include no chest pain, no sweats, no weight loss, no ear congestion, no ear pain, no sore throat, no shortness of breath and no eye redness.     Review of patient's allergies indicates:  No Known Allergies  Past Medical History:   Diagnosis Date    Alcoholism in recovery 2022    Cardiomyopathy 2022    Alcoholic    CHF (congestive heart failure)     Chronic combined systolic and diastolic congestive heart failure 2022    Pulmonary hypertension     pt reported     Past Surgical History:   Procedure Laterality Date    CATHETERIZATION OF BOTH LEFT AND RIGHT HEART N/A 2022    Procedure: CATHETERIZATION, HEART, BOTH LEFT AND RIGHT;  Surgeon: Yady Patterson MD;  Location: [facility] CATH LAB;  Service: Cardiology;  Laterality: N/A;    RIGHT HEART CATHETERIZATION Right 10/13/2022    Procedure: INSERTION, CATHETER, RIGHT HEART;  Surgeon: La Nena Ramriez DO;  Location: [facility] CATH LAB;  Service: Cardiology;  Laterality: Right;     Family History   Problem Relation Age of Onset    Hypertension Mother     Diabetes Mother     Hypertension Father      Social History     Tobacco Use    Smoking status: Former     Current packs/day: 0.00     Types: Cigarettes     Quit date: 2022     Years since quittin.1    Smokeless tobacco: Never   Substance Use Topics    Alcohol use: Not Currently     Comment: was drinking 1.5-2.0 pints of hard liquor/day until 2022    Drug use: Never     Review of Systems   Constitutional:  Positive for chills. Negative for fever and weight  loss.   HENT:  Positive for rhinorrhea. Negative for ear pain and sore throat.    Eyes:  Negative for redness.   Respiratory:  Positive for cough. Negative for shortness of breath.    Cardiovascular:  Negative for chest pain.   Gastrointestinal:  Negative for nausea.   Genitourinary:  Negative for dysuria.   Musculoskeletal:  Positive for myalgias. Negative for back pain.   Skin:  Negative for rash.   Neurological:  Negative for weakness.   Hematological:  Does not bruise/bleed easily.       Physical Exam     Initial Vitals [11/11/23 0725]   BP Pulse Resp Temp SpO2   131/77 (!) 112 (!) 24 98.7 °F (37.1 °C) 97 %      MAP       --         Physical Exam    Nursing note and vitals reviewed.  Constitutional: He appears well-developed and well-nourished.   HENT:   Head: Normocephalic and atraumatic.   Nose: Mucosal edema and rhinorrhea present.   Mouth/Throat: Posterior oropharyngeal erythema present. No oropharyngeal exudate.   Eyes: Conjunctivae and EOM are normal. Pupils are equal, round, and reactive to light.   Neck: Neck supple.   Normal range of motion.  Cardiovascular:  Normal rate, regular rhythm and normal heart sounds.           Pulmonary/Chest: Breath sounds normal.   Abdominal: Abdomen is soft. Bowel sounds are normal.   Musculoskeletal:         General: Normal range of motion.      Cervical back: Normal range of motion and neck supple.     Neurological: He is alert and oriented to person, place, and time. He has normal strength.   Skin: Skin is warm and dry.   Psychiatric: He has a normal mood and affect. Thought content normal.         ED Course   Procedures  Labs Reviewed   POCT INFLUENZA A/B MOLECULAR - Abnormal; Notable for the following components:       Result Value    POC Molecular Influenza A Ag Positive (*)     All other components within normal limits   SARS-COV-2 RDRP GENE     Results for orders placed or performed during the hospital encounter of 11/11/23   POCT Influenza A/B Molecular   Result  Value Ref Range    POC Molecular Influenza A Ag Positive (A) Negative, Not Reported    POC Molecular Influenza B Ag Negative Negative, Not Reported     Acceptable Yes    POCT COVID-19 Rapid Screening   Result Value Ref Range    POC Rapid COVID Negative Negative     Acceptable Yes             Imaging Results              X-Ray Chest PA And Lateral (Final result)  Result time 11/11/23 08:01:30      Final result by Calvin Bo MD (11/11/23 08:01:30)                   Impression:      No acute findings.      Electronically signed by: Calvin Bo MD  Date:    11/11/2023  Time:    08:01               Narrative:    EXAMINATION:  XR CHEST PA AND LATERAL    CLINICAL HISTORY  Cough and congestion,    COMPARISON:  06/21/2023 x-ray    FINDINGS:  The heart size is normal.  The lung fields are clear.  No acute cardiopulmonary infiltrate.                                  8:20 AM - Counseling: Spoke with the patient and discussed todays findings, in addition to providing specific details for the plan of care and counseling regarding the diagnosis and prognosis. Questions are answered at this time.        Medications   oseltamivir capsule 75 mg (has no administration in time range)     Medical Decision Making  Problems Addressed:  Influenza A: acute illness or injury    Amount and/or Complexity of Data Reviewed  External Data Reviewed: radiology.     Details: Echo 9/23    Left Ventricle: The left ventricle is normal in size. Normal wall thickness. Normal wall motion. There is mildly reduced systolic function. Ejection fraction by visual approximation is 40%. Biplane (2D) method of discs ejection fraction is 46%. There is normal diastolic function.  ·  Right Ventricle: Normal right ventricular cavity size. Wall thickness is normal. Right ventricle wall motion  is normal. Systolic function is normal.  ·  Aortic Valve: The aortic valve is a trileaflet valve. There is mild stenosis. Aortic  valve area by VTI is 1.92 cm². Aortic valve peak velocity is 1.41 m/s. Mean gradient is 4 mmHg. The dimensionless index is 0.58.  ·  Mitral Valve: There is mild regurgitation.  ·  Pulmonary Artery: The estimated pulmonary artery systolic pressure is 21 mmHg.  ·  IVC/SVC: Normal venous pressure at 3 mmHg    Labs: ordered.     Details: Flu A +  Radiology: ordered.     Details: Agree c radiology No acute findings    Risk  Prescription drug management.                               Clinical Impression:   Final diagnoses:  [R05.9] Cough  [J10.1] Influenza A (Primary)        ED Disposition Condition    Discharge Stable          ED Prescriptions       Medication Sig Dispense Start Date End Date Auth. Provider    oseltamivir (TAMIFLU) 75 MG capsule Take 1 capsule (75 mg total) by mouth 2 (two) times daily. for 5 days 10 capsule 11/11/2023 11/16/2023 González Wilkes MD    promethazine-dextromethorphan (PROMETHAZINE-DM) 6.25-15 mg/5 mL Syrp Take 5 mLs by mouth every 4 (four) hours as needed. 118 mL 11/11/2023 11/21/2023 González Wilkes MD          Follow-up Information       Follow up With Specialties Details Why Contact Info    PCP  Call in 2 days      [facility] - Emergency Dept Emergency Medicine  If symptoms worsen [address] [address] [id number] [phone]             González Wilkes MD  11/11/23 4705

## 2023-11-11 NOTE — Clinical Note
"Filemon La (Brian) was seen and treated in our emergency department on 11/11/2023.  He may return to work on 11/15/2023.       If you have any questions or concerns, please don't hesitate to call.       RN    "

## 2023-11-21 ENCOUNTER — OFFICE VISIT (OUTPATIENT)
Dept: TRANSPLANT | Facility: CLINIC | Age: 43
End: 2023-11-21
Payer: MEDICAID

## 2023-11-21 ENCOUNTER — LAB VISIT (OUTPATIENT)
Dept: LAB | Facility: HOSPITAL | Age: 43
End: 2023-11-21
Attending: INTERNAL MEDICINE
Payer: MEDICAID

## 2023-11-21 VITALS
HEART RATE: 88 BPM | WEIGHT: 221.13 LBS | BODY MASS INDEX: 37.75 KG/M2 | DIASTOLIC BLOOD PRESSURE: 60 MMHG | HEIGHT: 64 IN | SYSTOLIC BLOOD PRESSURE: 116 MMHG

## 2023-11-21 DIAGNOSIS — I42.6 ALCOHOLIC CARDIOMYOPATHY: ICD-10-CM

## 2023-11-21 DIAGNOSIS — I50.42 CHRONIC COMBINED SYSTOLIC AND DIASTOLIC CONGESTIVE HEART FAILURE: Primary | ICD-10-CM

## 2023-11-21 DIAGNOSIS — I42.8 CARDIOMYOPATHY, NONISCHEMIC: ICD-10-CM

## 2023-11-21 DIAGNOSIS — I95.89 OTHER SPECIFIED HYPOTENSION: ICD-10-CM

## 2023-11-21 DIAGNOSIS — I50.42 CHRONIC COMBINED SYSTOLIC AND DIASTOLIC CONGESTIVE HEART FAILURE: ICD-10-CM

## 2023-11-21 LAB
ALBUMIN SERPL BCP-MCNC: 4.1 G/DL (ref 3.5–5.2)
ALP SERPL-CCNC: 135 U/L (ref 55–135)
ALT SERPL W/O P-5'-P-CCNC: 24 U/L (ref 10–44)
ANION GAP SERPL CALC-SCNC: 11 MMOL/L (ref 8–16)
AST SERPL-CCNC: 19 U/L (ref 10–40)
BILIRUB SERPL-MCNC: 0.9 MG/DL (ref 0.1–1)
BNP SERPL-MCNC: <10 PG/ML (ref 0–99)
BUN SERPL-MCNC: 21 MG/DL (ref 6–20)
CALCIUM SERPL-MCNC: 9.9 MG/DL (ref 8.7–10.5)
CHLORIDE SERPL-SCNC: 100 MMOL/L (ref 95–110)
CO2 SERPL-SCNC: 27 MMOL/L (ref 23–29)
CREAT SERPL-MCNC: 1.1 MG/DL (ref 0.5–1.4)
EST. GFR  (NO RACE VARIABLE): >60 ML/MIN/1.73 M^2
GLUCOSE SERPL-MCNC: 121 MG/DL (ref 70–110)
MAGNESIUM SERPL-MCNC: 2 MG/DL (ref 1.6–2.6)
POTASSIUM SERPL-SCNC: 4.1 MMOL/L (ref 3.5–5.1)
PROT SERPL-MCNC: 8.3 G/DL (ref 6–8.4)
SODIUM SERPL-SCNC: 138 MMOL/L (ref 136–145)

## 2023-11-21 PROCEDURE — 3008F BODY MASS INDEX DOCD: CPT | Mod: CPTII,,, | Performed by: INTERNAL MEDICINE

## 2023-11-21 PROCEDURE — 4010F PR ACE/ARB THEARPY RXD/TAKEN: ICD-10-PCS | Mod: CPTII,,, | Performed by: INTERNAL MEDICINE

## 2023-11-21 PROCEDURE — 1159F MED LIST DOCD IN RCRD: CPT | Mod: CPTII,,, | Performed by: INTERNAL MEDICINE

## 2023-11-21 PROCEDURE — 1159F PR MEDICATION LIST DOCUMENTED IN MEDICAL RECORD: ICD-10-PCS | Mod: CPTII,,, | Performed by: INTERNAL MEDICINE

## 2023-11-21 PROCEDURE — 3044F HG A1C LEVEL LT 7.0%: CPT | Mod: CPTII,,, | Performed by: INTERNAL MEDICINE

## 2023-11-21 PROCEDURE — 99999 PR PBB SHADOW E&M-EST. PATIENT-LVL II: CPT | Mod: PBBFAC,,, | Performed by: INTERNAL MEDICINE

## 2023-11-21 PROCEDURE — 1160F RVW MEDS BY RX/DR IN RCRD: CPT | Mod: CPTII,,, | Performed by: INTERNAL MEDICINE

## 2023-11-21 PROCEDURE — 99212 OFFICE O/P EST SF 10 MIN: CPT | Mod: PBBFAC | Performed by: INTERNAL MEDICINE

## 2023-11-21 PROCEDURE — 3078F DIAST BP <80 MM HG: CPT | Mod: CPTII,,, | Performed by: INTERNAL MEDICINE

## 2023-11-21 PROCEDURE — 4010F ACE/ARB THERAPY RXD/TAKEN: CPT | Mod: CPTII,,, | Performed by: INTERNAL MEDICINE

## 2023-11-21 PROCEDURE — 3044F PR MOST RECENT HEMOGLOBIN A1C LEVEL <7.0%: ICD-10-PCS | Mod: CPTII,,, | Performed by: INTERNAL MEDICINE

## 2023-11-21 PROCEDURE — 99999 PR PBB SHADOW E&M-EST. PATIENT-LVL II: ICD-10-PCS | Mod: PBBFAC,,, | Performed by: INTERNAL MEDICINE

## 2023-11-21 PROCEDURE — 80053 COMPREHEN METABOLIC PANEL: CPT | Performed by: INTERNAL MEDICINE

## 2023-11-21 PROCEDURE — 3008F PR BODY MASS INDEX (BMI) DOCUMENTED: ICD-10-PCS | Mod: CPTII,,, | Performed by: INTERNAL MEDICINE

## 2023-11-21 PROCEDURE — 99215 PR OFFICE/OUTPT VISIT, EST, LEVL V, 40-54 MIN: ICD-10-PCS | Mod: S$PBB,,, | Performed by: INTERNAL MEDICINE

## 2023-11-21 PROCEDURE — 83880 ASSAY OF NATRIURETIC PEPTIDE: CPT | Performed by: INTERNAL MEDICINE

## 2023-11-21 PROCEDURE — 36415 COLL VENOUS BLD VENIPUNCTURE: CPT | Performed by: INTERNAL MEDICINE

## 2023-11-21 PROCEDURE — 83735 ASSAY OF MAGNESIUM: CPT | Performed by: INTERNAL MEDICINE

## 2023-11-21 PROCEDURE — 99215 OFFICE O/P EST HI 40 MIN: CPT | Mod: S$PBB,,, | Performed by: INTERNAL MEDICINE

## 2023-11-21 PROCEDURE — 3074F PR MOST RECENT SYSTOLIC BLOOD PRESSURE < 130 MM HG: ICD-10-PCS | Mod: CPTII,,, | Performed by: INTERNAL MEDICINE

## 2023-11-21 PROCEDURE — 3078F PR MOST RECENT DIASTOLIC BLOOD PRESSURE < 80 MM HG: ICD-10-PCS | Mod: CPTII,,, | Performed by: INTERNAL MEDICINE

## 2023-11-21 PROCEDURE — 1160F PR REVIEW ALL MEDS BY PRESCRIBER/CLIN PHARMACIST DOCUMENTED: ICD-10-PCS | Mod: CPTII,,, | Performed by: INTERNAL MEDICINE

## 2023-11-21 PROCEDURE — 3074F SYST BP LT 130 MM HG: CPT | Mod: CPTII,,, | Performed by: INTERNAL MEDICINE

## 2023-11-21 NOTE — PATIENT INSTRUCTIONS
DC LifeVest and return it to [facility]  Recommend 2 gram sodium restriction and 1500cc fluid restriction.  Encourage physical activity with graded exercise program.

## 2023-11-21 NOTE — LETTER
November 21, 2023        Berenice Barnes  [address]  [place] [address]  Phone: [phone]  Fax: [phone]             [facility] (Medical Ofc Bldg)  [address] [place] [address]  Phone: [phone]  Fax: [phone]   Patient: Filemon La   MR Number: [id number]   YOB: 1980   Date of Visit: 11/21/2023       Dear Dr. Berenice Barnes    Thank you for referring Filemon La to me for evaluation. Attached you will find relevant portions of my assessment and plan of care.    If you have questions, please do not hesitate to call me. I look forward to following Filemon La along with you.    Sincerely,    Kofi Calixto MD    Enclosure    If you would like to receive this communication electronically, please contact [email or web address] or [phone] to request [employer] access.    [employer] is a tool which provides read-only access to select patient information with whom you have a relationship. Its easy to use and provides real time access to review your patients record including encounter summaries, notes, results, and demographic information.    If you feel you have received this communication in error or would no longer like to receive these types of communications, please e-mail [email or web address]

## 2023-11-21 NOTE — PROGRESS NOTES
Subjective:       HPI:  Mr. La is a very pleasant 43 y.o. year old black male who was referred by our colleague Berenice Barnes for evaluation for advanced HF therapies. This is his 5th visit with us.  His first HF symptoms began September 2022 and at that time he was drinking heavily, consuming 1.5-2 pt of hard liquor per day.  He was able to quit drinking and smoking at that time and has been sober now for almost 7 months.  We had made some adjustments in his GDMT but because of his low BP, OHTx work-up was completed and he was eventually presented at our selection meeting.  It was the Committee's decision to defer patient's for transplant and LVAD candidacy at this time due to ongoing tobacco use as evidence by positive nicotine and cotinine screens. In addition, patient has had an elevated total bilirubin that will need to be evaluated by hepatology. Fortunately, he has done very well and continues to do well. Last week had the influenza. was treated for it and now feels better. Today he reports NYHA class II symptoms. No PND or orthopnea. Has been tolerating slow uptitration of GDMT. Fortunately, his repeat echo also showed significant improvement in his LV function (now EF=40%) .  He is still wearing a LifeVest.      2D Echo done on 2/10/2023  The left ventricle is severely enlarged with eccentric hypertrophy and severely decreased systolic function.  Left ventricular diastolic dysfunction.  The estimated PA systolic pressure is 17 mmHg.  Normal right ventricular size with normal right ventricular systolic function.  Normal central venous pressure (3 mmHg).  The estimated ejection fraction is 20%.  There is severe left ventricular global hypokinesis.  Mild mitral regurgitation.  Mild tricuspid regurgitation.    Past Medical History:   Diagnosis Date    Alcoholism in recovery 09/2022    Cardiomyopathy 09/2022    Alcoholic    CHF (congestive heart failure)     Chronic combined systolic and diastolic  "congestive heart failure 09/17/2022    Pulmonary hypertension     pt reported     Past Surgical History:   Procedure Laterality Date    CATHETERIZATION OF BOTH LEFT AND RIGHT HEART N/A 9/20/2022    Procedure: CATHETERIZATION, HEART, BOTH LEFT AND RIGHT;  Surgeon: Yady Patterson MD;  Location: [facility] CATH LAB;  Service: Cardiology;  Laterality: N/A;    RIGHT HEART CATHETERIZATION Right 10/13/2022    Procedure: INSERTION, CATHETER, RIGHT HEART;  Surgeon: La Nena Ramirez DO;  Location: [facility] CATH LAB;  Service: Cardiology;  Laterality: Right;       Review of Systems   Constitutional: Negative. Negative for chills, decreased appetite, diaphoresis, fever, malaise/fatigue, night sweats, weight gain and weight loss.   Eyes: Negative.    Cardiovascular:  Positive for dyspnea on exertion. Negative for chest pain, claudication, cyanosis, irregular heartbeat, leg swelling, near-syncope, orthopnea, palpitations, paroxysmal nocturnal dyspnea and syncope.   Respiratory:  Negative for cough, hemoptysis and shortness of breath.    Endocrine: Negative.    Hematologic/Lymphatic: Negative.    Skin:  Negative for color change, dry skin and nail changes.   Musculoskeletal: Negative.    Gastrointestinal: Negative.    Genitourinary: Negative.    Neurological:  Negative for weakness.       Objective:   Blood pressure 116/60, pulse 88, height 5' 4" (1.626 m), weight 100.3 kg (221 lb 1.9 oz).body mass index is 37.96 kg/m².  Physical Exam  Vitals reviewed.   Constitutional:       Appearance: He is well-developed.      Comments: /60   Pulse 88   Ht 5' 4" (1.626 m)   Wt 100.3 kg (221 lb 1.9 oz)   BMI 37.96 kg/m²      HENT:      Head: Normocephalic.   Neck:      Vascular: No carotid bruit or JVD.   Cardiovascular:      Rate and Rhythm: Regular rhythm.      Pulses: Normal pulses.      Heart sounds: Normal heart sounds. No murmur heard.  Pulmonary:      Effort: Pulmonary effort is normal.      Breath sounds: Normal breath sounds. "   Abdominal:      General: Bowel sounds are normal.      Palpations: Abdomen is soft.   Skin:     General: Skin is warm.   Neurological:      Mental Status: He is alert.         Labs:    Chemistry        Component Value Date/Time     11/21/2023 1116    K 4.1 11/21/2023 1116     11/21/2023 1116    CO2 27 11/21/2023 1116    BUN 21 (H) 11/21/2023 1116    CREATININE 1.1 11/21/2023 1116     (H) 11/21/2023 1116        Component Value Date/Time    CALCIUM 9.7 11/08/2023 1106    ALKPHOS 135 11/08/2023 1106    AST 19 11/08/2023 1106    ALT 26 11/08/2023 1106    BILITOT 0.9 11/08/2023 1106    ESTGFRAFRICA >60.0 07/27/2022 0731    EGFRNONAA >60.0 07/27/2022 0731          Magnesium   Date Value Ref Range Status   10/27/2022 1.5 (L) 1.6 - 2.6 mg/dL Final     Lab Results   Component Value Date    WBC 10.31 11/08/2023    HGB 16.0 11/08/2023    HCT 46.2 11/08/2023     11/08/2023     Lab Results   Component Value Date    INR 1.0 10/27/2022    INR 1.0 10/13/2022     BNP   Date Value Ref Range Status   11/08/2023 <10 0 - 99 pg/mL Final     Comment:     Values of less than 100 pg/ml are consistent with non-CHF populations.   06/21/2023 24 0 - 99 pg/mL Final     Comment:     Values of less than 100 pg/ml are consistent with non-CHF populations.   06/06/2023 20 0 - 99 pg/mL Final     Comment:     Values of less than 100 pg/ml are consistent with non-CHF populations.     LD   Date Value Ref Range Status   12/15/2022 148 110 - 260 U/L Final     Comment:     Results are increased in hemolyzed samples.   10/27/2022 147 110 - 260 U/L Final     Comment:     Results are increased in hemolyzed samples.         Assessment:      1. Chronic combined systolic and diastolic congestive heart failure    2. Cardiomyopathy, nonischemic    3. Alcoholic cardiomyopathy    4. Other specified hypotension        Plan:   Stage C HFrEF, NICMP likely alcohol induced. In remission (sober for over a year now). So far tolerating low dose  GDMT with significant improvement in his LV function (reverse LV remodeling). In view of his improved LV function and EF of 40% he no longer needs a LifeVest. May DC his LifeVest.   Recommend to continue current HF regimen   Recommend 2 gram sodium restriction and 1500cc fluid restriction.  Encourage physical activity with graded exercise program.  Requested patient to weigh themselves daily, and to notify us if their weight increases by more than 3 lbs in 1 day or 5 lbs in 1 week.   RTC in 3 months     Kofi Calixto MD

## 2024-01-22 NOTE — Clinical Note
Manual pressure was applied to the left brachial vein sheath insertion site. Physical Therapy  Facility/Department: [address] MED SURG  Physical Therapy Initial Assessment    Name: Chantelle Maddox  : 1978  MRN: [id number]  Date of Service: 2024  Chief Complaint   Patient presents with    Dizziness     \"Room spinning\" since surgery on  \"water removed from my brain\"       Discharge Recommendations:  Further therapy recommended at discharge.The patient should be able to tolerate at least 3 hours of therapy per day over 5 days or 15 hours over 7 days.   This patient may benefit from a Physical Medicine and Rehab consult.    PT Equipment Recommendations  Equipment Needed: No (Pt is currently unsafe to return to prior living arrangements at this time)      Patient Diagnosis(es): There were no encounter diagnoses.  Past Medical History:  has a past medical history of Abnormal Pap smear of cervix, Abnormal stress test, Achilles tendonitis, Anxiety, Aortic regurgitation, Asthma, Bradycardia, Cancer (HCC), Chest pain, CHF (congestive heart failure) ([facility]), Coronary artery disease without angina pectoris, Depression, Dizziness, GERD (gastroesophageal reflux disease), Headache(784.0), Heart murmur, Herpes, Hyperlipidemia, Hypertension, Leaky heart valve, Migraine headache, Obesity, Poor historian, Schizophrenia (HCC), Seasonal allergies, Sleep apnea, Snoring, SOB (shortness of breath), Syncope, Thyroid disease, Under care of service provider, Under care of service provider, Under care of service provider, Under care of service provider, and Wears partial dentures.  Past Surgical History:  has a past surgical history that includes pre-malignant / benign skin lesion excision (Left, 2016); LEEP (2016); Cervix biopsy (N/A, 2019); Cardiac catheterization (2021); Achilles tendon surgery (Left, 2021); Endoscopy, colon, diagnostic; Achilles tendon surgery (Right, 2022); other surgical history (08/15/2022); and Cervix biopsy (N/A, 8/15/2022).    Assessment   Body

## 2024-01-31 ENCOUNTER — HOSPITAL ENCOUNTER (OUTPATIENT)
Dept: CARDIOLOGY | Facility: HOSPITAL | Age: 44
Discharge: HOME | End: 2024-01-31
Payer: MEDICAID

## 2024-01-31 ENCOUNTER — HOSPITAL ENCOUNTER (OUTPATIENT)
Dept: RADIOLOGY | Facility: HOSPITAL | Age: 44
Discharge: HOME | End: 2024-01-31
Attending: NURSE PRACTITIONER
Payer: MEDICAID

## 2024-01-31 DIAGNOSIS — I50.41 ACUTE COMBINED SYSTOLIC AND DIASTOLIC HEART FAILURE: ICD-10-CM

## 2024-01-31 DIAGNOSIS — I51.7 CARDIOMEGALY: ICD-10-CM

## 2024-01-31 DIAGNOSIS — I27.20 PORTOPULMONARY HYPERTENSION: ICD-10-CM

## 2024-01-31 DIAGNOSIS — R06.02 SHORTNESS OF BREATH: ICD-10-CM

## 2024-01-31 DIAGNOSIS — I27.0 PRIMARY PULMONARY HYPERTENSION: ICD-10-CM

## 2024-01-31 DIAGNOSIS — K76.6 PORTOPULMONARY HYPERTENSION: ICD-10-CM

## 2024-01-31 DIAGNOSIS — Z86.79 PERSONAL HISTORY OF UNSPECIFIED CIRCULATORY DISEASE: ICD-10-CM
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
## 2024-01-31 DIAGNOSIS — I10 ESSENTIAL HYPERTENSION, MALIGNANT: ICD-10-CM

## 2024-01-31 DIAGNOSIS — R00.0 TACHYCARDIA, UNSPECIFIED: ICD-10-CM

## 2024-01-31 DIAGNOSIS — I10 ESSENTIAL HYPERTENSION, MALIGNANT: Primary | ICD-10-CM

## 2024-01-31 PROCEDURE — 93005 ELECTROCARDIOGRAM TRACING: CPT | Mod: PO

## 2024-01-31 PROCEDURE — 71046 X-RAY EXAM CHEST 2 VIEWS: CPT | Mod: TC,PO

## 2024-01-31 PROCEDURE — 93010 ELECTROCARDIOGRAM REPORT: CPT | Mod: ,,, | Performed by: INTERNAL MEDICINE

## 2024-01-31 PROCEDURE — 71046 X-RAY EXAM CHEST 2 VIEWS: CPT | Mod: 26,,, | Performed by: RADIOLOGY

## 2024-04-29 DIAGNOSIS — I50.42 CHRONIC COMBINED SYSTOLIC AND DIASTOLIC HEART FAILURE: ICD-10-CM

## 2024-04-29 RX ORDER — SPIRONOLACTONE 25 MG/1
25 TABLET ORAL DAILY
Qty: 90 TABLET | Refills: 3 | Status: SHIPPED | OUTPATIENT
Start: 2024-04-29

## 2024-05-13 DIAGNOSIS — I50.42 CHRONIC COMBINED SYSTOLIC AND DIASTOLIC CONGESTIVE HEART FAILURE: Primary | ICD-10-CM

## 2024-05-17 ENCOUNTER — TELEPHONE (OUTPATIENT)
Dept: TRANSPLANT | Facility: CLINIC | Age: 44
End: 2024-05-17
Payer: MEDICAID

## 2024-05-17 NOTE — TELEPHONE ENCOUNTER
"5/17/24  1045 am: I returned call to this phone number  And "not a working number for your market"            ----- Message from Katrin Mendoza MA sent at 5/16/2024 12:04 PM CDT -----  Regarding: FW: Authorization  When I call they are give me the run around  ----- Message -----  From: Porsche Shine RN  Sent: 5/16/2024  11:54 AM CDT  To: Katrin Mendoza MA  Subject: FW: Authorization                                Please see what this in reference to     The message is very vague    Let me know    Thanks  Wendy  ----- Message -----  From: Jordyn Perrin  Sent: 5/16/2024  11:44 AM CDT  To: [facility] Heart Failure Clinical  Subject: Authorization                                    Gaby calling to get an authorization termination. Please call back @ [phone]. Thank you Jordyn  "

## 2024-06-13 ENCOUNTER — HOSPITAL ENCOUNTER (OUTPATIENT)
Dept: CARDIOLOGY | Facility: HOSPITAL | Age: 44
Discharge: HOME | End: 2024-06-13
Attending: INTERNAL MEDICINE
Payer: MEDICAID

## 2024-06-13 VITALS
HEIGHT: 64 IN | HEART RATE: 80 BPM | DIASTOLIC BLOOD PRESSURE: 60 MMHG | SYSTOLIC BLOOD PRESSURE: 116 MMHG | BODY MASS INDEX: 37.73 KG/M2 | WEIGHT: 221 LBS

## 2024-06-13 DIAGNOSIS — I50.42 CHRONIC COMBINED SYSTOLIC AND DIASTOLIC CONGESTIVE HEART FAILURE: ICD-10-CM

## 2024-06-13 LAB
AORTIC ROOT ANNULUS: 1.66 CM
AORTIC VALVE CUSP SEPERATION: 0 CM
AV INDEX (PROSTH): 0.77
AV MEAN GRADIENT: 3 MMHG
AV PEAK GRADIENT: 6 MMHG
AV VALVE AREA BY VELOCITY RATIO: 2.41 CM²
AV VALVE AREA: 2.65 CM²
AV VELOCITY RATIO: 0.7
BSA FOR ECHO PROCEDURE: 2.13 M2
CV ECHO LV RWT: 0.41 CM
DOP CALC AO PEAK VEL: 1.25 M/S
DOP CALC AO VTI: 20.6 CM
DOP CALC LVOT AREA: 3.4 CM2
DOP CALC LVOT DIAMETER: 2.09 CM
DOP CALC LVOT PEAK VEL: 0.88 M/S
DOP CALC LVOT STROKE VOLUME: 54.52 CM3
DOP CALC RVOT PEAK VEL: 0.92 M/S
DOP CALC RVOT VTI: 20.6 CM
DOP CALCLVOT PEAK VEL VTI: 15.9 CM
E WAVE DECELERATION TIME: 132.91 MSEC
E/A RATIO: 1.14
E/E' RATIO: 10.29 M/S
ECHO LV POSTERIOR WALL: 1.16 CM (ref 0.6–1.1)
EJECTION FRACTION: 35 %
FRACTIONAL SHORTENING: 15 % (ref 28–44)
INTERVENTRICULAR SEPTUM: 0.9 CM (ref 0.6–1.1)
IVRT: 121.11 MSEC
LA MAJOR: 3.28 CM
LA MINOR: 3.35 CM
LA WIDTH: 4.3 CM
LEFT ATRIUM SIZE: 2.76 CM
LEFT ATRIUM VOLUME INDEX: 16.4 ML/M2
LEFT ATRIUM VOLUME: 33.44 CM3
LEFT INTERNAL DIMENSION IN SYSTOLE: 4.77 CM (ref 2.1–4)
LEFT VENTRICLE DIASTOLIC VOLUME INDEX: 75.92 ML/M2
LEFT VENTRICLE DIASTOLIC VOLUME: 154.87 ML
LEFT VENTRICLE MASS INDEX: 113 G/M2
LEFT VENTRICLE SYSTOLIC VOLUME INDEX: 52.1 ML/M2
LEFT VENTRICLE SYSTOLIC VOLUME: 106.21 ML
LEFT VENTRICULAR INTERNAL DIMENSION IN DIASTOLE: 5.62 CM (ref 3.5–6)
LEFT VENTRICULAR MASS: 229.8 G
LV LATERAL E/E' RATIO: 10.29 M/S
LV SEPTAL E/E' RATIO: 10.29 M/S
LVOT MG: 1.86 MMHG
LVOT MV: 0.65 CM/S
MV PEAK A VEL: 0.63 M/S
MV PEAK E VEL: 0.72 M/S
MV STENOSIS PRESSURE HALF TIME: 38.54 MS
MV VALVE AREA P 1/2 METHOD: 5.71 CM2
OHS CV RV/LV RATIO: 0.67 CM
PISA TR MAX VEL: 2.19 M/S
PULM VEIN S/D RATIO: 1.23
PV MEAN GRADIENT: 2 MMHG
PV PEAK D VEL: 0.43 M/S
PV PEAK GRADIENT: 4 MMHG
PV PEAK S VEL: 0.53 M/S
PV PEAK VELOCITY: 1.04 M/S
RA MAJOR: 3.65 CM
RA PRESSURE ESTIMATED: 3 MMHG
RA WIDTH: 3.35 CM
RIGHT VENTRICULAR END-DIASTOLIC DIMENSION: 3.74 CM
RV TB RVSP: 5 MMHG
SINUS: 3.1 CM
STJ: 2.88 CM
TDI LATERAL: 0.07 M/S
TDI SEPTAL: 0.07 M/S
TDI: 0.07 M/S
TR MAX PG: 19 MMHG
TRICUSPID ANNULAR PLANE SYSTOLIC EXCURSION: 1.7 CM
TV REST PULMONARY ARTERY PRESSURE: 22 MMHG
Z-SCORE OF LEFT VENTRICULAR DIMENSION IN END DIASTOLE: -0.79
Z-SCORE OF LEFT VENTRICULAR DIMENSION IN END SYSTOLE: 1.95

## 2024-06-13 PROCEDURE — 93306 TTE W/DOPPLER COMPLETE: CPT | Mod: PO

## 2024-06-13 PROCEDURE — 93306 TTE W/DOPPLER COMPLETE: CPT | Mod: 26,,, | Performed by: INTERNAL MEDICINE

## 2024-06-18 ENCOUNTER — LAB VISIT (OUTPATIENT)
Dept: LAB | Facility: HOSPITAL | Age: 44
End: 2024-06-18
Attending: INTERNAL MEDICINE
Payer: MEDICAID

## 2024-06-18 ENCOUNTER — OFFICE VISIT (OUTPATIENT)
Dept: TRANSPLANT | Facility: CLINIC | Age: 44
End: 2024-06-18
Payer: MEDICAID

## 2024-06-18 VITALS
BODY MASS INDEX: 37.46 KG/M2 | DIASTOLIC BLOOD PRESSURE: 70 MMHG | SYSTOLIC BLOOD PRESSURE: 130 MMHG | WEIGHT: 219.44 LBS | HEIGHT: 64 IN

## 2024-06-18 DIAGNOSIS — Z01.89 NEEDS SLEEP APNEA ASSESSMENT: ICD-10-CM

## 2024-06-18 DIAGNOSIS — I50.42 CHRONIC COMBINED SYSTOLIC AND DIASTOLIC CONGESTIVE HEART FAILURE: Primary | ICD-10-CM

## 2024-06-18 DIAGNOSIS — I42.6 ALCOHOLIC CARDIOMYOPATHY: ICD-10-CM

## 2024-06-18 DIAGNOSIS — I42.8 CARDIOMYOPATHY, NONISCHEMIC: ICD-10-CM

## 2024-06-18 DIAGNOSIS — I50.42 CHRONIC COMBINED SYSTOLIC AND DIASTOLIC CONGESTIVE HEART FAILURE: ICD-10-CM

## 2024-06-18 DIAGNOSIS — I50.42 CHRONIC COMBINED SYSTOLIC AND DIASTOLIC HEART FAILURE: ICD-10-CM

## 2024-06-18 PROBLEM — Z76.82 ORGAN TRANSPLANT CANDIDATE: Status: RESOLVED | Noted: 2022-11-10 | Resolved: 2024-06-18

## 2024-06-18 PROBLEM — F10.90 ALCOHOL USE: Status: RESOLVED | Noted: 2022-09-23 | Resolved: 2024-06-18

## 2024-06-18 PROBLEM — Z78.9 ALCOHOL USE: Status: RESOLVED | Noted: 2022-09-23 | Resolved: 2024-06-18

## 2024-06-18 PROBLEM — I27.20 PULMONARY HYPERTENSION: Status: RESOLVED | Noted: 2022-09-22 | Resolved: 2024-06-18

## 2024-06-18 LAB
ANION GAP SERPL CALC-SCNC: 12 MMOL/L (ref 8–16)
BUN SERPL-MCNC: 17 MG/DL (ref 6–20)
CALCIUM SERPL-MCNC: 9.2 MG/DL (ref 8.7–10.5)
CHLORIDE SERPL-SCNC: 101 MMOL/L (ref 95–110)
CO2 SERPL-SCNC: 22 MMOL/L (ref 23–29)
CREAT SERPL-MCNC: 1 MG/DL (ref 0.5–1.4)
EST. GFR  (NO RACE VARIABLE): >60 ML/MIN/1.73 M^2
GLUCOSE SERPL-MCNC: 124 MG/DL (ref 70–110)
POTASSIUM SERPL-SCNC: 3.9 MMOL/L (ref 3.5–5.1)
SODIUM SERPL-SCNC: 135 MMOL/L (ref 136–145)

## 2024-06-18 PROCEDURE — 36415 COLL VENOUS BLD VENIPUNCTURE: CPT | Performed by: INTERNAL MEDICINE

## 2024-06-18 PROCEDURE — 80048 BASIC METABOLIC PNL TOTAL CA: CPT | Performed by: INTERNAL MEDICINE

## 2024-06-18 PROCEDURE — 99213 OFFICE O/P EST LOW 20 MIN: CPT | Mod: PBBFAC | Performed by: INTERNAL MEDICINE

## 2024-06-18 PROCEDURE — 99999 PR PBB SHADOW E&M-EST. PATIENT-LVL III: CPT | Mod: PBBFAC,,, | Performed by: INTERNAL MEDICINE

## 2024-06-18 RX ORDER — METOPROLOL SUCCINATE 25 MG/1
25 TABLET, EXTENDED RELEASE ORAL NIGHTLY
Qty: 30 TABLET | Refills: 11 | Status: SHIPPED | OUTPATIENT
Start: 2024-06-18 | End: 2024-07-02 | Stop reason: SDUPTHER

## 2024-06-18 RX ORDER — SACUBITRIL AND VALSARTAN 49; 51 MG/1; MG/1
1 TABLET, FILM COATED ORAL 2 TIMES DAILY
Qty: 60 TABLET | Refills: 6 | Status: SHIPPED | OUTPATIENT
Start: 2024-06-18

## 2024-06-18 RX ORDER — METFORMIN HYDROCHLORIDE 1000 MG/1
TABLET ORAL
COMMUNITY
Start: 2024-05-21

## 2024-06-18 NOTE — PROGRESS NOTES
Subjective:       HPI:  Mr. La is a very pleasant 43 y.o. year old black male who was referred by our colleague Berenice Barnes for evaluation for advanced HF therapies. This is his 5th visit with us.  His first HF symptoms began September 2022 and at that time he was drinking heavily, consuming 1.5-2 pt of hard liquor per day.  He was able to quit drinking and smoking at that time and has been sober now for almost 7 months.  We had made some adjustments in his GDMT but because of his low BP, OHTx work-up was completed and he was eventually presented at our selection meeting.  It was the Committee's decision to defer patient's for transplant and LVAD candidacy at this time due to ongoing tobacco use as evidence by positive nicotine and cotinine screens. In addition, patient has had an elevated total bilirubin that will need to be evaluated by hepatology.      Today, he is doing well, denies cardiopulmonary complaints. Patient denies N/V/F/C, lightheadedness, dizziness, PND, orthopnea, LE edema, abdominal pain, abdominal pressure, chest pain, chest pressure, syncope, pre-syncope.   Open to making a change in his meds to get even better. TTE earlier demonstrated LVEF 30-35%. Previously thought to be 40%.      TTE 06/13/24:    Left Ventricle: The left ventricle is normal in size. Ventricular mass is normal. Normal wall thickness. There is moderately reduced systolic function with a visually estimated ejection fraction of 30 - 35%. Ejection fraction by visual approximation is 35%. There is normal diastolic function.    Right Ventricle: Normal right ventricular cavity size. Wall thickness is normal. Systolic function is borderline low.    Tricuspid Valve: There is mild regurgitation.    Pulmonary Artery: The estimated pulmonary artery systolic pressure is 22 mmHg.    IVC/SVC: Normal venous pressure at 3 mmHg.      2D Echo done on 2/10/2023  The left ventricle is severely enlarged with eccentric hypertrophy and  "severely decreased systolic function.  Left ventricular diastolic dysfunction.  The estimated PA systolic pressure is 17 mmHg.  Normal right ventricular size with normal right ventricular systolic function.  Normal central venous pressure (3 mmHg).  The estimated ejection fraction is 20%.  There is severe left ventricular global hypokinesis.  Mild mitral regurgitation.  Mild tricuspid regurgitation.    Past Medical History:   Diagnosis Date    Alcoholism in recovery 09/2022    Cardiomyopathy 09/2022    Alcoholic    CHF (congestive heart failure)     Chronic combined systolic and diastolic congestive heart failure 09/17/2022    Pulmonary hypertension     pt reported     Past Surgical History:   Procedure Laterality Date    CATHETERIZATION OF BOTH LEFT AND RIGHT HEART N/A 9/20/2022    Procedure: CATHETERIZATION, HEART, BOTH LEFT AND RIGHT;  Surgeon: Yady Patterson MD;  Location: [facility] CATH LAB;  Service: Cardiology;  Laterality: N/A;    RIGHT HEART CATHETERIZATION Right 10/13/2022    Procedure: INSERTION, CATHETER, RIGHT HEART;  Surgeon: La Nena Ramirez DO;  Location: [facility] CATH LAB;  Service: Cardiology;  Laterality: Right;       Review of Systems   Constitutional: Negative. Negative for chills, decreased appetite, diaphoresis, fever, malaise/fatigue, night sweats, weight gain and weight loss.   Eyes: Negative.    Cardiovascular:  Positive for dyspnea on exertion. Negative for chest pain, claudication, cyanosis, irregular heartbeat, leg swelling, near-syncope, orthopnea, palpitations, paroxysmal nocturnal dyspnea and syncope.   Respiratory:  Negative for cough, hemoptysis and shortness of breath.    Endocrine: Negative.    Hematologic/Lymphatic: Negative.    Skin:  Negative for color change, dry skin and nail changes.   Musculoskeletal: Negative.    Gastrointestinal: Negative.    Genitourinary: Negative.    Neurological:  Negative for weakness.       Objective:   Blood pressure 130/70, pulse (P) 100, height 5' 4" " (1.626 m), weight 99.6 kg (219 lb 7.5 oz).body mass index is 37.67 kg/m².  Physical Exam  Vitals reviewed.   Constitutional:       Appearance: He is well-developed.      Comments:      HENT:      Head: Normocephalic.   Neck:      Vascular: No carotid bruit or JVD.   Cardiovascular:      Rate and Rhythm: Regular rhythm.      Pulses: Normal pulses.      Heart sounds: Normal heart sounds. No murmur heard.  Pulmonary:      Effort: Pulmonary effort is normal.      Breath sounds: Normal breath sounds.   Abdominal:      General: Bowel sounds are normal.      Palpations: Abdomen is soft.   Skin:     General: Skin is warm.   Neurological:      Mental Status: He is alert.         Labs:    Chemistry        Component Value Date/Time     11/21/2023 1116    K 4.1 11/21/2023 1116     11/21/2023 1116    CO2 27 11/21/2023 1116    BUN 21 (H) 11/21/2023 1116    CREATININE 1.1 11/21/2023 1116     (H) 11/21/2023 1116        Component Value Date/Time    CALCIUM 9.9 11/21/2023 1116    ALKPHOS 135 11/21/2023 1116    AST 19 11/21/2023 1116    ALT 24 11/21/2023 1116    BILITOT 0.9 11/21/2023 1116    ESTGFRAFRICA >60.0 07/27/2022 0731    EGFRNONAA >60.0 07/27/2022 0731          Magnesium   Date Value Ref Range Status   11/21/2023 2.0 1.6 - 2.6 mg/dL Final     Lab Results   Component Value Date    WBC 10.31 11/08/2023    HGB 16.0 11/08/2023    HCT 46.2 11/08/2023     11/08/2023     Lab Results   Component Value Date    INR 1.0 10/27/2022    INR 1.0 10/13/2022     BNP   Date Value Ref Range Status   11/21/2023 <10 0 - 99 pg/mL Final     Comment:     Values of less than 100 pg/ml are consistent with non-CHF populations.   11/08/2023 <10 0 - 99 pg/mL Final     Comment:     Values of less than 100 pg/ml are consistent with non-CHF populations.   06/21/2023 24 0 - 99 pg/mL Final     Comment:     Values of less than 100 pg/ml are consistent with non-CHF populations.     LD   Date Value Ref Range Status   12/15/2022 148 110  - 260 U/L Final     Comment:     Results are increased in hemolyzed samples.   10/27/2022 147 110 - 260 U/L Final     Comment:     Results are increased in hemolyzed samples.       Assessment:      1. Chronic combined systolic and diastolic congestive heart failure    2. Alcoholic cardiomyopathy    3. Chronic combined systolic and diastolic heart failure    4. Cardiomyopathy, nonischemic    5. Needs sleep apnea assessment          Plan:   Stage C HFrEF, NICMP likely alcohol induced. In remission now for 2 years. Feels good but interested in getting even better.  Will stop valsartan and switch to entresto 40/51mg po BID.  Repeat labs 1 week.  Followup in [place] to see if we can increase further his GDMT.     Has symptoms consistent with sleep apnea, will refer for sleep clinic visit as well.   Recommend 2 gram sodium restriction and 1500cc fluid restriction.  Encourage physical activity with graded exercise program.  Requested patient to weigh themselves daily, and to notify us if their weight increases by more than 3 lbs in 1 day or 5 lbs in 1 week.

## 2024-06-18 NOTE — LETTER
July 2, 2024        Berenice Barnes  [address]  [place] [address]  Phone: [phone]  Fax: [phone]             [facility]  [address] [place] [address]  Phone: [phone]  Fax: [phone]   Patient: Filemon La   MR Number: [id number]   YOB: 1980   Date of Visit: 6/18/2024       Dear Dr. Berenice Barnes    Thank you for referring Filemon La to me for evaluation. Attached you will find relevant portions of my assessment and plan of care.    If you have questions, please do not hesitate to call me. I look forward to following Filemon La along with you.    Sincerely,    Marcela Christianson MD    Enclosure    If you would like to receive this communication electronically, please contact [email or web address] or [phone] to request [employer] access.    [employer] is a tool which provides read-only access to select patient information with whom you have a relationship. Its easy to use and provides real time access to review your patients record including encounter summaries, notes, results, and demographic information.    If you feel you have received this communication in error or would no longer like to receive these types of communications, please e-mail [email or web address]

## 2024-06-26 ENCOUNTER — LAB VISIT (OUTPATIENT)
Dept: LAB | Facility: HOSPITAL | Age: 44
End: 2024-06-26
Attending: INTERNAL MEDICINE
Payer: MEDICAID

## 2024-06-26 DIAGNOSIS — I42.8 CARDIOMYOPATHY, NONISCHEMIC: ICD-10-CM

## 2024-06-26 LAB
ANION GAP SERPL CALC-SCNC: 11 MMOL/L (ref 8–16)
BUN SERPL-MCNC: 15 MG/DL (ref 6–20)
CALCIUM SERPL-MCNC: 9.9 MG/DL (ref 8.7–10.5)
CHLORIDE SERPL-SCNC: 104 MMOL/L (ref 95–110)
CO2 SERPL-SCNC: 22 MMOL/L (ref 23–29)
CREAT SERPL-MCNC: 1 MG/DL (ref 0.5–1.4)
EST. GFR  (NO RACE VARIABLE): >60 ML/MIN/1.73 M^2
GLUCOSE SERPL-MCNC: 144 MG/DL (ref 70–110)
POTASSIUM SERPL-SCNC: 4.1 MMOL/L (ref 3.5–5.1)
SODIUM SERPL-SCNC: 137 MMOL/L (ref 136–145)

## 2024-06-26 PROCEDURE — 80048 BASIC METABOLIC PNL TOTAL CA: CPT | Mod: PO | Performed by: INTERNAL MEDICINE

## 2024-06-26 PROCEDURE — 36415 COLL VENOUS BLD VENIPUNCTURE: CPT | Mod: PO | Performed by: INTERNAL MEDICINE

## 2024-07-02 ENCOUNTER — OFFICE VISIT (OUTPATIENT)
Dept: TRANSPLANT | Facility: CLINIC | Age: 44
End: 2024-07-02
Payer: MEDICAID

## 2024-07-02 VITALS
DIASTOLIC BLOOD PRESSURE: 60 MMHG | WEIGHT: 220.13 LBS | HEART RATE: 100 BPM | BODY MASS INDEX: 37.58 KG/M2 | HEIGHT: 64 IN | SYSTOLIC BLOOD PRESSURE: 108 MMHG

## 2024-07-02 DIAGNOSIS — I50.42 CHRONIC COMBINED SYSTOLIC AND DIASTOLIC HEART FAILURE: ICD-10-CM

## 2024-07-02 DIAGNOSIS — I42.8 CARDIOMYOPATHY, NONISCHEMIC: ICD-10-CM

## 2024-07-02 DIAGNOSIS — Z01.89 NEEDS SLEEP APNEA ASSESSMENT: ICD-10-CM

## 2024-07-02 DIAGNOSIS — I42.6 ALCOHOLIC CARDIOMYOPATHY: Primary | ICD-10-CM

## 2024-07-02 DIAGNOSIS — I50.42 CHRONIC COMBINED SYSTOLIC AND DIASTOLIC CONGESTIVE HEART FAILURE: ICD-10-CM

## 2024-07-02 PROBLEM — I95.9 HYPOTENSION: Status: RESOLVED | Noted: 2022-09-28 | Resolved: 2024-07-02

## 2024-07-02 PROCEDURE — 99999 PR PBB SHADOW E&M-EST. PATIENT-LVL III: CPT | Mod: PBBFAC,,, | Performed by: INTERNAL MEDICINE

## 2024-07-02 PROCEDURE — 99213 OFFICE O/P EST LOW 20 MIN: CPT | Mod: PBBFAC | Performed by: INTERNAL MEDICINE

## 2024-07-02 RX ORDER — METOPROLOL SUCCINATE 25 MG/1
50 TABLET, EXTENDED RELEASE ORAL NIGHTLY
Qty: 60 TABLET | Refills: 11 | Status: SHIPPED | OUTPATIENT
Start: 2024-07-02 | End: 2025-07-02

## 2024-07-02 NOTE — PATIENT INSTRUCTIONS
Start double toprol XL (metoprolol) dose at 50mg nightly today.     July 8: start new entresto prescription.     July 15- check labs again.

## 2024-07-02 NOTE — LETTER
July 2, 2024        Berenice Barnes  [address]  [place] [address]  Phone: [phone]  Fax: [phone]             [facility]  [address] [place] [address]  Phone: [phone]  Fax: [phone]   Patient: Filemon La   MR Number: [id number]   YOB: 1980   Date of Visit: 7/2/2024       Dear Dr. Berenice Barnes    Thank you for referring Filemon La to me for evaluation. Attached you will find relevant portions of my assessment and plan of care.    If you have questions, please do not hesitate to call me. I look forward to following Filemon La along with you.    Sincerely,    Marcela Christianson MD    Enclosure    If you would like to receive this communication electronically, please contact [email or web address] or [phone] to request [employer] Link access.    [employer] Link is a tool which provides read-only access to select patient information with whom you have a relationship. Its easy to use and provides real time access to review your patients record including encounter summaries, notes, results, and demographic information.    If you feel you have received this communication in error or would no longer like to receive these types of communications, please e-mail [email or web address]

## 2024-07-02 NOTE — PROGRESS NOTES
Subjective:       HPI:  Mr. La is a very pleasant 43 y.o. year old black male who was referred by our colleague Berenice Barnes for evaluation for advanced HF therapies. This is his 5th visit with us.  His first HF symptoms began September 2022 and at that time he was drinking heavily, consuming 1.5-2 pt of hard liquor per day.  He was able to quit drinking and smoking at that time and has been sober now for almost 7 months.  We had made some adjustments in his GDMT but because of his low BP, OHTx work-up was completed and he was eventually presented at our selection meeting.  It was the Committee's decision to defer patient's for transplant and LVAD candidacy at this time due to ongoing tobacco use as evidence by positive nicotine and cotinine screens. In addition, patient has had an elevated total bilirubin that will need to be evaluated by hepatology.      Last visit we adjusted his valsartan and switched to entresto. He tolerated the change well. Denies cardiopulmonary complaints today. Does a lot with his kids and his son's child, stays busy but not exercising. Patient denies N/V/F/C, lightheadedness, dizziness, PND, orthopnea, LE edema, abdominal pain, abdominal pressure, chest pain, chest pressure, syncope, pre-syncope. NYHA FC II-III. Still smoking.      TTE 06/13/24:    Left Ventricle: The left ventricle is normal in size. Ventricular mass is normal. Normal wall thickness. There is moderately reduced systolic function with a visually estimated ejection fraction of 30 - 35%. Ejection fraction by visual approximation is 35%. There is normal diastolic function.    Right Ventricle: Normal right ventricular cavity size. Wall thickness is normal. Systolic function is borderline low.    Tricuspid Valve: There is mild regurgitation.    Pulmonary Artery: The estimated pulmonary artery systolic pressure is 22 mmHg.    IVC/SVC: Normal venous pressure at 3 mmHg.      2D Echo done on 2/10/2023  The left ventricle is  severely enlarged with eccentric hypertrophy and severely decreased systolic function.  Left ventricular diastolic dysfunction.  The estimated PA systolic pressure is 17 mmHg.  Normal right ventricular size with normal right ventricular systolic function.  Normal central venous pressure (3 mmHg).  The estimated ejection fraction is 20%.  There is severe left ventricular global hypokinesis.  Mild mitral regurgitation.  Mild tricuspid regurgitation.    Past Medical History:   Diagnosis Date    Alcoholism in recovery 09/2022    Cardiomyopathy 09/2022    Alcoholic    CHF (congestive heart failure)     Chronic combined systolic and diastolic congestive heart failure 09/17/2022    Pulmonary hypertension     pt reported     Past Surgical History:   Procedure Laterality Date    CATHETERIZATION OF BOTH LEFT AND RIGHT HEART N/A 9/20/2022    Procedure: CATHETERIZATION, HEART, BOTH LEFT AND RIGHT;  Surgeon: Yady Patterson MD;  Location: [facility] CATH LAB;  Service: Cardiology;  Laterality: N/A;    RIGHT HEART CATHETERIZATION Right 10/13/2022    Procedure: INSERTION, CATHETER, RIGHT HEART;  Surgeon: La Nena Ramirez DO;  Location: [facility] CATH LAB;  Service: Cardiology;  Laterality: Right;       Review of Systems   Constitutional: Negative. Negative for chills, decreased appetite, diaphoresis, fever, malaise/fatigue, night sweats, weight gain and weight loss.   Eyes: Negative.    Cardiovascular:  Positive for dyspnea on exertion. Negative for chest pain, claudication, cyanosis, irregular heartbeat, leg swelling, near-syncope, orthopnea, palpitations, paroxysmal nocturnal dyspnea and syncope.   Respiratory:  Negative for cough, hemoptysis and shortness of breath.    Endocrine: Negative.    Hematologic/Lymphatic: Negative.    Skin:  Negative for color change, dry skin and nail changes.   Musculoskeletal: Negative.    Gastrointestinal: Negative.    Genitourinary: Negative.    Neurological:  Negative for weakness.       Objective:  "  Blood pressure 108/60, pulse 100, height 5' 4" (1.626 m), weight 99.9 kg (220 lb 2.1 oz).body mass index is 37.79 kg/m².  Physical Exam  Vitals reviewed.   Constitutional:       Appearance: He is well-developed.      Comments:      HENT:      Head: Normocephalic.   Neck:      Vascular: No carotid bruit or JVD.   Cardiovascular:      Rate and Rhythm: Regular rhythm.      Pulses: Normal pulses.      Heart sounds: Normal heart sounds. No murmur heard.  Pulmonary:      Effort: Pulmonary effort is normal.      Breath sounds: Normal breath sounds.   Abdominal:      General: Bowel sounds are normal.      Palpations: Abdomen is soft.   Skin:     General: Skin is warm.   Neurological:      Mental Status: He is alert.         Labs:  Lab Results   Component Value Date    BNP <10 11/21/2023     06/26/2024    K 4.1 06/26/2024    MG 2.0 11/21/2023     06/26/2024    CO2 22 (L) 06/26/2024    BUN 15 06/26/2024    CREATININE 1.0 06/26/2024     (H) 06/26/2024    HGBA1C 5.5 11/08/2023    AST 19 11/21/2023    ALT 24 11/21/2023    ALBUMIN 4.1 11/21/2023    PROT 8.3 11/21/2023    BILITOT 0.9 11/21/2023    CHOL 174 11/08/2023    HDL 31 (L) 11/08/2023    LDLCALC 118.2 11/08/2023    TRIG 124 11/08/2023       Magnesium   Date Value Ref Range Status   11/21/2023 2.0 1.6 - 2.6 mg/dL Final       Lab Results   Component Value Date    WBC 10.31 11/08/2023    HGB 16.0 11/08/2023    HCT 46.2 11/08/2023    MCV 92 11/08/2023     11/08/2023       Lab Results   Component Value Date    INR 1.0 10/27/2022    INR 1.0 10/13/2022       BNP   Date Value Ref Range Status   11/21/2023 <10 0 - 99 pg/mL Final     Comment:     Values of less than 100 pg/ml are consistent with non-CHF populations.   11/08/2023 <10 0 - 99 pg/mL Final     Comment:     Values of less than 100 pg/ml are consistent with non-CHF populations.   06/21/2023 24 0 - 99 pg/mL Final     Comment:     Values of less than 100 pg/ml are consistent with non-CHF " populations.       Assessment:      1. Alcoholic cardiomyopathy    2. Chronic combined systolic and diastolic congestive heart failure    3. Needs sleep apnea assessment      Plan:   Stage C HFrEF, NICMP likely alcohol induced. In remission now for 2 years. Feels good but interested in getting even better.    Tolerated change to entresto 49/51mg po BID well.  Would like to see if we can do the next highest dose again. He has 10-12 doses left at mid dose. Will have him finish this out, start 97-103mg, about Monday of next week.   When you start next higher entresto dose, please cut torsemide to 10mg daily. He will make this change on July 8,   We will repeat labs on July 15.   Increase toprol XL to 50mg daily. No signs of decompensated heart failure.   GDMT for CHF: entresto, farxiga, toprol XL (only 25mg), aldactone 25mg  Diuretic: torsemide 20mg daily  Needs sleep apnea assessment- did not get scheduled yet, referral placed and authorized from last month.     Has symptoms consistent with sleep apnea, will refer for sleep clinic visit as well.   Recommend 2 gram sodium restriction and 1500cc fluid restriction.  Encourage physical activity with graded exercise program.  Requested patient to weigh themselves daily, and to notify us if their weight increases by more than 3 lbs in 1 day or 5 lbs in 1 week.

## 2024-07-15 ENCOUNTER — LAB VISIT (OUTPATIENT)
Dept: LAB | Facility: HOSPITAL | Age: 44
End: 2024-07-15
Attending: INTERNAL MEDICINE
Payer: MEDICAID

## 2024-07-15 DIAGNOSIS — I50.42 CHRONIC COMBINED SYSTOLIC AND DIASTOLIC CONGESTIVE HEART FAILURE: ICD-10-CM

## 2024-07-15 LAB — BNP SERPL-MCNC: <10 PG/ML (ref 0–99)

## 2024-07-15 PROCEDURE — 83880 ASSAY OF NATRIURETIC PEPTIDE: CPT | Mod: PO | Performed by: INTERNAL MEDICINE

## 2024-07-15 PROCEDURE — 36415 COLL VENOUS BLD VENIPUNCTURE: CPT | Mod: PO | Performed by: INTERNAL MEDICINE

## 2024-07-31 ENCOUNTER — HOSPITAL ENCOUNTER (OUTPATIENT)
Dept: CARDIOLOGY | Facility: HOSPITAL | Age: 44
Discharge: HOME | End: 2024-07-31
Attending: INTERNAL MEDICINE
Payer: MEDICAID

## 2024-07-31 VITALS
BODY MASS INDEX: 37.56 KG/M2 | WEIGHT: 220 LBS | DIASTOLIC BLOOD PRESSURE: 60 MMHG | HEIGHT: 64 IN | SYSTOLIC BLOOD PRESSURE: 108 MMHG

## 2024-07-31 DIAGNOSIS — I50.42 CHRONIC COMBINED SYSTOLIC AND DIASTOLIC CONGESTIVE HEART FAILURE: ICD-10-CM

## 2024-07-31 LAB
AORTIC ROOT ANNULUS: 3.23 CM
AORTIC VALVE CUSP SEPERATION: 0 CM
AV INDEX (PROSTH): 0.7
AV MEAN GRADIENT: 3 MMHG
AV PEAK GRADIENT: 5 MMHG
AV VALVE AREA BY VELOCITY RATIO: 2.77 CM²
AV VALVE AREA: 2.63 CM²
AV VELOCITY RATIO: 0.74
BSA FOR ECHO PROCEDURE: 2.12 M2
CV ECHO LV RWT: 0.38 CM
DOP CALC AO PEAK VEL: 1.1 M/S
DOP CALC AO VTI: 19.6 CM
DOP CALC LVOT AREA: 3.8 CM2
DOP CALC LVOT DIAMETER: 2.19 CM
DOP CALC LVOT PEAK VEL: 0.81 M/S
DOP CALC LVOT STROKE VOLUME: 51.58 CM3
DOP CALC RVOT PEAK VEL: 0.71 M/S
DOP CALC RVOT VTI: 17 CM
DOP CALCLVOT PEAK VEL VTI: 13.7 CM
E WAVE DECELERATION TIME: 149.32 MSEC
E/A RATIO: 1.45
E/E' RATIO: 9.67 M/S
ECHO LV POSTERIOR WALL: 1 CM (ref 0.6–1.1)
EJECTION FRACTION: 35 %
FRACTIONAL SHORTENING: 11 % (ref 28–44)
INTERVENTRICULAR SEPTUM: 0.74 CM (ref 0.6–1.1)
IVRT: 121.11 MSEC
LA MAJOR: 3.29 CM
LA MINOR: 3.65 CM
LA WIDTH: 3.5 CM
LEFT ATRIUM AREA SYSTOLIC (APICAL 2 CHAMBER): 13.36 CM2
LEFT ATRIUM AREA SYSTOLIC (APICAL 4 CHAMBER): 16.83 CM2
LEFT ATRIUM SIZE: 3.22 CM
LEFT ATRIUM VOLUME INDEX: 16.3 ML/M2
LEFT ATRIUM VOLUME: 33.15 CM3
LEFT INTERNAL DIMENSION IN SYSTOLE: 4.69 CM (ref 2.1–4)
LEFT VENTRICLE DIASTOLIC VOLUME INDEX: 65.61 ML/M2
LEFT VENTRICLE DIASTOLIC VOLUME: 133.84 ML
LEFT VENTRICLE END SYSTOLIC VOLUME APICAL 2 CHAMBER: 36.21 ML
LEFT VENTRICLE END SYSTOLIC VOLUME APICAL 4 CHAMBER: 50.67 ML
LEFT VENTRICLE MASS INDEX: 81 G/M2
LEFT VENTRICLE SYSTOLIC VOLUME INDEX: 50 ML/M2
LEFT VENTRICLE SYSTOLIC VOLUME: 101.92 ML
LEFT VENTRICULAR INTERNAL DIMENSION IN DIASTOLE: 5.27 CM (ref 3.5–6)
LEFT VENTRICULAR MASS: 165.43 G
LV LATERAL E/E' RATIO: 8.7 M/S
LV SEPTAL E/E' RATIO: 10.88 M/S
LVED V (TEICH): 133.84 ML
LVES V (TEICH): 101.92 ML
LVOT MG: 1.69 MMHG
LVOT MV: 0.62 CM/S
MV PEAK A VEL: 0.6 M/S
MV PEAK E VEL: 0.87 M/S
MV STENOSIS PRESSURE HALF TIME: 43.3 MS
MV VALVE AREA P 1/2 METHOD: 5.08 CM2
OHS CV RV/LV RATIO: 0.57 CM
PISA TR MAX VEL: 2.19 M/S
PULM VEIN S/D RATIO: 1.7
PV MEAN GRADIENT: 1 MMHG
PV PEAK D VEL: 0.37 M/S
PV PEAK GRADIENT: 4 MMHG
PV PEAK S VEL: 0.63 M/S
PV PEAK VELOCITY: 0.96 M/S
RA MAJOR: 3.19 CM
RA PRESSURE ESTIMATED: 3 MMHG
RA WIDTH: 2.74 CM
RIGHT VENTRICULAR END-DIASTOLIC DIMENSION: 3.02 CM
RV TB RVSP: 5 MMHG
SINUS: 3 CM
STJ: 2.86 CM
TDI LATERAL: 0.1 M/S
TDI SEPTAL: 0.08 M/S
TDI: 0.09 M/S
TR MAX PG: 19 MMHG
TRICUSPID ANNULAR PLANE SYSTOLIC EXCURSION: 1.3 CM
TV REST PULMONARY ARTERY PRESSURE: 22 MMHG
Z-SCORE OF LEFT VENTRICULAR DIMENSION IN END DIASTOLE: -1.43
Z-SCORE OF LEFT VENTRICULAR DIMENSION IN END SYSTOLE: 1.82

## 2024-07-31 PROCEDURE — 93306 TTE W/DOPPLER COMPLETE: CPT | Mod: 26,,, | Performed by: INTERNAL MEDICINE

## 2024-07-31 PROCEDURE — 93306 TTE W/DOPPLER COMPLETE: CPT | Mod: PO

## 2025-01-26 RX ORDER — SACUBITRIL AND VALSARTAN 49; 51 MG/1; MG/1
1 TABLET, FILM COATED ORAL 2 TIMES DAILY
Qty: 60 TABLET | Refills: 0 | Status: SHIPPED | OUTPATIENT
Start: 2025-01-26 | End: 2025-01-28

## 2025-01-28 RX ORDER — SACUBITRIL AND VALSARTAN 49; 51 MG/1; MG/1
1 TABLET, FILM COATED ORAL 2 TIMES DAILY
Qty: 60 TABLET | Refills: 5 | Status: SHIPPED | OUTPATIENT
Start: 2025-01-28

## 2025-04-01 NOTE — Clinical Note
A venogram was performed in the left brachial vein. The vessel was injected via hand injection  with 5 mL of contrast. Needs new sig

## 2025-05-17 ENCOUNTER — HOSPITAL ENCOUNTER (EMERGENCY)
Facility: HOSPITAL | Age: 45
Discharge: HOME | End: 2025-05-17
Attending: EMERGENCY MEDICINE
Payer: MEDICARE

## 2025-05-17 VITALS
OXYGEN SATURATION: 96 % | HEART RATE: 108 BPM | SYSTOLIC BLOOD PRESSURE: 132 MMHG | WEIGHT: 184.75 LBS | DIASTOLIC BLOOD PRESSURE: 69 MMHG | BODY MASS INDEX: 31.71 KG/M2 | TEMPERATURE: 98 F | RESPIRATION RATE: 21 BRPM

## 2025-05-17 DIAGNOSIS — R06.02 SHORTNESS OF BREATH: ICD-10-CM

## 2025-05-17 DIAGNOSIS — I50.9 CONGESTIVE HEART FAILURE, UNSPECIFIED HF CHRONICITY, UNSPECIFIED HEART FAILURE TYPE: Primary | ICD-10-CM

## 2025-05-17 LAB
ABSOLUTE EOSINOPHIL (OHS): 0.12 K/UL
ABSOLUTE MONOCYTE (OHS): 0.56 K/UL (ref 0.3–1)
ABSOLUTE NEUTROPHIL COUNT (OHS): 4.42 K/UL (ref 1.8–7.7)
ALBUMIN SERPL BCP-MCNC: 3.9 G/DL (ref 3.5–5.2)
ALP SERPL-CCNC: 107 UNIT/L (ref 40–150)
ALT SERPL W/O P-5'-P-CCNC: 13 UNIT/L (ref 10–44)
ANION GAP (OHS): 14 MMOL/L (ref 8–16)
AST SERPL-CCNC: 16 UNIT/L (ref 11–45)
BASOPHILS # BLD AUTO: 0.04 K/UL
BASOPHILS NFR BLD AUTO: 0.4 %
BILIRUB SERPL-MCNC: 1.4 MG/DL (ref 0.1–1)
BNP SERPL-MCNC: <10 PG/ML (ref 0–99)
BUN SERPL-MCNC: 16 MG/DL (ref 6–20)
CALCIUM SERPL-MCNC: 8.5 MG/DL (ref 8.7–10.5)
CHLORIDE SERPL-SCNC: 105 MMOL/L (ref 95–110)
CO2 SERPL-SCNC: 17 MMOL/L (ref 23–29)
CREAT SERPL-MCNC: 1.1 MG/DL (ref 0.5–1.4)
ERYTHROCYTE [DISTWIDTH] IN BLOOD BY AUTOMATED COUNT: 13.2 % (ref 11.5–14.5)
GFR SERPLBLD CREATININE-BSD FMLA CKD-EPI: >60 ML/MIN/1.73/M2
GLUCOSE SERPL-MCNC: 92 MG/DL (ref 70–110)
HCT VFR BLD AUTO: 41.6 % (ref 40–54)
HGB BLD-MCNC: 14.4 GM/DL (ref 14–18)
HOLD SPECIMEN: NORMAL
IMM GRANULOCYTES # BLD AUTO: 0.02 K/UL (ref 0–0.04)
IMM GRANULOCYTES NFR BLD AUTO: 0.2 % (ref 0–0.5)
LYMPHOCYTES # BLD AUTO: 4.04 K/UL (ref 1–4.8)
MCH RBC QN AUTO: 31.6 PG (ref 27–31)
MCHC RBC AUTO-ENTMCNC: 34.6 G/DL (ref 32–36)
MCV RBC AUTO: 91 FL (ref 82–98)
NUCLEATED RBC (/100WBC) (OHS): 0 /100 WBC
PLATELET # BLD AUTO: 202 K/UL (ref 150–450)
PMV BLD AUTO: 11.4 FL (ref 9.2–12.9)
POTASSIUM SERPL-SCNC: 3.6 MMOL/L (ref 3.5–5.1)
PROT SERPL-MCNC: 7.9 GM/DL (ref 6–8.4)
RBC # BLD AUTO: 4.56 M/UL (ref 4.6–6.2)
RELATIVE EOSINOPHIL (OHS): 1.3 %
RELATIVE LYMPHOCYTE (OHS): 43.9 % (ref 18–48)
RELATIVE MONOCYTE (OHS): 6.1 % (ref 4–15)
RELATIVE NEUTROPHIL (OHS): 48.1 % (ref 38–73)
SODIUM SERPL-SCNC: 136 MMOL/L (ref 136–145)
TROPONIN I SERPL DL<=0.01 NG/ML-MCNC: <0.006 NG/ML
WBC # BLD AUTO: 9.2 K/UL (ref 3.9–12.7)

## 2025-05-17 PROCEDURE — 80053 COMPREHEN METABOLIC PANEL: CPT | Mod: ER | Performed by: EMERGENCY MEDICINE

## 2025-05-17 PROCEDURE — 87389 HIV-1 AG W/HIV-1&-2 AB AG IA: CPT | Performed by: EMERGENCY MEDICINE

## 2025-05-17 PROCEDURE — 94761 N-INVAS EAR/PLS OXIMETRY MLT: CPT | Mod: ER

## 2025-05-17 PROCEDURE — 84484 ASSAY OF TROPONIN QUANT: CPT | Mod: ER | Performed by: EMERGENCY MEDICINE

## 2025-05-17 PROCEDURE — 93010 ELECTROCARDIOGRAM REPORT: CPT | Mod: ,,, | Performed by: INTERNAL MEDICINE

## 2025-05-17 PROCEDURE — 83880 ASSAY OF NATRIURETIC PEPTIDE: CPT | Mod: ER | Performed by: EMERGENCY MEDICINE

## 2025-05-17 PROCEDURE — 86803 HEPATITIS C AB TEST: CPT | Performed by: EMERGENCY MEDICINE

## 2025-05-17 PROCEDURE — 85025 COMPLETE CBC W/AUTO DIFF WBC: CPT | Mod: ER | Performed by: EMERGENCY MEDICINE

## 2025-05-17 PROCEDURE — 93005 ELECTROCARDIOGRAM TRACING: CPT | Mod: ER

## 2025-05-17 PROCEDURE — 99285 EMERGENCY DEPT VISIT HI MDM: CPT | Mod: 25,ER

## 2025-05-17 RX ORDER — FUROSEMIDE 10 MG/ML
80 INJECTION INTRAMUSCULAR; INTRAVENOUS
Status: DISCONTINUED | OUTPATIENT
Start: 2025-05-17 | End: 2025-05-17

## 2025-05-17 NOTE — ED PROVIDER NOTES
Encounter Date: 5/17/2025       History     Chief Complaint   Patient presents with    Congestive Heart Failure     Admits hx chf. 2 weeks SOB. Fluid pill dose decreased recently.      The history is provided by the patient.   Congestive Heart Failure  This is a chronic problem. The current episode started more than 1 week ago. The problem occurs daily. The problem has not changed since onset.Associated symptoms include shortness of breath. Pertinent negatives include no chest pain, no abdominal pain and no headaches.   Pt reports decreasing diuretic dose several weeks ago secondary to decreased oral intake after starting Ozempic per pt.    Review of patient's allergies indicates:  No Known Allergies  Past Medical History:   Diagnosis Date    Alcoholism in recovery 09/2022    Cardiomyopathy 09/2022    Alcoholic    CHF (congestive heart failure)     Chronic combined systolic and diastolic congestive heart failure 09/17/2022    Pulmonary hypertension     pt reported     Past Surgical History:   Procedure Laterality Date    CATHETERIZATION OF BOTH LEFT AND RIGHT HEART N/A 9/20/2022    Procedure: CATHETERIZATION, HEART, BOTH LEFT AND RIGHT;  Surgeon: Yady Patterson MD;  Location: [facility] CATH LAB;  Service: Cardiology;  Laterality: N/A;    RIGHT HEART CATHETERIZATION Right 10/13/2022    Procedure: INSERTION, CATHETER, RIGHT HEART;  Surgeon: La Nena Ramirez DO;  Location: [facility] CATH LAB;  Service: Cardiology;  Laterality: Right;     Family History   Problem Relation Name Age of Onset    Hypertension Mother      Diabetes Mother      Hypertension Father       Social History[1]  Review of Systems   Constitutional:  Negative for fever.   HENT:  Negative for sore throat.    Respiratory:  Positive for shortness of breath.    Cardiovascular:  Positive for leg swelling. Negative for chest pain.   Gastrointestinal:  Negative for abdominal pain and nausea.   Genitourinary:  Negative for dysuria.   Musculoskeletal:  Negative for  back pain.   Skin:  Negative for rash.   Neurological:  Negative for weakness and headaches.   Hematological:  Does not bruise/bleed easily.       Physical Exam     Initial Vitals [05/17/25 1804]   BP Pulse Resp Temp SpO2   132/69 (!) 112 20 97.7 °F (36.5 °C) 98 %      MAP       --         Physical Exam    Nursing note and vitals reviewed.  Constitutional: He appears well-developed and well-nourished.   HENT:   Head: Normocephalic and atraumatic. Mouth/Throat: Oropharynx is clear and moist.   Eyes: Conjunctivae and EOM are normal. Pupils are equal, round, and reactive to light.   Neck: Neck supple.   Normal range of motion.  Cardiovascular:  Normal rate, regular rhythm and normal heart sounds.           Pulmonary/Chest: Breath sounds normal.   Abdominal: Abdomen is soft. Bowel sounds are normal.   Musculoskeletal:         General: No edema. Normal range of motion.      Cervical back: Normal range of motion and neck supple.     Neurological: He is alert and oriented to person, place, and time. He has normal strength.   Skin: Skin is warm and dry.   Psychiatric: He has a normal mood and affect. Thought content normal.         ED Course   Procedures  Labs Reviewed   COMPREHENSIVE METABOLIC PANEL - Abnormal       Result Value    Sodium 136      Potassium 3.6      Chloride 105      CO2 17 (*)     Glucose 92      BUN 16      Creatinine 1.1      Calcium 8.5 (*)     Protein Total 7.9      Albumin 3.9      Bilirubin Total 1.4 (*)           AST 16      ALT 13      Anion Gap 14      eGFR >60     CBC WITH DIFFERENTIAL - Abnormal    WBC 9.20      RBC 4.56 (*)     HGB 14.4      HCT 41.6      MCV 91      MCH 31.6 (*)     MCHC 34.6      RDW 13.2      Platelet Count 202      MPV 11.4      Nucleated RBC 0      Neut % 48.1      Lymph % 43.9      Mono % 6.1      Eos % 1.3      Basophil % 0.4      Imm Grans % 0.2      Neut # 4.42      Lymph # 4.04      Mono # 0.56      Eos # 0.12      Baso # 0.04      Imm Grans # 0.02      TROPONIN I - Normal    Troponin-I <0.006     B-TYPE NATRIURETIC PEPTIDE - Normal    BNP <10     HEP C VIRUS HOLD SPECIMEN    Extra Tube Hold for add-ons.     CBC W/ AUTO DIFFERENTIAL    Narrative:     The following orders were created for panel order CBC auto differential.  Procedure                               Abnormality         Status                     ---------                               -----------         ------                     CBC with Differential[[id number]]       Abnormal            Final result                 Please view results for these tests on the individual orders.   HEPATITIS C ANTIBODY   HIV 1 / 2 ANTIBODY     EKG Readings: (Independently Interpreted)   Rhythm: Normal Sinus Rhythm. Heart Rate: 95. Conduction: RBBB. Clinical Impression: Normal Sinus Rhythm       Imaging Results              X-Ray Chest AP Portable (Final result)  Result time 05/17/25 18:52:15      Final result by Fox Everett MD (05/17/25 18:52:15)                   Impression:      No acute findings.    Finalized on: 5/17/2025 6:52 PM By:  Fox Everett MD  [facility]# [id number]      2025-05-17 18:54:[id number]     [facility]               Narrative:    EXAM:  XR CHEST AP PORTABLE    CLINICAL HISTORY: CHF;    COMPARISON STUDIES: 02/10/2025    FINDINGS:  The heart size is normal.  The mediastinal silhouette is within normal limits.    The lungs are clear. No pleural effusion.    No acute osseous findings.  Age appropriate or no arthritic change.                                    11:48 PM - Counseling: Spoke with the patient and discussed todays findings, in addition to providing specific details for the plan of care and counseling regarding the diagnosis and prognosis. Questions are answered at this time.        Medications - No data to display    Medical Decision Making  DDx CHF, medication dose change, edema, acs    Amount and/or Complexity of Data Reviewed  Labs: ordered.  Radiology: ordered.  ECG/medicine tests: ordered and  independent interpretation performed. Decision-making details documented in ED Course.    Risk  Prescription drug management.                                      Clinical Impression:  Final diagnoses:  [R06.02] Shortness of breath  [I50.9] Congestive heart failure, unspecified HF chronicity, unspecified heart failure type (Primary)          ED Disposition Condition    Discharge Stable          ED Prescriptions    None       Follow-up Information       Follow up With Specialties Details Why Contact Info    PCP  Call in 2 days      [facility] - Emergency Dept Emergency Medicine  If symptoms worsen [id number]  Emergency Department  [facility] [id number] [phone]                 [1]   Social History  Tobacco Use    Smoking status: Former     Current packs/day: 0.00     Types: Cigarettes     Quit date: 2022     Years since quittin.7    Smokeless tobacco: Never   Substance Use Topics    Alcohol use: Not Currently     Comment: was drinking 1.5-2.0 pints of hard liquor/day until 2022    Drug use: Never        González Wilkes MD  [phone]

## 2025-05-18 LAB
HCV AB SERPL QL IA: NEGATIVE
HIV 1+2 AB+HIV1 P24 AG SERPL QL IA: NEGATIVE
OHS QRS DURATION: 132 MS
OHS QTC CALCULATION: 480 MS

## 2025-05-29 ENCOUNTER — HOSPITAL ENCOUNTER (EMERGENCY)
Facility: HOSPITAL | Age: 45
Discharge: HOME | End: 2025-05-29
Attending: EMERGENCY MEDICINE
Payer: MEDICARE

## 2025-05-29 VITALS
TEMPERATURE: 98 F | HEART RATE: 93 BPM | RESPIRATION RATE: 18 BRPM | OXYGEN SATURATION: 97 % | DIASTOLIC BLOOD PRESSURE: 56 MMHG | BODY MASS INDEX: 32.69 KG/M2 | HEIGHT: 64 IN | SYSTOLIC BLOOD PRESSURE: 102 MMHG | WEIGHT: 191.5 LBS

## 2025-05-29 DIAGNOSIS — M79.602 LEFT ARM PAIN: Primary | ICD-10-CM

## 2025-05-29 PROCEDURE — 99284 EMERGENCY DEPT VISIT MOD MDM: CPT | Mod: 25,ER

## 2025-06-19 DIAGNOSIS — I50.42 CHRONIC COMBINED SYSTOLIC AND DIASTOLIC HEART FAILURE: ICD-10-CM

## 2025-06-19 DIAGNOSIS — I42.8 CARDIOMYOPATHY, NONISCHEMIC: ICD-10-CM

## 2025-06-19 RX ORDER — METOPROLOL SUCCINATE 25 MG/1
50 TABLET, EXTENDED RELEASE ORAL NIGHTLY
Qty: 180 TABLET | Refills: 0 | Status: SHIPPED | OUTPATIENT
Start: 2025-06-19

## (undated) DEVICE — CATH INFINITI MULTIPAK JR4 5FR

## (undated) DEVICE — SET MICROPUNCTURE 5FR 501NT

## (undated) DEVICE — CATH INFINITI MP JL3.5 JR4 5FR

## (undated) DEVICE — CATH JL3.5 5FR

## (undated) DEVICE — KIT GLIDESHEATH SLEND 6FR 10CM

## (undated) DEVICE — CATH CV QD LUMN 6FRX110CM

## (undated) DEVICE — CONTRAST VISIPAQUE 150ML

## (undated) DEVICE — SHEATH INTRODUCER 7FR 11CM

## (undated) DEVICE — CATH JR4 5FR

## (undated) DEVICE — ANGIOTOUCH KIT

## (undated) DEVICE — KIT MANIFOLD LOW PRESS TUBING

## (undated) DEVICE — PACK CATH LAB CUSTOM BR

## (undated) DEVICE — GUIDE WIRE J-TIP 260CM .025

## (undated) DEVICE — SEE MEDLINE ITEM 156894

## (undated) DEVICE — CATH JL4 5FR

## (undated) DEVICE — SEE MEDLINE ITEM 157187

## (undated) DEVICE — BAND TR COMP DEVICE REG 24CM

## (undated) DEVICE — KIT PROBE COVER WITH GEL

## (undated) DEVICE — CATH PIG145 INFINITI 5X110CM

## (undated) DEVICE — GUIDEWIRE EMERALD .035IN 260CM

## (undated) DEVICE — KIT SYR REUSABLE

## (undated) DEVICE — GUIDEWIRE WHOLEY HI TORQ 175CM

## (undated) DEVICE — CATH SWAN GANZ STND 7FR